# Patient Record
Sex: FEMALE | Race: BLACK OR AFRICAN AMERICAN | NOT HISPANIC OR LATINO | Employment: OTHER | URBAN - METROPOLITAN AREA
[De-identification: names, ages, dates, MRNs, and addresses within clinical notes are randomized per-mention and may not be internally consistent; named-entity substitution may affect disease eponyms.]

---

## 2023-08-23 ENCOUNTER — LAB (OUTPATIENT)
Dept: LAB | Facility: LAB | Age: 88
End: 2023-08-23
Payer: COMMERCIAL

## 2023-08-23 LAB
CHOLESTEROL (MG/DL) IN SER/PLAS: 109 MG/DL (ref 0–199)
CHOLESTEROL IN HDL (MG/DL) IN SER/PLAS: 44.1 MG/DL
CHOLESTEROL/HDL RATIO: 2.5
LDL: 53 MG/DL (ref 0–99)
TRIGLYCERIDE (MG/DL) IN SER/PLAS: 59 MG/DL (ref 0–149)
VLDL: 12 MG/DL (ref 0–40)

## 2023-10-12 DIAGNOSIS — R60.1 GENERALIZED EDEMA: Primary | ICD-10-CM

## 2023-10-12 PROBLEM — R32 INCONTINENCE OF URINE: Status: ACTIVE | Noted: 2023-10-12

## 2023-10-12 PROBLEM — I25.10 ATHEROSCLEROSIS OF CORONARY ARTERY: Status: ACTIVE | Noted: 2022-09-27

## 2023-10-12 PROBLEM — R42 VERTIGO: Status: ACTIVE | Noted: 2022-09-27

## 2023-10-12 PROBLEM — M47.816 LUMBAR SPONDYLOSIS: Status: ACTIVE | Noted: 2023-10-12

## 2023-10-12 PROBLEM — M17.9 OSTEOARTHRITIS OF KNEE: Status: ACTIVE | Noted: 2023-10-12

## 2023-10-12 PROBLEM — E66.9 OBESITY: Status: ACTIVE | Noted: 2023-10-12

## 2023-10-12 PROBLEM — H55.09: Status: ACTIVE | Noted: 2023-10-12

## 2023-10-12 PROBLEM — I48.0 PAROXYSMAL ATRIAL FIBRILLATION (MULTI): Status: ACTIVE | Noted: 2023-10-12

## 2023-10-12 PROBLEM — Z96.1 PSEUDOPHAKIA OF BOTH EYES: Status: ACTIVE | Noted: 2023-10-12

## 2023-10-12 PROBLEM — I63.9 CEREBELLAR STROKE (MULTI): Status: ACTIVE | Noted: 2023-10-12

## 2023-10-12 PROBLEM — D36.10 SCHWANNOMA: Status: ACTIVE | Noted: 2023-10-12

## 2023-10-12 PROBLEM — R33.9 URINARY RETENTION: Status: ACTIVE | Noted: 2023-10-12

## 2023-10-12 PROBLEM — R00.1 BRADYCARDIA: Status: ACTIVE | Noted: 2022-10-02

## 2023-10-12 PROBLEM — I50.30 HEART FAILURE WITH PRESERVED LEFT VENTRICULAR FUNCTION (HFPEF) (MULTI): Status: ACTIVE | Noted: 2023-10-12

## 2023-10-12 PROBLEM — I50.32 CHRONIC DIASTOLIC HEART FAILURE (MULTI): Status: ACTIVE | Noted: 2023-10-12

## 2023-10-12 PROBLEM — D64.9 CHRONIC ANEMIA: Status: ACTIVE | Noted: 2022-09-27

## 2023-10-12 PROBLEM — R22.1 MASS OF PARAPHARYNGEAL SPACE: Status: ACTIVE | Noted: 2023-10-12

## 2023-10-12 PROBLEM — G50.0 TRIGEMINAL NEURALGIA: Status: ACTIVE | Noted: 2023-10-12

## 2023-10-12 PROBLEM — N94.89 ADNEXAL MASS: Status: ACTIVE | Noted: 2023-10-12

## 2023-10-12 PROBLEM — M48.00 SPINAL STENOSIS: Status: ACTIVE | Noted: 2023-10-12

## 2023-10-12 PROBLEM — I51.7 LAE (LEFT ATRIAL ENLARGEMENT): Status: ACTIVE | Noted: 2023-10-12

## 2023-10-12 PROBLEM — E78.5 HYPERLIPIDEMIA: Status: ACTIVE | Noted: 2023-10-12

## 2023-10-12 PROBLEM — K11.8 PAROTID MASS: Status: ACTIVE | Noted: 2023-10-12

## 2023-10-12 PROBLEM — R73.03 PRE-DIABETES: Status: ACTIVE | Noted: 2023-10-12

## 2023-10-12 PROBLEM — K21.9 GERD (GASTROESOPHAGEAL REFLUX DISEASE): Status: ACTIVE | Noted: 2023-10-12

## 2023-10-12 PROBLEM — H90.3 BILATERAL SENSORINEURAL HEARING LOSS: Status: ACTIVE | Noted: 2023-10-12

## 2023-10-12 PROBLEM — G47.33 OBSTRUCTIVE SLEEP APNEA SYNDROME: Status: ACTIVE | Noted: 2019-04-02

## 2023-10-12 PROBLEM — Z86.73 HISTORY OF CVA (CEREBROVASCULAR ACCIDENT): Status: ACTIVE | Noted: 2023-10-12

## 2023-10-12 PROBLEM — R94.31 ABNORMAL ECG: Status: ACTIVE | Noted: 2023-10-12

## 2023-10-12 RX ORDER — CALCIUM CARBONATE 200(500)MG
1 TABLET,CHEWABLE ORAL EVERY 4 HOURS PRN
COMMUNITY
Start: 2022-11-28 | End: 2024-02-27 | Stop reason: WASHOUT

## 2023-10-12 RX ORDER — DOCUSATE SODIUM 100 MG/1
100 CAPSULE, LIQUID FILLED ORAL 2 TIMES DAILY
COMMUNITY
Start: 2022-05-23

## 2023-10-12 RX ORDER — MULTIVITAMIN
1 TABLET ORAL DAILY
COMMUNITY
Start: 2013-08-01

## 2023-10-12 RX ORDER — METOLAZONE 5 MG/1
5 TABLET ORAL
COMMUNITY
Start: 2023-09-06 | End: 2024-01-09 | Stop reason: HOSPADM

## 2023-10-12 RX ORDER — LOSARTAN POTASSIUM 50 MG/1
50 TABLET ORAL DAILY
COMMUNITY
Start: 2023-10-02 | End: 2024-01-09 | Stop reason: HOSPADM

## 2023-10-12 RX ORDER — FUROSEMIDE 40 MG/1
1 TABLET ORAL DAILY
COMMUNITY
Start: 2022-10-24 | End: 2023-12-21 | Stop reason: ENTERED-IN-ERROR

## 2023-10-12 RX ORDER — ATORVASTATIN CALCIUM 20 MG/1
1 TABLET, FILM COATED ORAL NIGHTLY
COMMUNITY
Start: 2019-09-24

## 2023-10-12 RX ORDER — ALBUTEROL SULFATE 90 UG/1
2 AEROSOL, METERED RESPIRATORY (INHALATION) EVERY 4 HOURS PRN
COMMUNITY
Start: 2023-01-27

## 2023-10-12 RX ORDER — GABAPENTIN 100 MG/1
CAPSULE ORAL
COMMUNITY
Start: 2023-05-06 | End: 2023-12-21 | Stop reason: ENTERED-IN-ERROR

## 2023-10-12 RX ORDER — FOLIC ACID 1 MG/1
1 TABLET ORAL DAILY
COMMUNITY

## 2023-10-12 RX ORDER — FAMOTIDINE 20 MG/1
1 TABLET, FILM COATED ORAL DAILY
COMMUNITY
Start: 2022-10-24

## 2023-10-12 RX ORDER — APIXABAN 5 MG/1
5 TABLET, FILM COATED ORAL 2 TIMES DAILY
COMMUNITY
Start: 2023-09-03

## 2023-10-12 RX ORDER — CEPHRADINE 500 MG
1 CAPSULE ORAL DAILY
COMMUNITY
Start: 2022-11-28 | End: 2023-12-21 | Stop reason: ENTERED-IN-ERROR

## 2023-12-21 ENCOUNTER — APPOINTMENT (OUTPATIENT)
Dept: CARDIOLOGY | Facility: HOSPITAL | Age: 88
DRG: 242 | End: 2023-12-21
Payer: COMMERCIAL

## 2023-12-21 ENCOUNTER — APPOINTMENT (OUTPATIENT)
Dept: RADIOLOGY | Facility: HOSPITAL | Age: 88
DRG: 242 | End: 2023-12-21
Payer: COMMERCIAL

## 2023-12-21 ENCOUNTER — HOSPITAL ENCOUNTER (INPATIENT)
Facility: HOSPITAL | Age: 88
LOS: 18 days | Discharge: NURSING, REHAB OR LONG-TERM CARE | DRG: 242 | End: 2024-01-09
Attending: EMERGENCY MEDICINE | Admitting: INTERNAL MEDICINE
Payer: COMMERCIAL

## 2023-12-21 DIAGNOSIS — M79.89 SWELLING OF EXTREMITY, RIGHT: ICD-10-CM

## 2023-12-21 DIAGNOSIS — E87.3 METABOLIC ALKALOSIS: ICD-10-CM

## 2023-12-21 DIAGNOSIS — N18.30 STAGE 3 CHRONIC KIDNEY DISEASE, UNSPECIFIED WHETHER STAGE 3A OR 3B CKD (MULTI): ICD-10-CM

## 2023-12-21 DIAGNOSIS — N14.11 CONTRAST-INDUCED NEPHROPATHY: ICD-10-CM

## 2023-12-21 DIAGNOSIS — R60.9 SWELLING: ICD-10-CM

## 2023-12-21 DIAGNOSIS — E66.01 MORBID OBESITY (MULTI): ICD-10-CM

## 2023-12-21 DIAGNOSIS — T50.8X5A CONTRAST-INDUCED NEPHROPATHY: ICD-10-CM

## 2023-12-21 DIAGNOSIS — I50.41 ACUTE COMBINED SYSTOLIC AND DIASTOLIC HEART FAILURE (MULTI): ICD-10-CM

## 2023-12-21 DIAGNOSIS — I50.30 HEART FAILURE WITH PRESERVED LEFT VENTRICULAR FUNCTION (HFPEF) (MULTI): ICD-10-CM

## 2023-12-21 DIAGNOSIS — I48.0 PAROXYSMAL ATRIAL FIBRILLATION (MULTI): ICD-10-CM

## 2023-12-21 DIAGNOSIS — D63.1 ANEMIA DUE TO STAGE 3A CHRONIC KIDNEY DISEASE (MULTI): ICD-10-CM

## 2023-12-21 DIAGNOSIS — J90 PLEURAL EFFUSION: ICD-10-CM

## 2023-12-21 DIAGNOSIS — R45.1 AGITATION: ICD-10-CM

## 2023-12-21 DIAGNOSIS — R06.02 SHORTNESS OF BREATH: ICD-10-CM

## 2023-12-21 DIAGNOSIS — R79.89 ELEVATED BRAIN NATRIURETIC PEPTIDE (BNP) LEVEL: ICD-10-CM

## 2023-12-21 DIAGNOSIS — N18.31 STAGE 3A CHRONIC KIDNEY DISEASE (MULTI): ICD-10-CM

## 2023-12-21 DIAGNOSIS — E11.22 TYPE 2 DIABETES MELLITUS WITH CHRONIC KIDNEY DISEASE, WITHOUT LONG-TERM CURRENT USE OF INSULIN, UNSPECIFIED CKD STAGE (MULTI): ICD-10-CM

## 2023-12-21 DIAGNOSIS — R06.81 APNEA: ICD-10-CM

## 2023-12-21 DIAGNOSIS — I50.31 ACUTE HEART FAILURE WITH PRESERVED EJECTION FRACTION (MULTI): ICD-10-CM

## 2023-12-21 DIAGNOSIS — I50.33 ACUTE ON CHRONIC HEART FAILURE WITH PRESERVED EJECTION FRACTION (MULTI): Primary | ICD-10-CM

## 2023-12-21 DIAGNOSIS — N17.9 AKI (ACUTE KIDNEY INJURY) (CMS-HCC): ICD-10-CM

## 2023-12-21 DIAGNOSIS — N18.31 ANEMIA DUE TO STAGE 3A CHRONIC KIDNEY DISEASE (MULTI): ICD-10-CM

## 2023-12-21 PROBLEM — R73.03 PRE-DIABETES: Status: RESOLVED | Noted: 2023-10-12 | Resolved: 2023-12-21

## 2023-12-21 PROBLEM — J96.01 ACUTE RESPIRATORY FAILURE WITH HYPOXIA (MULTI): Status: ACTIVE | Noted: 2023-12-21

## 2023-12-21 LAB
ALBUMIN SERPL BCP-MCNC: 4.1 G/DL (ref 3.4–5)
ALP SERPL-CCNC: 91 U/L (ref 33–136)
ALT SERPL W P-5'-P-CCNC: 9 U/L (ref 7–45)
ANION GAP SERPL CALC-SCNC: 11 MMOL/L (ref 10–20)
AST SERPL W P-5'-P-CCNC: 11 U/L (ref 9–39)
BASOPHILS # BLD AUTO: 0.01 X10*3/UL (ref 0–0.1)
BASOPHILS NFR BLD AUTO: 0.2 %
BILIRUB SERPL-MCNC: 0.8 MG/DL (ref 0–1.2)
BNP SERPL-MCNC: 982 PG/ML (ref 0–99)
BUN SERPL-MCNC: 26 MG/DL (ref 6–23)
CALCIUM SERPL-MCNC: 8.7 MG/DL (ref 8.6–10.3)
CARDIAC TROPONIN I PNL SERPL HS: 8 NG/L (ref 0–13)
CARDIAC TROPONIN I PNL SERPL HS: 9 NG/L (ref 0–13)
CHLORIDE SERPL-SCNC: 104 MMOL/L (ref 98–107)
CO2 SERPL-SCNC: 31 MMOL/L (ref 21–32)
CREAT SERPL-MCNC: 1.09 MG/DL (ref 0.5–1.05)
EOSINOPHIL # BLD AUTO: 0.15 X10*3/UL (ref 0–0.4)
EOSINOPHIL NFR BLD AUTO: 2.7 %
ERYTHROCYTE [DISTWIDTH] IN BLOOD BY AUTOMATED COUNT: 17.2 % (ref 11.5–14.5)
GFR SERPL CREATININE-BSD FRML MDRD: 50 ML/MIN/1.73M*2
GLUCOSE BLD MANUAL STRIP-MCNC: 80 MG/DL (ref 74–99)
GLUCOSE SERPL-MCNC: 109 MG/DL (ref 74–99)
HCT VFR BLD AUTO: 30.7 % (ref 36–46)
HGB BLD-MCNC: 8.6 G/DL (ref 12–16)
IMM GRANULOCYTES # BLD AUTO: 0.02 X10*3/UL (ref 0–0.5)
IMM GRANULOCYTES NFR BLD AUTO: 0.4 % (ref 0–0.9)
LYMPHOCYTES # BLD AUTO: 0.76 X10*3/UL (ref 0.8–3)
LYMPHOCYTES NFR BLD AUTO: 13.9 %
MAGNESIUM SERPL-MCNC: 2.12 MG/DL (ref 1.6–2.4)
MCH RBC QN AUTO: 28.5 PG (ref 26–34)
MCHC RBC AUTO-ENTMCNC: 28 G/DL (ref 32–36)
MCV RBC AUTO: 102 FL (ref 80–100)
MONOCYTES # BLD AUTO: 0.66 X10*3/UL (ref 0.05–0.8)
MONOCYTES NFR BLD AUTO: 12.1 %
NEUTROPHILS # BLD AUTO: 3.86 X10*3/UL (ref 1.6–5.5)
NEUTROPHILS NFR BLD AUTO: 70.7 %
NRBC BLD-RTO: 0 /100 WBCS (ref 0–0)
PLATELET # BLD AUTO: 155 X10*3/UL (ref 150–450)
POTASSIUM SERPL-SCNC: 4.8 MMOL/L (ref 3.5–5.3)
PROT SERPL-MCNC: 6.2 G/DL (ref 6.4–8.2)
RBC # BLD AUTO: 3.02 X10*6/UL (ref 4–5.2)
SODIUM SERPL-SCNC: 141 MMOL/L (ref 136–145)
WBC # BLD AUTO: 5.5 X10*3/UL (ref 4.4–11.3)

## 2023-12-21 PROCEDURE — 83880 ASSAY OF NATRIURETIC PEPTIDE: CPT | Performed by: EMERGENCY MEDICINE

## 2023-12-21 PROCEDURE — 83735 ASSAY OF MAGNESIUM: CPT | Performed by: STUDENT IN AN ORGANIZED HEALTH CARE EDUCATION/TRAINING PROGRAM

## 2023-12-21 PROCEDURE — 36415 COLL VENOUS BLD VENIPUNCTURE: CPT | Performed by: PHYSICIAN ASSISTANT

## 2023-12-21 PROCEDURE — 99222 1ST HOSP IP/OBS MODERATE 55: CPT | Performed by: NURSE PRACTITIONER

## 2023-12-21 PROCEDURE — 71045 X-RAY EXAM CHEST 1 VIEW: CPT | Mod: FOREIGN READ | Performed by: RADIOLOGY

## 2023-12-21 PROCEDURE — 71275 CT ANGIOGRAPHY CHEST: CPT | Mod: FR

## 2023-12-21 PROCEDURE — 80053 COMPREHEN METABOLIC PANEL: CPT | Performed by: STUDENT IN AN ORGANIZED HEALTH CARE EDUCATION/TRAINING PROGRAM

## 2023-12-21 PROCEDURE — 84075 ASSAY ALKALINE PHOSPHATASE: CPT | Performed by: STUDENT IN AN ORGANIZED HEALTH CARE EDUCATION/TRAINING PROGRAM

## 2023-12-21 PROCEDURE — 99285 EMERGENCY DEPT VISIT HI MDM: CPT | Mod: 25 | Performed by: EMERGENCY MEDICINE

## 2023-12-21 PROCEDURE — 2500000001 HC RX 250 WO HCPCS SELF ADMINISTERED DRUGS (ALT 637 FOR MEDICARE OP): Performed by: NURSE PRACTITIONER

## 2023-12-21 PROCEDURE — 84484 ASSAY OF TROPONIN QUANT: CPT | Performed by: STUDENT IN AN ORGANIZED HEALTH CARE EDUCATION/TRAINING PROGRAM

## 2023-12-21 PROCEDURE — 2500000004 HC RX 250 GENERAL PHARMACY W/ HCPCS (ALT 636 FOR OP/ED): Performed by: PHYSICIAN ASSISTANT

## 2023-12-21 PROCEDURE — 2550000001 HC RX 255 CONTRASTS: Performed by: EMERGENCY MEDICINE

## 2023-12-21 PROCEDURE — 83880 ASSAY OF NATRIURETIC PEPTIDE: CPT | Performed by: STUDENT IN AN ORGANIZED HEALTH CARE EDUCATION/TRAINING PROGRAM

## 2023-12-21 PROCEDURE — G0378 HOSPITAL OBSERVATION PER HR: HCPCS

## 2023-12-21 PROCEDURE — 82947 ASSAY GLUCOSE BLOOD QUANT: CPT

## 2023-12-21 PROCEDURE — 93005 ELECTROCARDIOGRAM TRACING: CPT

## 2023-12-21 PROCEDURE — 71045 X-RAY EXAM CHEST 1 VIEW: CPT | Mod: FR

## 2023-12-21 PROCEDURE — 85025 COMPLETE CBC W/AUTO DIFF WBC: CPT | Performed by: STUDENT IN AN ORGANIZED HEALTH CARE EDUCATION/TRAINING PROGRAM

## 2023-12-21 PROCEDURE — 71275 CT ANGIOGRAPHY CHEST: CPT | Mod: FOREIGN READ | Performed by: RADIOLOGY

## 2023-12-21 PROCEDURE — 36415 COLL VENOUS BLD VENIPUNCTURE: CPT | Performed by: STUDENT IN AN ORGANIZED HEALTH CARE EDUCATION/TRAINING PROGRAM

## 2023-12-21 PROCEDURE — 99285 EMERGENCY DEPT VISIT HI MDM: CPT | Performed by: PHYSICIAN ASSISTANT

## 2023-12-21 PROCEDURE — 94760 N-INVAS EAR/PLS OXIMETRY 1: CPT

## 2023-12-21 RX ORDER — FUROSEMIDE 10 MG/ML
40 INJECTION INTRAMUSCULAR; INTRAVENOUS ONCE
Status: COMPLETED | OUTPATIENT
Start: 2023-12-21 | End: 2023-12-21

## 2023-12-21 RX ORDER — DOCUSATE SODIUM 100 MG/1
100 CAPSULE, LIQUID FILLED ORAL 2 TIMES DAILY
Status: DISCONTINUED | OUTPATIENT
Start: 2023-12-21 | End: 2024-01-07

## 2023-12-21 RX ORDER — ALBUTEROL SULFATE 0.83 MG/ML
2.5 SOLUTION RESPIRATORY (INHALATION) EVERY 4 HOURS PRN
Status: DISCONTINUED | OUTPATIENT
Start: 2023-12-21 | End: 2024-01-09 | Stop reason: HOSPADM

## 2023-12-21 RX ORDER — INSULIN LISPRO 100 [IU]/ML
0-5 INJECTION, SOLUTION INTRAVENOUS; SUBCUTANEOUS
Status: DISCONTINUED | OUTPATIENT
Start: 2023-12-21 | End: 2024-01-07

## 2023-12-21 RX ORDER — NYSTATIN 100000 [USP'U]/G
1 POWDER TOPICAL 2 TIMES DAILY PRN
COMMUNITY

## 2023-12-21 RX ORDER — CYCLOBENZAPRINE HCL 10 MG
10 TABLET ORAL 2 TIMES DAILY PRN
Status: DISCONTINUED | OUTPATIENT
Start: 2023-12-21 | End: 2024-01-09 | Stop reason: HOSPADM

## 2023-12-21 RX ORDER — METOLAZONE 5 MG/1
5 TABLET ORAL
Status: DISCONTINUED | OUTPATIENT
Start: 2023-12-27 | End: 2023-12-25

## 2023-12-21 RX ORDER — ALBUTEROL SULFATE 90 UG/1
2 AEROSOL, METERED RESPIRATORY (INHALATION) EVERY 4 HOURS PRN
Status: DISCONTINUED | OUTPATIENT
Start: 2023-12-21 | End: 2023-12-21

## 2023-12-21 RX ORDER — SENNOSIDES 8.6 MG/1
1 TABLET ORAL 2 TIMES DAILY
Status: DISCONTINUED | OUTPATIENT
Start: 2023-12-21 | End: 2024-01-07

## 2023-12-21 RX ORDER — SENNOSIDES 8.6 MG/1
1 TABLET ORAL 2 TIMES DAILY
COMMUNITY

## 2023-12-21 RX ORDER — ACETAMINOPHEN 325 MG/1
650 TABLET ORAL EVERY 6 HOURS PRN
Status: DISCONTINUED | OUTPATIENT
Start: 2023-12-21 | End: 2024-01-07

## 2023-12-21 RX ORDER — CYANOCOBALAMIN (VITAMIN B-12) 250 MCG
250 TABLET ORAL DAILY
COMMUNITY

## 2023-12-21 RX ORDER — FUROSEMIDE 10 MG/ML
40 INJECTION INTRAMUSCULAR; INTRAVENOUS 2 TIMES DAILY
Status: DISCONTINUED | OUTPATIENT
Start: 2023-12-22 | End: 2023-12-25

## 2023-12-21 RX ORDER — DEXTROSE MONOHYDRATE 100 MG/ML
0.3 INJECTION, SOLUTION INTRAVENOUS ONCE AS NEEDED
Status: DISCONTINUED | OUTPATIENT
Start: 2023-12-21 | End: 2024-01-09 | Stop reason: HOSPADM

## 2023-12-21 RX ORDER — FAMOTIDINE 20 MG/1
20 TABLET, FILM COATED ORAL DAILY
Status: DISCONTINUED | OUTPATIENT
Start: 2023-12-22 | End: 2023-12-29

## 2023-12-21 RX ORDER — POLYETHYLENE GLYCOL 3350 17 G/17G
17 POWDER, FOR SOLUTION ORAL DAILY
Status: DISCONTINUED | OUTPATIENT
Start: 2023-12-22 | End: 2024-01-07

## 2023-12-21 RX ORDER — ACETAMINOPHEN 325 MG/1
500 TABLET ORAL 2 TIMES DAILY
Status: DISCONTINUED | OUTPATIENT
Start: 2023-12-21 | End: 2024-01-07

## 2023-12-21 RX ORDER — POLYETHYLENE GLYCOL 3350 17 G/17G
17 POWDER, FOR SOLUTION ORAL DAILY
COMMUNITY

## 2023-12-21 RX ORDER — LOSARTAN POTASSIUM 50 MG/1
50 TABLET ORAL DAILY
Status: DISCONTINUED | OUTPATIENT
Start: 2023-12-22 | End: 2023-12-27

## 2023-12-21 RX ORDER — CALCIUM CARBONATE 200(500)MG
1 TABLET,CHEWABLE ORAL EVERY 4 HOURS PRN
Status: DISCONTINUED | OUTPATIENT
Start: 2023-12-21 | End: 2024-01-09 | Stop reason: HOSPADM

## 2023-12-21 RX ORDER — ATORVASTATIN CALCIUM 20 MG/1
20 TABLET, FILM COATED ORAL NIGHTLY
Status: DISCONTINUED | OUTPATIENT
Start: 2023-12-21 | End: 2024-01-09 | Stop reason: HOSPADM

## 2023-12-21 RX ORDER — CYCLOBENZAPRINE HCL 10 MG
10 TABLET ORAL 2 TIMES DAILY PRN
COMMUNITY
End: 2024-02-27 | Stop reason: WASHOUT

## 2023-12-21 RX ORDER — DEXTROSE 50 % IN WATER (D50W) INTRAVENOUS SYRINGE
25
Status: DISCONTINUED | OUTPATIENT
Start: 2023-12-21 | End: 2024-01-09 | Stop reason: HOSPADM

## 2023-12-21 RX ORDER — ACETAMINOPHEN 500 MG
500 TABLET ORAL 2 TIMES DAILY
COMMUNITY
End: 2024-04-30 | Stop reason: WASHOUT

## 2023-12-21 RX ORDER — ACETAMINOPHEN 325 MG/1
650 TABLET ORAL EVERY 4 HOURS PRN
COMMUNITY

## 2023-12-21 RX ADMIN — ATORVASTATIN CALCIUM 20 MG: 20 TABLET, FILM COATED ORAL at 21:30

## 2023-12-21 RX ADMIN — ACETAMINOPHEN 487.5 MG: 325 TABLET ORAL at 21:31

## 2023-12-21 RX ADMIN — IOHEXOL 60 ML: 350 INJECTION, SOLUTION INTRAVENOUS at 14:56

## 2023-12-21 RX ADMIN — FUROSEMIDE 40 MG: 10 INJECTION, SOLUTION INTRAMUSCULAR; INTRAVENOUS at 13:33

## 2023-12-21 RX ADMIN — APIXABAN 5 MG: 5 TABLET, FILM COATED ORAL at 21:30

## 2023-12-21 SDOH — SOCIAL STABILITY: SOCIAL INSECURITY: WERE YOU ABLE TO COMPLETE ALL THE BEHAVIORAL HEALTH SCREENINGS?: YES

## 2023-12-21 SDOH — SOCIAL STABILITY: SOCIAL INSECURITY: DOES ANYONE TRY TO KEEP YOU FROM HAVING/CONTACTING OTHER FRIENDS OR DOING THINGS OUTSIDE YOUR HOME?: YES

## 2023-12-21 SDOH — SOCIAL STABILITY: SOCIAL INSECURITY: ABUSE: ADULT

## 2023-12-21 SDOH — HEALTH STABILITY: MENTAL HEALTH: EXPERIENCED ANY OF THE FOLLOWING LIFE EVENTS: DEATH OF FAMILY/FRIEND

## 2023-12-21 SDOH — SOCIAL STABILITY: SOCIAL INSECURITY: DO YOU FEEL UNSAFE GOING BACK TO THE PLACE WHERE YOU ARE LIVING?: YES

## 2023-12-21 SDOH — SOCIAL STABILITY: SOCIAL INSECURITY: DO YOU FEEL ANYONE HAS EXPLOITED OR TAKEN ADVANTAGE OF YOU FINANCIALLY OR OF YOUR PERSONAL PROPERTY?: YES

## 2023-12-21 SDOH — SOCIAL STABILITY: SOCIAL INSECURITY: ARE YOU OR HAVE YOU BEEN THREATENED OR ABUSED PHYSICALLY, EMOTIONALLY, OR SEXUALLY BY ANYONE?: YES

## 2023-12-21 SDOH — SOCIAL STABILITY: SOCIAL INSECURITY: ARE THERE ANY APPARENT SIGNS OF INJURIES/BEHAVIORS THAT COULD BE RELATED TO ABUSE/NEGLECT?: YES

## 2023-12-21 SDOH — SOCIAL STABILITY: SOCIAL INSECURITY: HAVE YOU HAD THOUGHTS OF HARMING ANYONE ELSE?: NO

## 2023-12-21 SDOH — SOCIAL STABILITY: SOCIAL INSECURITY: POSSIBLE ABUSE REPORTED TO:: OTHER (COMMENT)

## 2023-12-21 SDOH — SOCIAL STABILITY: SOCIAL INSECURITY: HAS ANYONE EVER THREATENED TO HURT YOUR FAMILY OR YOUR PETS?: YES

## 2023-12-21 ASSESSMENT — ENCOUNTER SYMPTOMS
COUGH: 0
MYALGIAS: 0
EYE REDNESS: 0
BLOOD IN STOOL: 0
SHORTNESS OF BREATH: 1
WOUND: 0
NAUSEA: 0
NUMBNESS: 0
HALLUCINATIONS: 0
DIFFICULTY URINATING: 0
HEADACHES: 1
PALPITATIONS: 0
DYSURIA: 0
ACTIVITY CHANGE: 0
EYE PAIN: 0
ABDOMINAL DISTENTION: 0
ROS GI COMMENTS: OBESE
FREQUENCY: 0
CHEST TIGHTNESS: 0
RHINORRHEA: 0
ARTHRALGIAS: 0
POLYPHAGIA: 0
POLYDIPSIA: 0
APPETITE CHANGE: 0
FLANK PAIN: 0
WHEEZING: 0
CHILLS: 0
EYE DISCHARGE: 0
CONFUSION: 0
DIARRHEA: 0
FEVER: 0
DIZZINESS: 1
FACIAL ASYMMETRY: 1
AGITATION: 0
VOMITING: 0
CONSTIPATION: 0

## 2023-12-21 ASSESSMENT — LIFESTYLE VARIABLES
HOW OFTEN DO YOU HAVE A DRINK CONTAINING ALCOHOL: NEVER
SKIP TO QUESTIONS 9-10: 1
HAVE YOU EVER FELT YOU SHOULD CUT DOWN ON YOUR DRINKING: NO
HOW MANY STANDARD DRINKS CONTAINING ALCOHOL DO YOU HAVE ON A TYPICAL DAY: PATIENT DOES NOT DRINK
REASON UNABLE TO ASSESS: NO
SUBSTANCE_ABUSE_PAST_12_MONTHS: NO
PRESCIPTION_ABUSE_PAST_12_MONTHS: NO
HOW OFTEN DO YOU HAVE 6 OR MORE DRINKS ON ONE OCCASION: NEVER
AUDIT-C TOTAL SCORE: 0
AUDIT-C TOTAL SCORE: 0
HAVE PEOPLE ANNOYED YOU BY CRITICIZING YOUR DRINKING: NO
EVER FELT BAD OR GUILTY ABOUT YOUR DRINKING: NO
EVER HAD A DRINK FIRST THING IN THE MORNING TO STEADY YOUR NERVES TO GET RID OF A HANGOVER: NO

## 2023-12-21 ASSESSMENT — ACTIVITIES OF DAILY LIVING (ADL)
ADEQUATE_TO_COMPLETE_ADL: YES
FEEDING YOURSELF: INDEPENDENT
HEARING - RIGHT EAR: FUNCTIONAL
DRESSING YOURSELF: NEEDS ASSISTANCE
JUDGMENT_ADEQUATE_SAFELY_COMPLETE_DAILY_ACTIVITIES: YES
WALKS IN HOME: NEEDS ASSISTANCE
LACK_OF_TRANSPORTATION: NO
LACK_OF_TRANSPORTATION: NO
TOILETING: NEEDS ASSISTANCE
ASSISTIVE_DEVICE: WHEELCHAIR
HEARING - LEFT EAR: FUNCTIONAL
GROOMING: NEEDS ASSISTANCE
PATIENT'S MEMORY ADEQUATE TO SAFELY COMPLETE DAILY ACTIVITIES?: YES
BATHING: NEEDS ASSISTANCE

## 2023-12-21 ASSESSMENT — PAIN - FUNCTIONAL ASSESSMENT
PAIN_FUNCTIONAL_ASSESSMENT: 0-10
PAIN_FUNCTIONAL_ASSESSMENT: 0-10

## 2023-12-21 ASSESSMENT — COGNITIVE AND FUNCTIONAL STATUS - GENERAL
MOVING TO AND FROM BED TO CHAIR: A LOT
WALKING IN HOSPITAL ROOM: TOTAL
DRESSING REGULAR UPPER BODY CLOTHING: A LITTLE
PATIENT BASELINE BEDBOUND: NO
HELP NEEDED FOR BATHING: A LITTLE
STANDING UP FROM CHAIR USING ARMS: A LOT
DRESSING REGULAR LOWER BODY CLOTHING: A LITTLE
TURNING FROM BACK TO SIDE WHILE IN FLAT BAD: A LOT
DAILY ACTIVITIY SCORE: 20
MOBILITY SCORE: 11
CLIMB 3 TO 5 STEPS WITH RAILING: TOTAL
MOVING FROM LYING ON BACK TO SITTING ON SIDE OF FLAT BED WITH BEDRAILS: A LITTLE
TOILETING: A LITTLE

## 2023-12-21 ASSESSMENT — COLUMBIA-SUICIDE SEVERITY RATING SCALE - C-SSRS
2. HAVE YOU ACTUALLY HAD ANY THOUGHTS OF KILLING YOURSELF?: NO
6. HAVE YOU EVER DONE ANYTHING, STARTED TO DO ANYTHING, OR PREPARED TO DO ANYTHING TO END YOUR LIFE?: NO
1. IN THE PAST MONTH, HAVE YOU WISHED YOU WERE DEAD OR WISHED YOU COULD GO TO SLEEP AND NOT WAKE UP?: NO

## 2023-12-21 ASSESSMENT — PAIN SCALES - PAIN ASSESSMENT IN ADVANCED DEMENTIA (PAINAD): BREATHING: NORMAL

## 2023-12-21 ASSESSMENT — PATIENT HEALTH QUESTIONNAIRE - PHQ9
2. FEELING DOWN, DEPRESSED OR HOPELESS: NOT AT ALL
SUM OF ALL RESPONSES TO PHQ9 QUESTIONS 1 & 2: 0
1. LITTLE INTEREST OR PLEASURE IN DOING THINGS: NOT AT ALL

## 2023-12-21 ASSESSMENT — PAIN DESCRIPTION - LOCATION
LOCATION: GENERALIZED
LOCATION: OTHER (COMMENT)

## 2023-12-21 ASSESSMENT — PAIN SCALES - WONG BAKER: WONGBAKER_NUMERICALRESPONSE: HURTS LITTLE MORE

## 2023-12-21 ASSESSMENT — PAIN SCALES - GENERAL: PAINLEVEL_OUTOF10: 4

## 2023-12-21 NOTE — PROGRESS NOTES
Emergency Medicine Transition of Care Note.    I received Fifi Jenkins in signout from Areli Santana PA-C.  Please see the previous ED provider note for all HPI, PE and MDM up to the time of signout at 12/21/23 1600. This is in addition to the primary record.    In brief Fifi Jenkins is an 87 y.o. female presenting for dyspnea with new oxygen requirement on 3 L via NC.  At the time of signout we were awaiting: CT PE study.    CT PE negative for evidence of pulmonary embolism, noted small bilateral pleural effusions and mild bibasilar areas of infiltrate or atelectasis.    Her oxygen was turned off and soon after, she desatted again down to 85% and was turned back on 3 L via NC with subsequent normalization of her oxygen saturation to 98%.  Low clinical evidence of pneumonia.  With concern for CHF exacerbation with new oxygen requirement, the patient was admitted for further diuresis.    ED Course as of 12/24/23 1413   Thu Dec 21, 2023   1216 EKG similar to appearance from 1 on July 25, 2023, patient does have T wave inversions in V2 and V3 however this was also present on previous EKG. [MK]      ED Course User Index  [MK] Areli Santana PA-C         Diagnoses as of 12/24/23 1413   Shortness of breath   Elevated brain natriuretic peptide (BNP) level   Pleural effusion   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 (CMS/HCC)       Medical Decision Making      Final diagnoses:   [R06.02] Shortness of breath   [R79.89] Elevated brain natriuretic peptide (BNP) level   [J90] Pleural effusion           Procedure  Procedures    Oli Foster PA-C

## 2023-12-21 NOTE — Clinical Note
The PACEMAKER, DUAL CHAMBER, PREMA MRI XT DR - [id number] device was inserted. The leads were placed into the connector and visually verified to be in correct position. Injury current not obtained.

## 2023-12-21 NOTE — ED PROVIDER NOTES
HPI   Chief Complaint   Patient presents with    Shortness of Breath       Patient is an 87-year-old female with history of heart failure with EF of 55%, paroxysmal A-fib on Eliquis, hyperlipidemia, hypertension who presents today for evaluation of 2 weeks of shortness of breath, patient states it started gradually, she states she is also had a cough but she does not feel like she is sick, no nasal congestion or fevers.  She states that when her shortness of breath gets worse specifically with exertion she does have some burning left-sided chest pain that is nonradiating.  Patient states been on and off for the past 2 weeks.  She never had a heart attack but does have hypertension and hyperlipidemia which is controlled with medication.  She denies specific weight gain but does endorse that her legs are always swollen, states she normally gets out of bed and into a wheelchair and when her legs are in the wheelchair all day the legs tend to swell more, both of them are equally swollen, denies recent surgeries hospitalizations or travel, denies history of malignancy or hormone use.  She denies any hemoptysis.  Patient is not sure whether that she feels fluid overloaded, states that she is incontinent at baseline so she does not notice any real change with the Lasix that she is supposed to be taking.  Patient tells me she did not take any of her medications today secondary to not eating, states she normally takes her medications daily unless she is going to the doctors or is not eating.  Denies any abdominal pain or back pain.  She does endorse a chronic headache secondary to trigeminal neuralgia, states is in the same spot as it always is, no changes.                            [place] Coma Scale Score: 15                  Patient History   Past Medical History:   Diagnosis Date    Elevated blood-pressure reading, without diagnosis of hypertension 05/23/2018    Elevated BP without diagnosis of hypertension    Other  chest pain 11/06/2015    Atypical chest pain    Other nonspecific abnormal finding of lung field 07/31/2013    Radiologic findings of lung field, abnormal    Other symptoms and signs involving the musculoskeletal system 10/22/2015    Leg weakness, bilateral    Personal history of other diseases of the circulatory system 11/28/2022    History of cardiomyopathy    Personal history of other endocrine, nutritional and metabolic disease 10/22/2015    History of diabetes mellitus    Personal history of transient ischemic attack (TIA), and cerebral infarction without residual deficits 10/28/2020    History of cerebrovascular accident    Polyp of colon 08/27/2018    Benign colonic polyp    Right upper quadrant pain 10/22/2015    Abdominal pain, RUQ (right upper quadrant)    Unspecified systolic (congestive) heart failure (CMS/HCC) 06/08/2021    HFrEF (heart failure with reduced ejection fraction)    Unspecified visual disturbance 10/17/2017    Change in vision     Past Surgical History:   Procedure Laterality Date    ANKLE SURGERY  08/01/2013    Ankle Surgery    APPENDECTOMY  02/21/2013    Appendectomy    BREAST BIOPSY  04/02/2013    Biopsy Breast Percutaneous Needle Core    CARPAL TUNNEL RELEASE  02/21/2013    Neuroplasty Decompression Median Nerve At Carpal Tunnel    CHOLECYSTECTOMY  02/21/2013    Cholecystectomy    COLONOSCOPY  12/22/2015    Complete Colonoscopy    CT ANGIO HEART CORONARY  2/26/2019    CT HEART CORONARY ANGIOGRAM 2/26/2019 [facility]    CT ANGIO NECK W AND WO IV CONTRAST  2/23/2019    CT NECK ANGIO W AND WO IV CONTRAST 2/23/2019 [facility]ICAL [facility]    CT HEAD ANGIO W AND WO IV CONTRAST  2/23/2019    CT HEAD ANGIO W AND WO IV CONTRAST 2/23/2019 [facility]    OTHER SURGICAL HISTORY  02/11/2020    Cataract surgery    OTHER SURGICAL HISTORY  12/22/2018    Upper Gastrointestinal Endoscopy (Therapeutic)    ROTATOR CUFF REPAIR  04/02/2013    Rotator Cuff Repair    TOTAL ABDOMINAL  HYSTERECTOMY  02/21/2013    Total Abdominal Hysterectomy    TOTAL KNEE ARTHROPLASTY  08/01/2013    Knee Replacement     No family history on file.  Social History     Tobacco Use    Smoking status: Not on file    Smokeless tobacco: Not on file   Substance Use Topics    Alcohol use: Not on file    Drug use: Not on file       Physical Exam   ED Triage Vitals [12/21/23 1111]   Temp Heart Rate Resp BP   36.1 °C (97 °F) 90 22 148/70      SpO2 Temp Source Heart Rate Source Patient Position   100 % Temporal Monitor --      BP Location FiO2 (%)     -- --       Physical Exam  Vitals and nursing note reviewed.   Constitutional:       General: She is not in acute distress.     Appearance: Normal appearance. She is obese. She is not toxic-appearing.   HENT:      Head: Normocephalic and atraumatic.      Nose: Nose normal.   Eyes:      Extraocular Movements: Extraocular movements intact.   Cardiovascular:      Rate and Rhythm: Normal rate and regular rhythm.   Pulmonary:      Effort: Pulmonary effort is normal.      Breath sounds: Examination of the right-lower field reveals rhonchi. Examination of the left-lower field reveals rhonchi. Rhonchi present.   Abdominal:      Palpations: Abdomen is soft.   Musculoskeletal:         General: Normal range of motion.      Cervical back: Normal range of motion and neck supple.      Right lower leg: No tenderness. Edema present.      Left lower leg: No tenderness. Edema present.   Skin:     General: Skin is warm and dry.   Neurological:      General: No focal deficit present.      Mental Status: She is alert.   Psychiatric:         Mood and Affect: Mood normal.         Thought Content: Thought content normal.       CT angio chest for pulmonary embolism   Final Result   No CT evidence of pulmonary embolism.   Scattered emphysematous changes with small bilateral pleural effusions   and mild bibasilar areas of infiltrate or atelectasis.   Signed by Steve Ramírez MD      XR chest 1 view   Final  Result   Mild cardiomegaly with small left pleural effusion.   Signed by Steve Ramírez MD        Labs Reviewed   CBC WITH AUTO DIFFERENTIAL - Abnormal       Result Value    WBC 5.5      nRBC 0.0      RBC 3.02 (*)     Hemoglobin 8.6 (*)     Hematocrit 30.7 (*)      (*)     MCH 28.5      MCHC 28.0 (*)     RDW 17.2 (*)     Platelets 155      Neutrophils % 70.7      Immature Granulocytes %, Automated 0.4      Lymphocytes % 13.9      Monocytes % 12.1      Eosinophils % 2.7      Basophils % 0.2      Neutrophils Absolute 3.86      Immature Granulocytes Absolute, Automated 0.02      Lymphocytes Absolute 0.76 (*)     Monocytes Absolute 0.66      Eosinophils Absolute 0.15      Basophils Absolute 0.01     COMPREHENSIVE METABOLIC PANEL - Abnormal    Glucose 109 (*)     Sodium 141      Potassium 4.8      Chloride 104      Bicarbonate 31      Anion Gap 11      Urea Nitrogen 26 (*)     Creatinine 1.09 (*)     eGFR 50 (*)     Calcium 8.7      Albumin 4.1      Alkaline Phosphatase 91      Total Protein 6.2 (*)     AST 11      Bilirubin, Total 0.8      ALT 9     B-TYPE NATRIURETIC PEPTIDE - Abnormal     (*)     Narrative:        <100 pg/mL - Heart failure unlikely  100-299 pg/mL - Intermediate probability of acute heart                  failure exacerbation. Correlate with clinical                  context and patient history.    >=300 pg/mL - Heart Failure likely. Correlate with clinical                  context and patient history.    BNP testing is performed using different testing methodology at [facility] than at other [facility] hospitals. Direct result comparisons should only be made within the same method.      MAGNESIUM - Normal    Magnesium 2.12     SERIAL TROPONIN-INITIAL - Normal    Troponin I, High Sensitivity 8      Narrative:     Less than 99th percentile of normal range cutoff-  Female and children under 18 years old <14 ng/L; Male <21 ng/L: Negative  Repeat testing should be performed if  clinically indicated.     Female and children under 18 years old 14-50 ng/L; Male 21-50 ng/L:  Consistent with possible cardiac damage and possible increased clinical   risk. Serial measurements may help to assess extent of myocardial damage.     >50 ng/L: Consistent with cardiac damage, increased clinical risk and  myocardial infarction. Serial measurements may help assess extent of   myocardial damage.      NOTE: Children less than 1 year old may have higher baseline troponin   levels and results should be interpreted in conjunction with the overall   clinical context.     NOTE: Troponin I testing is performed using a different   testing methodology at [facility] than at other   [facility]. Direct result comparisons should only   be made within the same method.   SERIAL TROPONIN, 1 HOUR - Normal    Troponin I, High Sensitivity 9      Narrative:     Less than 99th percentile of normal range cutoff-  Female and children under 18 years old <14 ng/L; Male <21 ng/L: Negative  Repeat testing should be performed if clinically indicated.     Female and children under 18 years old 14-50 ng/L; Male 21-50 ng/L:  Consistent with possible cardiac damage and possible increased clinical   risk. Serial measurements may help to assess extent of myocardial damage.     >50 ng/L: Consistent with cardiac damage, increased clinical risk and  myocardial infarction. Serial measurements may help assess extent of   myocardial damage.      NOTE: Children less than 1 year old may have higher baseline troponin   levels and results should be interpreted in conjunction with the overall   clinical context.     NOTE: Troponin I testing is performed using a different   testing methodology at [facility] than at other   [facility]. Direct result comparisons should only   be made within the same method.   TROPONIN SERIES- (INITIAL, 1 HR)    Narrative:     The following orders were created for panel order  Troponin I Series, High Sensitivity (0, 1 HR).  Procedure                               Abnormality         Status                     ---------                               -----------         ------                     Troponin I, High Sensiti...[[id number]]  Normal              Final result               Troponin, High Sensitivi...[[id number]]  Normal              Final result                 Please view results for these tests on the individual orders.         ED Course & MDM   ED Course as of 01/01/24 2117   Thu Dec 21, 2023   1216 EKG similar to appearance from 1 on July 25, 2023, patient does have T wave inversions in V2 and V3 however this was also present on previous EKG. [MK]      ED Course User Index  [MK] Areli Santana PA-C         Diagnoses as of 01/01/24 2117   Shortness of breath   Elevated brain natriuretic peptide (BNP) level   Pleural effusion   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 (CMS/HCC)       Medical Decision Making    MDM: Patient is a 87-year-old female who presents today for evaluation of left-sided burning chest pain and shortness of breath for 2 weeks, she does appear fluid overloaded on examination has bilateral rhonchi on examination, her proBNP is elevated at 982, troponin is 8, EKG showed T wave inversions in V2 and V3 however this was also present in July, no new ischemic changes on the EKG, her hemoglobin is 8.6, little bit lower than her baseline, she has no leukocytosis, magnesium is normal, glucose showed a slightly elevated creatinine of 1.09.  Patient was ordered 40 mg of IV Lasix given elevated proBNP rhonchi on examination pitting edema to the lower extremities, given that she is on oxygen here and normally is not supposed to be on oxygen, and she also does not necessarily always take her medications daily like she is supposed I did obtain a PE study to rule this out as a potential source. PE study pending medical regimen the department, patient  will be signed out to incoming provider for admission, Oli Foster.        Procedure  Procedures     Arlei Santana PA-C  12/21/23 5814      This patient was seen by the advanced practice provider.  I have personally performed a substantive portion of the encounter.  I have seen and examined the patient; agree with the workup, evaluation, MDM, management and diagnosis.  The care plan has been discussed.      I personally saw the patient and made/approved the management plan and take responsibility for the patient management.      The patient will be admitted to the hospital for worsening shortness of breath with new bilateral pleural effusions, infiltrates as well seen on the CT scan without evidence of pulmonary embolism.  The patient's oxygen was turned off to see how she would do, and she desatted to 85% and was turned up and titrated to 3 L again where she was at 98% and comfortable.  The patient will be admitted to the hospital for diuresis in the setting of heart failure and further management.  Clinically she does not appear to have pneumonia however if she develops worsening cough, fever, elevated white count or sputum production then treatment for infection may be considered.     Royer Alvarez DO  01/01/24 2126

## 2023-12-22 LAB
ANION GAP SERPL CALC-SCNC: 10 MMOL/L (ref 10–20)
ATRIAL RATE: 108 BPM
BUN SERPL-MCNC: 24 MG/DL (ref 6–23)
CALCIUM SERPL-MCNC: 9 MG/DL (ref 8.6–10.3)
CHLORIDE SERPL-SCNC: 102 MMOL/L (ref 98–107)
CO2 SERPL-SCNC: 34 MMOL/L (ref 21–32)
CREAT SERPL-MCNC: 0.99 MG/DL (ref 0.5–1.05)
ERYTHROCYTE [DISTWIDTH] IN BLOOD BY AUTOMATED COUNT: 17.2 % (ref 11.5–14.5)
GFR SERPL CREATININE-BSD FRML MDRD: 56 ML/MIN/1.73M*2
GLUCOSE BLD MANUAL STRIP-MCNC: 104 MG/DL (ref 74–99)
GLUCOSE BLD MANUAL STRIP-MCNC: 159 MG/DL (ref 74–99)
GLUCOSE BLD MANUAL STRIP-MCNC: 84 MG/DL (ref 74–99)
GLUCOSE BLD MANUAL STRIP-MCNC: 84 MG/DL (ref 74–99)
GLUCOSE SERPL-MCNC: 89 MG/DL (ref 74–99)
HCT VFR BLD AUTO: 31.6 % (ref 36–46)
HGB BLD-MCNC: 8.5 G/DL (ref 12–16)
MAGNESIUM SERPL-MCNC: 2.11 MG/DL (ref 1.6–2.4)
MCH RBC QN AUTO: 28 PG (ref 26–34)
MCHC RBC AUTO-ENTMCNC: 26.9 G/DL (ref 32–36)
MCV RBC AUTO: 104 FL (ref 80–100)
NRBC BLD-RTO: 0 /100 WBCS (ref 0–0)
P AXIS: -15 DEGREES
P OFFSET: 243 MS
P ONSET: 161 MS
PLATELET # BLD AUTO: 173 X10*3/UL (ref 150–450)
POTASSIUM SERPL-SCNC: 4.5 MMOL/L (ref 3.5–5.3)
PR INTERVAL: 168 MS
Q ONSET: 245 MS
QRS COUNT: 18 BEATS
QRS DURATION: 26 MS
QT INTERVAL: 278 MS
QTC CALCULATION(BAZETT): 372 MS
QTC FREDERICIA: 338 MS
R AXIS: 180 DEGREES
RBC # BLD AUTO: 3.04 X10*6/UL (ref 4–5.2)
SODIUM SERPL-SCNC: 141 MMOL/L (ref 136–145)
T AXIS: 81 DEGREES
T OFFSET: 384 MS
VENTRICULAR RATE: 108 BPM
WBC # BLD AUTO: 5.1 X10*3/UL (ref 4.4–11.3)

## 2023-12-22 PROCEDURE — 2500000001 HC RX 250 WO HCPCS SELF ADMINISTERED DRUGS (ALT 637 FOR MEDICARE OP): Performed by: NURSE PRACTITIONER

## 2023-12-22 PROCEDURE — 82947 ASSAY GLUCOSE BLOOD QUANT: CPT

## 2023-12-22 PROCEDURE — 80048 BASIC METABOLIC PNL TOTAL CA: CPT | Performed by: NURSE PRACTITIONER

## 2023-12-22 PROCEDURE — 2500000001 HC RX 250 WO HCPCS SELF ADMINISTERED DRUGS (ALT 637 FOR MEDICARE OP): Performed by: INTERNAL MEDICINE

## 2023-12-22 PROCEDURE — 2500000004 HC RX 250 GENERAL PHARMACY W/ HCPCS (ALT 636 FOR OP/ED): Performed by: NURSE PRACTITIONER

## 2023-12-22 PROCEDURE — 2500000005 HC RX 250 GENERAL PHARMACY W/O HCPCS: Performed by: NURSE PRACTITIONER

## 2023-12-22 PROCEDURE — 1200000002 HC GENERAL ROOM WITH TELEMETRY DAILY

## 2023-12-22 PROCEDURE — 36415 COLL VENOUS BLD VENIPUNCTURE: CPT | Performed by: NURSE PRACTITIONER

## 2023-12-22 PROCEDURE — 83735 ASSAY OF MAGNESIUM: CPT | Performed by: NURSE PRACTITIONER

## 2023-12-22 PROCEDURE — 2500000002 HC RX 250 W HCPCS SELF ADMINISTERED DRUGS (ALT 637 FOR MEDICARE OP, ALT 636 FOR OP/ED): Performed by: NURSE PRACTITIONER

## 2023-12-22 PROCEDURE — 85027 COMPLETE CBC AUTOMATED: CPT | Performed by: NURSE PRACTITIONER

## 2023-12-22 PROCEDURE — 94760 N-INVAS EAR/PLS OXIMETRY 1: CPT

## 2023-12-22 RX ADMIN — ACETAMINOPHEN 487.5 MG: 325 TABLET ORAL at 08:14

## 2023-12-22 RX ADMIN — EMPAGLIFLOZIN 10 MG: 10 TABLET, FILM COATED ORAL at 17:33

## 2023-12-22 RX ADMIN — INSULIN LISPRO 1 UNITS: 100 INJECTION, SOLUTION INTRAVENOUS; SUBCUTANEOUS at 21:31

## 2023-12-22 RX ADMIN — FUROSEMIDE 40 MG: 10 INJECTION, SOLUTION INTRAMUSCULAR; INTRAVENOUS at 08:15

## 2023-12-22 RX ADMIN — POLYETHYLENE GLYCOL 3350 17 G: 17 POWDER, FOR SOLUTION ORAL at 08:20

## 2023-12-22 RX ADMIN — ATORVASTATIN CALCIUM 20 MG: 20 TABLET, FILM COATED ORAL at 21:35

## 2023-12-22 RX ADMIN — FAMOTIDINE 20 MG: 20 TABLET ORAL at 17:32

## 2023-12-22 RX ADMIN — Medication 2 L/MIN: at 10:05

## 2023-12-22 RX ADMIN — APIXABAN 5 MG: 5 TABLET, FILM COATED ORAL at 08:14

## 2023-12-22 RX ADMIN — ACETAMINOPHEN 487.5 MG: 325 TABLET ORAL at 21:34

## 2023-12-22 RX ADMIN — FUROSEMIDE 40 MG: 10 INJECTION, SOLUTION INTRAMUSCULAR; INTRAVENOUS at 14:22

## 2023-12-22 RX ADMIN — LOSARTAN POTASSIUM 50 MG: 50 TABLET, FILM COATED ORAL at 08:15

## 2023-12-22 RX ADMIN — APIXABAN 5 MG: 5 TABLET, FILM COATED ORAL at 21:35

## 2023-12-22 ASSESSMENT — COGNITIVE AND FUNCTIONAL STATUS - GENERAL
MOVING TO AND FROM BED TO CHAIR: A LOT
MOVING FROM LYING ON BACK TO SITTING ON SIDE OF FLAT BED WITH BEDRAILS: A LITTLE
DRESSING REGULAR LOWER BODY CLOTHING: A LOT
MOBILITY SCORE: 11
CLIMB 3 TO 5 STEPS WITH RAILING: TOTAL
WALKING IN HOSPITAL ROOM: TOTAL
STANDING UP FROM CHAIR USING ARMS: A LOT
TURNING FROM BACK TO SIDE WHILE IN FLAT BAD: A LOT
DRESSING REGULAR UPPER BODY CLOTHING: A LOT
TOILETING: A LOT
HELP NEEDED FOR BATHING: A LOT
DAILY ACTIVITIY SCORE: 16

## 2023-12-22 ASSESSMENT — PAIN - FUNCTIONAL ASSESSMENT: PAIN_FUNCTIONAL_ASSESSMENT: 0-10

## 2023-12-22 ASSESSMENT — PAIN SCALES - GENERAL
PAINLEVEL_OUTOF10: 0 - NO PAIN
PAINLEVEL_OUTOF10: 0 - NO PAIN

## 2023-12-22 NOTE — PROGRESS NOTES
12/22/23 1130   Discharge Planning   Living Arrangements Alone  (Assisted living facility)   Support Systems Friends/neighbors;[employer] community;Children   Assistance Needed uses wheelchair   Type of Residence Assisted living   Do you have animals or pets at home? No   Care Facility Name [facility]   Who is requesting discharge planning? Provider   Home or Post Acute Services Other (Comment)  (Assisted living)   Patient expects to be discharged to: Assisted living   Does the patient need discharge transport arranged? Yes   RoundTrip coordination needed? Yes   Has discharge transport been arranged? No     Met with patient at bedside. Verified address and it is an Assisted Living Facility. Patient has been there for 3 years. Patient uses a wheelchair to get around and uses a para transport system through [place] to go wherever needed. Patient does have a son who lives in [place] but will help out when needed. Medications are gotten by facility through a pharmacy that facility utilizes. I patient needs to go to a SNF, would like to go somewhere other than [facility]. Will provide patient a list of other facilities for rehab. TCC team will follow patient for further discharge instructions.     1149- There is no PT/OT ordered at this time.

## 2023-12-22 NOTE — CONSULTS
Consults  History Of Present Illness:    Fifi Jenkins is a 87 y.o. female presenting with congestive heart failure by history appears to be HFpEF with an EF of 55 paroxysmal A-fib on Eliquis dyslipidemia hypertension presenting with gradual onset of dyspnea without chest pain over 2 weeks.  Also presented with cough but had no signs on evaluation of pneumonia.  She has hypertension hyperlipidemia but has never had a heart attack also presented with progressive leg swelling and had difficulty getting out of bed into a wheelchair when she presented..  He also has a history of trigeminal neuralgia and headache.  Her EKG showed T inversions V2 V3 also present in July her creatinine was 1.09 and she was given Lasix for an elevated proBNP.  She is on Eliquis 5 twice daily atorvastatin 20 daily Lasix 40 twice daily losartan 50 daily metolazone 5 daily she is not on a beta-blocker she presents with blood pressures of 130/80 pulse rate is 86-1 01.  I will try her on Jardiance and reduce the metolazone.  Spironolactone is also a consideration.  An echocardiogram is ordered but not completed.  The last completed echocardiogram in the system is from 2019 1 was ordered in 2022 but not completed    Per year she follows with Dr. 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 Nestor Wang's clinic    Echocardiogram    CONCLUSIONS:   1. Poorly visualized anatomical structures due to suboptimal image quality.   2. The left ventricular systolic function is low normal with a 55% estimated ejection fraction.   3. There is moderate concentric left ventricular hypertrophy.   4. There is plaque visualized in the ascending aorta.     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    Spinal stenosis 10/12/2023    Schwannoma 10/12/2023    Pseudophakia of both eyes 10/12/2023    Paroxysmal atrial fibrillation (CMS/HCC) 10/12/2023    Parotid mass 10/12/2023    Cerebellar stroke (CMS/[facility]) 10/12/2023    Osteoarthritis of knee 10/12/2023    Mass of parapharyngeal space 10/12/2023    Lumbar spondylosis 10/12/2023    LAE (left atrial enlargement) 10/12/2023    Incontinence of urine 10/12/2023    Hyperlipidemia 10/12/2023    History of CVA (cerebrovascular accident) 10/12/2023    Heart failure with preserved left ventricular function (HFpEF) (CMS/[facility]) 10/12/2023    GERD (gastroesophageal reflux disease) 10/12/2023    Bilateral sensorineural hearing loss 10/12/2023    Adnexal mass 10/12/2023    Abnormal ECG 10/12/2023    Bradycardia 10/02/2022    Vertigo 09/27/2022    Chronic anemia 09/27/2022    Atherosclerosis of coronary artery 09/27/2022    Obstructive sleep apnea syndrome 04/02/2019    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 (CMS/HCC) 12/21/2023    Acute respiratory failure with hypoxia (CMS/[facility]) 12/21/2023    Type 2 diabetes mellitus with chronic kidney disease, without long-term current use of insulin (CMS/[facility]) 12/21/2023    Acute heart failure with preserved ejection fraction (CMS/HCC) 12/21/2023    Morbid obesity (CMS/[facility]) 10/12/2023    Stage 3 chronic kidney disease (CMS/HCC) 10/02/2023       From Dr. Duarte note in  July    PMHx: as above  PSHx: carpal tunnel surgery R hand, BL knee replacement,  FHx: denies hx of DM or heart disease  Allergies: NKDA  Social Hx: Denies tobacco, drug, or alcohol use.     Hospitalization Hx:  9/22 hosp for CHF (edema/dyspnea/CP) -> diuresed and felt better.     Cardiac/Brain Imaging Hx:  ECG 11/03: SB (47)  ECG 11/08: SB (53)  ECG 8/12: SB (54), 1st deg AVB, TWIs  ECG 2/19: MAT (101); however,  AFIB  ECG 2/19: SB (57), 1st deg AVB, LVH, TWIs  ECG 2/19: SB (51), 1st deg AVB, LVH, TWIs  ECG 3/19: SB (54), 1st deg AVB, RBBB, LAFB, LVH, TWIs  ECG 9/19: SB (53), 1st deg AVB, RBBB, LAFB, TWIs  ECG 12/19: SR (64), RBBB, LAFB  ECG 9/22: AFIB (59), LBBB  ECG 10/22: Jxnal (45)  ECG 11/22: SB (53), PAC, 1st deg AVB, RBBB, LAFB  Zio 4/19: SR, HR  (avg 53), SVT x137 (long 1min)  HM 7/21: SR, HR  (avg 57), SVT x97 (long 4min)  Echo 12/15: EF 55-60%, sev LAE, mod ALEXIS, mild MS  Echo 2/19: EF 25-30%, DD, sev LAE  Echo 12/19: TDS, EF 55%, mod conc LVH, AsAo plaque  Nuc 12/15: no ischemia/scar, EF 68%  Nuc 2/19: no ischemia, breast artifact, EF 37% (rest) -> 44% (stress), mild LVE  CTA cors 2/19: TDS (motion artifact), LM ok, pLAD <50%, dLAD/LCx/RCA not well visualized, mod AS calc, nl Ao, PA 3.3cm  CXR 2/19: marked perihilar della  CXR 9/22: no acute abnl  CT brain 2/19: L cerebellar hypodensity/infarct  CT brain 3/19: no acute abnl, remote L cerebellar infarct  MRI brain 10/21: no acute abnl  CTA head/neck 2/19: L VA occ, no carotid/IC occ  LE US 9/22: no DVT         Active Problems  Problems    · Abnormal ECG (794.31) (R94.31)   · 2015 Echo mild MS, dilitation of LA - severe and RA mod, LVH. good ej fxn, diastolic      dysfnx. Nuclear ETT neg   · Adnexal mass (625.8) (N94.89)   · left adnexal region there is a solid nodular      Left mass present since 2003/ 2015 structure measuring 2.5 x 2.6 x 3.0 cm.   · A-fib (427.31) (I48.91)   · FELICIANO - 25 - 30 % ej fxn. CT angio Coronaries - no sig obstructive  dis. Nucl ETT neg.   · Arthritis (716.90) (M19.90)   · History of Benign colonic polyp (211.3) (K63.5)   · Bilateral sensorineural hearing loss (389.18) (H90.3)   · BMI 45.0-49.9, adult (V85.42) (Z68.42)   · CAD (coronary artery disease) (414.00) (I25.10)   · Cerebellar stroke (434.91) (I63.9)   · 02/19 Left - occlusion veterbral artery with a fib. anticoaguln. Rx   · Chronic anticoagulation (V58.61) (Z79.01)   · Chronic diastolic heart failure (428.32) (I50.32)   · 11/15 Echo ej fxn 55 -6 0 %, LA > RA diltn, LVH and mild MS. 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)   · Headache (784.0) (R51.9)   · Hearing loss (389.9) (H91.90)   · Heart failure with preserved left ventricular function (HFpEF) (428.9) (I50.30)   · History of HFrEF (heart failure with reduced ejection fraction) (428.20) (I50.20)   · History of cardiomyopathy (V12.59) (Z86.79)   · History of CVA (cerebrovascular accident) (V12.54) (Z86.73)   · Hyperlipidemia (272.4) (E78.5)   · Added by Problem List Migration; 2013-6-29   · Hyperopia of both eyes with astigmatism and presbyopia (367.0,367.20,367.4)  (H52.03,H52.203,H52.4)   · Hypertropia of right eye (378.31) (H50.21)   · Incontinence of urine (788.30) (R32)   · Influenza vaccine needed (V04.81) (Z23)   · Jaw pain (784.92) (R68.84)   · LAE (left atrial enlargement) (429.3) (I51.7)   · Lesion of parotid gland (527.8) (K11.9)   · L parotid lesion on MRI   · Lumbar spondylosis (721.3) (M47.816)   · Added by Problem List  Migration; 2013-6-29   · Mass of parapharyngeal space (784.2) (R22.1)   · Morbid obesity (278.01) (E66.01)   · Nausea alone (787.02) (R11.0)   · off & on   chronic   · Nausea with vomiting (787.01) (R11.2)   · Neck mass (784.2) (R22.1)   · 2 cm Right lesion post to parotid gland on MRI   · Nystagmus (379.50) (H55.00)   · Obesity (278.00) (E66.9)   · Obstructive sleep apnea syndrome (327.23) (G47.33)   · Ocular pain, left eye (379.91) (H57.12)   · Osteoarthritis of knee (715.36) (M17.9)   · Pain (780.96) (R52)   · Pain around left eye (379.91) (H57.12)   · Pain of left eye (379.91) (H57.12)   · Parotid mass (527.8) (K11.8)   · Paroxysmal atrial fibrillation (427.31) (I48.0)   · Persistent headaches (784.0) (R51.9)   · Pre-diabetes (790.29) (R73.03)   · Pseudophakia of both eyes (V43.1) (Z96.1)   · Schwannoma (215.9) (D36.10)   · Sleep apnea (780.57) (G47.30)   · 2015 and 11/16 studies   pt defers CPAP Rx   · Spinal stenosis (724.00) (M48.00)   · Spontaneous ocular nystagmus (379.56) (H55.09)   · Toenail deformity (703.9) (L60.8)   · Trigeminal neuralgia (350.1) (G50.0)   · Trigeminal neuralgia of left side of face (350.1) (G50.0)   · Urinary retention (788.20) (R33.9)   · Vertigo (780.4) (R42)     Surgical History  Problems    · History of Ankle Surgery   · lat malleolar with plate from fracture   · History of Appendectomy   · History of Biopsy Breast Percutaneous Needle Core   · LEFT   left - neg   · History of Cataract surgery   · Both eyes have had surgery by Dr. Cortez   · History of Cholecystectomy   · History of Complete Colonoscopy   · 05/13 colon polyps poor prep 12/15 - neg   · History of Knee Replacement   · bilat   · History of Neuroplasty Decompression Median Nerve At Carpal Tunnel   · R ULNAR & R MEDIAN   · History of Rotator Cuff Repair   · RIGHT   right   · History of Total Abdominal Hysterectomy   · History of Upper Gastrointestinal Endoscopy (Therapeutic)   · 12/15 gastric polyp and schatzi ring      Past Medical History  Problems    · History of Abdominal pain, RUQ (right upper quadrant) (789.01) (R10.11)   · Resolved Date: 10 May 2017   chronic   · History of Atypical chest pain (786.59) (R07.89)   · Resolved Date: 10 Aug 2017   · History of Benign colonic polyp (211.3) (K63.5)   · Resolved Date: 22 Oct 2015   · History of Change in vision (368.9) (H53.9)   · Resolved Date: 27 Aug 2018   · History of Elevated BP without diagnosis of hypertension (796.2) (R03.0)   · Resolved Date: 09 Nov 2017   · History of HFrEF (heart failure with reduced ejection fraction) (428.20) (I50.20)   · Resolved Date: 20 Dec 2019   · History of cardiomyopathy (V12.59) (Z86.79)   · Resolved Date: 20 Dec 2019   · History of cerebrovascular accident (V12.54) (Z86.73)   · Resolved Date: 08 Sep 2021   · History of diabetes mellitus (V12.29) (Z86.39)   · Resolved Date: 22 Oct 2015   · Added by Problem List Migration; 2013-2-19   · History of Leg weakness, bilateral (729.89) (R29.898)   · Resolved Date: 10 Aug 2017   · History of Radiologic findings of lung field, abnormal (793.19) (R91.8)   · folllowed for 2 years and stable - 2006 -2008     Current Meds     Medication Name Instruction   Artificial Tears 1 % Ophthalmic Solution INSTILL 1 DROP INTO AFFECTED EYE(S) 4 TIMES DAILY.   Atorvastatin Calcium 20 MG Oral Tablet TAKE 1 TABLET BY MOUTH DAILY   Cozaar 100 MG Oral Tablet TAKE 1 TABLET BY MOUTH EVERY DAY   Docusate Sodium 100 MG Oral Capsule TAKE 1 CAPSULE TWICE DAILY AS NEEDED.   Eliquis 5 MG Oral Tablet TAKE 1 TABLET BY MOUTH TWICE DAILY   Famotidine 20 MG Oral Tablet TAKE 1 TABLET AT BEDTIME.   Folic Acid 1 MG Oral Tablet TAKE 1 TABLET DAILY AS DIRECTED.   Furosemide 40 MG Oral Tablet TAKE 1 TABLET DAILY.   MiraLax 17 GM Oral Packet USE AS DIRECTED.   Multi-Vitamins TABS TAKE 1 TABLET DAILY.   Senna 8.6 MG Oral Tablet TAKE AS DIRECTED.   Tums 500 MG Oral Tablet Chewable TAKE AS DIRECTED.   Tylenol Extra Strength 500 MG Oral Tablet  TAKE 2 TABLETS UP TO THREE TIMES PER DAY AS NEEDED.   Vitamin D3 250 MCG (90098 UT) Oral Capsule Take as directed   Zofran 4 MG TABS TAKE 1 TABLET Every 6 hours PRN      Allergies  Medication    · No Known Drug Allergies   Recorded By: Carline Sullivan; 2020 1:33:35 PM  Denied    · Tramadol   Adverse Reaction; Chest Pain;; Updated By: Martina Logan; 2019 11:44:50 AM     Family History  Mother    · Family history of Breast Cancer (V16.3)   · Family history of Cancer   · Family history of Mother  At Age 49   · Family history of Uterine Cancer (V16.49)  Father    · Family history of Arteriosclerotic Cardiovascular Disease (ASCVD)   · Family history of Father  At Age 65  Family History    · Denied: Family history of Diabetes Mellitus   · Denied: Family history of Essential Hypertension   · Family history of Father  At Age ___   · Family history of Mother  At Age ___   · Denied: Family history of Sickle Cell Anemia  Other    · Family history of Medical history reviewed with no changes     Social History  Problems    · Denied: History of Alcohol Use (History)   · Caffeine use (V49.89) (Z78.9)   · Marital History -    · Never smoker   · No alcohol use   · Parentage   · 1 son in [place]   · Retired From Work   · Nursing Asst at [employer]   · Social history reviewed, unchanged   · Tobacco Non-user     Last Recorded Vitals:  Vitals:    23 1005 23 1021 23 1200 23 1601   BP:   137/81 91/54   BP Location:    Left arm   Patient Position:    Lying   Pulse:   101 86   Resp:   18 14   Temp:   37.3 °C (99.1 °F) 36.5 °C (97.7 °F)   TempSrc:    Tympanic   SpO2: (!) 86% 94% 95% (!) 82%   Weight:       Height:           Last Labs:  CBC - 2023:  8:49 AM  5.1 8.5 173    31.6      CMP - 2023:  8:49 AM  9.0 6.2 11 --- 0.8   _ 4.1 9 91      PTT - No results in last year.  _   _ _     Troponin I, High Sensitivity   Date/Time Value Ref Range Status    12/21/2023 01:32 PM 9 0 - 13 ng/L Final   12/21/2023 11:58 AM 8 0 - 13 ng/L Final     BNP   Date/Time Value Ref Range Status   12/21/2023 11:58  (H) 0 - 99 pg/mL Final   03/06/2019 04:50  (H) 0 - 99 pg/mL Final     Comment:     .  <100 pg/mL - Heart failure unlikely  100-299 pg/mL - Intermediate probability of acute heart  .               failure exacerbation. Correlate with clinical  .               context and patient history.    >=300 pg/mL - Heart Failure likely. Correlate with clinical  .               context and patient history.  BNP testing is performed using different testing   methodology at [facility] than at other   system hospitals. Direct result comparisons should   only be made within the same method.     03/05/2019 08:18  (H) 0 - 99 pg/mL Final     Comment:     .  <100 pg/mL - Heart failure unlikely  100-299 pg/mL - Intermediate probability of acute heart  .               failure exacerbation. Correlate with clinical  .               context and patient history.    >=300 pg/mL - Heart Failure likely. Correlate with clinical  .               context and patient history.  BNP testing is performed using different testing   methodology at [facility] than at other   system hospitals. Direct result comparisons should   only be made within the same method.       Hemoglobin A1C   Date/Time Value Ref Range Status   10/02/2023 03:06 PM 6.3 (H) 4.3 - 5.6 % Final     Comment:     American Diabetes Association guidelines indicate that patients with HgbA1c in the range 5.7-6.4% are at increased risk for development of diabetes, and intervention by lifestyle modification may be beneficial. HgbA1c greater or equal to 6.5% is considered diagnostic of diabetes.   03/16/2019 05:30 AM 5.6 % Final     Comment:          Diagnosis of Diabetes-Adults   Non-Diabetic: < or = 5.6%   Increased risk for developing diabetes: 5.7-6.4%   Diagnostic of diabetes: > or = 6.5%  .        "Monitoring of Diabetes                Age (y)     Therapeutic Goal (%)   Adults:          >18           <7.0   Pediatrics:    13-18           <7.5                   7-12           <8.0                   0- 6            7.5-8.5   American Diabetes Association. Diabetes Care 33(S1), Jan 2010.     02/23/2019 07:30 PM 6.0 % Final     Comment:          Diagnosis of Diabetes-Adults   Non-Diabetic: < or = 5.6%   Increased risk for developing diabetes: 5.7-6.4%   Diagnostic of diabetes: > or = 6.5%  .       Monitoring of Diabetes                Age (y)     Therapeutic Goal (%)   Adults:          >18           <7.0   Pediatrics:    13-18           <7.5                   7-12           <8.0                   0- 6            7.5-8.5   American Diabetes Association. Diabetes Care 33(S1), Jan 2010.       VLDL   Date/Time Value Ref Range Status   08/23/2023 01:25 PM 12 0 - 40 mg/dL Final   02/23/2019 07:30 PM 16 0 - 40 mg/dL Final      Last I/O:  I/O last 3 completed shifts:  In: 235 (1.6 mL/kg) [P.O.:235]  Out: 150 (1 mL/kg) [Urine:150 (0 mL/kg/hr)]  Weight: 148.9 kg     Past Cardiology Tests (Last 3 Years):  EKG:  ECG 12 lead 12/21/2023 (Preliminary)      ECG 12 lead 12/21/2023 (Preliminary)      ECG 12 lead 12/21/2023    Echo:  No results found for this or any previous visit from the past 1095 days.    Ejection Fractions:  No results found for: \"EF\"  Cath:  No results found for this or any previous visit from the past 1095 days.    Stress Test:  No results found for this or any previous visit from the past 1095 days.    Cardiac Imaging:  No results found for this or any previous visit from the past 1095 days.      Past Medical History:  She has a past medical history of Bilateral sensorineural hearing loss (10/12/2023), Cerebellar stroke (CMS/HCC) (10/12/2023), Chronic anemia (09/27/2022), Chronic diastolic heart failure (CMS/HCC) (10/12/2023), Elevated blood-pressure reading, without diagnosis of hypertension (05/23/2018), " GERD (gastroesophageal reflux disease) (10/12/2023), Heart failure with preserved left ventricular function (HFpEF) (CMS/[facility]) (10/12/2023), Hyperlipidemia (10/12/2023), LAE (left atrial enlargement) (10/12/2023), Obstructive sleep apnea syndrome (04/02/2019), Other chest pain (11/06/2015), Other nonspecific abnormal finding of lung field (07/31/2013), Other symptoms and signs involving the musculoskeletal system (10/22/2015), Paroxysmal atrial fibrillation (CMS/[facility]) (10/12/2023), Personal history of other diseases of the circulatory system (11/28/2022), Personal history of transient ischemic attack (TIA), and cerebral infarction without residual deficits (10/28/2020), Polyp of colon (08/27/2018), Spinal stenosis (10/12/2023), Stage 3 chronic kidney disease (CMS/[facility]) (10/02/2023), Trigeminal neuralgia (10/12/2023), Type 2 diabetes mellitus with chronic kidney disease, without long-term current use of insulin (CMS/[facility]) (12/21/2023), Unspecified systolic (congestive) heart failure (CMS/[facility]) (06/08/2021), Unspecified visual disturbance (10/17/2017), and Urinary retention (10/12/2023).    Past Surgical History:  She has a past surgical history that includes Total abdominal hysterectomy (02/21/2013); Carpal tunnel release (02/21/2013); Cholecystectomy (02/21/2013); Appendectomy (02/21/2013); Breast biopsy (04/02/2013); Rotator cuff repair (04/02/2013); Other surgical history (02/11/2020); Other surgical history (12/22/2018); Colonoscopy (12/22/2015); Ankle surgery (08/01/2013); Total knee arthroplasty (08/01/2013); CT angio neck (2/23/2019); CT angio head w and wo IV contrast (2/23/2019); and CT angio coronary art with heartflow if score >30% (2/26/2019).      Social History:  She reports that she has never smoked. She has never used smokeless tobacco. She reports that she does not drink alcohol and does not use drugs.    Family History:  Family History   Problem Relation Name Age of Onset    Other (breast cancer, uterine  cancer) Mother      Coronary artery disease Father          Allergies:  Ace inhibitors    Inpatient Medications:  Scheduled medications   Medication Dose Route Frequency    acetaminophen  487.5 mg oral BID    apixaban  5 mg oral BID    atorvastatin  20 mg oral Nightly    docusate sodium  100 mg oral BID    famotidine  20 mg oral Daily    furosemide  40 mg intravenous BID    insulin lispro  0-5 Units subcutaneous 4x daily    losartan  50 mg oral Daily    [START ON 12/27/2023] metOLazone  5 mg oral Weekly    perflutren lipid microspheres  0.5-10 mL of dilution intravenous Once in imaging    perflutren protein A microsphere  0.5 mL intravenous Once in imaging    polyethylene glycol  17 g oral Daily    sennosides  1 tablet oral BID    sulfur hexafluoride microsphr  2 mL intravenous Once in imaging     PRN medications   Medication    acetaminophen    albuterol    calcium carbonate    cyclobenzaprine    dextrose 10 % in water (D10W)    dextrose    glucagon    lubricating eye drops    oxygen     Continuous Medications   Medication Dose Last Rate     Outpatient Medications:  Current Outpatient Medications   Medication Instructions    acetaminophen (TYLENOL) 650 mg, oral, Every 4 hours PRN    acetaminophen (TYLENOL) 500 mg, oral, 2 times daily    albuterol 90 mcg/actuation inhaler Inhale 2 puffs every 4 hours if needed for wheezing or shortness of breath.    atorvastatin (Lipitor) 20 mg tablet 1 tablet, oral, Nightly    calcium carbonate (Tums) 200 mg calcium chewable tablet 1 tablet, oral, Every 4 hours PRN    compress.stocking,knee,reg,med misc MEDICAL COMPRESSION STOCKINGS 20-30 MM HG KNEE HIGH COMPRESSION STOCKINGS, PLEASE MEASURE FOR APPROPRIATE FIT, DISPENSE TWO PAIRS, REFILL 8    cyanocobalamin (VITAMIN B-12) 250 mcg, oral, Daily    cyclobenzaprine (FLEXERIL) 10 mg, oral, 2 times daily PRN    docusate sodium (COLACE) 100 mg, oral, 2 times daily    Eliquis 5 mg, oral, 2 times daily    famotidine (Pepcid) 20 mg tablet 1  tablet, oral, Daily    folic acid (FOLVITE) 1 mg, oral, Daily    losartan (COZAAR) 50 mg, oral, Daily    lubricating eye drops ophthalmic solution 1 drop, Both Eyes, Every 4 hours PRN    metOLazone (Zaroxolyn) 5 mg tablet Take 1 tablet (5 mg) by mouth 1 (one) time per week. Wednesday    multivitamin tablet 1 tablet, oral, Daily    nystatin (Mycostatin) 100,000 unit/gram powder 1 Application, Topical, 2 times daily PRN    polyethylene glycol (GLYCOLAX, MIRALAX) 17 g, oral, Daily    sennosides (Senokot) 8.6 mg tablet 1 tablet, oral, 2 times daily    torsemide 40 mg, oral, Daily     Results for orders placed or performed during the hospital encounter of 12/21/23 (from the past 96 hour(s))   ECG 12 lead   Result Value Ref Range    Ventricular Rate 108 BPM    Atrial Rate 108 BPM    SD Interval 168 ms    QRS Duration 26 ms    QT Interval 278 ms    QTC Calculation(Bazett) 372 ms    P Axis -15 degrees    R Axis 180 degrees    T Axis 81 degrees    QRS Count 18 beats    Q Onset 245 ms    P Onset 161 ms    P Offset 243 ms    T Offset 384 ms    QTC Fredericia 338 ms   ECG 12 lead   Result Value Ref Range    Ventricular Rate 90 BPM    Atrial Rate 110 BPM    QRS Duration 142 ms    QT Interval 400 ms    QTC Calculation(Bazett) 489 ms    R Axis -60 degrees    T Axis 70 degrees    QRS Count 15 beats    Q Onset 188 ms    T Offset 388 ms    QTC Fredericia 458 ms   CBC and Auto Differential   Result Value Ref Range    WBC 5.5 4.4 - 11.3 x10*3/uL    nRBC 0.0 0.0 - 0.0 /100 WBCs    RBC 3.02 (L) 4.00 - 5.20 x10*6/uL    Hemoglobin 8.6 (L) 12.0 - 16.0 g/dL    Hematocrit 30.7 (L) 36.0 - 46.0 %     (H) 80 - 100 fL    MCH 28.5 26.0 - 34.0 pg    MCHC 28.0 (L) 32.0 - 36.0 g/dL    RDW 17.2 (H) 11.5 - 14.5 %    Platelets 155 150 - 450 x10*3/uL    Neutrophils % 70.7 40.0 - 80.0 %    Immature Granulocytes %, Automated 0.4 0.0 - 0.9 %    Lymphocytes % 13.9 13.0 - 44.0 %    Monocytes % 12.1 2.0 - 10.0 %    Eosinophils % 2.7 0.0 - 6.0 %     Basophils % 0.2 0.0 - 2.0 %    Neutrophils Absolute 3.86 1.60 - 5.50 x10*3/uL    Immature Granulocytes Absolute, Automated 0.02 0.00 - 0.50 x10*3/uL    Lymphocytes Absolute 0.76 (L) 0.80 - 3.00 x10*3/uL    Monocytes Absolute 0.66 0.05 - 0.80 x10*3/uL    Eosinophils Absolute 0.15 0.00 - 0.40 x10*3/uL    Basophils Absolute 0.01 0.00 - 0.10 x10*3/uL   Comprehensive Metabolic Panel   Result Value Ref Range    Glucose 109 (H) 74 - 99 mg/dL    Sodium 141 136 - 145 mmol/L    Potassium 4.8 3.5 - 5.3 mmol/L    Chloride 104 98 - 107 mmol/L    Bicarbonate 31 21 - 32 mmol/L    Anion Gap 11 10 - 20 mmol/L    Urea Nitrogen 26 (H) 6 - 23 mg/dL    Creatinine 1.09 (H) 0.50 - 1.05 mg/dL    eGFR 50 (L) >60 mL/min/1.73m*2    Calcium 8.7 8.6 - 10.3 mg/dL    Albumin 4.1 3.4 - 5.0 g/dL    Alkaline Phosphatase 91 33 - 136 U/L    Total Protein 6.2 (L) 6.4 - 8.2 g/dL    AST 11 9 - 39 U/L    Bilirubin, Total 0.8 0.0 - 1.2 mg/dL    ALT 9 7 - 45 U/L   Magnesium   Result Value Ref Range    Magnesium 2.12 1.60 - 2.40 mg/dL   Brain Natriuretic Peptide   Result Value Ref Range     (H) 0 - 99 pg/mL   Troponin I, High Sensitivity, Initial   Result Value Ref Range    Troponin I, High Sensitivity 8 0 - 13 ng/L   Troponin, High Sensitivity, 1 Hour   Result Value Ref Range    Troponin I, High Sensitivity 9 0 - 13 ng/L   ECG 12 lead   Result Value Ref Range    Ventricular Rate 108 BPM    Atrial Rate 108 BPM    GA Interval 168 ms    QRS Duration 26 ms    QT Interval 278 ms    QTC Calculation(Bazett) 372 ms    P Axis -15 degrees    R Axis 180 degrees    T Axis 81 degrees    QRS Count 18 beats    Q Onset 245 ms    P Onset 161 ms    P Offset 243 ms    T Offset 384 ms    QTC Fredericia 338 ms   POCT GLUCOSE   Result Value Ref Range    POCT Glucose 80 74 - 99 mg/dL   POCT GLUCOSE   Result Value Ref Range    POCT Glucose 84 74 - 99 mg/dL   Magnesium   Result Value Ref Range    Magnesium 2.11 1.60 - 2.40 mg/dL   CBC   Result Value Ref Range    WBC 5.1 4.4  - 11.3 x10*3/uL    nRBC 0.0 0.0 - 0.0 /100 WBCs    RBC 3.04 (L) 4.00 - 5.20 x10*6/uL    Hemoglobin 8.5 (L) 12.0 - 16.0 g/dL    Hematocrit 31.6 (L) 36.0 - 46.0 %     (H) 80 - 100 fL    MCH 28.0 26.0 - 34.0 pg    MCHC 26.9 (L) 32.0 - 36.0 g/dL    RDW 17.2 (H) 11.5 - 14.5 %    Platelets 173 150 - 450 x10*3/uL   Basic Metabolic Panel   Result Value Ref Range    Glucose 89 74 - 99 mg/dL    Sodium 141 136 - 145 mmol/L    Potassium 4.5 3.5 - 5.3 mmol/L    Chloride 102 98 - 107 mmol/L    Bicarbonate 34 (H) 21 - 32 mmol/L    Anion Gap 10 10 - 20 mmol/L    Urea Nitrogen 24 (H) 6 - 23 mg/dL    Creatinine 0.99 0.50 - 1.05 mg/dL    eGFR 56 (L) >60 mL/min/1.73m*2    Calcium 9.0 8.6 - 10.3 mg/dL   POCT GLUCOSE   Result Value Ref Range    POCT Glucose 84 74 - 99 mg/dL   POCT GLUCOSE   Result Value Ref Range    POCT Glucose 104 (H) 74 - 99 mg/dL          Physical Exam:  Subjective:   Examination:   General Examination:   General Appearance: Well developed, well nourished, in no acute distress.  Morbidly obese  Head: normocephalic, atraumatic   Eyes: Anicteric sclera. Pupils are equally round and reactive to light.  Extraocular movements are intact.    Ears: normal   Oral: Cavity: mucosa moist.   Throat: clear.   Neck/Thyroid: neck supple, full range of motion, no cervical lymphadenopathy.   Skin: warm and dry, no suspicious lesions.    Heart: regular rate and rhythm, S1, S2 normal, no murmurs.   Lungs: clear to auscultation bilaterally.   Abdomen: soft, non-tender, non-distended, bowl sound present, normal.   Extremities: no clubbing, no cyanosis, 2+  Neuro: non-focal, motor strength normal upper lower extremities, sensory exam intact.  Past stroke       Assessment/Plan   I 53 I50.92 History of HFpEF but last echo was in 2019  E66 )2 stage II obesity BMI 55  G47.33 UMA but CPAP intolerant  I10.0 Hypertension  I63.9 I48.92 Paroxysmal A-fib with CVA history on Eliquis    Try Jardiance for HFpEF adjust metolazone started  consider spironolactone review echo when completed      Code Status:  DNR and No Intubation    I spent 45 minutes in the professional and overall care of this patient.        Steve Sneed MD

## 2023-12-22 NOTE — H&P
History Of Present Illness  Fifi Jenkins is a 87 y.o. 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     She tells me for the past 2 weeks her Chronic dyspnea exertion when getting up to the wheelchair has progressively worsened.  She is uncertain if she has gained weight and due to her morbid obesity is unable to tell if she has increased swelling in her legs.  She has chronic orthopnea that is somewhat worse.  She reports chest pain with coughing at times without hemoptysis and remains compliant with her Eliquis for A-fib.  She denies fever or chills, wheezing or productive cough.     In the emergency room she was hypoxic and required 3 L nasal cannula.  She was given 40 mg IV Lasix and has diuresed over 1100 mL via pure wick.  WBC count 5.5, hemoglobin 8.6 similar to baseline around 9.  BUN 26, creatinine 1.09, , troponin within normal limits 9-8.  EKG -atrial fibrillation at a rate of 90 with QTc 0.489 no acute findings.     She also mentions having dizziness when moving her head and headache which has been present since her stroke in 2019.  She is followed by her neurologist that she saw 1 month ago and was diagnosed with trigeminal neuropathy and chronic migraine and prescribed Pristiq.  She tells me her neurologist has tried multiple different medications and she continues to have the symptoms.  She tells me meclizine was not effective.     Past Medical History  She has a past medical history of Bilateral sensorineural hearing loss (10/12/2023), Cerebellar stroke (CMS/HCC) (10/12/2023), Chronic anemia (09/27/2022), Chronic  diastolic heart failure (CMS/[facility]) (10/12/2023), Elevated blood-pressure reading, without diagnosis of hypertension (05/23/2018), GERD (gastroesophageal reflux disease) (10/12/2023), Heart failure with preserved left ventricular function (HFpEF) (CMS/[facility]) (10/12/2023), Hyperlipidemia (10/12/2023), LAE (left atrial enlargement) (10/12/2023), Obstructive sleep apnea syndrome (04/02/2019), Other chest pain (11/06/2015), Other nonspecific abnormal finding of lung field (07/31/2013), Other symptoms and signs involving the musculoskeletal system (10/22/2015), Paroxysmal atrial fibrillation (CMS/[facility]) (10/12/2023), Personal history of other diseases of the circulatory system (11/28/2022), Personal history of transient ischemic attack (TIA), and cerebral infarction without residual deficits (10/28/2020), Polyp of colon (08/27/2018), Spinal stenosis (10/12/2023), Stage 3 chronic kidney disease (CMS/[facility]) (10/02/2023), Trigeminal neuralgia (10/12/2023), Type 2 diabetes mellitus with chronic kidney disease, without long-term current use of insulin (CMS/[facility]) (12/21/2023), Unspecified systolic (congestive) heart failure (CMS/[facility]) (06/08/2021), Unspecified visual disturbance (10/17/2017), and Urinary retention (10/12/2023).    Surgical History  She has a past surgical history that includes Total abdominal hysterectomy (02/21/2013); Carpal tunnel release (02/21/2013); Cholecystectomy (02/21/2013); Appendectomy (02/21/2013); Breast biopsy (04/02/2013); Rotator cuff repair (04/02/2013); Other surgical history (02/11/2020); Other surgical history (12/22/2018); Colonoscopy (12/22/2015); Ankle surgery (08/01/2013); Total knee arthroplasty (08/01/2013); CT angio neck (2/23/2019); CT angio head w and wo IV contrast (2/23/2019); and CT angio coronary art with heartflow if score >30% (2/26/2019).     Social History  She reports that she has never smoked. She has never used smokeless tobacco. She reports that she does not drink alcohol and  does not use drugs.    Family History  Family History   Problem Relation Name Age of Onset    Other (breast cancer, uterine cancer) Mother      Coronary artery disease Father          Allergies  Ace inhibitors    Review of Systems   Constitutional:  Negative for activity change, appetite change, chills and fever.   HENT:  Negative for congestion, rhinorrhea and sneezing.    Eyes:  Positive for visual disturbance. Negative for pain, discharge and redness.        Chronic HA and disjointed vision with asymmetrical eye movements and dizziness with eye movements ever since her stroke for which she has seen her neurologist many times including last month and was dx with chronic migrane and trigeminal neuropathy   Respiratory:  Positive for shortness of breath. Negative for cough, chest tightness and wheezing.    Cardiovascular:  Positive for chest pain. Negative for palpitations and leg swelling.        CP a few days ago with coughing that resolved   Gastrointestinal:  Negative for abdominal distention, blood in stool, constipation, diarrhea, nausea and vomiting.        Obese   Endocrine: Negative for polydipsia, polyphagia and polyuria.   Genitourinary:  Negative for difficulty urinating, dysuria, flank pain and frequency.   Musculoskeletal:  Positive for gait problem. Negative for arthralgias and myalgias.        Chronic difficulty walking   Skin:  Negative for pallor, rash and wound.   Allergic/Immunologic: Negative for environmental allergies.   Neurological:  Positive for dizziness, facial asymmetry and headaches. Negative for syncope and numbness.        Asymmetrical eye movements as specified above   Psychiatric/Behavioral:  Negative for agitation, confusion and hallucinations.         Physical Exam  Vitals reviewed.   Constitutional:       General: She is not in acute distress.     Appearance: She is obese. She is ill-appearing. She is not toxic-appearing or diaphoretic.   HENT:      Head: Normocephalic and  "atraumatic.      Right Ear: External ear normal.      Left Ear: External ear normal.      Nose: Nose normal. No congestion or rhinorrhea.      Mouth/Throat:      Mouth: Mucous membranes are moist.      Pharynx: Oropharynx is clear.   Eyes:      Conjunctiva/sclera: Conjunctivae normal.      Comments: Eye movements disjointed    Cardiovascular:      Rate and Rhythm: Normal rate and regular rhythm.      Pulses: Normal pulses.      Heart sounds: Normal heart sounds. No murmur heard.  Pulmonary:      Effort: Pulmonary effort is normal. No respiratory distress.      Breath sounds: Rhonchi present. No wheezing.      Comments: Bibasilar crackles posteriorly, upper lobes clear. Breathing even and unlabored on NC. Able to speak in complete sentences without SOB.   Abdominal:      General: Bowel sounds are normal. There is no distension.      Palpations: Abdomen is soft.      Tenderness: There is no abdominal tenderness. There is no right CVA tenderness, left CVA tenderness or rebound.      Hernia: No hernia is present.   Musculoskeletal:         General: No swelling or tenderness.      Cervical back: Normal range of motion. No rigidity or tenderness.   Skin:     General: Skin is warm and dry.      Capillary Refill: Capillary refill takes 2 to 3 seconds.      Coloration: Skin is not jaundiced or pale.      Findings: No bruising or rash.   Neurological:      Mental Status: She is alert and oriented to person, place, and time.      Comments: Alert, oriented x 3, eye movements disjointed, smile symmetrical. Speech clear. Reports feeling weaker on her left side but  grasps equal with hands and leg raise is weaker on the right from \"right thigh pain that has been present for a while\". Denies numbness or tingling of legs arms or face currently. Finger to nose is intact, no drift.    Psychiatric:         Mood and Affect: Mood normal.         Last Recorded Vitals  Blood pressure 127/89, pulse 94, temperature 36.1 °C (97 °F), " "temperature source Temporal, resp. rate 22, height 1.702 m (5' 7\"), weight (!) 151 kg (331 lb 14.4 oz), SpO2 100 %.  Visit Vitals  /89   Pulse 94   Temp 36.1 °C (97 °F) (Temporal)   Resp 22   Ht 1.702 m (5' 7\")   Wt (!) 151 kg (331 lb 14.4 oz)   SpO2 100%   BMI 51.98 kg/m²   Smoking Status Never   BSA 2.67 m²     Weight: (!) 151 kg (331 lb 14.4 oz)   Pain Assessment: 0-10  Pain Score: 4  Pain Location: Generalized        Relevant Results  Results for orders placed or performed during the hospital encounter of 12/21/23 (from the past 24 hour(s))   ECG 12 lead   Result Value Ref Range    Ventricular Rate 108 BPM    Atrial Rate 108 BPM    HI Interval 168 ms    QRS Duration 26 ms    QT Interval 278 ms    QTC Calculation(Bazett) 372 ms    P Axis -15 degrees    R Axis 180 degrees    T Axis 81 degrees    QRS Count 18 beats    Q Onset 245 ms    P Onset 161 ms    P Offset 243 ms    T Offset 384 ms    QTC Fredericia 338 ms   CBC and Auto Differential   Result Value Ref Range    WBC 5.5 4.4 - 11.3 x10*3/uL    nRBC 0.0 0.0 - 0.0 /100 WBCs    RBC 3.02 (L) 4.00 - 5.20 x10*6/uL    Hemoglobin 8.6 (L) 12.0 - 16.0 g/dL    Hematocrit 30.7 (L) 36.0 - 46.0 %     (H) 80 - 100 fL    MCH 28.5 26.0 - 34.0 pg    MCHC 28.0 (L) 32.0 - 36.0 g/dL    RDW 17.2 (H) 11.5 - 14.5 %    Platelets 155 150 - 450 x10*3/uL    Neutrophils % 70.7 40.0 - 80.0 %    Immature Granulocytes %, Automated 0.4 0.0 - 0.9 %    Lymphocytes % 13.9 13.0 - 44.0 %    Monocytes % 12.1 2.0 - 10.0 %    Eosinophils % 2.7 0.0 - 6.0 %    Basophils % 0.2 0.0 - 2.0 %    Neutrophils Absolute 3.86 1.60 - 5.50 x10*3/uL    Immature Granulocytes Absolute, Automated 0.02 0.00 - 0.50 x10*3/uL    Lymphocytes Absolute 0.76 (L) 0.80 - 3.00 x10*3/uL    Monocytes Absolute 0.66 0.05 - 0.80 x10*3/uL    Eosinophils Absolute 0.15 0.00 - 0.40 x10*3/uL    Basophils Absolute 0.01 0.00 - 0.10 x10*3/uL   Comprehensive Metabolic Panel   Result Value Ref Range    Glucose 109 (H) 74 - 99 mg/dL "    Sodium 141 136 - 145 mmol/L    Potassium 4.8 3.5 - 5.3 mmol/L    Chloride 104 98 - 107 mmol/L    Bicarbonate 31 21 - 32 mmol/L    Anion Gap 11 10 - 20 mmol/L    Urea Nitrogen 26 (H) 6 - 23 mg/dL    Creatinine 1.09 (H) 0.50 - 1.05 mg/dL    eGFR 50 (L) >60 mL/min/1.73m*2    Calcium 8.7 8.6 - 10.3 mg/dL    Albumin 4.1 3.4 - 5.0 g/dL    Alkaline Phosphatase 91 33 - 136 U/L    Total Protein 6.2 (L) 6.4 - 8.2 g/dL    AST 11 9 - 39 U/L    Bilirubin, Total 0.8 0.0 - 1.2 mg/dL    ALT 9 7 - 45 U/L   Magnesium   Result Value Ref Range    Magnesium 2.12 1.60 - 2.40 mg/dL   Brain Natriuretic Peptide   Result Value Ref Range     (H) 0 - 99 pg/mL   Troponin I, High Sensitivity, Initial   Result Value Ref Range    Troponin I, High Sensitivity 8 0 - 13 ng/L   Troponin, High Sensitivity, 1 Hour   Result Value Ref Range    Troponin I, High Sensitivity 9 0 - 13 ng/L      CT angio chest for pulmonary embolism    Result Date: 12/21/2023  STUDY: CT Angiogram of the Chest; 12/21/2023 2:57 PM. INDICATION: New hypoxemia.  On Eliquis.  Evaluate for pulmonary embolism. COMPARISON: Chest radiograph performed the same date. ACCESSION NUMBER(S): [id number] ORDERING CLINICIAN: BIANCA TIDWELL TECHNIQUE:  CTA of the chest was performed with intravenous contrast. Images are reviewed and processed at a workstation according to the CT angiogram protocol with 3-D and/or MIP post processing imaging generated.  Omnipaque 350, 60 mL was administered intravenously. Automated mA/kV exposure control was utilized and patient examination was performed in strict accordance with principles of ALARA. FINDINGS: Pulmonary arteries are adequately opacified without acute or chronic filling defects.  The thoracic aorta is normal in course and caliber without dissection or aneurysm. Heart is mildly enlarged. No pericardial effusion or thickening. Thoracic lymph nodes are not enlarged. There is no pleural effusion, pleural thickening, or pneumothorax. The  airways are patent. Lungs are clear without consolidation, interstitial disease, or suspicious nodules. Upper abdomen demonstrates no acute pathology. There are no acute fractures.  No suspicious bony lesions.    No CT evidence of pulmonary embolism. Scattered emphysematous changes with small bilateral pleural effusions and mild bibasilar areas of infiltrate or atelectasis. Signed by Steve Ramírez MD    ECG 12 lead    Result Date: 12/21/2023  Sinus tachycardia Right axis deviation Pulmonary disease pattern Right ventricular hypertrophy with repolarization abnormality ST elevation, consider inferior injury or acute infarct ** ** ACUTE MI ** ** Abnormal ECG When compared with ECG of 25-JUL-2023 14:50, CO interval has decreased Vent. rate has increased BY  51 BPM (RBBB and left anterior fascicular block) is no longer Present    XR chest 1 view    Result Date: 12/21/2023  STUDY: Chest Radiograph;  12/21/2023 at 12:21 PM. INDICATION: Shortness of breath. COMPARISON: XR chest 2/25/2019 ACCESSION NUMBER(S): [id number] ORDERING CLINICIAN: MATTHIEU PARISH TECHNIQUE:  Frontal chest was obtained at 12:21 hours. FINDINGS: CARDIOMEDIASTINAL SILHOUETTE: Heart is mildly enlarged.  LUNGS: Lungs are clear. Small left pleural effusion.  ABDOMEN: No remarkable upper abdominal findings.  BONES: No acute osseous changes.    Mild cardiomegaly with small left pleural effusion. Signed by Steve Ramríez MD     Encounter Date: 12/21/23   ECG 12 lead   Result Value    Ventricular Rate 108    Atrial Rate 108    CO Interval 168    QRS Duration 26    QT Interval 278    QTC Calculation(Bazett) 372    P Axis -15    R Axis 180    T Axis 81    QRS Count 18    Q Onset 245    P Onset 161    P Offset 243    T Offset 384    QTC Fredericia 338    Narrative    Sinus tachycardia  Right axis deviation  Pulmonary disease pattern  Right ventricular hypertrophy with repolarization abnormality  ST elevation, consider inferior injury or acute infarct  ** ** ACUTE  MI ** **  Abnormal ECG  When compared with ECG of 25-JUL-2023 14:50,  SD interval has decreased  Vent. rate has increased BY  51 BPM  (RBBB and left anterior fascicular block) is no longer Present       Home Medications  Prior to Admission medications    Medication Sig Start Date End Date Taking? Authorizing Provider   acetaminophen (Tylenol) 325 mg tablet Take 2 tablets (650 mg) by mouth every 4 hours if needed for mild pain (1 - 3).    Historical Provider, MD   acetaminophen (Tylenol) 500 mg tablet Take 1 tablet (500 mg) by mouth 2 times a day.    Historical Provider, MD   albuterol 90 mcg/actuation inhaler Inhale 2 puffs every 4 hours if needed for wheezing or shortness of breath. 1/27/23   Historical Provider, MD   atorvastatin (Lipitor) 20 mg tablet Take 1 tablet (20 mg) by mouth once daily at bedtime. 9/24/19   Historical Provider, MD   calcium carbonate (Tums) 200 mg calcium chewable tablet Chew 1 tablet (500 mg) every 4 hours if needed for heartburn. 11/28/22   Historical Provider, MD   compress.stocking,knee,reg,med misc MEDICAL COMPRESSION STOCKINGS 20-30 MM HG KNEE HIGH COMPRESSION STOCKINGS, PLEASE MEASURE FOR APPROPRIATE FIT, DISPENSE TWO PAIRS, REFILL 8 10/13/23   Marcie Terrazas MD   cyanocobalamin (Vitamin B-12) 250 mcg tablet Take 1 tablet (250 mcg) by mouth once daily.    Historical Provider, MD   cyclobenzaprine (Flexeril) 10 mg tablet Take 1 tablet (10 mg) by mouth 2 times a day as needed for muscle spasms.    Historical Provider, MD   docusate sodium (Colace) 100 mg capsule Take 1 capsule (100 mg) by mouth twice a day. 5/23/22   Historical Provider, MD   Eliquis 5 mg tablet Take 1 tablet (5 mg) by mouth 2 times a day. 9/3/23   Historical Provider, MD   famotidine (Pepcid) 20 mg tablet Take 1 tablet (20 mg) by mouth once daily. 10/24/22   Historical Provider, MD   folic acid (Folvite) 1 mg tablet Take 1 tablet (1 mg) by mouth once daily.    Historical Provider, MD   losartan (Cozaar) 50 mg  tablet Take 1 tablet (50 mg) by mouth once daily. 10/2/23   Historical Provider, MD   lubricating eye drops ophthalmic solution Administer 1 drop into both eyes every 4 hours if needed for dry eyes.    Historical Provider, MD   metOLazone (Zaroxolyn) 5 mg tablet Take 1 tablet (5 mg) by mouth 1 (one) time per week. Wednesday 9/6/23   Historical Provider, MD   multivitamin tablet Take 1 tablet by mouth once daily. 8/1/13   Historical Provider, MD   nystatin (Mycostatin) 100,000 unit/gram powder Apply 1 Application topically 2 times a day as needed for rash.    Historical Provider, MD   polyethylene glycol (Glycolax, Miralax) 17 gram packet Take 17 g by mouth once daily.    Historical Provider, MD   sennosides (Senokot) 8.6 mg tablet Take 1 tablet (8.6 mg) by mouth 2 times a day.    Historical Provider, MD   torsemide 40 mg tablet Take 40 mg by mouth once daily.    Historical Provider, MD   cholecalciferol, vitamin D3, 250 mcg (10,000 unit) capsule Take 1 capsule (250 mcg) by mouth once daily. 11/28/22 12/21/23  Historical Provider, MD   furosemide (Lasix) 40 mg tablet Take 1 tablet (40 mg) by mouth once daily. 10/24/22 12/21/23  Historical Provider, MD   gabapentin (Neurontin) 100 mg capsule  5/6/23 12/21/23  Historical Provider, MD       Medications  Scheduled medications  acetaminophen, 487.5 mg, oral, BID  apixaban, 5 mg, oral, BID  atorvastatin, 20 mg, oral, Nightly  docusate sodium, 100 mg, oral, BID  [START ON 12/22/2023] famotidine, 20 mg, oral, Daily  [START ON 12/22/2023] furosemide, 40 mg, intravenous, BID  insulin lispro, 0-5 Units, subcutaneous, 4x daily  [START ON 12/22/2023] losartan, 50 mg, oral, Daily  [START ON 12/22/2023] metOLazone, 5 mg, oral, Weekly  [START ON 12/22/2023] polyethylene glycol, 17 g, oral, Daily  sennosides, 1 tablet, oral, BID      Continuous medications     PRN medications  PRN medications: acetaminophen, albuterol, calcium carbonate, cyclobenzaprine, dextrose 10 % in water  (D10W), dextrose, glucagon, lubricating eye drops, oxygen      Assessment/Plan   Principal Problem:    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 (CMS/[facility])  Active Problems:    Acute respiratory failure with hypoxia (CMS/[facility])    Paroxysmal atrial fibrillation (CMS/[facility])    Obstructive sleep apnea syndrome    Stage 3 chronic kidney disease (CMS/[facility])    Morbid obesity (CMS/[facility])    Type 2 diabetes mellitus with chronic kidney disease, without long-term current use of insulin (CMS/[facility])    Acute heart failure with preserved ejection fraction (CMS/[facility])    Hyperlipidemia    History of CVA (cerebrovascular accident)    Chronic anemia    Plan: Daily weight, strict I and O, consult cardiology, IV Lasix 40 mg twice daily, monitor renal function, replace electrolytes as needed, home medications continued as appropriate, last echocardiogram on record from 2022 demonstrates ejection fraction of 64% with severely dilated LA and moderate/severe dilated RA, RVSP 39 and mild pulmonary hypertension.  Wean oxygen as tolerated.  Obtain echo.  Her neurologic symptoms of headache and dizziness seem best treated by her outpatient neurologist for continuity of care as her symptoms have been present for over 4 years and multiple treatments have been attempted.  Defer to PCP.    Per discussion [id number] order placed. Pt tells me she has paperwork on file at [facility].          Further evaluation and management per attending and consulting physicians.      This note has been transcribed using Dragon voice recognition system and there is a possibility of unintentional typing misprints.  Any information found to be copied from previous providers is done in the best interest of the patient to provide accurate, quality, and continuity of care.     I spent 40 minutes in the professional and overall care of this patient.      Jennifer Manuel, APRN-CNP  [facility] [place]

## 2023-12-22 NOTE — CARE PLAN
Problem: Fall/Injury  Goal: Pace activities to prevent fatigue by end of the shift  Outcome: Progressing     Problem: Heart Failure  Goal: Improved urinary output this shift  12/22/2023 1830 by Hali Foss RN  Outcome: Progressing  12/22/2023 1002 by Hali Foss RN  Outcome: Progressing  Goal: Reduction in peripheral edema within 24 hours  12/22/2023 1830 by Hali Foss RN  Outcome: Progressing  12/22/2023 1002 by Hali Foss RN  Outcome: Progressing  Goal: Report improvement of dyspnea/breathlessness this shift  Outcome: Progressing  Goal: Weight from fluid excess reduced over 2-3 days, then stabilize  Outcome: Progressing  Flowsheets (Taken 12/22/2023 1830)  Weight from fluid excess reduced over 2-3 days, then stabilize:   Med administration/monitor effect   Monitor intake/output and daily weight   Monitor serum electrolytes/replace per order     Problem: Fall/Injury  Goal: Not fall by end of shift  Outcome: Met

## 2023-12-22 NOTE — CARE PLAN
The patient's goals for the shift include      The clinical goals for the shift include LIVE LONGER      Problem: Fall/Injury  Goal: Not fall by end of shift  12/22/2023 0547 by Sejal Young RN  Outcome: Progressing  12/22/2023 0546 by Sejal Young RN  Outcome: Progressing  Goal: Be free from injury by end of the shift  12/22/2023 0547 by Sejal Young RN  Outcome: Progressing  12/22/2023 0546 by Sejal Young RN  Outcome: Progressing  Goal: Verbalize understanding of personal risk factors for fall in the hospital  12/22/2023 0547 by Sejal Young RN  Outcome: Progressing  12/22/2023 0546 by Sejal Young RN  Outcome: Progressing  Goal: Verbalize understanding of risk factor reduction measures to prevent injury from fall in the home  12/22/2023 0547 by Sejal Young RN  Outcome: Progressing  12/22/2023 0546 by Sejal Young RN  Outcome: Progressing  Goal: Use assistive devices by end of the shift  12/22/2023 0547 by Sejal Young RN  Outcome: Progressing  12/22/2023 0546 by Sejal Young RN  Outcome: Progressing  Goal: Pace activities to prevent fatigue by end of the shift  12/22/2023 0547 by Sejal Young RN  Outcome: Progressing  12/22/2023 0546 by Sejal Young RN  Outcome: Progressing     Problem: Pain  Goal: Takes deep breaths with improved pain control throughout the shift  12/22/2023 0547 by Sejal Young RN  Outcome: Progressing  12/22/2023 0546 by Sejal Young RN  Outcome: Progressing  Goal: Turns in bed with improved pain control throughout the shift  12/22/2023 0547 by Sejal Young RN  Outcome: Progressing  12/22/2023 0546 by Sejal Young RN  Outcome: Progressing  Goal: Walks with improved pain control throughout the shift  12/22/2023 0547 by Sejal Young RN  Outcome: Progressing  12/22/2023 0546 by Sejal Young RN  Outcome: Progressing  Goal: Performs ADL's with improved pain control throughout shift  12/22/2023 0547 by Sejal Danao, RN  Outcome: Progressing  12/22/2023 0546 by Sejal Young,  RN  Outcome: Progressing  Goal: Free from opioid side effects throughout the shift  12/22/2023 0547 by Sejal Young RN  Outcome: Progressing  12/22/2023 0546 by Sejal Young RN  Outcome: Progressing  Goal: Free from acute confusion related to pain meds throughout the shift  12/22/2023 0547 by Sejal Young RN  Outcome: Progressing  12/22/2023 0546 by Sejal Young RN  Outcome: Progressing     Problem: Skin  Goal: Participates in plan/prevention/treatment measures  12/22/2023 0547 by Sejal Young RN  Outcome: Progressing  12/22/2023 0546 by Sejal Young RN  Outcome: Progressing  Goal: Prevent/manage excess moisture  12/22/2023 0547 by Sejal Young RN  Outcome: Progressing  Flowsheets (Taken 12/22/2023 0547)  Prevent/manage excess moisture:   Cleanse incontinence/protect with barrier cream   Moisturize dry skin   Monitor for/manage infection if present  12/22/2023 0546 by Sejal Young RN  Outcome: Progressing  Goal: Prevent/minimize sheer/friction injuries  Outcome: Progressing  Goal: Promote/optimize nutrition  Outcome: Progressing

## 2023-12-23 LAB
ANION GAP SERPL CALC-SCNC: 11 MMOL/L (ref 10–20)
BUN SERPL-MCNC: 27 MG/DL (ref 6–23)
CALCIUM SERPL-MCNC: 8.7 MG/DL (ref 8.6–10.3)
CHLORIDE SERPL-SCNC: 101 MMOL/L (ref 98–107)
CO2 SERPL-SCNC: 31 MMOL/L (ref 21–32)
CREAT SERPL-MCNC: 1.23 MG/DL (ref 0.5–1.05)
ERYTHROCYTE [DISTWIDTH] IN BLOOD BY AUTOMATED COUNT: 17 % (ref 11.5–14.5)
GFR SERPL CREATININE-BSD FRML MDRD: 43 ML/MIN/1.73M*2
GLUCOSE BLD MANUAL STRIP-MCNC: 101 MG/DL (ref 74–99)
GLUCOSE BLD MANUAL STRIP-MCNC: 116 MG/DL (ref 74–99)
GLUCOSE BLD MANUAL STRIP-MCNC: 149 MG/DL (ref 74–99)
GLUCOSE BLD MANUAL STRIP-MCNC: 160 MG/DL (ref 74–99)
GLUCOSE SERPL-MCNC: 92 MG/DL (ref 74–99)
HCT VFR BLD AUTO: 30.4 % (ref 36–46)
HGB BLD-MCNC: 8.9 G/DL (ref 12–16)
MAGNESIUM SERPL-MCNC: 2.04 MG/DL (ref 1.6–2.4)
MCH RBC QN AUTO: 29.9 PG (ref 26–34)
MCHC RBC AUTO-ENTMCNC: 29.3 G/DL (ref 32–36)
MCV RBC AUTO: 102 FL (ref 80–100)
NRBC BLD-RTO: 0 /100 WBCS (ref 0–0)
PLATELET # BLD AUTO: 196 X10*3/UL (ref 150–450)
POTASSIUM SERPL-SCNC: 4.4 MMOL/L (ref 3.5–5.3)
RBC # BLD AUTO: 2.98 X10*6/UL (ref 4–5.2)
SODIUM SERPL-SCNC: 139 MMOL/L (ref 136–145)
WBC # BLD AUTO: 5.2 X10*3/UL (ref 4.4–11.3)

## 2023-12-23 PROCEDURE — 2500000001 HC RX 250 WO HCPCS SELF ADMINISTERED DRUGS (ALT 637 FOR MEDICARE OP): Performed by: NURSE PRACTITIONER

## 2023-12-23 PROCEDURE — 1200000002 HC GENERAL ROOM WITH TELEMETRY DAILY

## 2023-12-23 PROCEDURE — 83735 ASSAY OF MAGNESIUM: CPT | Performed by: NURSE PRACTITIONER

## 2023-12-23 PROCEDURE — 94760 N-INVAS EAR/PLS OXIMETRY 1: CPT

## 2023-12-23 PROCEDURE — 82947 ASSAY GLUCOSE BLOOD QUANT: CPT

## 2023-12-23 PROCEDURE — 80048 BASIC METABOLIC PNL TOTAL CA: CPT | Performed by: NURSE PRACTITIONER

## 2023-12-23 PROCEDURE — 2500000001 HC RX 250 WO HCPCS SELF ADMINISTERED DRUGS (ALT 637 FOR MEDICARE OP): Performed by: INTERNAL MEDICINE

## 2023-12-23 PROCEDURE — 2500000004 HC RX 250 GENERAL PHARMACY W/ HCPCS (ALT 636 FOR OP/ED): Performed by: NURSE PRACTITIONER

## 2023-12-23 PROCEDURE — 2500000002 HC RX 250 W HCPCS SELF ADMINISTERED DRUGS (ALT 637 FOR MEDICARE OP, ALT 636 FOR OP/ED): Performed by: NURSE PRACTITIONER

## 2023-12-23 PROCEDURE — 85027 COMPLETE CBC AUTOMATED: CPT | Performed by: NURSE PRACTITIONER

## 2023-12-23 PROCEDURE — 36415 COLL VENOUS BLD VENIPUNCTURE: CPT | Performed by: NURSE PRACTITIONER

## 2023-12-23 RX ADMIN — APIXABAN 5 MG: 5 TABLET, FILM COATED ORAL at 08:37

## 2023-12-23 RX ADMIN — DOCUSATE SODIUM 100 MG: 100 CAPSULE, LIQUID FILLED ORAL at 08:36

## 2023-12-23 RX ADMIN — INSULIN LISPRO 1 UNITS: 100 INJECTION, SOLUTION INTRAVENOUS; SUBCUTANEOUS at 21:41

## 2023-12-23 RX ADMIN — ATORVASTATIN CALCIUM 20 MG: 20 TABLET, FILM COATED ORAL at 21:41

## 2023-12-23 RX ADMIN — EMPAGLIFLOZIN 10 MG: 10 TABLET, FILM COATED ORAL at 08:37

## 2023-12-23 RX ADMIN — FUROSEMIDE 40 MG: 10 INJECTION, SOLUTION INTRAMUSCULAR; INTRAVENOUS at 14:31

## 2023-12-23 RX ADMIN — ACETAMINOPHEN 487.5 MG: 325 TABLET ORAL at 21:41

## 2023-12-23 RX ADMIN — FUROSEMIDE 40 MG: 10 INJECTION, SOLUTION INTRAMUSCULAR; INTRAVENOUS at 08:35

## 2023-12-23 RX ADMIN — APIXABAN 5 MG: 5 TABLET, FILM COATED ORAL at 21:41

## 2023-12-23 RX ADMIN — ACETAMINOPHEN 487.5 MG: 325 TABLET ORAL at 08:36

## 2023-12-23 RX ADMIN — FAMOTIDINE 20 MG: 20 TABLET ORAL at 16:20

## 2023-12-23 RX ADMIN — POLYETHYLENE GLYCOL 3350 17 G: 17 POWDER, FOR SOLUTION ORAL at 08:35

## 2023-12-23 RX ADMIN — LOSARTAN POTASSIUM 50 MG: 50 TABLET, FILM COATED ORAL at 08:37

## 2023-12-23 ASSESSMENT — COGNITIVE AND FUNCTIONAL STATUS - GENERAL
TOILETING: TOTAL
MOVING TO AND FROM BED TO CHAIR: TOTAL
PERSONAL GROOMING: TOTAL
MOBILITY SCORE: 6
DAILY ACTIVITIY SCORE: 7
EATING MEALS: A LOT
MOVING FROM LYING ON BACK TO SITTING ON SIDE OF FLAT BED WITH BEDRAILS: TOTAL
TURNING FROM BACK TO SIDE WHILE IN FLAT BAD: TOTAL
CLIMB 3 TO 5 STEPS WITH RAILING: TOTAL
HELP NEEDED FOR BATHING: TOTAL
WALKING IN HOSPITAL ROOM: TOTAL
DRESSING REGULAR UPPER BODY CLOTHING: TOTAL
STANDING UP FROM CHAIR USING ARMS: TOTAL
DRESSING REGULAR LOWER BODY CLOTHING: TOTAL

## 2023-12-23 ASSESSMENT — PAIN SCALES - WONG BAKER: WONGBAKER_NUMERICALRESPONSE: NO HURT

## 2023-12-23 ASSESSMENT — PAIN SCALES - GENERAL
PAINLEVEL_OUTOF10: 4
PAINLEVEL_OUTOF10: 0 - NO PAIN

## 2023-12-23 NOTE — PROGRESS NOTES
Physical Therapy                 Therapy Communication Note    Patient Name: Fifi Jenkins  MRN: [id number]  Today's Date: 12/23/2023     Discipline: Physical Therapy    Missed Visit Reason:  Attempted to see pt for PT reynoldal at 13:30 and pt was eating her lunch, re-attempted at 14:27 and RN was with patient putting in an IV.  Will attempt again later as appropriate.     Missed Time: Attempt

## 2023-12-23 NOTE — PROGRESS NOTES
Internal Medicine Progress Note    Patient Name: Fifi Jenkins          MRN: [id number]  Today's Date: December 23, 2023          Attending: Nick Montaño MD    Subjective:  Patient was seen and examined at bedside.    Review Of Systems:  GENERAL: No malaise or fevers.  CARDIOVASCULAR: Negative for chest pain, leg swelling  RESPIRATORY: Improving shortness of breath  GI: No nausea, vomiting, or diarrhea      Objective:  Vitals:    12/22/23 1700 12/22/23 2000 12/23/23 0000 12/23/23 0800   BP:  126/58 120/57 136/69   BP Location:  Left arm Left arm Right arm   Patient Position:  Lying Lying Lying   Pulse:  100 91 90   Resp:  16 18 18   Temp:  36.9 °C (98.4 °F) 37.4 °C (99.3 °F) 36.7 °C (98.1 °F)   TempSrc:    Temporal   SpO2: 92% 98% 94% 92%   Weight:       Height:               Physical Exam:   General appearance: Well-appearing alert, in no acute distress  Lungs: diminished breath sounds  Heart: RRR without murmur, gallop, or rubs.  No ectopy  Abdomen: Soft, non-tender.   Extremities: No deformity, no edema  Neuro: Alert oriented x3, no focal deficit.    Labs:  Results for orders placed or performed during the hospital encounter of 12/21/23 (from the past 24 hour(s))   POCT GLUCOSE   Result Value Ref Range    POCT Glucose 84 74 - 99 mg/dL   POCT GLUCOSE   Result Value Ref Range    POCT Glucose 104 (H) 74 - 99 mg/dL   POCT GLUCOSE   Result Value Ref Range    POCT Glucose 159 (H) 74 - 99 mg/dL   POCT GLUCOSE   Result Value Ref Range    POCT Glucose 101 (H) 74 - 99 mg/dL       Medications:  Scheduled medications  acetaminophen, 487.5 mg, oral, BID  apixaban, 5 mg, oral, BID  atorvastatin, 20 mg, oral, Nightly  docusate sodium, 100 mg, oral, BID  empagliflozin, 10 mg, oral, Daily  famotidine, 20 mg, oral, Daily  furosemide, 40 mg, intravenous, BID  insulin lispro, 0-5 Units, subcutaneous, 4x daily  losartan, 50 mg, oral, Daily  [START ON 12/27/2023] metOLazone, 5 mg, oral, Weekly  perflutren lipid microspheres, 0.5-10 mL  "of dilution, intravenous, Once in imaging  perflutren protein A microsphere, 0.5 mL, intravenous, Once in imaging  polyethylene glycol, 17 g, oral, Daily  sennosides, 1 tablet, oral, BID  sulfur hexafluoride microsphr, 2 mL, intravenous, Once in imaging      Continuous medications     PRN medications  PRN medications: acetaminophen, albuterol, calcium carbonate, cyclobenzaprine, dextrose 10 % in water (D10W), dextrose, glucagon, lubricating eye drops, oxygen      Assessment/Plan:  Medical Problems       Problem List       * (Principal) 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 (CMS/HCC)         Improving  Continue Lasix, losartan, Metolazone and Jardiance   Cardiology is following  Waiting for 2D echo report         Paroxysmal atrial fibrillation (CMS/HCC)         Continue current medications         Hyperlipidemia         Continue atorvastatin           Acute respiratory failure with hypoxia (CMS/HCC)         Improving  Continue treating heart failure and oxygen supplement         Stage 3 chronic kidney disease (CMS/HCC)         Monitor kidney function            Type 2 diabetes mellitus with chronic kidney disease, 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 In such instances, original meaning may be extrapolated by contextual derivation  "

## 2023-12-24 ENCOUNTER — APPOINTMENT (OUTPATIENT)
Dept: CARDIOLOGY | Facility: HOSPITAL | Age: 88
DRG: 242 | End: 2023-12-24
Payer: COMMERCIAL

## 2023-12-24 LAB
ANION GAP SERPL CALC-SCNC: 10 MMOL/L (ref 10–20)
BUN SERPL-MCNC: 28 MG/DL (ref 6–23)
CALCIUM SERPL-MCNC: 8.1 MG/DL (ref 8.6–10.3)
CHLORIDE SERPL-SCNC: 101 MMOL/L (ref 98–107)
CO2 SERPL-SCNC: 33 MMOL/L (ref 21–32)
CREAT SERPL-MCNC: 1.34 MG/DL (ref 0.5–1.05)
ERYTHROCYTE [DISTWIDTH] IN BLOOD BY AUTOMATED COUNT: 17.1 % (ref 11.5–14.5)
GFR SERPL CREATININE-BSD FRML MDRD: 39 ML/MIN/1.73M*2
GLUCOSE BLD MANUAL STRIP-MCNC: 126 MG/DL (ref 74–99)
GLUCOSE BLD MANUAL STRIP-MCNC: 151 MG/DL (ref 74–99)
GLUCOSE BLD MANUAL STRIP-MCNC: 88 MG/DL (ref 74–99)
GLUCOSE BLD MANUAL STRIP-MCNC: 91 MG/DL (ref 74–99)
GLUCOSE SERPL-MCNC: 88 MG/DL (ref 74–99)
HCT VFR BLD AUTO: 29.5 % (ref 36–46)
HGB BLD-MCNC: 8.2 G/DL (ref 12–16)
MAGNESIUM SERPL-MCNC: 2.07 MG/DL (ref 1.6–2.4)
MCH RBC QN AUTO: 28.3 PG (ref 26–34)
MCHC RBC AUTO-ENTMCNC: 27.8 G/DL (ref 32–36)
MCV RBC AUTO: 102 FL (ref 80–100)
NRBC BLD-RTO: 0 /100 WBCS (ref 0–0)
PLATELET # BLD AUTO: 178 X10*3/UL (ref 150–450)
POTASSIUM SERPL-SCNC: 4.2 MMOL/L (ref 3.5–5.3)
RBC # BLD AUTO: 2.9 X10*6/UL (ref 4–5.2)
SODIUM SERPL-SCNC: 140 MMOL/L (ref 136–145)
WBC # BLD AUTO: 5.2 X10*3/UL (ref 4.4–11.3)

## 2023-12-24 PROCEDURE — 2500000004 HC RX 250 GENERAL PHARMACY W/ HCPCS (ALT 636 FOR OP/ED): Performed by: INTERNAL MEDICINE

## 2023-12-24 PROCEDURE — 80048 BASIC METABOLIC PNL TOTAL CA: CPT | Performed by: NURSE PRACTITIONER

## 2023-12-24 PROCEDURE — 97165 OT EVAL LOW COMPLEX 30 MIN: CPT | Mod: GO | Performed by: OCCUPATIONAL THERAPIST

## 2023-12-24 PROCEDURE — 2500000004 HC RX 250 GENERAL PHARMACY W/ HCPCS (ALT 636 FOR OP/ED): Performed by: NURSE PRACTITIONER

## 2023-12-24 PROCEDURE — 85027 COMPLETE CBC AUTOMATED: CPT | Performed by: NURSE PRACTITIONER

## 2023-12-24 PROCEDURE — 97161 PT EVAL LOW COMPLEX 20 MIN: CPT | Mod: GP

## 2023-12-24 PROCEDURE — 97530 THERAPEUTIC ACTIVITIES: CPT | Mod: GP

## 2023-12-24 PROCEDURE — 2500000001 HC RX 250 WO HCPCS SELF ADMINISTERED DRUGS (ALT 637 FOR MEDICARE OP): Performed by: NURSE PRACTITIONER

## 2023-12-24 PROCEDURE — 36415 COLL VENOUS BLD VENIPUNCTURE: CPT | Performed by: NURSE PRACTITIONER

## 2023-12-24 PROCEDURE — 93306 TTE W/DOPPLER COMPLETE: CPT

## 2023-12-24 PROCEDURE — 1200000002 HC GENERAL ROOM WITH TELEMETRY DAILY

## 2023-12-24 PROCEDURE — 83735 ASSAY OF MAGNESIUM: CPT | Performed by: NURSE PRACTITIONER

## 2023-12-24 PROCEDURE — 2500000001 HC RX 250 WO HCPCS SELF ADMINISTERED DRUGS (ALT 637 FOR MEDICARE OP): Performed by: INTERNAL MEDICINE

## 2023-12-24 PROCEDURE — 82947 ASSAY GLUCOSE BLOOD QUANT: CPT

## 2023-12-24 RX ADMIN — APIXABAN 5 MG: 5 TABLET, FILM COATED ORAL at 21:08

## 2023-12-24 RX ADMIN — ATORVASTATIN CALCIUM 20 MG: 20 TABLET, FILM COATED ORAL at 21:07

## 2023-12-24 RX ADMIN — APIXABAN 5 MG: 5 TABLET, FILM COATED ORAL at 08:36

## 2023-12-24 RX ADMIN — FUROSEMIDE 40 MG: 10 INJECTION, SOLUTION INTRAMUSCULAR; INTRAVENOUS at 08:37

## 2023-12-24 RX ADMIN — FAMOTIDINE 20 MG: 20 TABLET ORAL at 17:25

## 2023-12-24 RX ADMIN — EMPAGLIFLOZIN 10 MG: 10 TABLET, FILM COATED ORAL at 08:37

## 2023-12-24 RX ADMIN — PERFLUTREN 2 ML OF DILUTION: 6.52 INJECTION, SUSPENSION INTRAVENOUS at 14:23

## 2023-12-24 RX ADMIN — ACETAMINOPHEN 487.5 MG: 325 TABLET ORAL at 21:04

## 2023-12-24 RX ADMIN — ACETAMINOPHEN 487.5 MG: 325 TABLET ORAL at 08:37

## 2023-12-24 ASSESSMENT — COGNITIVE AND FUNCTIONAL STATUS - GENERAL
DRESSING REGULAR UPPER BODY CLOTHING: A LOT
WALKING IN HOSPITAL ROOM: TOTAL
MOBILITY SCORE: 10
HELP NEEDED FOR BATHING: A LOT
MOVING FROM LYING ON BACK TO SITTING ON SIDE OF FLAT BED WITH BEDRAILS: A LOT
DRESSING REGULAR LOWER BODY CLOTHING: TOTAL
HELP NEEDED FOR BATHING: A LOT
TOILETING: A LOT
PERSONAL GROOMING: A LITTLE
TURNING FROM BACK TO SIDE WHILE IN FLAT BAD: A LOT
DRESSING REGULAR UPPER BODY CLOTHING: A LOT
STANDING UP FROM CHAIR USING ARMS: A LOT
MOBILITY SCORE: 10
TOILETING: A LOT
DAILY ACTIVITIY SCORE: 14
MOVING FROM LYING ON BACK TO SITTING ON SIDE OF FLAT BED WITH BEDRAILS: A LOT
PERSONAL GROOMING: A LITTLE
MOVING TO AND FROM BED TO CHAIR: A LOT
TURNING FROM BACK TO SIDE WHILE IN FLAT BAD: A LOT
TURNING FROM BACK TO SIDE WHILE IN FLAT BAD: A LOT
WALKING IN HOSPITAL ROOM: TOTAL
DRESSING REGULAR LOWER BODY CLOTHING: TOTAL
MOVING FROM LYING ON BACK TO SITTING ON SIDE OF FLAT BED WITH BEDRAILS: A LOT
DRESSING REGULAR UPPER BODY CLOTHING: A LITTLE
PERSONAL GROOMING: A LITTLE
CLIMB 3 TO 5 STEPS WITH RAILING: TOTAL
DRESSING REGULAR LOWER BODY CLOTHING: A LOT
WALKING IN HOSPITAL ROOM: TOTAL
DAILY ACTIVITIY SCORE: 16
CLIMB 3 TO 5 STEPS WITH RAILING: TOTAL
STANDING UP FROM CHAIR USING ARMS: A LOT
TOILETING: A LOT
STANDING UP FROM CHAIR USING ARMS: A LOT
MOVING TO AND FROM BED TO CHAIR: A LOT
MOVING TO AND FROM BED TO CHAIR: A LOT
MOBILITY SCORE: 10
CLIMB 3 TO 5 STEPS WITH RAILING: TOTAL
HELP NEEDED FOR BATHING: A LOT
DAILY ACTIVITIY SCORE: 14

## 2023-12-24 ASSESSMENT — PAIN - FUNCTIONAL ASSESSMENT: PAIN_FUNCTIONAL_ASSESSMENT: 0-10

## 2023-12-24 ASSESSMENT — PAIN SCALES - GENERAL
PAINLEVEL_OUTOF10: 0 - NO PAIN
PAINLEVEL_OUTOF10: 8

## 2023-12-24 ASSESSMENT — PAIN SCALES - PAIN ASSESSMENT IN ADVANCED DEMENTIA (PAINAD)
BREATHING: NORMAL
BODYLANGUAGE: RELAXED
FACIALEXPRESSION: SMILING OR INEXPRESSIVE

## 2023-12-24 ASSESSMENT — PAIN SCALES - WONG BAKER: WONGBAKER_NUMERICALRESPONSE: HURTS LITTLE MORE

## 2023-12-24 NOTE — CARE PLAN
Problem: Fall/Injury  Goal: Pace activities to prevent fatigue by end of the shift  Outcome: Progressing     Problem: Heart Failure  Goal: Improved urinary output this shift  12/24/2023 1719 by Hali Foss RN  Outcome: Progressing  12/24/2023 1046 by Hali Foss RN  Outcome: Progressing  Goal: Reduction in peripheral edema within 24 hours  Outcome: Progressing  Flowsheets (Taken 12/24/2023 1719)  Reduction in peripheral edema within 24 hours: Monitor edema/skin/mucous membrane integrity  Goal: Report improvement of dyspnea/breathlessness this shift  Outcome: Progressing  Flowsheets (Taken 12/24/2023 1719)  Report improvement of dyspnea/breathlessness this shift:   Consider PT/OT consult   Encourage activity/mobility/ROM

## 2023-12-24 NOTE — CONSULTS
INPATIENT NEPHROLOGY CONSULT NOTE        CONSULT: Nephrology SERVICE    PATIENT NAME: Fifi Jenkins    MRN: [id number]  DATE of SERVICE: December 24, 2023  TIME of SERVICE: 9:24 AM      REASON FOR CONSULT: CHERYL  REQUESTING PHYSICIAN: Dr. Montaño  PRIMARY CARE PHYSICIAN: Aung Jacobs MD    HPI:  Ms. Jenkins is a pleasant morbidly obese 87-year-old female with past medical history significant for chronic kidney disease stage IIIa with baseline serum creatinine 1 mg/dL EGFR 50 mill per minute per 1.73 m², long-term resident at [facility].  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 CVA in 2019, with residual dizziness.    Patient presented to [facility] December 21, 2023 for evaluation of progressively worsening shortness of breath.    Upon presentation patient was hypoxic requiring 3 L of oxygen.    Home medications: losartan 50 mg p.o. daily, metolazone 5 mg weekly, torsemide 40 mg p.o. daily  Current medications: Newly started Jardiance 10 mg, losartan has been on hold since admission.  Metolazone 5 mg weekly.  Received Lasix 40 mg IV twice daily.    Echocardiogram demonstrated ejection fraction 55%.     Labs:  Serum creatinine up to 1.34 mg deciliter BUN of 28 and GFR of 39.  From serum creatinine of 1 mg deciliter and a GFR of 58 mL/min upon presentation.  Anemia hemoglobin of 8.2 mg deciliter.    December 21, 2023 CT with IV contrast: No signs of pulmonary embolism demonstrated cardiomegaly without pericardial effusion.  Scattered emphysematous changes with small bilateral pleural effusions and mild bibasilar area of infiltrate or  atelectasis.    Patient seen and evaluated at bedside using PureWick.  Oxygen requirement stable at 2 L.    ASSESSMENT AND PLAN:  CHERYL on CKD IIIa:  likely hemodynamically mediated in the setting of diuretics adjustment and vasoactive medications (Jardiance), contrast nephropathy (contrast exposure December 21).  Serum creatinine up to 1.34 mg deciliter BUN of 28 and GFR of 39.  From serum creatinine of 1 mg deciliter and a GFR of 58 mL/min upon presentation.    Volume status: Hypervolemic on presentation improved after IV Lasix    CKD IIIa: Diabetic nephropathy, hypertensive nephrosclerosis  Electrolytes: Managed (with renal diet)  Hypertension: Relative hypotension blood pressure 100 oh 3/60 with a heart rate of 81  Without being on antihypertensive medications  Acid-base: Metabolic alkalosis likely secondary to diuresis  Anemia from renal failure, To check iron panel and start epogen. hemoglobin of 8.2 mg deciliter.  To rule out plasma cell disorder  T2DM on insulin sliding scale  COPD: Emphysematous changes on CT scan requiring 2 L of oxygen  CVA 2019 with residual dizziness  Patient wheelchair dependent  Urinary incontinent: Wearing depends at [facility] nursing facility  To monitor for UTI but Jardiance  CHF: Diastolic heart failure treated with IV Lasix likely for acute on chronic  UMA with CPAP intolerance  Paroxysmal A-fib on Eliquis     Plan:  Acute kidney injury likely hemodynamically mediated due to IV diuresis, the initiation of Jardiance, losartan use (last dose 12/23), hypotension, contrast exposure.    To discontinue losartan upon discharge (relative hypotension, patient already started on Jardiance for cardiorenal protective mechanism/diuresis).  Hypotension is a challenge for losartan use.    Regarding Jardiance safe to continue from renal standpoint.  An uptrend in serum creatinine by 30% from baseline still within acceptable range.  The only challenge the patient may face is frequent UTI due to the  fact that the patient has a urinary incontinent.  To monitor closely and consider an alternative if patient develops more than 3 urinary tract infection in 3 months.    Regarding diuresis:   The challenge with torsemide it is manufacturing dependent.  Diuretics can be switched from torsemide/metolazone to Bumex 2 mg p.o. daily with an extra dose for worsening dyspnea or 3 pounds weight gain or more.    Patient with severe emphysema/COPD requiring oxygen, UMA with intolerance to CPAP  May need home O2 upon discharge.    To continue diuretics holiday until serum creatinine improves then to restart Bumex.    To check urinalysis, urine lytes, protein to creatinine ratio    Strict input and output guide diuresis management.   Medication reviewed renally dosed  To continue renal diet, 2 g sodium.  No urgent indication for renal placement therapy.     Tentative medication adjustment upon discharge:  To discontinue losartan  To discontinue torsemide/metolazone  To continue Jardiance  To start Bumex 2 mg p.o. daily with extra dose as mentioned above.  Agree with cardiology recommendations; eventually at one point once patient blood pressure improved she will benefit from spironolactone for cardioprotective effect and volume management.     We will continue to monitor closely with you, thank you!      I sincerely, thank you Dr. Montaño for this opportunity to participate in the care of your patient, I will follow from Nephrology point view!    PAST MEDICAL HISTORY:    Past Medical History:   Diagnosis Date    Bilateral sensorineural hearing loss 10/12/2023    Cerebellar stroke (CMS/[facility]) 10/12/2023    Chronic anemia 09/27/2022    Chronic diastolic heart failure (CMS/[facility]) 10/12/2023    Elevated blood-pressure reading, without diagnosis of hypertension 05/23/2018    Elevated BP without diagnosis of hypertension    GERD (gastroesophageal reflux disease) 10/12/2023    Heart failure with preserved left ventricular function (HFpEF)  (CMS/[facility]) 10/12/2023    Hyperlipidemia 10/12/2023    LAE (left atrial enlargement) 10/12/2023    Obstructive sleep apnea syndrome 04/02/2019    Other chest pain 11/06/2015    Atypical chest pain    Other nonspecific abnormal finding of lung field 07/31/2013    Radiologic findings of lung field, abnormal    Other symptoms and signs involving the musculoskeletal system 10/22/2015    Leg weakness, bilateral    Paroxysmal atrial fibrillation (CMS/[facility]) 10/12/2023    Personal history of other diseases of the circulatory system 11/28/2022    History of cardiomyopathy    Personal history of transient ischemic attack (TIA), and cerebral infarction without residual deficits 10/28/2020    History of cerebrovascular accident    Polyp of colon 08/27/2018    Benign colonic polyp    Spinal stenosis 10/12/2023    Stage 3 chronic kidney disease (CMS/[facility]) 10/02/2023    Trigeminal neuralgia 10/12/2023    Type 2 diabetes mellitus with chronic kidney disease, without long-term current use of insulin (CMS/[facility]) 12/21/2023    Unspecified systolic (congestive) heart failure (CMS/[facility]) 06/08/2021    HFrEF (heart failure with reduced ejection fraction)    Unspecified visual disturbance 10/17/2017    Change in vision    Urinary retention 10/12/2023       PAST SURGICAL HISTORY:    Past Surgical History:   Procedure Laterality Date    ANKLE SURGERY  08/01/2013    Ankle Surgery    APPENDECTOMY  02/21/2013    Appendectomy    BREAST BIOPSY  04/02/2013    Biopsy Breast Percutaneous Needle Core    CARPAL TUNNEL RELEASE  02/21/2013    Neuroplasty Decompression Median Nerve At Carpal Tunnel    CHOLECYSTECTOMY  02/21/2013    Cholecystectomy    COLONOSCOPY  12/22/2015    Complete Colonoscopy    CT ANGIO CORONARY ART WITH HEARTFLOW IF SCORE >30%  2/26/2019    CT HEART CORONARY ANGIOGRAM 2/26/2019 [facility] CLINICAL LEGACY    CT ANGIO NECK  2/23/2019    CT NECK ANGIO W AND WO IV CONTRAST 2/23/2019 [facility] CLINICAL LEGACY    CT HEAD ANGIO W AND WO IV CONTRAST   2/23/2019    CT HEAD ANGIO W AND WO IV CONTRAST 2/23/2019 [facility] CLINICAL LEGACY    OTHER SURGICAL HISTORY  02/11/2020    Cataract surgery    OTHER SURGICAL HISTORY  12/22/2018    Upper Gastrointestinal Endoscopy (Therapeutic)    ROTATOR CUFF REPAIR  04/02/2013    Rotator Cuff Repair    TOTAL ABDOMINAL HYSTERECTOMY  02/21/2013    Total Abdominal Hysterectomy    TOTAL KNEE ARTHROPLASTY  08/01/2013    Knee Replacement       FAMILY HISTORY:    Family History   Problem Relation Name Age of Onset    Other (breast cancer, uterine cancer) Mother      Coronary artery disease Father         SOCIAL HISTORY:    Social History     Tobacco Use    Smoking status: Never    Smokeless tobacco: Never   Vaping Use    Vaping Use: Never used   Substance Use Topics    Alcohol use: Never    Drug use: Never       MEDICATIONS:  Prior to Admission Medications:  Medications Prior to Admission   Medication Sig Dispense Refill Last Dose    acetaminophen (Tylenol) 325 mg tablet Take 2 tablets (650 mg) by mouth every 4 hours if needed for mild pain (1 - 3).   Past Week    acetaminophen (Tylenol) 500 mg tablet Take 1 tablet (500 mg) by mouth 2 times a day.   12/20/2023    albuterol 90 mcg/actuation inhaler Inhale 2 puffs every 4 hours if needed for wheezing or shortness of breath.   Past Week    atorvastatin (Lipitor) 20 mg tablet Take 1 tablet (20 mg) by mouth once daily at bedtime.   12/20/2023    calcium carbonate (Tums) 200 mg calcium chewable tablet Chew 1 tablet (500 mg) every 4 hours if needed for heartburn.   Past Week    compress.stocking,knee,reg,med misc MEDICAL COMPRESSION STOCKINGS 20-30 MM HG KNEE HIGH COMPRESSION STOCKINGS, PLEASE MEASURE FOR APPROPRIATE FIT, DISPENSE TWO PAIRS, REFILL 8 2 each 8     cyanocobalamin (Vitamin B-12) 250 mcg tablet Take 1 tablet (250 mcg) by mouth once daily.   12/20/2023    cyclobenzaprine (Flexeril) 10 mg tablet Take 1 tablet (10 mg) by mouth 2 times a day as needed for muscle spasms.   Past Week     docusate sodium (Colace) 100 mg capsule Take 1 capsule (100 mg) by mouth twice a day.   12/20/2023    Eliquis 5 mg tablet Take 1 tablet (5 mg) by mouth 2 times a day.   12/20/2023    famotidine (Pepcid) 20 mg tablet Take 1 tablet (20 mg) by mouth once daily.   12/20/2023    folic acid (Folvite) 1 mg tablet Take 1 tablet (1 mg) by mouth once daily.   12/20/2023    losartan (Cozaar) 50 mg tablet Take 1 tablet (50 mg) by mouth once daily.   12/20/2023    lubricating eye drops ophthalmic solution Administer 1 drop into both eyes every 4 hours if needed for dry eyes.   Past Week    metOLazone (Zaroxolyn) 5 mg tablet Take 1 tablet (5 mg) by mouth 1 (one) time per week. Wednesday 12/20/2023    multivitamin tablet Take 1 tablet by mouth once daily.   12/20/2023    nystatin (Mycostatin) 100,000 unit/gram powder Apply 1 Application topically 2 times a day as needed for rash.   Past Month    polyethylene glycol (Glycolax, Miralax) 17 gram packet Take 17 g by mouth once daily.   12/20/2023    sennosides (Senokot) 8.6 mg tablet Take 1 tablet (8.6 mg) by mouth 2 times a day.   12/20/2023    torsemide 40 mg tablet Take 40 mg by mouth once daily.   12/20/2023       INPATIENT MEDICATIONS:    Current Facility-Administered Medications:     acetaminophen (Tylenol) tablet 487.5 mg, 487.5 mg, oral, BID, ESTEBAN Randhawa-CNP, 487.5 mg at 12/24/23 0837    acetaminophen (Tylenol) tablet 650 mg, 650 mg, oral, q6h PRN, ESTEBAN Randhawa-CNP    albuterol 2.5 mg /3 mL (0.083 %) nebulizer solution 2.5 mg, 2.5 mg, nebulization, q4h PRN, ESTEBAN Randhawa-CNP    apixaban (Eliquis) tablet 5 mg, 5 mg, oral, BID, BREE Randhawa, 5 mg at 12/24/23 0836    atorvastatin (Lipitor) tablet 20 mg, 20 mg, oral, Nightly, BREE Randhawa, 20 mg at 12/23/23 2141    calcium carbonate (Tums) chewable tablet 500 mg, 1 tablet, oral, q4h PRN, BREE Randhawa    cyclobenzaprine (Flexeril) tablet 10 mg, 10 mg, oral, BID  PRN, BREE Randhawa    dextrose 10 % in water (D10W) infusion, 0.3 g/kg/hr, intravenous, Once PRN, BREE Randhawa    dextrose 50 % injection 25 g, 25 g, intravenous, q15 min PRN, ESTEBAN Randhawa-CNP    docusate sodium (Colace) capsule 100 mg, 100 mg, oral, BID, ESTEBAN Randhawa-CNP, 100 mg at 12/23/23 0836    empagliflozin (Jardiance) tablet 10 mg, 10 mg, oral, Daily, Steve Sneed MD, 10 mg at 12/24/23 0837    famotidine (Pepcid) tablet 20 mg, 20 mg, oral, Daily, BREE Randhawa, 20 mg at 12/23/23 1620    [Held by provider] furosemide (Lasix) injection 40 mg, 40 mg, intravenous, BID, ESTEBAN Randhawa-CNP, 40 mg at 12/24/23 0837    glucagon (Glucagen) injection 1 mg, 1 mg, intramuscular, q15 min PRN, BREE Randhawa    insulin lispro (HumaLOG) injection 0-5 Units, 0-5 Units, subcutaneous, 4x daily, BREE Randhawa, 1 Units at 12/23/23 2141    [Held by provider] losartan (Cozaar) tablet 50 mg, 50 mg, oral, Daily, ESTEBAN Randhawa-CNP, 50 mg at 12/23/23 0837    lubricating eye drops ophthalmic solution 1 drop, 1 drop, Both Eyes, q4h PRN, ESTEBAN Randhawa-CNP    [START ON 12/27/2023] metOLazone (Zaroxolyn) tablet 5 mg, 5 mg, oral, Weekly, BREE Randhawa    oxygen (O2) therapy, , inhalation, Continuous PRN - O2/gases, ESTEBAN Randhawa-CNP, 2 L/min at 12/22/23 1005    perflutren lipid microspheres (Definity) injection 0.5-10 mL of dilution, 0.5-10 mL of dilution, intravenous, Once in imaging, BREE Randhawa    perflutren protein A microsphere (Optison) injection 0.5 mL, 0.5 mL, intravenous, Once in imaging, BREE Randhawa    polyethylene glycol (Glycolax, Miralax) packet 17 g, 17 g, oral, Daily, ESTEBAN Randhawa-CNP, 17 g at 12/23/23 0835    sennosides (Senokot) tablet 8.6 mg, 1 tablet, oral, BID, ESTEBAN Randhawa-CNP    sulfur hexafluoride microsphr (Lumason) injection 24.28 mg, 2 mL,  intravenous, Once in imaging, Jennifer Manuel, APRN-CNP    ALLERGIES:  Allergies   Allergen Reactions    Ace Inhibitors Cough       COMPLETE REVIEW OF SYSTEMS:    A comprehensive 14 point review of systems was obtained.  Constitutional: Morbidly obese requiring 2 L of oxygen  HENT: Negative for congestion and sore throat.    Eyes: Negative for pain and visual disturbance.  Respiratory: Positive for shortness of breath.    Cardiovascular: Negative for chest pain and palpitations.  Gastrointestinal: Negative for abdominal pain, diarrhea and vomiting.  Genitourinary: Positive for oliguria, PureWick catheter in place  Musculoskeletal: Negative for back pain and myalgias.  Skin: negative for wound. Negative for color change and rash.  Neurological: Negative for weakness and headaches.  Hematological: Negative for adenopathy. Does not bruise/bleed easily.  Psychiatric/Behavioral: Negative for agitation and confusion.  All other reviewed and negative other than HPI.    PHYSICAL EXAM: Physical exam performed.  Patient Vitals for the past 24 hrs:   BP Temp Temp src Pulse Resp SpO2   12/24/23 0800 103/71 36.1 °C (97 °F) -- 91 18 95 %   12/23/23 1600 166/63 36.7 °C (98.1 °F) Temporal (!) 116 (!) 87 92 %   12/23/23 1200 115/74 36.7 °C (98.1 °F) Temporal 92 18 90 %     Body mass index is 58.11 kg/m².    CONSTITUTIONAL:  Vital signs reviewed.  Relative hypotension, tachycardia  GENERAL: Morbidly obese, chronically ill, wearing 2 L of oxygen  SKIN: No petechia or ecchymosis  HEAD/SINUSES: Atraumatic  EYES: PERRLA, + pallor  OROPHARYNX: Dry mucous membranes  NECK: +  jugulovenous distention, No carotid bruits, Carotid pulse normal contour, Supple  LUNGS: Diminished air entry bilaterally, positive rhonchi  CARDIAC: distant S1 and S2; no rubs, murmurs, or gallops  ABDOMEN: Abdomen soft, non-tender, BS normal, distended  Pure wick catheter in place  EXTREMITIES: Good capillary refill, +1 edema.  NEUROLOGIC: Awake, alert and oriented  "×3, No focal deficit  HEMATOLOGIC/Lymphatic/Immunologic: No abnormal bleeding, echymosis, inflammation. No cervical or supraclavicular lymphadenopathy.    Intake/Output last 3 shifts:  I/O last 3 completed shifts:  In: 240 (1.6 mL/kg) [P.O.:240]  Out: - (0 mL/kg)   Weight: 148.8 kg     Diagnostic tests reviewed for today's visit:    CBC, Coags, RNP, Mg, Phos   Results from last 7 days   Lab Units 12/24/23  0637   WBC AUTO x10*3/uL 5.2   RBC AUTO x10*6/uL 2.90*   HEMOGLOBIN g/dL 8.2*   HEMATOCRIT % 29.5*     Results from last 7 days   Lab Units 12/24/23  0637 12/22/23  0849 12/21/23  1158   SODIUM mmol/L 140   < > 141   POTASSIUM mmol/L 4.2   < > 4.8   CHLORIDE mmol/L 101   < > 104   CO2 mmol/L 33*   < > 31   BUN mg/dL 28*   < > 26*   CREATININE mg/dL 1.34*   < > 1.09*   CALCIUM mg/dL 8.1*   < > 8.7   MAGNESIUM mg/dL 2.07   < > 2.12   BILIRUBIN TOTAL mg/dL  --   --  0.8   ALT U/L  --   --  9   AST U/L  --   --  11    < > = values in this interval not displayed.         IMAGING: CXR reviewed in  images.      SIGNATURE: Mary Solomon MD  Nephrology and Hypertension  [address] [facility]  Office phone: [phone]  FAX: [phone]      This note was partially created using voice recognition software and is inherently subject to errors including those of syntax and \"sound-alike\" substitutions which may escape proofreading.  In such instances, original meaning may be extrapolated by contextual derivation.    "

## 2023-12-24 NOTE — PROGRESS NOTES
Occupational Therapy    Evaluation    Patient Name: Fifi Jenkins  MRN: [id number]  Today's Date: 12/24/2023  Time Calculation  Start Time: 0949  Stop Time: 1015  Time Calculation (min): 26 min  Eval only     Assessment  IP OT Assessment  Prognosis: Fair  Medical Staff Made Aware: Yes  End of Session Communication: Bedside nurse  End of Session Patient Position: Bed, 3 rail up, Alarm on  Patient presents with decline in ADLs, functional transfers, and functional mobility tasks and would benefit from OT during acute stay to maximize functional independence and safety.  Patient requires 24 hours hands on assistance.  Recommend moderate intensity OT to maximize functional independence and safety.     Plan:  Treatment Interventions: ADL retraining, Functional transfer training, Patient/family training, Equipment evaluation/education, Compensatory technique education  OT Frequency: 3 times per week  OT Discharge Recommendations: Moderate intensity level of continued care  OT - OK to Discharge: Yes from acute care OT services to the next level of care when cleared by medical team      Subjective     Current Problem:  1. 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 (CMS/HCC)        2. Shortness of breath        3. Elevated brain natriuretic peptide (BNP) level        4. Pleural effusion        5. Acute heart failure with preserved ejection fraction (CMS/HCC)  Transthoracic Echo (TTE) Complete    Transthoracic Echo (TTE) Complete    CANCELED: Transthoracic Echo (TTE) Complete    CANCELED: Transthoracic Echo (TTE) Complete          General:  General  Reason for Referral: ADLs, safety assessment  Referred By: Nick Montaño MD  Past Medical History Relevant to Rehab: Admitted 12/21/2023 with shortness of breath for 2 weeks and bilateral LE swelling. PMH: atypical chest pain, CVA, TKR, hysterectomy, HTN, HLD, CHF, spinal stenosis, CKD III, chronic vertigo  Co-Treatment: PT  Co-Treatment Reason: for  "safety  Prior to Session Communication: Bedside nurse  Patient Position Received: Bed, 3 rail up, Alarm on  Preferred Learning Style: verbal  General Comment: Patient in long legged sitting in bed and agreeable to participate in OT evaluation.   Lines: NC, telemetry, purewick    Precautions:  Medical Precautions: Fall precautions    Vital Signs:   Spo2: 96% throughout on oxygen via NC    Pain:  Pain Assessment  Pain Assessment:  (Patient reported pain from \"my head to my toes\" but does not rate)    Objective     Cognition:  Overall Cognitive Status: Within Functional Limits  Orientation Level: Oriented X4             Home Living:  Home Living Comments: Lives at [facility].     Prior Function:  Prior Function Comments: Was independent with w/c mobility and transfers.  Independent with transfers and ADLs. Had assist for meals, laundry and cleaning    ADL:  LE Dressing Assistance: Maximal (anticipated)    Activity Tolerance:  Endurance: Decreased tolerance for upright activites    Bed Mobility/Transfers:   Bed Mobility  Bed Mobility:  (mod assist supine to sit at edge of bed with increased effort to complete. Max assist of 2 sit to supine in bed)  Transfers  Transfer:  (mod assist of 2 sit to stand with min verbal cues for technique. Patient only able to stand <30 seconds prior to needing to return to sitting secondary to dizziness and decreased endurance.  Patient appeared short of breath once returned to supine in bed.  Spo2 was assessed and was at 96% with heart rate in 90's. Notified RN.)    Extremities: RUE   RUE : Within Functional Limits and LUE   LUE: Within Functional Limits    Outcome Measures: [facility] Daily Activity  Putting on and taking off regular lower body clothing: A lot  Bathing (including washing, rinsing, drying): A lot  Putting on and taking off regular upper body clothing: A little  Toileting, which includes using toilet, bedpan or urinal: A lot  Taking care of personal " grooming such as brushing teeth: A little  Eating Meals: None  Daily Activity - Total Score: 16    EDUCATION:  Education  Individual(s) Educated: Patient  Education Provided: Fall precautons  Patient Response to Education: Patient/Caregiver Verbalized Understanding of Information      Goals:   Encounter Problems       Encounter Problems (Active)       Balance       STG-Patient will be CGA with assistive device dynamic stand task >5 minutes for ADL completion   (Progressing)       Start:  12/24/23    Expected End:  01/07/24               Dressing Upper Extremities       STG - Patient will dress upper body with CGA (Progressing)       Start:  12/24/23    Expected End:  01/07/24               Dressings Lower Extremities       STG - Patient will complete lower body dressing with mod assist with comp strategies PRN (Progressing)       Start:  12/24/23    Expected End:  01/07/24               Transfers       STG-Patient will be min assist with functional transfers demonstrating good safety  (progress only as appropriate) (Progressing)       Start:  12/24/23    Expected End:  01/07/24

## 2023-12-24 NOTE — PROGRESS NOTES
Internal Medicine Progress Note    Patient Name: Fifi Jenkins          MRN: [id number]  Today's Date: December 24, 2023          Attending: Nick Montaño MD    Subjective:  Patient was seen and examined at bedside.    Review Of Systems:  GENERAL: No malaise or fevers.  CARDIOVASCULAR: Negative for chest pain  RESPIRATORY: Improving shortness of breath  GI: No nausea, vomiting, or diarrhea      Objective:  Vitals:    12/23/23 0800 12/23/23 1200 12/23/23 1600 12/24/23 0800   BP: 136/69 115/74 166/63 103/71   BP Location: Right arm Left arm Left arm    Patient Position: Lying Lying Lying    Pulse: 90 92 (!) 116 91   Resp: 18 18 (!) 87 18   Temp: 36.7 °C (98.1 °F) 36.7 °C (98.1 °F) 36.7 °C (98.1 °F) 36.1 °C (97 °F)   TempSrc: Temporal Temporal Temporal    SpO2: 92% 90% 92% 95%   Weight:       Height:               Physical Exam:   General appearance: Well-appearing alert, in no acute distress  Lungs: diminished breath sounds  Heart: RRR without murmur, gallop, or rubs.  No ectopy  Abdomen: Soft, non-tender.   Extremities: No deformity, no edema  Neuro: Alert oriented x3, no focal deficit.    Labs:  Results for orders placed or performed during the hospital encounter of 12/21/23 (from the past 24 hour(s))   Magnesium   Result Value Ref Range    Magnesium 2.04 1.60 - 2.40 mg/dL   CBC   Result Value Ref Range    WBC 5.2 4.4 - 11.3 x10*3/uL    nRBC 0.0 0.0 - 0.0 /100 WBCs    RBC 2.98 (L) 4.00 - 5.20 x10*6/uL    Hemoglobin 8.9 (L) 12.0 - 16.0 g/dL    Hematocrit 30.4 (L) 36.0 - 46.0 %     (H) 80 - 100 fL    MCH 29.9 26.0 - 34.0 pg    MCHC 29.3 (L) 32.0 - 36.0 g/dL    RDW 17.0 (H) 11.5 - 14.5 %    Platelets 196 150 - 450 x10*3/uL   Basic Metabolic Panel   Result Value Ref Range    Glucose 92 74 - 99 mg/dL    Sodium 139 136 - 145 mmol/L    Potassium 4.4 3.5 - 5.3 mmol/L    Chloride 101 98 - 107 mmol/L    Bicarbonate 31 21 - 32 mmol/L    Anion Gap 11 10 - 20 mmol/L    Urea Nitrogen 27 (H) 6 - 23 mg/dL    Creatinine 1.23  (H) 0.50 - 1.05 mg/dL    eGFR 43 (L) >60 mL/min/1.73m*2    Calcium 8.7 8.6 - 10.3 mg/dL   POCT GLUCOSE   Result Value Ref Range    POCT Glucose 116 (H) 74 - 99 mg/dL   POCT GLUCOSE   Result Value Ref Range    POCT Glucose 149 (H) 74 - 99 mg/dL   POCT GLUCOSE   Result Value Ref Range    POCT Glucose 160 (H) 74 - 99 mg/dL   Magnesium   Result Value Ref Range    Magnesium 2.07 1.60 - 2.40 mg/dL   CBC   Result Value Ref Range    WBC 5.2 4.4 - 11.3 x10*3/uL    nRBC 0.0 0.0 - 0.0 /100 WBCs    RBC 2.90 (L) 4.00 - 5.20 x10*6/uL    Hemoglobin 8.2 (L) 12.0 - 16.0 g/dL    Hematocrit 29.5 (L) 36.0 - 46.0 %     (H) 80 - 100 fL    MCH 28.3 26.0 - 34.0 pg    MCHC 27.8 (L) 32.0 - 36.0 g/dL    RDW 17.1 (H) 11.5 - 14.5 %    Platelets 178 150 - 450 x10*3/uL   Basic Metabolic Panel   Result Value Ref Range    Glucose 88 74 - 99 mg/dL    Sodium 140 136 - 145 mmol/L    Potassium 4.2 3.5 - 5.3 mmol/L    Chloride 101 98 - 107 mmol/L    Bicarbonate 33 (H) 21 - 32 mmol/L    Anion Gap 10 10 - 20 mmol/L    Urea Nitrogen 28 (H) 6 - 23 mg/dL    Creatinine 1.34 (H) 0.50 - 1.05 mg/dL    eGFR 39 (L) >60 mL/min/1.73m*2    Calcium 8.1 (L) 8.6 - 10.3 mg/dL   POCT GLUCOSE   Result Value Ref Range    POCT Glucose 88 74 - 99 mg/dL       Medications:  Scheduled medications  acetaminophen, 487.5 mg, oral, BID  apixaban, 5 mg, oral, BID  atorvastatin, 20 mg, oral, Nightly  docusate sodium, 100 mg, oral, BID  empagliflozin, 10 mg, oral, Daily  famotidine, 20 mg, oral, Daily  [Held by provider] furosemide, 40 mg, intravenous, BID  insulin lispro, 0-5 Units, subcutaneous, 4x daily  [Held by provider] losartan, 50 mg, oral, Daily  [START ON 12/27/2023] metOLazone, 5 mg, oral, Weekly  perflutren lipid microspheres, 0.5-10 mL of dilution, intravenous, Once in imaging  perflutren protein A microsphere, 0.5 mL, intravenous, Once in imaging  polyethylene glycol, 17 g, oral, Daily  sennosides, 1 tablet, oral, BID  sulfur hexafluoride microsphr, 2 mL,  "intravenous, Once in imaging      Continuous medications     PRN medications  PRN medications: acetaminophen, albuterol, calcium carbonate, cyclobenzaprine, dextrose 10 % in water (D10W), dextrose, glucagon, lubricating eye drops, oxygen      Assessment/Plan:  Medical Problems       Problem List       * (Principal) 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 (CMS/HCC)         Continue Metolazone and Jardiance   Lasix and losartan on hold due to acute kidney injury  Cardiology is following  Waiting for 2D echo report         Paroxysmal atrial fibrillation (CMS/[facility])         Continue current medications         Hyperlipidemia         Continue atorvastatin         Acute respiratory failure with hypoxia (CMS/[facility])         Improving  Continue treating heart failure and oxygen supplement         Stage 3 chronic kidney disease (CMS/[facility])         Patient developed acute on chronic kidney disease  Consult nephrology         Type 2 diabetes mellitus with chronic kidney disease, without long-term current use of insulin (CMS/[facility])         Sliding scall insulin     Generalized weakness  PT/OT        Discussed with patient, RN    Nick Montaño MD   Date: 12/24/23  Time: 9:01 AM    This note was partially created using voice recognition software and is inherently subject to errors including those of syntax and \"sound-alike\" substitutions which may escape proofreading. In such instances, original meaning may be extrapolated by contextual derivation  "

## 2023-12-24 NOTE — PROGRESS NOTES
Physical Therapy    Physical Therapy Evaluation    Patient Name: Fifi Jenkins  MRN: [id number]  Today's Date: 12/24/2023   Time Calculation  Start Time: 0949  Stop Time: 1017  Time Calculation (min): 28 min  3114    Assessment/Plan   PT Assessment: Pt demonstrates decreased strength, decreased endurance, decreased activity tolerance, and impaired balance/mobility.  It is appears that pt is below her baseline level of function.  Based on current level of function, pt would benefit from continued skilled therapy while in the hospital to ensure safety, decrease risk of falls, and regain strength/mobility back to baseline.  Once stable enough for discharge, pt would benefit from moderate intensity therapy.    PT Assessment Results: Decreased strength, Decreased endurance, Impaired balance, Decreased mobility, Decreased safety awareness, Pain  Rehab Prognosis: Fair  Evaluation/Treatment Tolerance: Patient limited by fatigue  Medical Staff Made Aware: Yes  End of Session Communication: Bedside nurse  End of Session Patient Position: Bed, 3 rail up, Alarm on  IP OR SWING BED PT PLAN  Inpatient or Swing Bed: Inpatient  PT Plan  Treatment/Interventions: Bed mobility, Transfer training, Gait training, Balance training, Neuromuscular re-education, Strengthening, Therapeutic exercise, Therapeutic activity  PT Plan: Skilled PT  PT Frequency: 3 times per week  PT Discharge Recommendations: Moderate intensity level of continued care  Equipment Recommended upon Discharge: Wheeled walker, Wheelchair  PT Recommended Transfer Status: Assist x2  PT - OK to Discharge: Yes - To next level of care when cleared by medical team    Subjective     Current Problem:  Patient Active Problem List   Diagnosis    Urinary retention    Trigeminal neuralgia    Spontaneous ocular nystagmus    Spinal stenosis    Schwannoma    Pseudophakia of both eyes    Paroxysmal atrial fibrillation (CMS/HCC)    Parotid mass    Cerebellar stroke (CMS/HCC)     Osteoarthritis of knee    Obstructive sleep apnea syndrome    Mass of parapharyngeal space    Lumbar spondylosis    LAE (left atrial enlargement)    Incontinence of urine    Hyperlipidemia    History of CVA (cerebrovascular accident)    Heart failure with preserved left ventricular function (HFpEF) (CMS/[facility])    GERD (gastroesophageal reflux disease)    Vertigo    Chronic anemia    Bradycardia    Bilateral sensorineural hearing loss    Atherosclerosis of coronary artery    Adnexal mass    Abnormal ECG    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 (CMS/[facility])    Acute respiratory failure with hypoxia (CMS/[facility])    Stage 3 chronic kidney disease (CMS/[facility])    Morbid obesity (CMS/[facility])    Type 2 diabetes mellitus with chronic kidney disease, without long-term current use of insulin (CMS/[facility])    Acute heart failure with preserved ejection fraction (CMS/[facility])       General Visit Information:  Per EMR: pt presents today for evaluation of 2 weeks of shortness of breath, patient states it started gradually, she states she is also had a cough but she does not feel like she is sick, no nasal congestion or fevers.  She states that when her shortness of breath gets worse specifically with exertion she does have some burning left-sided chest pain that is nonradiating.  Patient states been on and off for the past 2 weeks.  She never had a heart attack but does have hypertension and hyperlipidemia which is controlled with medication.  She denies specific weight gain but does endorse that her legs are always swollen, states she normally gets out of bed and into a wheelchair and when her legs are in the wheelchair all day the legs tend to swell more, both of them are equally swollen, denies recent surgeries hospitalizations or travel, denies history of malignancy or hormone use.  She denies any hemoptysis.  Patient is not sure whether that she feels fluid overloaded, states that she is incontinent at baseline so she  "does not notice any real change with the Lasix that she is supposed to be taking.  Patient tells me she did not take any of her medications today secondary to not eating, states she normally takes her medications daily unless she is going to the doctors or is not eating.  Denies any abdominal pain or back pain.  She does endorse a chronic headache secondary to trigeminal neuralgia, states is in the same spot as it always is, no changes.   General: On arrival, pt supine in bed.  Pt in no apparent distress and agreeable to therapy.  Reason for Referral: impaired mobility, impaired cognition/safety awareness  Referred By: Nick Montaño  Co-Treatment: OT  Prior to Session Communication: Bedside nurse  Patient Position Received: Bed, 3 rail up, Alarm on    Home Living/PLOF:  Pt from [facility] where she has been for ~3 years.  She states she is indep with bed mobility occasionally needing assist to get BLE back into bed.  Pt reports she transfers self to/from  and self propels WC.  She ambulates minimally using rollator.  Pt gets meals to room.  No O2 needs at baseline.  Laundry and housekeeping is provided.  Denies any falls.      Precautions:  Precautions  Medical Precautions: Fall precautions, Oxygen therapy device and L/min    Vital Signs:  Vital Signs  SpO2: 96 % (2LNC)  Objective     Pain:  Pain Assessment  Pain Assessment:  (pt reports \"pain from my head to my toes\" when asked; but does not rate)    Cognition:  Cognition  Overall Cognitive Status: Within Functional Limits  Orientation Level: Oriented X4    General Assessments:      Activity Tolerance  Endurance: Tolerates less than 10 min exercise, no significant change in vital signs    Static Sitting Balance  Static Sitting-Comment/Number of Minutes: fair plus  Dynamic Sitting Balance  Dynamic Sitting-Comments: fair  Static Standing Balance  Static Standing-Comment/Number of Minutes: fair minus  Dynamic Standing Balance  Dynamic Standing-Comments: " poor    Functional Assessments:  Bed Mobility   Supine to sit: Mod A x2; able to move BLE but unable to complete transfer without assist  Sit to supine: Max A x2  Transfers  Sit to stand: Mod A x2; difficulty standing fully erect despite VCs  Stand to sit: Mod A x2; Vcs for hand placement     Unable to transfer to bedside chair secondary to safety and pt stating she would not be able to get out of it  Pt only able to tolerate ~10 seconds of standing     Ambulation/Gait Training  Unable to attempt ambulation at this time    Extremity/Trunk Assessments:  BLE strength: at least 3/5    Outcome Measures:  [facility] Basic Mobility  Turning from your back to your side while in a flat bed without using bedrails: A lot  Moving from lying on your back to sitting on the side of a flat bed without using bedrails: A lot  Moving to and from bed to chair (including a wheelchair): A lot  Standing up from a chair using your arms (e.g. wheelchair or bedside chair): A lot  To walk in hospital room: Total  Climbing 3-5 steps with railing: Total  Basic Mobility - Total Score: 10    Goals:  Encounter Problems       Encounter Problems (Active)       PT Problem       Pt will be able to perform all bed mobility tasks with CGA.  (Progressing)       Start:  12/24/23    Expected End:  01/07/24            Pt will perform all transfers with Min A with proper safety mechanics.   (Progressing)       Start:  12/24/23    Expected End:  01/07/24            Pt will be able to perform functional transfers while maintaining SpO2 > 90%.  (Progressing)       Start:  12/24/23    Expected End:  01/07/24            Pt will demonstrate good dynamic sit balance for completion of therapeutic exercises and functional tasks.  (Progressing)       Start:  12/24/23    Expected End:  01/07/24                 Education Documentation  Body Mechanics, taught by Sandy Singh, PT at 12/24/2023 10:35 AM.  Learner: Patient  Readiness: Acceptance  Method:  Explanation  Response: Verbalizes Understanding    Home Exercise Program, taught by Sandy Singh PT at 12/24/2023 10:35 AM.  Learner: Patient  Readiness: Acceptance  Method: Explanation  Response: Verbalizes Understanding    Mobility Training, taught by Sandy Singh PT at 12/24/2023 10:35 AM.  Learner: Patient  Readiness: Acceptance  Method: Explanation  Response: Verbalizes Understanding    Education Comments  No comments found.

## 2023-12-25 LAB
ANION GAP SERPL CALC-SCNC: 10 MMOL/L (ref 10–20)
BUN SERPL-MCNC: 24 MG/DL (ref 6–23)
CALCIUM SERPL-MCNC: 8.2 MG/DL (ref 8.6–10.3)
CHLORIDE SERPL-SCNC: 102 MMOL/L (ref 98–107)
CO2 SERPL-SCNC: 34 MMOL/L (ref 21–32)
CREAT SERPL-MCNC: 1.15 MG/DL (ref 0.5–1.05)
ERYTHROCYTE [DISTWIDTH] IN BLOOD BY AUTOMATED COUNT: 16.8 % (ref 11.5–14.5)
GFR SERPL CREATININE-BSD FRML MDRD: 46 ML/MIN/1.73M*2
GLUCOSE BLD MANUAL STRIP-MCNC: 110 MG/DL (ref 74–99)
GLUCOSE BLD MANUAL STRIP-MCNC: 113 MG/DL (ref 74–99)
GLUCOSE BLD MANUAL STRIP-MCNC: 122 MG/DL (ref 74–99)
GLUCOSE BLD MANUAL STRIP-MCNC: 149 MG/DL (ref 74–99)
GLUCOSE SERPL-MCNC: 110 MG/DL (ref 74–99)
HCT VFR BLD AUTO: 30 % (ref 36–46)
HGB BLD-MCNC: 8.3 G/DL (ref 12–16)
MAGNESIUM SERPL-MCNC: 1.96 MG/DL (ref 1.6–2.4)
MCH RBC QN AUTO: 28.3 PG (ref 26–34)
MCHC RBC AUTO-ENTMCNC: 27.7 G/DL (ref 32–36)
MCV RBC AUTO: 102 FL (ref 80–100)
NRBC BLD-RTO: 0 /100 WBCS (ref 0–0)
PLATELET # BLD AUTO: 179 X10*3/UL (ref 150–450)
POTASSIUM SERPL-SCNC: 4 MMOL/L (ref 3.5–5.3)
RBC # BLD AUTO: 2.93 X10*6/UL (ref 4–5.2)
SODIUM SERPL-SCNC: 142 MMOL/L (ref 136–145)
WBC # BLD AUTO: 6.1 X10*3/UL (ref 4.4–11.3)

## 2023-12-25 PROCEDURE — 2500000004 HC RX 250 GENERAL PHARMACY W/ HCPCS (ALT 636 FOR OP/ED): Performed by: NURSE PRACTITIONER

## 2023-12-25 PROCEDURE — 94760 N-INVAS EAR/PLS OXIMETRY 1: CPT

## 2023-12-25 PROCEDURE — 80048 BASIC METABOLIC PNL TOTAL CA: CPT | Performed by: NURSE PRACTITIONER

## 2023-12-25 PROCEDURE — 2500000001 HC RX 250 WO HCPCS SELF ADMINISTERED DRUGS (ALT 637 FOR MEDICARE OP): Performed by: INTERNAL MEDICINE

## 2023-12-25 PROCEDURE — 1200000002 HC GENERAL ROOM WITH TELEMETRY DAILY

## 2023-12-25 PROCEDURE — 83735 ASSAY OF MAGNESIUM: CPT | Performed by: NURSE PRACTITIONER

## 2023-12-25 PROCEDURE — 2500000001 HC RX 250 WO HCPCS SELF ADMINISTERED DRUGS (ALT 637 FOR MEDICARE OP): Performed by: NURSE PRACTITIONER

## 2023-12-25 PROCEDURE — 82947 ASSAY GLUCOSE BLOOD QUANT: CPT

## 2023-12-25 PROCEDURE — 82435 ASSAY OF BLOOD CHLORIDE: CPT | Performed by: NURSE PRACTITIONER

## 2023-12-25 PROCEDURE — 36415 COLL VENOUS BLD VENIPUNCTURE: CPT | Performed by: NURSE PRACTITIONER

## 2023-12-25 PROCEDURE — 85027 COMPLETE CBC AUTOMATED: CPT | Performed by: NURSE PRACTITIONER

## 2023-12-25 RX ORDER — BUMETANIDE 1 MG/1
2 TABLET ORAL DAILY
Status: DISCONTINUED | OUTPATIENT
Start: 2023-12-26 | End: 2024-01-02

## 2023-12-25 RX ADMIN — FAMOTIDINE 20 MG: 20 TABLET ORAL at 15:18

## 2023-12-25 RX ADMIN — ACETAMINOPHEN 650 MG: 325 TABLET ORAL at 09:34

## 2023-12-25 RX ADMIN — ACETAMINOPHEN 487.5 MG: 325 TABLET ORAL at 21:29

## 2023-12-25 RX ADMIN — SENNOSIDES 8.6 MG: 8.6 TABLET, FILM COATED ORAL at 09:34

## 2023-12-25 RX ADMIN — ACETAMINOPHEN 487.5 MG: 325 TABLET ORAL at 09:36

## 2023-12-25 RX ADMIN — DOCUSATE SODIUM 100 MG: 100 CAPSULE, LIQUID FILLED ORAL at 09:34

## 2023-12-25 RX ADMIN — EMPAGLIFLOZIN 10 MG: 10 TABLET, FILM COATED ORAL at 09:34

## 2023-12-25 RX ADMIN — ATORVASTATIN CALCIUM 20 MG: 20 TABLET, FILM COATED ORAL at 21:28

## 2023-12-25 RX ADMIN — APIXABAN 5 MG: 5 TABLET, FILM COATED ORAL at 21:27

## 2023-12-25 RX ADMIN — DOCUSATE SODIUM 100 MG: 100 CAPSULE, LIQUID FILLED ORAL at 21:29

## 2023-12-25 RX ADMIN — APIXABAN 5 MG: 5 TABLET, FILM COATED ORAL at 09:34

## 2023-12-25 RX ADMIN — POLYETHYLENE GLYCOL 3350 17 G: 17 POWDER, FOR SOLUTION ORAL at 09:36

## 2023-12-25 ASSESSMENT — PAIN SCALES - GENERAL
PAINLEVEL_OUTOF10: 5 - MODERATE PAIN
PAINLEVEL_OUTOF10: 4

## 2023-12-25 ASSESSMENT — PAIN SCALES - WONG BAKER: WONGBAKER_NUMERICALRESPONSE: HURTS LITTLE BIT

## 2023-12-25 ASSESSMENT — PAIN DESCRIPTION - LOCATION: LOCATION: BACK

## 2023-12-25 NOTE — PROGRESS NOTES
Internal Medicine Progress Note    Patient Name: Fifi Jenkins          MRN: [id number]  Today's Date: December 25, 2023          Attending: Nick Montaño MD    Subjective:  Patient was seen and examined at bedside.    Review Of Systems:  GENERAL: No malaise or fevers.  CARDIOVASCULAR: Negative for chest pain  RESPIRATORY: Improving shortness of breath  GI: No nausea, vomiting, or diarrhea    Objective:  Vitals:    12/24/23 2000 12/25/23 0000 12/25/23 0400 12/25/23 0800   BP: 103/60 112/74 108/68 140/61   BP Location:  Right arm Right arm Right arm   Patient Position:  Lying Lying Lying   Pulse: 81 87 77 57   Resp: 16 16 16 18   Temp: 36.1 °C (97 °F) 36.3 °C (97.3 °F) 35.5 °C (95.9 °F) 36.1 °C (97 °F)   TempSrc: Temporal Temporal Temporal Temporal   SpO2: 99% 98% 95% 98%   Weight:       Height:               Physical Exam:   General appearance: Alert, in no acute distress  Lungs: diminished breath sounds  Heart: RRR without murmur, gallop, or rubs.  No ectopy  Abdomen: Soft, non-tender.   Neuro: Alert oriented x3, no focal deficit.    Labs:  Results for orders placed or performed during the hospital encounter of 12/21/23 (from the past 24 hour(s))   POCT GLUCOSE   Result Value Ref Range    POCT Glucose 91 74 - 99 mg/dL   Transthoracic Echo (TTE) Complete   Result Value Ref Range    BSA 2.57 m2   POCT GLUCOSE   Result Value Ref Range    POCT Glucose 126 (H) 74 - 99 mg/dL   POCT GLUCOSE   Result Value Ref Range    POCT Glucose 151 (H) 74 - 99 mg/dL   Magnesium   Result Value Ref Range    Magnesium 1.96 1.60 - 2.40 mg/dL   Basic Metabolic Panel   Result Value Ref Range    Glucose 110 (H) 74 - 99 mg/dL    Sodium 142 136 - 145 mmol/L    Potassium 4.0 3.5 - 5.3 mmol/L    Chloride 102 98 - 107 mmol/L    Bicarbonate 34 (H) 21 - 32 mmol/L    Anion Gap 10 10 - 20 mmol/L    Urea Nitrogen 24 (H) 6 - 23 mg/dL    Creatinine 1.15 (H) 0.50 - 1.05 mg/dL    eGFR 46 (L) >60 mL/min/1.73m*2    Calcium 8.2 (L) 8.6 - 10.3 mg/dL   CBC    Result Value Ref Range    WBC 6.1 4.4 - 11.3 x10*3/uL    nRBC 0.0 0.0 - 0.0 /100 WBCs    RBC 2.93 (L) 4.00 - 5.20 x10*6/uL    Hemoglobin 8.3 (L) 12.0 - 16.0 g/dL    Hematocrit 30.0 (L) 36.0 - 46.0 %     (H) 80 - 100 fL    MCH 28.3 26.0 - 34.0 pg    MCHC 27.7 (L) 32.0 - 36.0 g/dL    RDW 16.8 (H) 11.5 - 14.5 %    Platelets 179 150 - 450 x10*3/uL   POCT GLUCOSE   Result Value Ref Range    POCT Glucose 113 (H) 74 - 99 mg/dL       Medications:  Scheduled medications  acetaminophen, 487.5 mg, oral, BID  apixaban, 5 mg, oral, BID  atorvastatin, 20 mg, oral, Nightly  docusate sodium, 100 mg, oral, BID  empagliflozin, 10 mg, oral, Daily  famotidine, 20 mg, oral, Daily  [Held by provider] furosemide, 40 mg, intravenous, BID  insulin lispro, 0-5 Units, subcutaneous, 4x daily  [Held by provider] losartan, 50 mg, oral, Daily  [START ON 12/27/2023] metOLazone, 5 mg, oral, Weekly  perflutren lipid microspheres, 0.5-10 mL of dilution, intravenous, Once in imaging  perflutren protein A microsphere, 0.5 mL, intravenous, Once in imaging  perflutren protein A microsphere, 0.5 mL, intravenous, Once in imaging  polyethylene glycol, 17 g, oral, Daily  sennosides, 1 tablet, oral, BID  sulfur hexafluoride microsphr, 2 mL, intravenous, Once in imaging  sulfur hexafluoride microsphr, 2 mL, intravenous, Once in imaging      Continuous medications     PRN medications  PRN medications: acetaminophen, albuterol, calcium carbonate, cyclobenzaprine, dextrose 10 % in water (D10W), dextrose, glucagon, lubricating eye drops, oxygen      Assessment/Plan:  Medical Problems       Problem List       * (Principal) 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 (CMS/HCC)         Continue Metolazone and Jardiance   Cardiology is following  Waiting for 2D echo report         Paroxysmal atrial fibrillation (CMS/HCC)         Continue current medications         Hyperlipidemia         Continue atorvastatin         Acute respiratory  "failure with hypoxia (CMS/HCC)         Improving  Continue treating heart failure and oxygen supplement         Stage 3 chronic kidney disease (CMS/[facility])         Kidney function is improving  Continue monitoring  Nephrology is following         Type 2 diabetes mellitus with chronic kidney disease, without long-term current use of insulin (CMS/[facility])         Sliding scall insulin     Generalized weakness  PT/OT        Discussed with patient, CONY Montaño MD   Date: 12/25/23  Time: 9:44 AM    This note was partially created using voice recognition software and is inherently subject to errors including those of syntax and \"sound-alike\" substitutions which may escape proofreading. In such instances, original meaning may be extrapolated by contextual derivation  "

## 2023-12-25 NOTE — CARE PLAN
The patient's goals for the shift include      The clinical goals for the shift include CONTINUE TO BREATH BETTER      Problem: Fall/Injury  Goal: Be free from injury by end of the shift  Outcome: Progressing  Goal: Verbalize understanding of personal risk factors for fall in the hospital  Outcome: Progressing  Goal: Verbalize understanding of risk factor reduction measures to prevent injury from fall in the home  Outcome: Progressing  Goal: Use assistive devices by end of the shift  Outcome: Progressing  Goal: Pace activities to prevent fatigue by end of the shift  Outcome: Progressing     Problem: Heart Failure  Goal: Improved gas exchange this shift  Outcome: Progressing  Goal: Improved urinary output this shift  Outcome: Progressing  Goal: Reduction in peripheral edema within 24 hours  Outcome: Progressing  Goal: Report improvement of dyspnea/breathlessness this shift  Outcome: Progressing  Goal: Weight from fluid excess reduced over 2-3 days, then stabilize  Outcome: Progressing

## 2023-12-25 NOTE — CARE PLAN
Problem: Fall/Injury  Goal: Be free from injury by end of the shift  Outcome: Progressing  Goal: Verbalize understanding of personal risk factors for fall in the hospital  Outcome: Progressing  Goal: Verbalize understanding of risk factor reduction measures to prevent injury from fall in the home  Outcome: Progressing  Goal: Use assistive devices by end of the shift  Outcome: Progressing  Goal: Pace activities to prevent fatigue by end of the shift  Outcome: Progressing     Problem: Skin  Goal: Participates in plan/prevention/treatment measures  Outcome: Progressing  Goal: Prevent/manage excess moisture  Outcome: Progressing  Goal: Prevent/minimize sheer/friction injuries  Outcome: Progressing  Flowsheets (Taken 12/25/2023 1051)  Prevent/minimize sheer/friction injuries: HOB 30 degrees or less  Goal: Promote/optimize nutrition  Outcome: Progressing     Problem: Dressings Lower Extremities  Goal: STG - Patient will complete lower body dressing with mod assist with comp strategies PRN  Outcome: Progressing     Problem: Heart Failure  Goal: Improved gas exchange this shift  Outcome: Progressing  Goal: Improved urinary output this shift  Outcome: Progressing  Goal: Reduction in peripheral edema within 24 hours  Outcome: Progressing  Goal: Report improvement of dyspnea/breathlessness this shift  Outcome: Progressing  Goal: Weight from fluid excess reduced over 2-3 days, then stabilize  Outcome: Progressing  Goal: Increase self care and/or family involvement in 24 hours  Outcome: Progressing   The patient's goals for the shift include      The clinical goals for the shift include CONTINUE TO BREATH BETTER

## 2023-12-25 NOTE — CARE PLAN
Problem: Fall/Injury  Goal: Be free from injury by end of the shift  12/25/2023 0549 by Sejal Young RN  Outcome: Progressing  12/25/2023 0543 by Sejal Young RN  Outcome: Progressing  Goal: Verbalize understanding of personal risk factors for fall in the hospital  12/25/2023 0549 by Sejal Young RN  Outcome: Progressing  12/25/2023 0543 by Sejal Young RN  Outcome: Progressing  Goal: Verbalize understanding of risk factor reduction measures to prevent injury from fall in the home  12/25/2023 0549 by Sejal Young RN  Outcome: Progressing  12/25/2023 0543 by Sejal Young RN  Outcome: Progressing  Goal: Use assistive devices by end of the shift  12/25/2023 0549 by Sejal Young RN  Outcome: Progressing  12/25/2023 0543 by Sejal Young RN  Outcome: Progressing  Goal: Pace activities to prevent fatigue by end of the shift  Outcome: Progressing     Problem: Heart Failure  Goal: Improved gas exchange this shift  12/25/2023 0549 by Sejal Young RN  Outcome: Progressing  12/25/2023 0543 by Sejal Young RN  Outcome: Progressing  Goal: Improved urinary output this shift  12/25/2023 0549 by Sejal Young RN  Outcome: Progressing  12/25/2023 0543 by Sejal Young RN  Outcome: Progressing  Goal: Reduction in peripheral edema within 24 hours  12/25/2023 0549 by Sejal Young RN  Outcome: Progressing  12/25/2023 0543 by Sejal Young RN  Outcome: Progressing  Goal: Report improvement of dyspnea/breathlessness this shift  12/25/2023 0549 by Sejal Young RN  Outcome: Progressing  12/25/2023 0543 by Sejal Young RN  Outcome: Progressing  Goal: Weight from fluid excess reduced over 2-3 days, then stabilize  12/25/2023 0549 by Sejal Young RN  Outcome: Progressing  12/25/2023 0543 by Sejal Young RN  Outcome: Progressing  Goal: Increase self care and/or family involvement in 24 hours  Outcome: Progressing     Problem: Dressings Lower Extremities  Goal: STG - Patient will complete lower body dressing with mod assist with  comp strategies PRN  Outcome: Progressing   The patient's goals for the shift include      The clinical goals for the shift include CONTINUE TO BREATH BETTER

## 2023-12-25 NOTE — PROGRESS NOTES
INPATIENT NEPHROLOGY CONSULT PROGRESS NOTE      Patient Name: Fifi Jenkins MRN: [id number]  DATE of SERVICE: December 25, 2023  TIME of SERVICE: 12:05 PM  CONSULTING SERVICE: Nephrology    REASON for CONSULT: Acute kidney injury    SUBJECTIVE:  Patient seen and evaluated at bedside.  Reports sudden onset left side of the face pain associated with dizziness.  Patient with prior history of trigeminal neuropathy and residual dizziness after her prior stroke  Patient reports that her symptoms started after the morning round.  Patient reports intolerance to multiple medications in the past including gabapentin.  To give a dose of Tylenol.  Eventually needs to follow-up with neurology for further evaluation for the need for carbamazepine/topiramate or amitriptyline.    SUMMARY OF STAY:  Ms. Jenkins is a pleasant morbidly obese 87-year-old female with past medical history significant for chronic kidney disease stage IIIa with baseline serum creatinine 1 mg/dL EGFR 50 mill per minute per 1.73 m², long-term resident at [facility].  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 CVA in 2019, with residual dizziness. Patient presented to [facility] December 21, 2023 for evaluation of progressively worsening shortness of breath.  Upon presentation patient was hypoxic requiring 3 L of oxygen.  Home medications: losartan 50 mg p.o. daily, metolazone 5 mg weekly, torsemide 40 mg p.o. daily  Current medications: Newly started Jardiance 10 mg, losartan has been on hold since admission.  Metolazone 5 mg weekly.  Received Lasix 40 mg IV twice daily. Echocardiogram demonstrated  ejection fraction 55%.  Labs Serum creatinine up to 1.34 mg deciliter BUN of 28 and GFR of 39.  From serum creatinine of 1 mg deciliter and a GFR of 58 mL/min upon presentation.  Anemia hemoglobin of 8.2 mg deciliter. December 21, 2023 CT with IV contrast: No signs of pulmonary embolism demonstrated cardiomegaly without pericardial effusion.  Scattered emphysematous changes with small bilateral pleural effusions and mild bibasilar area of infiltrate or atelectasis.    ASSESSMENT AND PLAN:  CHERYL on CKD IIIa:  likely hemodynamically mediated in the setting of diuretics adjustment and vasoactive medications (Jardiance), contrast nephropathy (contrast exposure December 21).  Serum creatinine up to 1.34 mg deciliter BUN of 28 and GFR of 39.  From serum creatinine of 1 mg deciliter and a GFR of 58 mL/min upon presentation.     Volume status: Hypervolemic on presentation improved after IV Lasix     CKD IIIa: Diabetic nephropathy, hypertensive nephrosclerosis  Electrolytes: Managed (with renal diet)  Hypertension: Relative hypotension blood pressure 100 oh 3/60 with a heart rate of 81  Without being on antihypertensive medications  Acid-base: Metabolic alkalosis likely secondary to diuresis  Anemia from renal failure, To check iron panel and start epogen. hemoglobin of 8.2 mg deciliter.  To rule out plasma cell disorder  T2DM on insulin sliding scale  COPD: Emphysematous changes on CT scan requiring 2 L of oxygen  CVA 2019 with residual dizziness  Patient wheelchair dependent  Urinary incontinent: Wearing depends at skilled nursing facility  To monitor for UTI but Jardiance  CHF: Diastolic heart failure treated with IV Lasix likely for acute on chronic  UMA with CPAP intolerance  Paroxysmal A-fib on Eliquis    Plan:  Acute kidney injury likely hemodynamically mediated due to IV diuresis, Jardiance, losartan use (last dose 12/23), hypotension, contrast exposure.     Renal parameters improving serum creatinine down to 1.22 mg  deciliter, BUN 24 GFR 46 from 39  Urine output 1.6 L.  To start Bumex 2 mg p.o. daily (first dose in a.m.).   Discontinue metolazone/torsemide upon discharge     Patient with severe emphysema/COPD requiring oxygen, UMA with intolerance to CPAP  May need home O2 upon discharge.  Pending evaluation     Strict input and output guide diuresis management.   Medication reviewed renally dosed  To continue renal diet, 2 g sodium.  No urgent indication for renal placement therapy.      Tentative medication adjustment upon discharge:  To discontinue losartan  To discontinue torsemide/metolazone  To continue Jardiance  To start Bumex 2 mg p.o. daily with extra dose as mentioned above.  Agree with cardiology recommendations; reevaluate in the near future the need to start spironolactone,     We will continue to monitor closely with you, thank you!    Medications:    Current Facility-Administered Medications:     acetaminophen (Tylenol) tablet 487.5 mg, 487.5 mg, oral, BID, Jennifer Granadosrici, APRN-CNP, 487.5 mg at 12/25/23 0936    acetaminophen (Tylenol) tablet 650 mg, 650 mg, oral, q6h PRN, Jennifer Granadosrici, APRN-CNP, 650 mg at 12/25/23 0934    albuterol 2.5 mg /3 mL (0.083 %) nebulizer solution 2.5 mg, 2.5 mg, nebulization, q4h PRN, Jennifer Granadosrici, APRN-CNP    apixaban (Eliquis) tablet 5 mg, 5 mg, oral, BID, Jennifer Granadosrici, APRN-CNP, 5 mg at 12/25/23 0934    atorvastatin (Lipitor) tablet 20 mg, 20 mg, oral, Nightly, Jennifer Granadosrici, APRN-CNP, 20 mg at 12/24/23 2107    calcium carbonate (Tums) chewable tablet 500 mg, 1 tablet, oral, q4h PRN, Jennifer Granadosrici, APRN-CNP    cyclobenzaprine (Flexeril) tablet 10 mg, 10 mg, oral, BID PRN, Jennifer Granadosrici, APRN-CNP    dextrose 10 % in water (D10W) infusion, 0.3 g/kg/hr, intravenous, Once PRN, BREE Randhawa    dextrose 50 % injection 25 g, 25 g, intravenous, q15 min PRN, BREE Randhawa    docusate sodium (Colace) capsule 100 mg, 100 mg, oral, BID, Jennifer Manuel,  APRN-CNP, 100 mg at 12/25/23 0934    empagliflozin (Jardiance) tablet 10 mg, 10 mg, oral, Daily, Steve Sneed MD, 10 mg at 12/25/23 0934    famotidine (Pepcid) tablet 20 mg, 20 mg, oral, Daily, ESTEBAN Randhawa-CNP, 20 mg at 12/25/23 1518    [Held by provider] furosemide (Lasix) injection 40 mg, 40 mg, intravenous, BID, ESTEBAN Randhawa-CNP, 40 mg at 12/24/23 0837    glucagon (Glucagen) injection 1 mg, 1 mg, intramuscular, q15 min PRN, BREE Randhawa    insulin lispro (HumaLOG) injection 0-5 Units, 0-5 Units, subcutaneous, 4x daily, ESTEBAN Randhawa-CNP, 1 Units at 12/23/23 2141    [Held by provider] losartan (Cozaar) tablet 50 mg, 50 mg, oral, Daily, ESTEBAN Randhawa-CNP, 50 mg at 12/23/23 0837    lubricating eye drops ophthalmic solution 1 drop, 1 drop, Both Eyes, q4h PRN, ESTEBAN Randhawa-CNP    [START ON 12/27/2023] metOLazone (Zaroxolyn) tablet 5 mg, 5 mg, oral, Weekly, ESTEBAN Randhawa-CNP    oxygen (O2) therapy, , inhalation, Continuous PRN - O2/gases, ESTEBAN Randhawa-CNP, 2 L/min at 12/22/23 1005    perflutren lipid microspheres (Definity) injection 0.5-10 mL of dilution, 0.5-10 mL of dilution, intravenous, Once in imaging, ESTEBAN Randhawa-CNP    perflutren protein A microsphere (Optison) injection 0.5 mL, 0.5 mL, intravenous, Once in imaging, Jennifer Manuel APRN-CNP    perflutren protein A microsphere (Optison) injection 0.5 mL, 0.5 mL, intravenous, Once in imaging, Steve Sneed MD    polyethylene glycol (Glycolax, Miralax) packet 17 g, 17 g, oral, Daily, Jennifer Manuel APRN-CNP, 17 g at 12/25/23 0936    sennosides (Senokot) tablet 8.6 mg, 1 tablet, oral, BID, Jennifer Manuel APRN-CNP, 8.6 mg at 12/25/23 0934    sulfur hexafluoride microsphr (Lumason) injection 24.28 mg, 2 mL, intravenous, Once in imaging, Jennifer Manuel APRN-CNP    sulfur hexafluoride microsphr (Lumason) injection 24.28 mg, 2 mL, intravenous, Once in imaging, Steve LOJA  MD Nedra    PERTINENT ROS:  GENERAL:  positive for fatigue, poor appetite.  No fever/chills  RESPIRATORY:  positive for shortness of breath.  Negative for cough, wheezing.  CARDIOVASCULAR:   Negative for chest pain or palpitation.  GI:  Negative for abdominal pain, diarrhea, heartburn, nausea, vomiting  : Dysuria    Physical Exam:  Vital signs in last 24 hours:  Temp:  [35.5 °C (95.9 °F)-36.3 °C (97.3 °F)] 36 °C (96.8 °F)  Heart Rate:  [] 102  Resp:  [16-18] 16  BP: (103-140)/(56-74) 132/56    General: Awake, cooperative, not in acute distress  HEENT:  NCAT,  mucous membranes moist and pink  NECK:  Elevated JVD, no carotid bruit, supple, no cervical mass or thyromegaly  LUNGS;  Diminished breath sounds, fine Rales  CV:  Distant, regular rate and rhythm, no murmurs  ABDOMEN:  abdomen soft, nontender, BS normal, no masses or organomegaly  EDEMA:  +2 lower extremity edema/dependent edema  SKIN:  dry and normal turgor, no clubbing, cyanosis or petechia.  No rashes noted      Intake/Output last 3 shifts:  I/O last 3 completed shifts:  In: 840 (5.6 mL/kg) [P.O.:840]  Out: 1600 (10.8 mL/kg) [Urine:1600 (0.3 mL/kg/hr)]  Weight: 148.8 kg     DATA:  Diagnotic tests reviewed for Todays visit:  Results from last 7 days   Lab Units 12/25/23  0616   WBC AUTO x10*3/uL 6.1   RBC AUTO x10*6/uL 2.93*   HEMOGLOBIN g/dL 8.3*   HEMATOCRIT % 30.0*     Results from last 7 days   Lab Units 12/25/23  0615 12/22/23  0849 12/21/23  1158   SODIUM mmol/L 142   < > 141   POTASSIUM mmol/L 4.0   < > 4.8   CHLORIDE mmol/L 102   < > 104   CO2 mmol/L 34*   < > 31   BUN mg/dL 24*   < > 26*   CREATININE mg/dL 1.15*   < > 1.09*   CALCIUM mg/dL 8.2*   < > 8.7   MAGNESIUM mg/dL 1.96   < > 2.12   BILIRUBIN TOTAL mg/dL  --   --  0.8   ALT U/L  --   --  9   AST U/L  --   --  11    < > = values in this interval not displayed.         IMAGING: CXR reviewed in  images      SIGNATURE: Mary Solomon MD  Nephrology and Hypertension  [address]  [place], [place]  Office phone: [phone]  FAX: [phone]    This note was partially generated using the Dragon voice recognition system, and there may be some incorrect words, spelling's and punctuation that were not noted in checking the note before saving.

## 2023-12-26 LAB
ANION GAP SERPL CALC-SCNC: 11 MMOL/L (ref 10–20)
BUN SERPL-MCNC: 21 MG/DL (ref 6–23)
CALCIUM SERPL-MCNC: 8.4 MG/DL (ref 8.6–10.3)
CHLORIDE SERPL-SCNC: 101 MMOL/L (ref 98–107)
CO2 SERPL-SCNC: 35 MMOL/L (ref 21–32)
CREAT SERPL-MCNC: 1.1 MG/DL (ref 0.5–1.05)
CRP SERPL-MCNC: 0.24 MG/DL
ERYTHROCYTE [DISTWIDTH] IN BLOOD BY AUTOMATED COUNT: 17 % (ref 11.5–14.5)
ERYTHROCYTE [SEDIMENTATION RATE] IN BLOOD BY WESTERGREN METHOD: 21 MM/H (ref 0–30)
GFR SERPL CREATININE-BSD FRML MDRD: 49 ML/MIN/1.73M*2
GLUCOSE BLD MANUAL STRIP-MCNC: 119 MG/DL (ref 74–99)
GLUCOSE BLD MANUAL STRIP-MCNC: 128 MG/DL (ref 74–99)
GLUCOSE BLD MANUAL STRIP-MCNC: 131 MG/DL (ref 74–99)
GLUCOSE SERPL-MCNC: 113 MG/DL (ref 74–99)
HCT VFR BLD AUTO: 31.4 % (ref 36–46)
HGB BLD-MCNC: 8.7 G/DL (ref 12–16)
MAGNESIUM SERPL-MCNC: 2.09 MG/DL (ref 1.6–2.4)
MCH RBC QN AUTO: 28.3 PG (ref 26–34)
MCHC RBC AUTO-ENTMCNC: 27.7 G/DL (ref 32–36)
MCV RBC AUTO: 102 FL (ref 80–100)
NRBC BLD-RTO: 0 /100 WBCS (ref 0–0)
PLATELET # BLD AUTO: 169 X10*3/UL (ref 150–450)
POTASSIUM SERPL-SCNC: 4.3 MMOL/L (ref 3.5–5.3)
RBC # BLD AUTO: 3.07 X10*6/UL (ref 4–5.2)
SODIUM SERPL-SCNC: 143 MMOL/L (ref 136–145)
WBC # BLD AUTO: 6.9 X10*3/UL (ref 4.4–11.3)

## 2023-12-26 PROCEDURE — 2500000004 HC RX 250 GENERAL PHARMACY W/ HCPCS (ALT 636 FOR OP/ED): Performed by: NURSE PRACTITIONER

## 2023-12-26 PROCEDURE — 2500000001 HC RX 250 WO HCPCS SELF ADMINISTERED DRUGS (ALT 637 FOR MEDICARE OP): Performed by: INTERNAL MEDICINE

## 2023-12-26 PROCEDURE — 85027 COMPLETE CBC AUTOMATED: CPT | Performed by: NURSE PRACTITIONER

## 2023-12-26 PROCEDURE — 82947 ASSAY GLUCOSE BLOOD QUANT: CPT

## 2023-12-26 PROCEDURE — 94760 N-INVAS EAR/PLS OXIMETRY 1: CPT

## 2023-12-26 PROCEDURE — 2500000001 HC RX 250 WO HCPCS SELF ADMINISTERED DRUGS (ALT 637 FOR MEDICARE OP): Performed by: NURSE PRACTITIONER

## 2023-12-26 PROCEDURE — 83735 ASSAY OF MAGNESIUM: CPT | Performed by: NURSE PRACTITIONER

## 2023-12-26 PROCEDURE — 80048 BASIC METABOLIC PNL TOTAL CA: CPT | Performed by: NURSE PRACTITIONER

## 2023-12-26 PROCEDURE — 1200000002 HC GENERAL ROOM WITH TELEMETRY DAILY

## 2023-12-26 PROCEDURE — 86140 C-REACTIVE PROTEIN: CPT | Performed by: PHYSICIAN ASSISTANT

## 2023-12-26 PROCEDURE — 85652 RBC SED RATE AUTOMATED: CPT | Performed by: PHYSICIAN ASSISTANT

## 2023-12-26 PROCEDURE — 2500000004 HC RX 250 GENERAL PHARMACY W/ HCPCS (ALT 636 FOR OP/ED): Performed by: PHYSICIAN ASSISTANT

## 2023-12-26 PROCEDURE — 36415 COLL VENOUS BLD VENIPUNCTURE: CPT | Performed by: NURSE PRACTITIONER

## 2023-12-26 RX ORDER — LORAZEPAM 0.5 MG/1
0.25 TABLET ORAL ONCE
Status: DISCONTINUED | OUTPATIENT
Start: 2023-12-26 | End: 2024-01-07

## 2023-12-26 RX ORDER — TRAMADOL HYDROCHLORIDE 50 MG/1
50 TABLET ORAL EVERY 8 HOURS PRN
Status: DISCONTINUED | OUTPATIENT
Start: 2023-12-26 | End: 2024-01-07

## 2023-12-26 RX ORDER — ONDANSETRON HYDROCHLORIDE 2 MG/ML
4 INJECTION, SOLUTION INTRAVENOUS ONCE
Status: COMPLETED | OUTPATIENT
Start: 2023-12-26 | End: 2023-12-26

## 2023-12-26 RX ADMIN — ACETAMINOPHEN 487.5 MG: 325 TABLET ORAL at 22:20

## 2023-12-26 RX ADMIN — SENNOSIDES 8.6 MG: 8.6 TABLET, FILM COATED ORAL at 08:41

## 2023-12-26 RX ADMIN — ONDANSETRON 4 MG: 2 INJECTION INTRAMUSCULAR; INTRAVENOUS at 09:57

## 2023-12-26 RX ADMIN — POLYETHYLENE GLYCOL 3350 17 G: 17 POWDER, FOR SOLUTION ORAL at 08:41

## 2023-12-26 RX ADMIN — SENNOSIDES 8.6 MG: 8.6 TABLET, FILM COATED ORAL at 22:21

## 2023-12-26 RX ADMIN — DOCUSATE SODIUM 100 MG: 100 CAPSULE, LIQUID FILLED ORAL at 08:41

## 2023-12-26 RX ADMIN — ACETAMINOPHEN 487.5 MG: 325 TABLET ORAL at 08:40

## 2023-12-26 RX ADMIN — FAMOTIDINE 20 MG: 20 TABLET ORAL at 16:00

## 2023-12-26 RX ADMIN — TRAMADOL HYDROCHLORIDE 50 MG: 50 TABLET, COATED ORAL at 22:22

## 2023-12-26 RX ADMIN — APIXABAN 5 MG: 5 TABLET, FILM COATED ORAL at 22:21

## 2023-12-26 RX ADMIN — BUMETANIDE 2 MG: 1 TABLET ORAL at 08:40

## 2023-12-26 RX ADMIN — APIXABAN 5 MG: 5 TABLET, FILM COATED ORAL at 08:40

## 2023-12-26 RX ADMIN — EMPAGLIFLOZIN 10 MG: 10 TABLET, FILM COATED ORAL at 08:40

## 2023-12-26 RX ADMIN — ATORVASTATIN CALCIUM 20 MG: 20 TABLET, FILM COATED ORAL at 22:21

## 2023-12-26 ASSESSMENT — PAIN - FUNCTIONAL ASSESSMENT
PAIN_FUNCTIONAL_ASSESSMENT: 0-10
PAIN_FUNCTIONAL_ASSESSMENT: 0-10

## 2023-12-26 ASSESSMENT — PAIN SCALES - GENERAL
PAINLEVEL_OUTOF10: 7
PAINLEVEL_OUTOF10: 10 - WORST POSSIBLE PAIN
PAINLEVEL_OUTOF10: 2

## 2023-12-26 ASSESSMENT — PAIN DESCRIPTION - DESCRIPTORS: DESCRIPTORS: ACHING

## 2023-12-26 ASSESSMENT — PAIN SCALES - WONG BAKER: WONGBAKER_NUMERICALRESPONSE: NO HURT

## 2023-12-26 NOTE — PROGRESS NOTES
Internal Medicine Progress Note    Patient Name: Fifi Jenkins          MRN: [id number]  Today's Date: December 26, 2023          Attending: Nick Montaño MD    Subjective:  Patient was seen and examined at bedside she complains of severe left-sided headache.    Review Of Systems:  GENERAL: No malaise or fevers.  CARDIOVASCULAR: Negative for chest pain  RESPIRATORY: Improving shortness of breath  GI: No nausea, vomiting, or diarrhea    Objective:  Vitals:    12/25/23 2000 12/26/23 0100 12/26/23 0400 12/26/23 0800   BP: 113/76   113/76   BP Location: Right arm   Right arm   Patient Position: Lying   Lying   Pulse: 95   106   Resp: 18   18   Temp: 36.8 °C (98.2 °F)   36.4 °C (97.5 °F)   TempSrc: Temporal   Temporal   SpO2: 94% 94% 95% 95%   Weight:       Height:               Physical Exam:   General appearance: Alert, in no acute distress  Lungs: diminished breath sounds  Heart: RRR without murmur, gallop, or rubs  Abdomen: Soft, non-tender.   Neuro: Alert oriented x3, no focal deficit.    Labs:  Results for orders placed or performed during the hospital encounter of 12/21/23 (from the past 24 hour(s))   POCT GLUCOSE   Result Value Ref Range    POCT Glucose 122 (H) 74 - 99 mg/dL   POCT GLUCOSE   Result Value Ref Range    POCT Glucose 149 (H) 74 - 99 mg/dL   Magnesium   Result Value Ref Range    Magnesium 2.09 1.60 - 2.40 mg/dL   CBC   Result Value Ref Range    WBC 6.9 4.4 - 11.3 x10*3/uL    nRBC 0.0 0.0 - 0.0 /100 WBCs    RBC 3.07 (L) 4.00 - 5.20 x10*6/uL    Hemoglobin 8.7 (L) 12.0 - 16.0 g/dL    Hematocrit 31.4 (L) 36.0 - 46.0 %     (H) 80 - 100 fL    MCH 28.3 26.0 - 34.0 pg    MCHC 27.7 (L) 32.0 - 36.0 g/dL    RDW 17.0 (H) 11.5 - 14.5 %    Platelets 169 150 - 450 x10*3/uL   Basic Metabolic Panel   Result Value Ref Range    Glucose 113 (H) 74 - 99 mg/dL    Sodium 143 136 - 145 mmol/L    Potassium 4.3 3.5 - 5.3 mmol/L    Chloride 101 98 - 107 mmol/L    Bicarbonate 35 (H) 21 - 32 mmol/L    Anion Gap 11 10 - 20  mmol/L    Urea Nitrogen 21 6 - 23 mg/dL    Creatinine 1.10 (H) 0.50 - 1.05 mg/dL    eGFR 49 (L) >60 mL/min/1.73m*2    Calcium 8.4 (L) 8.6 - 10.3 mg/dL   Sedimentation rate, automated   Result Value Ref Range    Sedimentation Rate 21 0 - 30 mm/h   C-reactive protein   Result Value Ref Range    C-Reactive Protein 0.24 <1.00 mg/dL   POCT GLUCOSE   Result Value Ref Range    POCT Glucose 128 (H) 74 - 99 mg/dL       Medications:  Scheduled medications  acetaminophen, 487.5 mg, oral, BID  apixaban, 5 mg, oral, BID  atorvastatin, 20 mg, oral, Nightly  bumetanide, 2 mg, oral, Daily  docusate sodium, 100 mg, oral, BID  empagliflozin, 10 mg, oral, Daily  famotidine, 20 mg, oral, Daily  insulin lispro, 0-5 Units, subcutaneous, 4x daily  LORazepam, 0.25 mg, oral, Once  [Held by provider] losartan, 50 mg, oral, Daily  perflutren lipid microspheres, 0.5-10 mL of dilution, intravenous, Once in imaging  perflutren protein A microsphere, 0.5 mL, intravenous, Once in imaging  perflutren protein A microsphere, 0.5 mL, intravenous, Once in imaging  polyethylene glycol, 17 g, oral, Daily  sennosides, 1 tablet, oral, BID  sulfur hexafluoride microsphr, 2 mL, intravenous, Once in imaging  sulfur hexafluoride microsphr, 2 mL, intravenous, Once in imaging      Continuous medications     PRN medications  PRN medications: acetaminophen, albuterol, calcium carbonate, cyclobenzaprine, dextrose 10 % in water (D10W), dextrose, glucagon, lubricating eye drops, oxygen, traMADol      Assessment/Plan:  Medical Problems       Problem List       * (Principal) 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 (CMS/HCC)         Continue Metolazone and Jardiance   Cardiology is following  Waiting for 2D echo report         Paroxysmal atrial fibrillation (CMS/HCC)         Continue current medications         Hyperlipidemia         Continue atorvastatin         Acute respiratory failure with hypoxia (CMS/HCC)         Improving  Continue  "treating heart failure and oxygen supplement         Stage 3 chronic kidney disease (CMS/[facility])         Kidney function is improving  Continue monitoring  Nephrology is following         Type 2 diabetes mellitus with chronic kidney disease, without long-term current use of insulin (CMS/[facility])         Sliding scall insulin     Generalized weakness  PT/OT    Headache  Patient complains of left-sided headache, will check CRP and ESR they were negative  Start patient on tramadol for pain        Discussed with patient, RN    Nick Montaño MD   Date: 12/26/23  Time: 11:50 AM    This note was partially created using voice recognition software and is inherently subject to errors including those of syntax and \"sound-alike\" substitutions which may escape proofreading. In such instances, original meaning may be extrapolated by contextual derivation  "

## 2023-12-26 NOTE — PROGRESS NOTES
"Occupational Therapy                 Therapy Communication Note    Patient Name: Fifi Jenkins  MRN: [id number]  Today's Date: 12/26/2023     Discipline: Occupational Therapy    Missed Visit Reason:      Missed Time: Attempt    Comment:Pt not feeling well. Currently \"puking\" according to nursing and is not appropriate for therpy at this time  "

## 2023-12-26 NOTE — PROGRESS NOTES
Physical Therapy                 Therapy Communication Note    Patient Name: Fifi Jenkins  MRN: [id number]  Today's Date: 12/26/2023     Discipline: Physical Therapy    Missed Visit Reason: Missed Visit Reason: Patient refused    Missed Time: Attempt    Comment: Patient declined PT stating she felt poorly Nursing reporting patient nauseated and vomitting. Will continue to follow and see as it is appropriate

## 2023-12-26 NOTE — CARE PLAN
Problem: Fall/Injury  Goal: Be free from injury by end of the shift  Outcome: Progressing  Goal: Verbalize understanding of personal risk factors for fall in the hospital  Outcome: Progressing  Goal: Verbalize understanding of risk factor reduction measures to prevent injury from fall in the home  Outcome: Progressing  Goal: Use assistive devices by end of the shift  Outcome: Progressing  Goal: Pace activities to prevent fatigue by end of the shift  Outcome: Progressing     Problem: Heart Failure  Goal: Improved gas exchange this shift  Outcome: Progressing  Goal: Improved urinary output this shift  Outcome: Progressing  Goal: Reduction in peripheral edema within 24 hours  Outcome: Progressing  Goal: Report improvement of dyspnea/breathlessness this shift  Outcome: Progressing  Goal: Weight from fluid excess reduced over 2-3 days, then stabilize  Outcome: Progressing  Goal: Increase self care and/or family involvement in 24 hours  Outcome: Progressing     Problem: Respiratory  Goal: Clear secretions with interventions this shift  Outcome: Progressing  Goal: Minimize anxiety/maximize coping throughout shift  Outcome: Progressing  Goal: Minimal/no exertional discomfort or dyspnea this shift  Outcome: Progressing  Goal: No signs of respiratory distress (eg. Use of accessory muscles. Peds grunting)  Outcome: Progressing   The patient's goals for the shift include      The clinical goals for the shift include will be without respiratory distress

## 2023-12-26 NOTE — CARE PLAN
The patient's goals for the shift include  get all my meds.    The clinical goals for the shift include will be without respiratory distress    Continue with oxygen at 2l/nc for pox >90%.

## 2023-12-27 LAB
ANION GAP SERPL CALC-SCNC: 11 MMOL/L (ref 10–20)
AORTIC VALVE MEAN GRADIENT: 5
AORTIC VALVE PEAK VELOCITY: 1.35
APPEARANCE UR: ABNORMAL
AV PEAK GRADIENT: 7.3
AVA (PEAK VEL): 1.32
AVA (VTI): 1.08
BACTERIA #/AREA URNS AUTO: ABNORMAL /HPF
BILIRUB UR STRIP.AUTO-MCNC: NEGATIVE MG/DL
BUN SERPL-MCNC: 23 MG/DL (ref 6–23)
CALCIUM SERPL-MCNC: 8.5 MG/DL (ref 8.6–10.3)
CHLORIDE SERPL-SCNC: 100 MMOL/L (ref 98–107)
CO2 SERPL-SCNC: 34 MMOL/L (ref 21–32)
COLOR UR: ABNORMAL
CREAT SERPL-MCNC: 1.12 MG/DL (ref 0.5–1.05)
EJECTION FRACTION APICAL 4 CHAMBER: 30.5
EJECTION FRACTION: 33
ERYTHROCYTE [DISTWIDTH] IN BLOOD BY AUTOMATED COUNT: 16.6 % (ref 11.5–14.5)
GFR SERPL CREATININE-BSD FRML MDRD: 48 ML/MIN/1.73M*2
GLUCOSE BLD MANUAL STRIP-MCNC: 105 MG/DL (ref 74–99)
GLUCOSE BLD MANUAL STRIP-MCNC: 123 MG/DL (ref 74–99)
GLUCOSE BLD MANUAL STRIP-MCNC: 125 MG/DL (ref 74–99)
GLUCOSE BLD MANUAL STRIP-MCNC: 140 MG/DL (ref 74–99)
GLUCOSE BLD MANUAL STRIP-MCNC: 99 MG/DL (ref 74–99)
GLUCOSE SERPL-MCNC: 103 MG/DL (ref 74–99)
GLUCOSE UR STRIP.AUTO-MCNC: ABNORMAL MG/DL
HCT VFR BLD AUTO: 32 % (ref 36–46)
HGB BLD-MCNC: 8.7 G/DL (ref 12–16)
HOLD SPECIMEN: NORMAL
KETONES UR STRIP.AUTO-MCNC: NEGATIVE MG/DL
LEFT ATRIUM VOLUME AREA LENGTH INDEX BSA: 38.1
LEFT VENTRICLE INTERNAL DIMENSION DIASTOLE: 5.38 (ref 3.5–6)
LEFT VENTRICULAR OUTFLOW TRACT DIAMETER: 1.8
LEUKOCYTE ESTERASE UR QL STRIP.AUTO: ABNORMAL
MAGNESIUM SERPL-MCNC: 2.16 MG/DL (ref 1.6–2.4)
MCH RBC QN AUTO: 28.2 PG (ref 26–34)
MCHC RBC AUTO-ENTMCNC: 27.2 G/DL (ref 32–36)
MCV RBC AUTO: 104 FL (ref 80–100)
NITRITE UR QL STRIP.AUTO: NEGATIVE
NRBC BLD-RTO: 0 /100 WBCS (ref 0–0)
PH UR STRIP.AUTO: 6 [PH]
PLATELET # BLD AUTO: 179 X10*3/UL (ref 150–450)
POTASSIUM SERPL-SCNC: 4.4 MMOL/L (ref 3.5–5.3)
PROT UR STRIP.AUTO-MCNC: ABNORMAL MG/DL
RBC # BLD AUTO: 3.08 X10*6/UL (ref 4–5.2)
RBC # UR STRIP.AUTO: ABNORMAL /UL
RBC #/AREA URNS AUTO: >20 /HPF
RIGHT VENTRICLE FREE WALL PEAK S': 7.6
RIGHT VENTRICLE PEAK SYSTOLIC PRESSURE: 15.7
SODIUM SERPL-SCNC: 141 MMOL/L (ref 136–145)
SP GR UR STRIP.AUTO: 1.02
SQUAMOUS #/AREA URNS AUTO: ABNORMAL /HPF
TRICUSPID ANNULAR PLANE SYSTOLIC EXCURSION: 1.3
UROBILINOGEN UR STRIP.AUTO-MCNC: 4 MG/DL
WBC # BLD AUTO: 5.9 X10*3/UL (ref 4.4–11.3)
WBC #/AREA URNS AUTO: ABNORMAL /HPF

## 2023-12-27 PROCEDURE — 87186 SC STD MICRODIL/AGAR DIL: CPT | Mod: STJLAB | Performed by: NURSE PRACTITIONER

## 2023-12-27 PROCEDURE — 81001 URINALYSIS AUTO W/SCOPE: CPT | Performed by: NURSE PRACTITIONER

## 2023-12-27 PROCEDURE — 2500000001 HC RX 250 WO HCPCS SELF ADMINISTERED DRUGS (ALT 637 FOR MEDICARE OP): Performed by: INTERNAL MEDICINE

## 2023-12-27 PROCEDURE — 83735 ASSAY OF MAGNESIUM: CPT | Performed by: NURSE PRACTITIONER

## 2023-12-27 PROCEDURE — 82947 ASSAY GLUCOSE BLOOD QUANT: CPT

## 2023-12-27 PROCEDURE — 97116 GAIT TRAINING THERAPY: CPT | Mod: GP,CQ

## 2023-12-27 PROCEDURE — 1200000002 HC GENERAL ROOM WITH TELEMETRY DAILY

## 2023-12-27 PROCEDURE — 80048 BASIC METABOLIC PNL TOTAL CA: CPT | Performed by: NURSE PRACTITIONER

## 2023-12-27 PROCEDURE — 97530 THERAPEUTIC ACTIVITIES: CPT | Mod: GP,CQ

## 2023-12-27 PROCEDURE — 94760 N-INVAS EAR/PLS OXIMETRY 1: CPT

## 2023-12-27 PROCEDURE — 36415 COLL VENOUS BLD VENIPUNCTURE: CPT | Performed by: NURSE PRACTITIONER

## 2023-12-27 PROCEDURE — 2500000004 HC RX 250 GENERAL PHARMACY W/ HCPCS (ALT 636 FOR OP/ED): Performed by: NURSE PRACTITIONER

## 2023-12-27 PROCEDURE — 87086 URINE CULTURE/COLONY COUNT: CPT | Mod: STJLAB | Performed by: NURSE PRACTITIONER

## 2023-12-27 PROCEDURE — 81003 URINALYSIS AUTO W/O SCOPE: CPT | Performed by: NURSE PRACTITIONER

## 2023-12-27 PROCEDURE — 2500000001 HC RX 250 WO HCPCS SELF ADMINISTERED DRUGS (ALT 637 FOR MEDICARE OP): Performed by: NURSE PRACTITIONER

## 2023-12-27 PROCEDURE — 85027 COMPLETE CBC AUTOMATED: CPT | Performed by: NURSE PRACTITIONER

## 2023-12-27 PROCEDURE — 97535 SELF CARE MNGMENT TRAINING: CPT | Mod: GO,CO

## 2023-12-27 RX ORDER — BUTALBITAL, ACETAMINOPHEN AND CAFFEINE 50; 325; 40 MG/1; MG/1; MG/1
1 TABLET ORAL EVERY 4 HOURS PRN
Status: DISCONTINUED | OUTPATIENT
Start: 2023-12-27 | End: 2024-01-07

## 2023-12-27 RX ORDER — CEFTRIAXONE 1 G/50ML
1 INJECTION, SOLUTION INTRAVENOUS EVERY 24 HOURS
Status: DISCONTINUED | OUTPATIENT
Start: 2023-12-27 | End: 2024-01-01

## 2023-12-27 RX ADMIN — CEFTRIAXONE SODIUM 1 G: 1 INJECTION, SOLUTION INTRAVENOUS at 13:13

## 2023-12-27 RX ADMIN — EMPAGLIFLOZIN 10 MG: 10 TABLET, FILM COATED ORAL at 09:09

## 2023-12-27 RX ADMIN — APIXABAN 5 MG: 5 TABLET, FILM COATED ORAL at 09:09

## 2023-12-27 RX ADMIN — APIXABAN 5 MG: 5 TABLET, FILM COATED ORAL at 20:56

## 2023-12-27 RX ADMIN — BUTALBITAL, ACETAMINOPHEN, AND CAFFEINE 1 TABLET: 50; 325; 40 TABLET ORAL at 20:56

## 2023-12-27 RX ADMIN — SENNOSIDES 8.6 MG: 8.6 TABLET, FILM COATED ORAL at 09:10

## 2023-12-27 RX ADMIN — FAMOTIDINE 20 MG: 20 TABLET ORAL at 18:25

## 2023-12-27 RX ADMIN — ATORVASTATIN CALCIUM 20 MG: 20 TABLET, FILM COATED ORAL at 20:56

## 2023-12-27 RX ADMIN — CARBOXYMETHYLCELLULOSE SODIUM 1 DROP: 5 SOLUTION/ DROPS OPHTHALMIC at 09:11

## 2023-12-27 RX ADMIN — BUMETANIDE 2 MG: 1 TABLET ORAL at 09:09

## 2023-12-27 RX ADMIN — ACETAMINOPHEN 487.5 MG: 325 TABLET ORAL at 09:10

## 2023-12-27 RX ADMIN — ACETAMINOPHEN 487.5 MG: 325 TABLET ORAL at 20:56

## 2023-12-27 RX ADMIN — DOCUSATE SODIUM 100 MG: 100 CAPSULE, LIQUID FILLED ORAL at 09:10

## 2023-12-27 ASSESSMENT — COGNITIVE AND FUNCTIONAL STATUS - GENERAL
EATING MEALS: A LITTLE
WALKING IN HOSPITAL ROOM: A LOT
MOBILITY SCORE: 12
PERSONAL GROOMING: A LITTLE
HELP NEEDED FOR BATHING: A LOT
EATING MEALS: A LITTLE
HELP NEEDED FOR BATHING: A LOT
CLIMB 3 TO 5 STEPS WITH RAILING: TOTAL
DAILY ACTIVITIY SCORE: 14
DAILY ACTIVITIY SCORE: 14
MOVING FROM LYING ON BACK TO SITTING ON SIDE OF FLAT BED WITH BEDRAILS: A LOT
WALKING IN HOSPITAL ROOM: A LOT
MOVING TO AND FROM BED TO CHAIR: A LOT
MOVING TO AND FROM BED TO CHAIR: A LOT
DRESSING REGULAR LOWER BODY CLOTHING: A LOT
DRESSING REGULAR UPPER BODY CLOTHING: A LOT
MOVING FROM LYING ON BACK TO SITTING ON SIDE OF FLAT BED WITH BEDRAILS: A LOT
MOBILITY SCORE: 11
TURNING FROM BACK TO SIDE WHILE IN FLAT BAD: A LOT
CLIMB 3 TO 5 STEPS WITH RAILING: TOTAL
STANDING UP FROM CHAIR USING ARMS: A LOT
MOBILITY SCORE: 11
HELP NEEDED FOR BATHING: A LOT
DAILY ACTIVITIY SCORE: 14
TOILETING: TOTAL
MOVING TO AND FROM BED TO CHAIR: A LOT
TURNING FROM BACK TO SIDE WHILE IN FLAT BAD: A LOT
DRESSING REGULAR UPPER BODY CLOTHING: A LITTLE
PERSONAL GROOMING: A LITTLE
CLIMB 3 TO 5 STEPS WITH RAILING: A LOT
TURNING FROM BACK TO SIDE WHILE IN FLAT BAD: A LOT
WALKING IN HOSPITAL ROOM: A LOT
DRESSING REGULAR LOWER BODY CLOTHING: A LOT
DRESSING REGULAR UPPER BODY CLOTHING: A LITTLE
PERSONAL GROOMING: A LOT
STANDING UP FROM CHAIR USING ARMS: A LOT
STANDING UP FROM CHAIR USING ARMS: A LOT
MOVING FROM LYING ON BACK TO SITTING ON SIDE OF FLAT BED WITH BEDRAILS: A LOT
TOILETING: TOTAL
TOILETING: A LOT
DRESSING REGULAR LOWER BODY CLOTHING: A LOT

## 2023-12-27 ASSESSMENT — PAIN DESCRIPTION - ORIENTATION: ORIENTATION: LEFT

## 2023-12-27 ASSESSMENT — PAIN - FUNCTIONAL ASSESSMENT
PAIN_FUNCTIONAL_ASSESSMENT: 0-10

## 2023-12-27 ASSESSMENT — ACTIVITIES OF DAILY LIVING (ADL)
BATHING_WHERE_ASSESSED: EDGE OF BED
BATHING_LEVEL_OF_ASSISTANCE: MAXIMUM ASSISTANCE
HOME_MANAGEMENT_TIME_ENTRY: 26

## 2023-12-27 ASSESSMENT — PAIN DESCRIPTION - LOCATION: LOCATION: HEAD

## 2023-12-27 ASSESSMENT — PAIN DESCRIPTION - DESCRIPTORS
DESCRIPTORS: ACHING
DESCRIPTORS: ACHING

## 2023-12-27 ASSESSMENT — PAIN SCALES - GENERAL
PAINLEVEL_OUTOF10: 4
PAINLEVEL_OUTOF10: 0 - NO PAIN
PAINLEVEL_OUTOF10: 7
PAINLEVEL_OUTOF10: 10 - WORST POSSIBLE PAIN
PAINLEVEL_OUTOF10: 0 - NO PAIN

## 2023-12-27 NOTE — PROGRESS NOTES
Physical Therapy    Physical Therapy Treatment    Patient Name: Fifi Jenkins  MRN: [id number]  Today's Date: 12/27/2023  Time Calculation  Start Time: 0936  Stop Time: 1022  Time Calculation (min): 46 min       Assessment/Plan   PT Assessment  PT Assessment Results: Decreased strength, Decreased endurance, Impaired balance, Decreased mobility  Rehab Prognosis: Fair  Evaluation/Treatment Tolerance: Patient limited by fatigue  Medical Staff Made Aware: Yes  End of Session Communication: Bedside nurse  End of Session Patient Position: Bed, 3 rail up, Alarm on  PT Plan  Inpatient/Swing Bed or Outpatient: Inpatient  PT Plan  Treatment/Interventions: Bed mobility, Transfer training, Gait training, Balance training, Neuromuscular re-education, Strengthening, Therapeutic exercise, Therapeutic activity  PT Plan: Skilled PT  PT Frequency: 3 times per week  PT Discharge Recommendations: Moderate intensity level of continued care  Equipment Recommended upon Discharge: Wheeled walker, Wheelchair  PT Recommended Transfer Status: Assist x2  PT - OK to Discharge: Yes     12/27/23 0936   PT  Visit   PT Received On 12/27/23   Response to Previous Treatment Not applicable  (Pt. refused previous tx.)   General   Reason for Referral impaired mobility, impaired cognition/safety awareness   Referred By Nick Montaño   Prior to Session Communication Bedside nurse   Patient Position Received Bed, 3 rail up;Alarm on   Preferred Learning Style verbal   General Comment Pt. cleared by nursing, pt. pleasant and agreeable to tx.   Pain Assessment   Pain Assessment 0-10   Pain Score 0 - No pain   Cognition   Overall Cognitive Status WFL   Therapeutic Exercise   Therapeutic Exercise Performed No   Bed Mobility   Bed Mobility Yes   Bed Mobility 1   Bed Mobility 1 Supine to sitting   Level of Assistance 1 Minimum assistance;Minimal verbal cues   Bed Mobility Comments 1 Pt. required Cem to get to EOB.  (Extra time needed to bring legs over EOB. Pt.  required multiple breaks throughout.)   Bed Mobility 2   Bed Mobility  2 Sitting to supine   Level of Assistance 2 Moderate assistance   Ambulation/Gait Training   Ambulation/Gait Training Performed No   Transfers   Transfer Yes   Transfer 1   Transfer From 1 Sit to;Stand to   Transfer to 1 Sit;Stand   Technique 1 Sit to stand;Stand to sit   Transfer Device 1 Walker;Gait belt   Transfer Level of Assistance 1 Moderate assistance;+2;Minimal verbal cues   Trials/Comments 1 Pt. performed 2 STS and was able to take 4 side steps to HOB with moderate assistance x2.  (VC required for hand placement.)   Activity Tolerance   Endurance Decreased tolerance for upright activites  (Pt. stated she was dizzy upon sitting at bedside and standing. Requiring a few minutes to reorient herself.)   PT Assessment   PT Assessment Results Decreased strength;Decreased endurance;Impaired balance;Decreased mobility   Rehab Prognosis Fair   Evaluation/Treatment Tolerance Patient limited by fatigue   Medical Staff Made Aware Yes   End of Session Communication Bedside nurse   End of Session Patient Position Bed, 3 rail up;Alarm on   PT Plan   Inpatient/Swing Bed or Outpatient Inpatient   PT Plan   Treatment/Interventions Bed mobility;Transfer training;Gait training;Balance training;Neuromuscular re-education;Strengthening;Therapeutic exercise;Therapeutic activity   PT Plan Skilled PT   PT Frequency 3 times per week   PT Discharge Recommendations Moderate intensity level of continued care   Equipment Recommended upon Discharge Wheeled walker;Wheelchair   PT Recommended Transfer Status Assist x2     Outcome Measures:  [facility] Basic Mobility  Turning from your back to your side while in a flat bed without using bedrails: A lot  Moving from lying on your back to sitting on the side of a flat bed without using bedrails: A lot  Moving to and from bed to chair (including a wheelchair): A lot  Standing up from a chair using your arms (e.g. wheelchair or  bedside chair): A lot  To walk in hospital room: A lot  Climbing 3-5 steps with railing: Total  Basic Mobility - Total Score: 11  Education Documentation  Body Mechanics, taught by Jacqueline Isbell PTA at 12/27/2023  1:07 PM.  Learner: Patient  Readiness: Acceptance  Method: Explanation  Response: Needs Reinforcement    Home Exercise Program, taught by Jacqueline Isbell PTA at 12/27/2023  1:07 PM.  Learner: Patient  Readiness: Acceptance  Method: Explanation  Response: Needs Reinforcement    Mobility Training, taught by Jacqueline Isbell PTA at 12/27/2023  1:07 PM.  Learner: Patient  Readiness: Acceptance  Method: Explanation  Response: Needs Reinforcement    Education Comments  No comments found.            EDUCATION:       GOALS:  Encounter Problems       Encounter Problems (Active)       PT Problem       Pt will be able to perform all bed mobility tasks with CGA.  (Progressing)       Start:  12/24/23    Expected End:  01/07/24            Pt will perform all transfers with Min A with proper safety mechanics.   (Progressing)       Start:  12/24/23    Expected End:  01/07/24            Pt will be able to perform functional transfers while maintaining SpO2 > 90%.  (Progressing)       Start:  12/24/23    Expected End:  01/07/24            Pt will demonstrate good dynamic sit balance for completion of therapeutic exercises and functional tasks.  (Progressing)       Start:  12/24/23    Expected End:  01/07/24

## 2023-12-27 NOTE — PROGRESS NOTES
Occupational Therapy    OT Treatment    Patient Name: Fifi Jenkins  MRN: [id number]  Today's Date: 12/27/2023  Time Calculation  Start Time: 0929  Stop Time: 1020  Time Calculation (min): 51 min         Assessment:  Prognosis: Fair  Evaluation/Treatment Tolerance: Patient tolerated treatment well, Patient limited by fatigue  End of Session Communication: Bedside nurse  End of Session Patient Position: Bed, 3 rail up, Alarm on  OT Assessment Results: Decreased ADL status, Decreased functional mobility  Prognosis: Fair  Evaluation/Treatment Tolerance: Patient tolerated treatment well, Patient limited by fatigue    Plan:  Treatment Interventions: ADL retraining, Functional transfer training, Endurance training  OT Frequency: 3 times per week  OT Discharge Recommendations: Moderate intensity level of continued care  Equipment Recommended upon Discharge: Wheeled walker, Wheelchair  Treatment Interventions: ADL retraining, Functional transfer training, Endurance training  Subjective     Current Problem:  Patient Active Problem List   Diagnosis    Urinary retention    Trigeminal neuralgia    Spontaneous ocular nystagmus    Spinal stenosis    Schwannoma    Pseudophakia of both eyes    Paroxysmal atrial fibrillation (CMS/HCC)    Parotid mass    Cerebellar stroke (CMS/HCC)    Osteoarthritis of knee    Obstructive sleep apnea syndrome    Mass of parapharyngeal space    Lumbar spondylosis    LAE (left atrial enlargement)    Incontinence of urine    Hyperlipidemia    History of CVA (cerebrovascular accident)    Heart failure with preserved left ventricular function (HFpEF) (CMS/HCC)    GERD (gastroesophageal reflux disease)    Vertigo    Chronic anemia    Bradycardia    Bilateral sensorineural hearing loss    Atherosclerosis of coronary artery    Adnexal mass    Abnormal ECG    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 (CMS/HCC)    Acute respiratory failure with hypoxia (CMS/HCC)    Stage 3 chronic kidney  disease (CMS/[facility])    Morbid obesity (CMS/[facility])    Type 2 diabetes mellitus with chronic kidney disease, without long-term current use of insulin (CMS/HCC)    Acute heart failure with preserved ejection fraction (CMS/[facility])       General:  OT Received On: 12/27/23  Reason for Referral: impaired mobility, impaired cognition/safety awareness  Co-Treatment: PT  Co-Treatment Reason: for safety  Prior to Session Communication: Bedside nurse  Patient Position Received: Bed, 3 rail up, Alarm on  Preferred Learning Style: verbal  General Comment: Pt agreeable to therapy, cleared by nursing    Vital Signs:       Pain:  Pain Assessment  Pain Assessment: 0-10  Pain Score: 0 - No pain  Objective      Activities of Daily Living:    Grooming  Grooming Level of Assistance: Setup  Grooming Where Assessed: Edge of bed  Grooming Comments: Pt washed face  UE Bathing  UE Bathing Level of Assistance: Minimum assistance  UE Bathing Where Assessed: Edge of bed  UE Bathing Comments: Pt washed under arms and folds. Assist with drying under folds  LE Bathing  LE Bathing Level of Assistance: Maximum assistance  LE Bathing Where Assessed: Edge of bed  UE Dressing  UE Dressing Level of Assistance: Minimum assistance  UE Dressing Where Assessed: Edge of bed  UE Dressing Comments: doffed/donned gown     Toileting  Toileting Level of Assistance: Dependent  Where Assessed: Bed level  Toileting Comments: pt using pure wick    Functional Standing Tolerance:  Functional Standing Tolerance Comments: Pt stood alongside EOB 2 x , min A of 2 people and took a few side steps alongside EOB.    Bed Mobility/Transfers: Bed Mobility  Bed Mobility:  (min A to EOB, Mod A to get back into bed)  Transfers  Transfer:  (STS at min A of 2 people)                     Outcome Measures:[facility] Daily Activity  Putting on and taking off regular lower body clothing: A lot  Bathing (including washing, rinsing, drying): A lot  Putting on and taking off regular upper body clothing:  A little  Toileting, which includes using toilet, bedpan or urinal: Total  Taking care of personal grooming such as brushing teeth: A little  Eating Meals: A little  Daily Activity - Total Score: 14        EDUCATION:  Education  Individual(s) Educated: Patient  Education Provided: Fall precautons  Patient Response to Education: Patient/Caregiver Verbalized Understanding of Information  Education Comment: Pt would benefit from continued reinforcement.    Goals:  Encounter Problems       Encounter Problems (Active)       Balance       STG-Patient will be CGA with assistive device dynamic stand task >5 minutes for ADL completion   (Progressing)       Start:  12/24/23    Expected End:  01/07/24               Dressing Upper Extremities       STG - Patient will dress upper body with CGA (Progressing)       Start:  12/24/23    Expected End:  12/30/23               Dressings Lower Extremities       STG - Patient will complete lower body dressing with mod assist with comp strategies PRN (Progressing)       Start:  12/24/23    Expected End:  12/30/23               Transfers       STG-Patient will be min assist with functional transfers demonstrating good safety  (progress only as appropriate) (Progressing)       Start:  12/24/23    Expected End:  01/07/24

## 2023-12-27 NOTE — DISCHARGE INSTR - AVS FIRST PAGE
Heart Failure Discharge Instructions - SNF/ECF/AL    1. Weigh patient daily and record.  2. If patient gains more than 2 or 3 pounds overnight or 5 pounds in 5 days, call the cardiologist Dr. Marcie Terrazas [phone].  3. Follow a low sodium diet. No more than 2000 mg in one day, or more than 700 mg per meal.  4. Limit total fluids to no more than 8 cups (or 2 liters) per day - this includes all fluids (water, coffee, juice, milk, tea, etc.)  5. Monitor blood pressure daily and record.  6. Be sure to have patient see cardiologist in one week after discharge. Call to schedule follow-up appointments.  7. Keep follow-up appointments. Send the weight and vital signs record with patient to the appointments so the doctors can see the weight trend and blood pressure readings for tighter heart failure management.  8. Activity as tolerated, advance based on tolerance.  9. If you notice subtle change of symptoms (slight increase in swelling, slight shortness of breath, a new intolerance to lying flat, a new cough), be sure to call the cardiologist.  10. If the cardiologist is unavailable or does not return your call, call the [facility] heart failure navigator (Mila Juarez) at [phone].  11. NOTE: Patient is newly started on Empagliflozin (Jardiance) for her heart failure. It is imperative to provide meticulous wanda care to avoid UTI and fungal infections with this medicine.    Pacemaker Instructions:  No lifting the affected arm above the level of the heart for one month from 1/5/24.   No lifting greater than 5 pounds with the affected arm for one month.   No reaching behind you or pushing off the chair/bed with the affected arm for one month. May use affected arm for limited activity.  Keep surgical dressing on insertion site until seen by the surgeon in the office on scheduled appointment 1/15/24, unless instructed otherwise BY DR. PAUL.  Per Eloy Paul, may shower patient but keep her back to the water and do not let  water run over the incision.

## 2023-12-27 NOTE — CARE PLAN
Problem: Fall/Injury  Goal: Be free from injury by end of the shift  Outcome: Progressing  Goal: Verbalize understanding of personal risk factors for fall in the hospital  Outcome: Progressing  Goal: Verbalize understanding of risk factor reduction measures to prevent injury from fall in the home  Outcome: Progressing  Goal: Use assistive devices by end of the shift  Outcome: Progressing  Goal: Pace activities to prevent fatigue by end of the shift  Outcome: Progressing

## 2023-12-27 NOTE — PROGRESS NOTES
Internal Medicine Progress Note    Patient Name: Fifi Jenkins          MRN: [id number]  Today's Date: December 27, 2023          Attending: Nick Montaño MD    Subjective:  Patient was seen and examined at bedside she complains of severe left-sided headache.    Review Of Systems:  GENERAL: No malaise or fevers.  CARDIOVASCULAR: Negative for chest pain  RESPIRATORY: Improving shortness of breath  GI: No nausea, vomiting, or diarrhea    Objective:  Vitals:    12/27/23 0800 12/27/23 1100 12/27/23 1139 12/27/23 1501   BP: 162/63      BP Location: Right arm      Patient Position: Lying      Pulse: 100      Resp: 18      Temp: 36.1 °C (97 °F)      TempSrc: Temporal      SpO2: 99% (!) 87% 97% (!) 88%   Weight:       Height:               Physical Exam:   General appearance: Alert, in no acute distress  Lungs: diminished breath sounds  Heart: RRR without murmur  Abdomen: Soft, non-tender.   Neuro: Alert oriented x3, no focal deficit.    Labs:  Results for orders placed or performed during the hospital encounter of 12/21/23 (from the past 24 hour(s))   POCT GLUCOSE   Result Value Ref Range    POCT Glucose 131 (H) 74 - 99 mg/dL   POCT GLUCOSE   Result Value Ref Range    POCT Glucose 119 (H) 74 - 99 mg/dL   POCT GLUCOSE   Result Value Ref Range    POCT Glucose 99 74 - 99 mg/dL   Magnesium   Result Value Ref Range    Magnesium 2.16 1.60 - 2.40 mg/dL   CBC   Result Value Ref Range    WBC 5.9 4.4 - 11.3 x10*3/uL    nRBC 0.0 0.0 - 0.0 /100 WBCs    RBC 3.08 (L) 4.00 - 5.20 x10*6/uL    Hemoglobin 8.7 (L) 12.0 - 16.0 g/dL    Hematocrit 32.0 (L) 36.0 - 46.0 %     (H) 80 - 100 fL    MCH 28.2 26.0 - 34.0 pg    MCHC 27.2 (L) 32.0 - 36.0 g/dL    RDW 16.6 (H) 11.5 - 14.5 %    Platelets 179 150 - 450 x10*3/uL   Basic Metabolic Panel   Result Value Ref Range    Glucose 103 (H) 74 - 99 mg/dL    Sodium 141 136 - 145 mmol/L    Potassium 4.4 3.5 - 5.3 mmol/L    Chloride 100 98 - 107 mmol/L    Bicarbonate 34 (H) 21 - 32 mmol/L    Anion Gap  11 10 - 20 mmol/L    Urea Nitrogen 23 6 - 23 mg/dL    Creatinine 1.12 (H) 0.50 - 1.05 mg/dL    eGFR 48 (L) >60 mL/min/1.73m*2    Calcium 8.5 (L) 8.6 - 10.3 mg/dL   Urinalysis with Reflex Culture and Microscopic   Result Value Ref Range    Color, Urine Airam (N) Straw, Yellow    Appearance, Urine Hazy (N) Clear    Specific Gravity, Urine 1.020 1.005 - 1.035    pH, Urine 6.0 5.0, 5.5, 6.0, 6.5, 7.0, 7.5, 8.0    Protein, Urine 100 (2+) (N) NEGATIVE mg/dL    Glucose, Urine >=500 (3+) (A) NEGATIVE mg/dL    Blood, Urine MODERATE (2+) (A) NEGATIVE    Ketones, Urine NEGATIVE NEGATIVE mg/dL    Bilirubin, Urine NEGATIVE NEGATIVE    Urobilinogen, Urine 4.0 (N) <2.0 mg/dL    Nitrite, Urine NEGATIVE NEGATIVE    Leukocyte Esterase, Urine LARGE (3+) (A) NEGATIVE   Microscopic Only, Urine   Result Value Ref Range    WBC, Urine 11-20 (A) 1-5, NONE /HPF    RBC, Urine >20 (A) NONE, 1-2, 3-5 /HPF    Squamous Epithelial Cells, Urine 1-9 (SPARSE) Reference range not established. /HPF    Bacteria, Urine 4+ (A) NONE SEEN /HPF   POCT GLUCOSE   Result Value Ref Range    POCT Glucose 125 (H) 74 - 99 mg/dL       Medications:  Scheduled medications  acetaminophen, 487.5 mg, oral, BID  apixaban, 5 mg, oral, BID  atorvastatin, 20 mg, oral, Nightly  bumetanide, 2 mg, oral, Daily  cefTRIAXone, 1 g, intravenous, q24h  docusate sodium, 100 mg, oral, BID  empagliflozin, 10 mg, oral, Daily  famotidine, 20 mg, oral, Daily  insulin lispro, 0-5 Units, subcutaneous, 4x daily  LORazepam, 0.25 mg, oral, Once  perflutren lipid microspheres, 0.5-10 mL of dilution, intravenous, Once in imaging  perflutren protein A microsphere, 0.5 mL, intravenous, Once in imaging  perflutren protein A microsphere, 0.5 mL, intravenous, Once in imaging  polyethylene glycol, 17 g, oral, Daily  sennosides, 1 tablet, oral, BID  sulfur hexafluoride microsphr, 2 mL, intravenous, Once in imaging  sulfur hexafluoride microsphr, 2 mL, intravenous, Once in imaging      Continuous  "medications     PRN medications  PRN medications: acetaminophen, albuterol, butalbital-acetaminophen-caff, calcium carbonate, cyclobenzaprine, dextrose 10 % in water (D10W), dextrose, glucagon, lubricating eye drops, oxygen, traMADol      Assessment/Plan:  Medical Problems       Problem List       * (Principal) 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 (CMS/HCC)         Continue Metolazone and Jardiance   Cardiology is following  Echo showed decreased systolic function         Paroxysmal atrial fibrillation (CMS/[facility])         Continue current medications         Hyperlipidemia         Continue atorvastatin         Acute respiratory failure with hypoxia (CMS/[facility])         Continue treating heart failure and oxygen supplement         Stage 3 chronic kidney disease (CMS/[facility])         Kidney function is improving  Continue monitoring  Nephrology is following         Type 2 diabetes mellitus with chronic kidney disease, without long-term current use of insulin (CMS/[facility])         Sliding scall insulin     Generalized weakness  PT/OT    Headache  Patient did not improve with pseudorheumatoid, will try Fioricet        Discussed with patient, RN    Nick Montaño MD   Date: 12/27/23  Time: 3:36 PM    This note was partially created using voice recognition software and is inherently subject to errors including those of syntax and \"sound-alike\" substitutions which may escape proofreading. In such instances, original meaning may be extrapolated by contextual derivation  "

## 2023-12-27 NOTE — PROGRESS NOTES
INPATIENT NEPHROLOGY CONSULT PROGRESS NOTE      Patient Name: Fifi Jenkins MRN: [id number]  DATE of SERVICE: December 26, 2023  TIME of SERVICE: 12:49 PM  CONSULTING SERVICE: Nephrology    REASON for CONSULT: Acute kidney injury    SUBJECTIVE:  Patient seen and evaluated at bedside.  Reports an improvement in her left sided headache.    ml  Tolerating bumex 2 mg po daily.   Tolerating jardiance 10 mg  BP stable off losartan.     SUMMARY OF STAY:  Ms. Jenkins is a pleasant morbidly obese 87-year-old female with past medical history significant for chronic kidney disease stage IIIa with baseline serum creatinine 1 mg/dL EGFR 50 mill per minute per 1.73 m², long-term resident at [facility].  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 CVA in 2019, with residual dizziness. Patient presented to [facility] December 21, 2023 for evaluation of progressively worsening shortness of breath.  Upon presentation patient was hypoxic requiring 3 L of oxygen.  Home medications: losartan 50 mg p.o. daily, metolazone 5 mg weekly, torsemide 40 mg p.o. daily  Current medications: Newly started Jardiance 10 mg, losartan has been on hold since admission.  Metolazone 5 mg weekly.  Received Lasix 40 mg IV twice daily. Echocardiogram demonstrated ejection fraction 55%.  Labs Serum creatinine up to 1.34 mg deciliter BUN of 28 and GFR of 39.  From serum creatinine of 1 mg deciliter and a GFR of 58 mL/min upon presentation.  Anemia hemoglobin of 8.2 mg deciliter. December 21, 2023 CT with IV contrast: No signs of pulmonary embolism demonstrated cardiomegaly without pericardial  effusion.  Scattered emphysematous changes with small bilateral pleural effusions and mild bibasilar area of infiltrate or atelectasis.    ASSESSMENT AND PLAN:  CHERYL on CKD IIIa:  likely hemodynamically mediated in the setting of diuretics adjustment and vasoactive medications (Jardiance), contrast nephropathy (contrast exposure December 21).  Serum creatinine up to 1.34 mg deciliter BUN of 28 and GFR of 39.  From serum creatinine of 1 mg deciliter and a GFR of 58 mL/min upon presentation.     Volume status: Hypervolemic on presentation improved after IV Lasix     CKD IIIa: Diabetic nephropathy, hypertensive nephrosclerosis  Electrolytes: Managed (with renal diet)  Hypertension: Relative hypotension blood pressure 100 oh 3/60 with a heart rate of 81  Without being on antihypertensive medications  Acid-base: Metabolic alkalosis likely secondary to diuresis  Anemia from renal failure, To check iron panel and start epogen. hemoglobin of 8.2 mg deciliter.  To rule out plasma cell disorder  T2DM on insulin sliding scale  COPD: Emphysematous changes on CT scan requiring 2 L of oxygen  CVA 2019 with residual dizziness  Patient wheelchair dependent  Urinary incontinent: Wearing depends at [facility] nursing facility  CHF: Diastolic heart failure treated with IV Lasix likely for acute on chronic  UMA with CPAP intolerance  Paroxysmal A-fib on Eliquis    Plan:  Acute kidney injury likely hemodynamically mediated due to IV diuresis, Jardiance, losartan use (last dose 12/23), hypotension, contrast exposure.     Renal parameters improving serum creatinine down to 1.1 mg deciliter, BUN 24 GFR 46 from 39  To cont Bumex 2 mg p.o. daily.   To Discontinue metolazone/torsemide upon discharge     Patient with severe emphysema/COPD requiring oxygen, UMA with intolerance to CPAP    Strict input and output guide diuresis management.   Medication reviewed renally dosed  To continue renal diet, 2 g sodium.  No urgent indication for renal  placement therapy.      Discharge medication:  To discontinue losartan.   To discontinue torsemide/metolazone.   To continue Jardiance  To cont Bumex 2 mg p.o. daily with extra dose as mentioned above.    We will continue to monitor closely with you, thank you!    Medications:    Current Facility-Administered Medications:     acetaminophen (Tylenol) tablet 487.5 mg, 487.5 mg, oral, BID, Jennifer Manuel APRN-CNP, 487.5 mg at 12/26/23 0840    acetaminophen (Tylenol) tablet 650 mg, 650 mg, oral, q6h PRN, Jennifer Manuel, APRN-CNP, 650 mg at 12/25/23 0934    albuterol 2.5 mg /3 mL (0.083 %) nebulizer solution 2.5 mg, 2.5 mg, nebulization, q4h PRN, Jennifer Manuel APRN-CNP    apixaban (Eliquis) tablet 5 mg, 5 mg, oral, BID, ESTEBAN Randhawa-CNP, 5 mg at 12/26/23 0840    atorvastatin (Lipitor) tablet 20 mg, 20 mg, oral, Nightly, Jennifer Manuel APRN-CNP, 20 mg at 12/25/23 2128    bumetanide (Bumex) tablet 2 mg, 2 mg, oral, Daily, Mary Solomon MD, 2 mg at 12/26/23 0840    calcium carbonate (Tums) chewable tablet 500 mg, 1 tablet, oral, q4h PRN, Jennifer Manuel APRN-CNP    cyclobenzaprine (Flexeril) tablet 10 mg, 10 mg, oral, BID PRN, ESTEBAN Randhawa-CNP    dextrose 10 % in water (D10W) infusion, 0.3 g/kg/hr, intravenous, Once PRN, ESTEBAN Randhawa-CNP    dextrose 50 % injection 25 g, 25 g, intravenous, q15 min PRN, Jennifer Manuel APRN-CNP    docusate sodium (Colace) capsule 100 mg, 100 mg, oral, BID, ESTEBAN Randhawa-CNP, 100 mg at 12/26/23 0841    empagliflozin (Jardiance) tablet 10 mg, 10 mg, oral, Daily, Steve Sneed MD, 10 mg at 12/26/23 0840    famotidine (Pepcid) tablet 20 mg, 20 mg, oral, Daily, BREE Randhawa, 20 mg at 12/26/23 1600    glucagon (Glucagen) injection 1 mg, 1 mg, intramuscular, q15 min PRN, BREE Randhawa    insulin lispro (HumaLOG) injection 0-5 Units, 0-5 Units, subcutaneous, 4x daily, BREE Randhawa, 1 Units at 12/23/23 4798     LORazepam (Ativan) tablet 0.25 mg, 0.25 mg, oral, Once, Ayanna Giles PA-C    [Held by provider] losartan (Cozaar) tablet 50 mg, 50 mg, oral, Daily, Jennifer Tomsravan APROZ-CNP, 50 mg at 12/23/23 0837    lubricating eye drops ophthalmic solution 1 drop, 1 drop, Both Eyes, q4h PRN, Jennifer Manuel APRN-CNP    oxygen (O2) therapy, , inhalation, Continuous PRN - O2/gases, Jennifer Tomi APRN-CNP, 2 L/min at 12/22/23 1005    perflutren lipid microspheres (Definity) injection 0.5-10 mL of dilution, 0.5-10 mL of dilution, intravenous, Once in imaging, Jennifer M Vaishalisravan APROZ-CNP    perflutren protein A microsphere (Optison) injection 0.5 mL, 0.5 mL, intravenous, Once in imaging, Jennifer OSCAR Tomsravan APRN-CNP    perflutren protein A microsphere (Optison) injection 0.5 mL, 0.5 mL, intravenous, Once in imaging, Steve Sneed MD    polyethylene glycol (Glycolax, Miralax) packet 17 g, 17 g, oral, Daily, Jennifer Manuel APROZ-CNP, 17 g at 12/26/23 0841    sennosides (Senokot) tablet 8.6 mg, 1 tablet, oral, BID, Jennifer Tomsravan APROZ-CNP, 8.6 mg at 12/26/23 0841    sulfur hexafluoride microsphr (Lumason) injection 24.28 mg, 2 mL, intravenous, Once in imaging, Jennifer Tomsravan APRN-CNP    sulfur hexafluoride microsphr (Lumason) injection 24.28 mg, 2 mL, intravenous, Once in imaging, Steve Sneed MD    traMADol (Ultram) tablet 50 mg, 50 mg, oral, q8h PRN, Nick Montaño MD    PERTINENT ROS:  GENERAL:  positive for fatigue, poor appetite.  No fever/chills  RESPIRATORY:  positive for shortness of breath.  Negative for cough, wheezing.  CARDIOVASCULAR:   Negative for chest pain or palpitation.  GI:  Negative for abdominal pain, diarrhea, heartburn, nausea, vomiting  : Dysuria    Physical Exam:  Vital signs in last 24 hours:  Temp:  [36.1 °C (97 °F)-36.4 °C (97.5 °F)] 36.1 °C (97 °F)  Heart Rate:  [] 98  Resp:  [18] 18  BP: (113-128)/(59-76) 128/59    General: Awake, cooperative, not in acute distress  HEENT:  NCAT,   mucous membranes moist and pink  NECK:  Elevated JVD, no carotid bruit, supple, no cervical mass or thyromegaly  LUNGS;  Diminished breath sounds, fine Rales  CV:  Distant, regular rate and rhythm, no murmurs  ABDOMEN:  abdomen soft, nontender, BS normal, no masses or organomegaly  EDEMA:  +1 lower extremity edema/dependent edema  SKIN:  dry and normal turgor, no clubbing, cyanosis or petechia.  No rashes noted      Intake/Output last 3 shifts:  I/O last 3 completed shifts:  In: 720 (4.8 mL/kg) [P.O.:720]  Out: 1700 (11.4 mL/kg) [Urine:1700 (0.3 mL/kg/hr)]  Weight: 148.8 kg     DATA:  Diagnotic tests reviewed for Todays visit:  Results from last 7 days   Lab Units 12/26/23  0629   WBC AUTO x10*3/uL 6.9   RBC AUTO x10*6/uL 3.07*   HEMOGLOBIN g/dL 8.7*   HEMATOCRIT % 31.4*       Results from last 7 days   Lab Units 12/26/23  0629 12/22/23  0849 12/21/23  1158   SODIUM mmol/L 143   < > 141   POTASSIUM mmol/L 4.3   < > 4.8   CHLORIDE mmol/L 101   < > 104   CO2 mmol/L 35*   < > 31   BUN mg/dL 21   < > 26*   CREATININE mg/dL 1.10*   < > 1.09*   CALCIUM mg/dL 8.4*   < > 8.7   MAGNESIUM mg/dL 2.09   < > 2.12   BILIRUBIN TOTAL mg/dL  --   --  0.8   ALT U/L  --   --  9   AST U/L  --   --  11    < > = values in this interval not displayed.           IMAGING: CXR reviewed in  images      SIGNATURE: Mary Solomon MD  Nephrology and Hypertension  [address] [place]  Office phone: [phone]  FAX: [phone]    This note was partially generated using the Dragon voice recognition system, and there may be some incorrect words, spelling's and punctuation that were not noted in checking the note before saving.

## 2023-12-27 NOTE — PROGRESS NOTES
Spiritual Care Visit    Clinical Encounter Type  Visited With: Patient  Routine Visit: Introduction  Continue Visiting: No                                            Taxonomy  Intended Effects: Preserve dignity and respect, Promote sense of peace  Methods: Offer spiritual/Voodoo support  Interventions: Share words of hope and inspiration, [place]    Patient shared she goes to a [employer] called [facility] on [address].   sat wit the patient and listened as she talked about her alfonso.   prayed at her request.  Patient said she prays often and it is what gets her through each day.

## 2023-12-27 NOTE — PROGRESS NOTES
INPATIENT NEPHROLOGY CONSULT PROGRESS NOTE      Patient Name: Fifi Jenkins MRN: [id number]  DATE of SERVICE: December 27, 2023  TIME of SERVICE: 12:49 PM  CONSULTING SERVICE: Nephrology    REASON for CONSULT: Acute kidney injury    SUBJECTIVE:  Seen and evaluated at bedside.   Still complaining of migraine headaches patient reports that the tramadol did not help.  Given a dose of Ativan    Urine microscopy today positive for leukocyte esterase RBCs and 4+ bacteria urine culture in process.  Renal parameters plateauing versus improving serum creatinine 1.1 mg deciliter EGFR 48 metabolic alkalosis bicarb 34.     Tolerating bumex 2 mg po daily.   Tolerating jardiance 10 mg  BP stable off losartan.     SUMMARY OF STAY:  Ms. Jenkins is a pleasant morbidly obese 87-year-old female with past medical history significant for chronic kidney disease stage IIIa with baseline serum creatinine 1 mg/dL EGFR 50 mill per minute per 1.73 m², long-term resident at [facility].  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 CVA in 2019, with residual dizziness. Patient presented to [facility] December 21, 2023 for evaluation of progressively worsening shortness of breath.  Upon presentation patient was hypoxic requiring 3 L of oxygen.  Home medications: losartan 50 mg p.o. daily, metolazone 5 mg weekly, torsemide 40 mg p.o. daily  Current medications: Newly started Jardiance 10 mg, losartan has been on hold since admission.  Metolazone 5 mg weekly.  Received Lasix 40 mg IV twice daily. Echocardiogram demonstrated ejection fraction 55%.  Labs Serum creatinine up to 1.34 mg deciliter  BUN of 28 and GFR of 39.  From serum creatinine of 1 mg deciliter and a GFR of 58 mL/min upon presentation.  Anemia hemoglobin of 8.2 mg deciliter. December 21, 2023 CT with IV contrast: No signs of pulmonary embolism demonstrated cardiomegaly without pericardial effusion.  Scattered emphysematous changes with small bilateral pleural effusions and mild bibasilar area of infiltrate or atelectasis.    ASSESSMENT AND PLAN:  CHERYL on CKD IIIa:  likely hemodynamically mediated in the setting of diuretics adjustment and vasoactive medications (Jardiance), contrast nephropathy (contrast exposure December 21).  Serum creatinine up to 1.34 mg deciliter BUN of 28 and GFR of 39.  From serum creatinine of 1 mg deciliter and a GFR of 58 mL/min upon presentation.     Volume status: Hypervolemic on presentation improved after IV Lasix     CKD IIIa: Diabetic nephropathy, hypertensive nephrosclerosis  Electrolytes: Managed (with renal diet)  Hypertension: Relative hypotension blood pressure 100 oh 3/60 with a heart rate of 81  Without being on antihypertensive medications  Acid-base: Metabolic alkalosis likely secondary to diuresis  Anemia from renal failure, To check iron panel and start epogen. hemoglobin of 8.2 mg deciliter.  To rule out plasma cell disorder  T2DM on insulin sliding scale  COPD: Emphysematous changes on CT scan requiring 2 L of oxygen  CVA 2019 with residual dizziness  Patient wheelchair dependent  Urinary incontinent: Wearing depends at skilled nursing facility  CHF: Diastolic heart failure treated with IV Lasix likely for acute on chronic  UMA with CPAP intolerance  Paroxysmal A-fib on Eliquis    Plan:  Acute kidney injury likely hemodynamically mediated due to IV diuresis, Jardiance, losartan use (last dose 12/23), hypotension, contrast exposure.  Improving     Volume status improving:   Renal parameters improving serum creatinine down to 1.1 mg deciliter, EGFR 48 mL/min per 1.73 m per  To cont Bumex 2 mg p.o.  daily.  Urine output 1100 ml, using a pure wick    Mild metabolic alkalosis bicarb up to 34, will add acetazolamide for bicarb level more than 40.    Dysuria urine culture pending.     Persistent headache, known history of left-sided trigeminy with intolerance to multiple medications may benefit from neurology evaluation.     Strict input and output guide diuresis management.   Medication reviewed renally dosed  To continue renal diet, 2 g sodium.  No urgent indication for renal placement therapy.      Discharge medication:  To discontinue losartan.   To discontinue torsemide/metolazone.   To continue Jardiance  To cont Bumex 2 mg p.o. daily with extra dose as mentioned above.  To continue CPAP    We will continue to monitor closely with you, thank you!    Medications:    Current Facility-Administered Medications:     acetaminophen (Tylenol) tablet 487.5 mg, 487.5 mg, oral, BID, BREE Randhawa, 487.5 mg at 12/27/23 0910    acetaminophen (Tylenol) tablet 650 mg, 650 mg, oral, q6h PRN, BREE Randhawa, 650 mg at 12/25/23 0934    albuterol 2.5 mg /3 mL (0.083 %) nebulizer solution 2.5 mg, 2.5 mg, nebulization, q4h PRN, BREE Randhawa    apixaban (Eliquis) tablet 5 mg, 5 mg, oral, BID, ESTEBAN Randhawa-CNP, 5 mg at 12/27/23 0909    atorvastatin (Lipitor) tablet 20 mg, 20 mg, oral, Nightly, ESTEBAN Randhawa-CNP, 20 mg at 12/26/23 2221    bumetanide (Bumex) tablet 2 mg, 2 mg, oral, Daily, Mary Solomon MD, 2 mg at 12/27/23 0909    butalbital-acetaminophen-caff -40 mg per tablet 1 tablet, 1 tablet, oral, q4h PRN, Nick Montaño MD    calcium carbonate (Tums) chewable tablet 500 mg, 1 tablet, oral, q4h PRN, BREE Randhawa    cefTRIAXone (Rocephin) IVPB 1 g, 1 g, intravenous, q24h, Aminata Gardner, ESTEBAN-CNP, Stopped at 12/27/23 1343    cyclobenzaprine (Flexeril) tablet 10 mg, 10 mg, oral, BID PRN, Jennifer Manuel, ESTEBAN-CNP    dextrose 10 % in water (D10W)  infusion, 0.3 g/kg/hr, intravenous, Once PRN, ESTEBAN Randhawa-CNP    dextrose 50 % injection 25 g, 25 g, intravenous, q15 min PRN, ESTEBAN Randhawa-CNP    docusate sodium (Colace) capsule 100 mg, 100 mg, oral, BID, ESTEBAN Randhawa-CNP, 100 mg at 12/27/23 0910    empagliflozin (Jardiance) tablet 10 mg, 10 mg, oral, Daily, Steve Sneed MD, 10 mg at 12/27/23 0909    famotidine (Pepcid) tablet 20 mg, 20 mg, oral, Daily, BREE Randhawa, 20 mg at 12/26/23 1600    glucagon (Glucagen) injection 1 mg, 1 mg, intramuscular, q15 min PRN, ESTEBAN Randhawa-CNP    insulin lispro (HumaLOG) injection 0-5 Units, 0-5 Units, subcutaneous, 4x daily, ESTEBAN Randhawa-CNP, 1 Units at 12/23/23 2141    LORazepam (Ativan) tablet 0.25 mg, 0.25 mg, oral, Once, Ayanna Giles PA-C    lubricating eye drops ophthalmic solution 1 drop, 1 drop, Both Eyes, q4h PRN, ESTEBAN Randhawa-CNP, 1 drop at 12/27/23 0911    oxygen (O2) therapy, , inhalation, Continuous PRN - O2/gases, ESTEBAN Randhawa-CNP, 2 L/min at 12/22/23 1005    perflutren lipid microspheres (Definity) injection 0.5-10 mL of dilution, 0.5-10 mL of dilution, intravenous, Once in imaging, ESTEBAN Randhawa-CNP    perflutren protein A microsphere (Optison) injection 0.5 mL, 0.5 mL, intravenous, Once in imaging, Jennifer Manuel APRN-CNP    perflutren protein A microsphere (Optison) injection 0.5 mL, 0.5 mL, intravenous, Once in imaging, Steve Sneed MD    polyethylene glycol (Glycolax, Miralax) packet 17 g, 17 g, oral, Daily, Jennifer Manuel APRN-CNP, 17 g at 12/26/23 0841    sennosides (Senokot) tablet 8.6 mg, 1 tablet, oral, BID, Jennifer Manuel APRN-CNP, 8.6 mg at 12/27/23 0910    sulfur hexafluoride microsphr (Lumason) injection 24.28 mg, 2 mL, intravenous, Once in imaging, ESTEBAN Randhawa-CNP    sulfur hexafluoride microsphr (Lumason) injection 24.28 mg, 2 mL, intravenous, Once in imaging, Steve Sneed MD     traMADol (Ultram) tablet 50 mg, 50 mg, oral, q8h PRN, Nick Montaño MD, 50 mg at 12/26/23 2222    PERTINENT ROS:  GENERAL:  positive for fatigue, poor appetite.  No fever/chills  RESPIRATORY:  positive for shortness of breath.  Negative for cough, wheezing.  CARDIOVASCULAR:   Negative for chest pain or palpitation.  GI:  Negative for abdominal pain, diarrhea, heartburn, nausea, vomiting  : Dysuria    Physical Exam:  Vital signs in last 24 hours:  Temp:  [36 °C (96.8 °F)-36.3 °C (97.3 °F)] 36 °C (96.8 °F)  Heart Rate:  [] 117  Resp:  [16-18] 16  BP: (100-162)/(56-67) 110/56    General: Awake, cooperative, not in acute distress  HEENT:  NCAT,  mucous membranes moist and pink  NECK:  Elevated JVD, no carotid bruit, supple, no cervical mass or thyromegaly  LUNGS;  Diminished breath sounds, fine Rales  CV:  Distant, regular rate and rhythm, no murmurs  ABDOMEN:  abdomen soft, nontender, BS normal, no masses or organomegaly  EDEMA:  +1 lower extremity edema/dependent edema  SKIN:  dry and normal turgor, no clubbing, cyanosis or petechia.  No rashes noted      Intake/Output last 3 shifts:  I/O last 3 completed shifts:  In: 600 (4 mL/kg) [P.O.:600]  Out: 1150 (7.7 mL/kg) [Urine:1150 (0.2 mL/kg/hr)]  Weight: 148.8 kg     DATA:  Diagnotic tests reviewed for Todays visit:  Results from last 7 days   Lab Units 12/27/23  0818   WBC AUTO x10*3/uL 5.9   RBC AUTO x10*6/uL 3.08*   HEMOGLOBIN g/dL 8.7*   HEMATOCRIT % 32.0*       Results from last 7 days   Lab Units 12/27/23  0818 12/22/23  0849 12/21/23  1158   SODIUM mmol/L 141   < > 141   POTASSIUM mmol/L 4.4   < > 4.8   CHLORIDE mmol/L 100   < > 104   CO2 mmol/L 34*   < > 31   BUN mg/dL 23   < > 26*   CREATININE mg/dL 1.12*   < > 1.09*   CALCIUM mg/dL 8.5*   < > 8.7   MAGNESIUM mg/dL 2.16   < > 2.12   BILIRUBIN TOTAL mg/dL  --   --  0.8   ALT U/L  --   --  9   AST U/L  --   --  11    < > = values in this interval not displayed.       Results from last 7 days   Lab Units  12/27/23  1114   COLOR U  Airam*   APPEARANCE U  Hazy*   PH U  6.0   SPEC GRAV UR  1.020   PROTEIN U mg/dL 100 (2+)*   BLOOD UR  MODERATE (2+)*   NITRITE U  NEGATIVE   WBC UR /HPF 11-20*   BACTERIA UR /HPF 4+*     IMAGING: CXR reviewed in  images      SIGNATURE: Mary Solomon MD  Nephrology and Hypertension  [address] [facility]  Office phone: [phone]  FAX: [phone]    This note was partially generated using the Dragon voice recognition system, and there may be some incorrect words, spelling's and punctuation that were not noted in checking the note before saving.

## 2023-12-28 ENCOUNTER — APPOINTMENT (OUTPATIENT)
Dept: CARDIOLOGY | Facility: HOSPITAL | Age: 88
DRG: 242 | End: 2023-12-28
Payer: COMMERCIAL

## 2023-12-28 LAB
ANION GAP SERPL CALC-SCNC: 9 MMOL/L (ref 10–20)
BUN SERPL-MCNC: 25 MG/DL (ref 6–23)
CALCIUM SERPL-MCNC: 8.3 MG/DL (ref 8.6–10.3)
CHLORIDE SERPL-SCNC: 99 MMOL/L (ref 98–107)
CO2 SERPL-SCNC: 37 MMOL/L (ref 21–32)
CREAT SERPL-MCNC: 1.23 MG/DL (ref 0.5–1.05)
ERYTHROCYTE [DISTWIDTH] IN BLOOD BY AUTOMATED COUNT: 16.7 % (ref 11.5–14.5)
GFR SERPL CREATININE-BSD FRML MDRD: 43 ML/MIN/1.73M*2
GLUCOSE BLD MANUAL STRIP-MCNC: 119 MG/DL (ref 74–99)
GLUCOSE BLD MANUAL STRIP-MCNC: 122 MG/DL (ref 74–99)
GLUCOSE BLD MANUAL STRIP-MCNC: 136 MG/DL (ref 74–99)
GLUCOSE BLD MANUAL STRIP-MCNC: 94 MG/DL (ref 74–99)
GLUCOSE SERPL-MCNC: 92 MG/DL (ref 74–99)
HCT VFR BLD AUTO: 31.2 % (ref 36–46)
HGB BLD-MCNC: 8.6 G/DL (ref 12–16)
MAGNESIUM SERPL-MCNC: 2.14 MG/DL (ref 1.6–2.4)
MCH RBC QN AUTO: 28.8 PG (ref 26–34)
MCHC RBC AUTO-ENTMCNC: 27.6 G/DL (ref 32–36)
MCV RBC AUTO: 104 FL (ref 80–100)
NRBC BLD-RTO: 0 /100 WBCS (ref 0–0)
PLATELET # BLD AUTO: 163 X10*3/UL (ref 150–450)
POTASSIUM SERPL-SCNC: 4.3 MMOL/L (ref 3.5–5.3)
RBC # BLD AUTO: 2.99 X10*6/UL (ref 4–5.2)
SODIUM SERPL-SCNC: 141 MMOL/L (ref 136–145)
WBC # BLD AUTO: 5.7 X10*3/UL (ref 4.4–11.3)

## 2023-12-28 PROCEDURE — 93971 EXTREMITY STUDY: CPT

## 2023-12-28 PROCEDURE — 93971 EXTREMITY STUDY: CPT | Performed by: INTERNAL MEDICINE

## 2023-12-28 PROCEDURE — 85027 COMPLETE CBC AUTOMATED: CPT | Performed by: NURSE PRACTITIONER

## 2023-12-28 PROCEDURE — 2500000001 HC RX 250 WO HCPCS SELF ADMINISTERED DRUGS (ALT 637 FOR MEDICARE OP): Performed by: INTERNAL MEDICINE

## 2023-12-28 PROCEDURE — 2500000004 HC RX 250 GENERAL PHARMACY W/ HCPCS (ALT 636 FOR OP/ED): Performed by: NURSE PRACTITIONER

## 2023-12-28 PROCEDURE — 1200000002 HC GENERAL ROOM WITH TELEMETRY DAILY

## 2023-12-28 PROCEDURE — 36415 COLL VENOUS BLD VENIPUNCTURE: CPT | Performed by: NURSE PRACTITIONER

## 2023-12-28 PROCEDURE — 2500000001 HC RX 250 WO HCPCS SELF ADMINISTERED DRUGS (ALT 637 FOR MEDICARE OP): Performed by: NURSE PRACTITIONER

## 2023-12-28 PROCEDURE — 2500000002 HC RX 250 W HCPCS SELF ADMINISTERED DRUGS (ALT 637 FOR MEDICARE OP, ALT 636 FOR OP/ED): Performed by: NURSE PRACTITIONER

## 2023-12-28 PROCEDURE — 80048 BASIC METABOLIC PNL TOTAL CA: CPT | Performed by: NURSE PRACTITIONER

## 2023-12-28 PROCEDURE — 82947 ASSAY GLUCOSE BLOOD QUANT: CPT

## 2023-12-28 PROCEDURE — 94640 AIRWAY INHALATION TREATMENT: CPT

## 2023-12-28 PROCEDURE — 83735 ASSAY OF MAGNESIUM: CPT | Performed by: NURSE PRACTITIONER

## 2023-12-28 RX ORDER — DIGOXIN 125 MCG
62.5 TABLET ORAL DAILY
Status: DISCONTINUED | OUTPATIENT
Start: 2023-12-29 | End: 2023-12-29

## 2023-12-28 RX ORDER — METOPROLOL SUCCINATE 25 MG/1
25 TABLET, EXTENDED RELEASE ORAL DAILY
Status: DISCONTINUED | OUTPATIENT
Start: 2023-12-28 | End: 2023-12-29

## 2023-12-28 RX ADMIN — CALCIUM CARBONATE (ANTACID) CHEW TAB 500 MG 500 MG: 500 CHEW TAB at 20:53

## 2023-12-28 RX ADMIN — BUMETANIDE 2 MG: 1 TABLET ORAL at 09:24

## 2023-12-28 RX ADMIN — FAMOTIDINE 20 MG: 20 TABLET ORAL at 16:49

## 2023-12-28 RX ADMIN — METOPROLOL SUCCINATE 25 MG: 25 TABLET, EXTENDED RELEASE ORAL at 16:49

## 2023-12-28 RX ADMIN — CEFTRIAXONE SODIUM 1 G: 1 INJECTION, SOLUTION INTRAVENOUS at 12:03

## 2023-12-28 RX ADMIN — APIXABAN 5 MG: 5 TABLET, FILM COATED ORAL at 09:24

## 2023-12-28 RX ADMIN — ACETAMINOPHEN 487.5 MG: 325 TABLET ORAL at 09:24

## 2023-12-28 RX ADMIN — ATORVASTATIN CALCIUM 20 MG: 20 TABLET, FILM COATED ORAL at 20:37

## 2023-12-28 RX ADMIN — ALBUTEROL SULFATE 2.5 MG: 2.5 SOLUTION RESPIRATORY (INHALATION) at 17:16

## 2023-12-28 RX ADMIN — APIXABAN 5 MG: 5 TABLET, FILM COATED ORAL at 20:36

## 2023-12-28 RX ADMIN — EMPAGLIFLOZIN 10 MG: 10 TABLET, FILM COATED ORAL at 09:24

## 2023-12-28 RX ADMIN — ACETAMINOPHEN 487.5 MG: 325 TABLET ORAL at 20:37

## 2023-12-28 ASSESSMENT — COGNITIVE AND FUNCTIONAL STATUS - GENERAL
WALKING IN HOSPITAL ROOM: A LOT
DAILY ACTIVITIY SCORE: 14
MOVING FROM LYING ON BACK TO SITTING ON SIDE OF FLAT BED WITH BEDRAILS: A LOT
MOBILITY SCORE: 11
MOVING TO AND FROM BED TO CHAIR: A LOT
DRESSING REGULAR UPPER BODY CLOTHING: A LITTLE
PERSONAL GROOMING: A LITTLE
TURNING FROM BACK TO SIDE WHILE IN FLAT BAD: A LOT
EATING MEALS: A LITTLE
HELP NEEDED FOR BATHING: A LOT
CLIMB 3 TO 5 STEPS WITH RAILING: TOTAL
STANDING UP FROM CHAIR USING ARMS: A LOT
DRESSING REGULAR LOWER BODY CLOTHING: A LOT
TOILETING: TOTAL

## 2023-12-28 ASSESSMENT — PAIN DESCRIPTION - LOCATION: LOCATION: OTHER (COMMENT)

## 2023-12-28 ASSESSMENT — PAIN SCALES - GENERAL
PAINLEVEL_OUTOF10: 9
PAINLEVEL_OUTOF10: 0 - NO PAIN

## 2023-12-28 ASSESSMENT — PAIN - FUNCTIONAL ASSESSMENT: PAIN_FUNCTIONAL_ASSESSMENT: 0-10

## 2023-12-28 NOTE — PROGRESS NOTES
Physical Therapy                 Therapy Communication Note    Patient Name: Fifi Jenkins  MRN: [id number]  Today's Date: 12/28/2023     Discipline: Physical Therapy    Missed Visit Reason: Missed Visit Reason: Patient sleeping    Missed Time: Attempt    Comment: Multiple attempts made to see pt this date. 814, 1147, 1220, 1350.  Pt sleeping at every attempt. Will continue to see per POC.

## 2023-12-28 NOTE — CARE PLAN
The patient's goals for the shift include    Problem: Fall/Injury  Goal: Be free from injury by end of the shift  Outcome: Progressing  Goal: Verbalize understanding of personal risk factors for fall in the hospital  Outcome: Progressing  Goal: Verbalize understanding of risk factor reduction measures to prevent injury from fall in the home  Outcome: Progressing  Goal: Use assistive devices by end of the shift  Outcome: Progressing  Goal: Pace activities to prevent fatigue by end of the shift  Outcome: Progressing     Problem: Heart Failure  Goal: Improved gas exchange this shift  Outcome: Progressing  Goal: Improved urinary output this shift  Outcome: Progressing  Goal: Reduction in peripheral edema within 24 hours  Outcome: Progressing  Goal: Report improvement of dyspnea/breathlessness this shift  Outcome: Progressing  Goal: Weight from fluid excess reduced over 2-3 days, then stabilize  Outcome: Progressing  Goal: Increase self care and/or family involvement in 24 hours  Outcome: Progressing     Problem: Dressings Lower Extremities  Goal: STG - Patient will complete lower body dressing with mod assist with comp strategies PRN  Outcome: Progressing     Problem: Dressing Upper Extremities  Goal: STG - Patient will dress upper body with CGA  Outcome: Progressing     Problem: Skin  Goal: Participates in plan/prevention/treatment measures  Outcome: Progressing  Flowsheets (Taken 12/28/2023 1021)  Participates in plan/prevention/treatment measures: Elevate heels  Goal: Prevent/manage excess moisture  Outcome: Progressing  Flowsheets (Taken 12/28/2023 1021)  Prevent/manage excess moisture:   Cleanse incontinence/protect with barrier cream   Moisturize dry skin   Monitor for/manage infection if present  Goal: Prevent/minimize sheer/friction injuries  Outcome: Progressing  Flowsheets (Taken 12/28/2023 1021)  Prevent/minimize sheer/friction injuries:   Turn/reposition every 2 hours/use positioning/transfer devices   Use  pull sheet   HOB 30 degrees or less  Goal: Promote/optimize nutrition  Outcome: Progressing  Flowsheets (Taken 12/28/2023 1021)  Promote/optimize nutrition:   Consume > 50% meals/supplements   Monitor/record intake including meals     Problem: Respiratory  Goal: Clear secretions with interventions this shift  Outcome: Progressing  Goal: Minimize anxiety/maximize coping throughout shift  Outcome: Progressing  Goal: Minimal/no exertional discomfort or dyspnea this shift  Outcome: Progressing  Goal: No signs of respiratory distress (eg. Use of accessory muscles. Peds grunting)  Outcome: Progressing       The clinical goals for the shift include remain HDS this shift    Pt stable at this time.

## 2023-12-28 NOTE — PROGRESS NOTES
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 dizziness  Presented to [facility] on December 21 for progressive dyspnea sees Marcie Terrazas  Her home meds were losartan metolazone Demadex she was started on Jardiance losartan has been on hold since admission she received twice daily IV Lasix her creatinine rusty to 1.34  When her renal situation stabilizes recommend resuming losartan or considering Entresto based on nephrology status  Will try adding a low-dose of the beta-1 selective agent  She has emphysematous lungs and previous CVA  She appears to have 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  Transient hypotension has limited medical therapy    In addition she has new A-fib RVR and not present in July we will ask EP opinion given her morbid obesity pulmonary status and weight of 147 kg regarding potential consideration of cardioversion attempt she has been compliant on Eliquis    Her CAD status is unknown she will warrant possible consideration of a functional study in the future      Overnight Events:    Improving renal status     Objective Data:  Last Recorded Vitals:  Vitals:    12/28/23 0400 12/28/23 0800 12/28/23 1200 12/28/23 1600   BP: 115/65 145/73 122/55 137/67   BP Location: Right arm Right arm Right arm Right arm   Patient Position: Lying Lying Lying Lying   Pulse: (!) 113 95 100 107   Resp: 18 18 18 18   Temp: 36.2 °C (97.2 °F) 36.3 °C (97.3  °F) 36.4 °C (97.5 °F) 36.1 °C (97 °F)   TempSrc: Temporal Temporal Temporal Temporal   SpO2: 98% 96% 94% 98%   Weight:       Height:           Last Labs:  CBC - 12/28/2023:  7:08 AM  5.7 8.6 163    31.2      CMP - 12/28/2023:  7:08 AM  8.3 6.2 11 --- 0.8   _ 4.1 9 91      PTT - No results in last year.  _   _ _     TROPHS   Date/Time Value Ref Range Status   12/21/2023 01:32 PM 9 0 - 13 ng/L Final   12/21/2023 11:58 AM 8 0 - 13 ng/L Final     BNP   Date/Time Value Ref Range Status   12/21/2023 11:58  0 - 99 pg/mL Final   03/06/2019 04:50  0 - 99 pg/mL Final     Comment:     .  <100 pg/mL - Heart failure unlikely  100-299 pg/mL - Intermediate probability of acute heart  .               failure exacerbation. Correlate with clinical  .               context and patient history.    >=300 pg/mL - Heart Failure likely. Correlate with clinical  .               context and patient history.  BNP testing is performed using different testing   methodology at [facility] than at other   system hospitals. Direct result comparisons should   only be made within the same method.     03/05/2019 08:18  0 - 99 pg/mL Final     Comment:     .  <100 pg/mL - Heart failure unlikely  100-299 pg/mL - Intermediate probability of acute heart  .               failure exacerbation. Correlate with clinical  .               context and patient history.    >=300 pg/mL - Heart Failure likely. Correlate with clinical  .               context and patient history.  BNP testing is performed using different testing   methodology at [facility] than at other   system hospitals. Direct result comparisons should   only be made within the same method.       HGBA1C   Date/Time Value Ref Range Status   10/02/2023 03:06 PM 6.3 4.3 - 5.6 % Final     Comment:     American Diabetes Association guidelines indicate that patients with HgbA1c in the range 5.7-6.4% are at increased risk for development of diabetes, and  intervention by lifestyle modification may be beneficial. HgbA1c greater or equal to 6.5% is considered diagnostic of diabetes.   03/16/2019 05:30 AM 5.6 % Final     Comment:          Diagnosis of Diabetes-Adults   Non-Diabetic: < or = 5.6%   Increased risk for developing diabetes: 5.7-6.4%   Diagnostic of diabetes: > or = 6.5%  .       Monitoring of Diabetes                Age (y)     Therapeutic Goal (%)   Adults:          >18           <7.0   Pediatrics:    13-18           <7.5                   7-12           <8.0                   0- 6            7.5-8.5   American Diabetes Association. Diabetes Care 33(S1), Jan 2010.     02/23/2019 07:30 PM 6.0 % Final     Comment:          Diagnosis of Diabetes-Adults   Non-Diabetic: < or = 5.6%   Increased risk for developing diabetes: 5.7-6.4%   Diagnostic of diabetes: > or = 6.5%  .       Monitoring of Diabetes                Age (y)     Therapeutic Goal (%)   Adults:          >18           <7.0   Pediatrics:    13-18           <7.5                   7-12           <8.0                   0- 6            7.5-8.5   American Diabetes Association. Diabetes Care 33(S1), Jan 2010.       VLDL   Date/Time Value Ref Range Status   08/23/2023 01:25 PM 12 0 - 40 mg/dL Final   02/23/2019 07:30 PM 16 0 - 40 mg/dL Final      Results for orders placed or performed during the hospital encounter of 12/21/23 (from the past 96 hour(s))   POCT GLUCOSE   Result Value Ref Range    POCT Glucose 126 (H) 74 - 99 mg/dL   POCT GLUCOSE   Result Value Ref Range    POCT Glucose 151 (H) 74 - 99 mg/dL   Magnesium   Result Value Ref Range    Magnesium 1.96 1.60 - 2.40 mg/dL   Basic Metabolic Panel   Result Value Ref Range    Glucose 110 (H) 74 - 99 mg/dL    Sodium 142 136 - 145 mmol/L    Potassium 4.0 3.5 - 5.3 mmol/L    Chloride 102 98 - 107 mmol/L    Bicarbonate 34 (H) 21 - 32 mmol/L    Anion Gap 10 10 - 20 mmol/L    Urea Nitrogen 24 (H) 6 - 23 mg/dL    Creatinine 1.15 (H) 0.50 - 1.05 mg/dL    eGFR 46  (L) >60 mL/min/1.73m*2    Calcium 8.2 (L) 8.6 - 10.3 mg/dL   CBC   Result Value Ref Range    WBC 6.1 4.4 - 11.3 x10*3/uL    nRBC 0.0 0.0 - 0.0 /100 WBCs    RBC 2.93 (L) 4.00 - 5.20 x10*6/uL    Hemoglobin 8.3 (L) 12.0 - 16.0 g/dL    Hematocrit 30.0 (L) 36.0 - 46.0 %     (H) 80 - 100 fL    MCH 28.3 26.0 - 34.0 pg    MCHC 27.7 (L) 32.0 - 36.0 g/dL    RDW 16.8 (H) 11.5 - 14.5 %    Platelets 179 150 - 450 x10*3/uL   POCT GLUCOSE   Result Value Ref Range    POCT Glucose 113 (H) 74 - 99 mg/dL   POCT GLUCOSE   Result Value Ref Range    POCT Glucose 110 (H) 74 - 99 mg/dL   POCT GLUCOSE   Result Value Ref Range    POCT Glucose 122 (H) 74 - 99 mg/dL   POCT GLUCOSE   Result Value Ref Range    POCT Glucose 149 (H) 74 - 99 mg/dL   Magnesium   Result Value Ref Range    Magnesium 2.09 1.60 - 2.40 mg/dL   CBC   Result Value Ref Range    WBC 6.9 4.4 - 11.3 x10*3/uL    nRBC 0.0 0.0 - 0.0 /100 WBCs    RBC 3.07 (L) 4.00 - 5.20 x10*6/uL    Hemoglobin 8.7 (L) 12.0 - 16.0 g/dL    Hematocrit 31.4 (L) 36.0 - 46.0 %     (H) 80 - 100 fL    MCH 28.3 26.0 - 34.0 pg    MCHC 27.7 (L) 32.0 - 36.0 g/dL    RDW 17.0 (H) 11.5 - 14.5 %    Platelets 169 150 - 450 x10*3/uL   Basic Metabolic Panel   Result Value Ref Range    Glucose 113 (H) 74 - 99 mg/dL    Sodium 143 136 - 145 mmol/L    Potassium 4.3 3.5 - 5.3 mmol/L    Chloride 101 98 - 107 mmol/L    Bicarbonate 35 (H) 21 - 32 mmol/L    Anion Gap 11 10 - 20 mmol/L    Urea Nitrogen 21 6 - 23 mg/dL    Creatinine 1.10 (H) 0.50 - 1.05 mg/dL    eGFR 49 (L) >60 mL/min/1.73m*2    Calcium 8.4 (L) 8.6 - 10.3 mg/dL   Sedimentation rate, automated   Result Value Ref Range    Sedimentation Rate 21 0 - 30 mm/h   C-reactive protein   Result Value Ref Range    C-Reactive Protein 0.24 <1.00 mg/dL   POCT GLUCOSE   Result Value Ref Range    POCT Glucose 128 (H) 74 - 99 mg/dL   POCT GLUCOSE   Result Value Ref Range    POCT Glucose 140 (H) 74 - 99 mg/dL   POCT GLUCOSE   Result Value Ref Range    POCT Glucose  131 (H) 74 - 99 mg/dL   POCT GLUCOSE   Result Value Ref Range    POCT Glucose 119 (H) 74 - 99 mg/dL   POCT GLUCOSE   Result Value Ref Range    POCT Glucose 99 74 - 99 mg/dL   Magnesium   Result Value Ref Range    Magnesium 2.16 1.60 - 2.40 mg/dL   CBC   Result Value Ref Range    WBC 5.9 4.4 - 11.3 x10*3/uL    nRBC 0.0 0.0 - 0.0 /100 WBCs    RBC 3.08 (L) 4.00 - 5.20 x10*6/uL    Hemoglobin 8.7 (L) 12.0 - 16.0 g/dL    Hematocrit 32.0 (L) 36.0 - 46.0 %     (H) 80 - 100 fL    MCH 28.2 26.0 - 34.0 pg    MCHC 27.2 (L) 32.0 - 36.0 g/dL    RDW 16.6 (H) 11.5 - 14.5 %    Platelets 179 150 - 450 x10*3/uL   Basic Metabolic Panel   Result Value Ref Range    Glucose 103 (H) 74 - 99 mg/dL    Sodium 141 136 - 145 mmol/L    Potassium 4.4 3.5 - 5.3 mmol/L    Chloride 100 98 - 107 mmol/L    Bicarbonate 34 (H) 21 - 32 mmol/L    Anion Gap 11 10 - 20 mmol/L    Urea Nitrogen 23 6 - 23 mg/dL    Creatinine 1.12 (H) 0.50 - 1.05 mg/dL    eGFR 48 (L) >60 mL/min/1.73m*2    Calcium 8.5 (L) 8.6 - 10.3 mg/dL   Urinalysis with Reflex Culture and Microscopic   Result Value Ref Range    Color, Urine [place] (N) Straw, Yellow    Appearance, Urine Hazy (N) Clear    Specific Gravity, Urine 1.020 1.005 - 1.035    pH, Urine 6.0 5.0, 5.5, 6.0, 6.5, 7.0, 7.5, 8.0    Protein, Urine 100 (2+) (N) NEGATIVE mg/dL    Glucose, Urine >=500 (3+) (A) NEGATIVE mg/dL    Blood, Urine MODERATE (2+) (A) NEGATIVE    Ketones, Urine NEGATIVE NEGATIVE mg/dL    Bilirubin, Urine NEGATIVE NEGATIVE    Urobilinogen, Urine 4.0 (N) <2.0 mg/dL    Nitrite, Urine NEGATIVE NEGATIVE    Leukocyte Esterase, Urine LARGE (3+) (A) NEGATIVE   Extra Urine Gray Tube   Result Value Ref Range    Extra Tube Hold for add-ons.    Microscopic Only, Urine   Result Value Ref Range    WBC, Urine 11-20 (A) 1-5, NONE /HPF    RBC, Urine >20 (A) NONE, 1-2, 3-5 /HPF    Squamous Epithelial Cells, Urine 1-9 (SPARSE) Reference range not established. /HPF    Bacteria, Urine 4+ (A) NONE SEEN /HPF   Urine  Culture    Specimen: Clean Catch/Voided; Urine   Result Value Ref Range    Urine Culture >100,000 Gram Negative Bacilli (A)    POCT GLUCOSE   Result Value Ref Range    POCT Glucose 125 (H) 74 - 99 mg/dL   POCT GLUCOSE   Result Value Ref Range    POCT Glucose 105 (H) 74 - 99 mg/dL   POCT GLUCOSE   Result Value Ref Range    POCT Glucose 123 (H) 74 - 99 mg/dL   Magnesium   Result Value Ref Range    Magnesium 2.14 1.60 - 2.40 mg/dL   CBC   Result Value Ref Range    WBC 5.7 4.4 - 11.3 x10*3/uL    nRBC 0.0 0.0 - 0.0 /100 WBCs    RBC 2.99 (L) 4.00 - 5.20 x10*6/uL    Hemoglobin 8.6 (L) 12.0 - 16.0 g/dL    Hematocrit 31.2 (L) 36.0 - 46.0 %     (H) 80 - 100 fL    MCH 28.8 26.0 - 34.0 pg    MCHC 27.6 (L) 32.0 - 36.0 g/dL    RDW 16.7 (H) 11.5 - 14.5 %    Platelets 163 150 - 450 x10*3/uL   Basic Metabolic Panel   Result Value Ref Range    Glucose 92 74 - 99 mg/dL    Sodium 141 136 - 145 mmol/L    Potassium 4.3 3.5 - 5.3 mmol/L    Chloride 99 98 - 107 mmol/L    Bicarbonate 37 (H) 21 - 32 mmol/L    Anion Gap 9 (L) 10 - 20 mmol/L    Urea Nitrogen 25 (H) 6 - 23 mg/dL    Creatinine 1.23 (H) 0.50 - 1.05 mg/dL    eGFR 43 (L) >60 mL/min/1.73m*2    Calcium 8.3 (L) 8.6 - 10.3 mg/dL   POCT GLUCOSE   Result Value Ref Range    POCT Glucose 94 74 - 99 mg/dL   POCT GLUCOSE   Result Value Ref Range    POCT Glucose 119 (H) 74 - 99 mg/dL        Last I/O:  I/O last 3 completed shifts:  In: 170 (1.1 mL/kg) [P.O.:120; IV Piggyback:50]  Out: 750 (5 mL/kg) [Urine:750 (0.1 mL/kg/hr)]  Weight: 148.8 kg     Past Cardiology Tests (Last 3 Years):  EKG:  ECG 12 lead 12/21/2023 (Preliminary)      ECG 12 lead 12/21/2023 (Preliminary)      ECG 12 lead 12/21/2023    Echo:  Transthoracic Echo (TTE) Complete 12/24/2023    Ejection Fractions:  EF   Date/Time Value Ref Range Status   12/24/2023 02:23 PM 33       Cath:  No results found for this or any previous visit from the past 1095 days.    Stress Test:  No results found for this or any previous visit  from the past 1095 days.    Cardiac Imaging:  No results found for this or any previous visit from the past 1095 days.      Inpatient Medications:  Scheduled medications   Medication Dose Route Frequency    acetaminophen  487.5 mg oral BID    apixaban  5 mg oral BID    atorvastatin  20 mg oral Nightly    bumetanide  2 mg oral Daily    cefTRIAXone  1 g intravenous q24h    docusate sodium  100 mg oral BID    empagliflozin  10 mg oral Daily    famotidine  20 mg oral Daily    insulin lispro  0-5 Units subcutaneous 4x daily    LORazepam  0.25 mg oral Once    perflutren lipid microspheres  0.5-10 mL of dilution intravenous Once in imaging    perflutren protein A microsphere  0.5 mL intravenous Once in imaging    perflutren protein A microsphere  0.5 mL intravenous Once in imaging    polyethylene glycol  17 g oral Daily    sennosides  1 tablet oral BID    sulfur hexafluoride microsphr  2 mL intravenous Once in imaging    sulfur hexafluoride microsphr  2 mL intravenous Once in imaging     PRN medications   Medication    acetaminophen    albuterol    butalbital-acetaminophen-caff    calcium carbonate    cyclobenzaprine    dextrose 10 % in water (D10W)    dextrose    glucagon    lubricating eye drops    oxygen    traMADol     Continuous Medications   Medication Dose Last Rate       Physical Exam:  Subjective:   Examination:   General Examination:   General Appearance: Well developed, well nourished, in no acute distress.  Morbidly obese  Head: normocephalic, atraumatic   Eyes: Anicteric sclera. Pupils are equally round and reactive to light.  Extraocular movements are intact.    Ears: normal   Oral: Cavity: mucosa moist.   Throat: clear.   Neck/Thyroid: neck supple, full range of motion, no cervical lymphadenopathy.   Skin: warm and dry, no suspicious lesions.    Heart: regular rate and rhythm, S1, S2 normal, no murmurs.   Lungs: clear to auscultation bilaterally.   Abdomen: soft, non-tender, non-distended, bowl sound  present, normal.   Extremities: no clubbing, no cyanosis, 2+  Neuro: non-focal, motor strength normal upper lower extremities, sensory exam intact.  Past stroke     Assessment/Plan   I48.91 Atrial fibrillation not present prior to this admission consideration of EP opinion regarding cardioversion, paroxysmal A-fib in the past on Eliquis with CVA in 2019  I42.9 HFrEF previously HFpEF followed by Dr. Terrazas echo done in the setting of irregular A-fib with tachycardia suggest drop in ejection fraction to 33 reestablish function if sinus rhythm restored  N18.3 Losartan was held Jardiance added with diuresis and followed by nephrology restart either losartan or consideration of Entresto if blood pressure and renal function stabilized for now add very low-dose beta-1 selective agent to try to better control her heart rate  E66.2 obesity bmi of 55  J44.9 emphysematous lungs    Code Status:  DNR and No Intubation    I spent 40 minutes in the professional and overall care of this patient.        Steve Sneed MD

## 2023-12-28 NOTE — PROGRESS NOTES
Internal Medicine Progress Note    Patient Name: Fifi Jenkins          MRN: [id number]  Today's Date: December 28, 2023          Attending: Nick Montaño MD    Subjective:  Patient was seen and examined at bedside she complains of severe left-sided headache.    Review Of Systems:  GENERAL: Generalized weakness  CARDIOVASCULAR: Negative for chest pain  RESPIRATORY: Improving shortness of breath  GI: No nausea, vomiting, or diarrhea    Objective:  Vitals:    12/28/23 0000 12/28/23 0400 12/28/23 0800 12/28/23 1200   BP: 114/68 115/65 145/73 122/55   BP Location: Right arm Right arm Right arm Right arm   Patient Position: Lying Lying Lying Lying   Pulse: 84 (!) 113 95 100   Resp: 18 18 18 18   Temp: 36.1 °C (97 °F) 36.2 °C (97.2 °F) 36.3 °C (97.3 °F) 36.4 °C (97.5 °F)   TempSrc: Temporal Temporal Temporal Temporal   SpO2: 98% 98% 96% 94%   Weight:       Height:               Physical Exam:   General appearance: Alert, in no acute distress  Lungs: diminished breath sounds  Heart: RRR without murmur  Abdomen: Soft, non-tender.   Neuro: Alert oriented x3, no focal deficit.    Labs:  Results for orders placed or performed during the hospital encounter of 12/21/23 (from the past 24 hour(s))   POCT GLUCOSE   Result Value Ref Range    POCT Glucose 105 (H) 74 - 99 mg/dL   POCT GLUCOSE   Result Value Ref Range    POCT Glucose 123 (H) 74 - 99 mg/dL   Magnesium   Result Value Ref Range    Magnesium 2.14 1.60 - 2.40 mg/dL   CBC   Result Value Ref Range    WBC 5.7 4.4 - 11.3 x10*3/uL    nRBC 0.0 0.0 - 0.0 /100 WBCs    RBC 2.99 (L) 4.00 - 5.20 x10*6/uL    Hemoglobin 8.6 (L) 12.0 - 16.0 g/dL    Hematocrit 31.2 (L) 36.0 - 46.0 %     (H) 80 - 100 fL    MCH 28.8 26.0 - 34.0 pg    MCHC 27.6 (L) 32.0 - 36.0 g/dL    RDW 16.7 (H) 11.5 - 14.5 %    Platelets 163 150 - 450 x10*3/uL   Basic Metabolic Panel   Result Value Ref Range    Glucose 92 74 - 99 mg/dL    Sodium 141 136 - 145 mmol/L    Potassium 4.3 3.5 - 5.3 mmol/L    Chloride 99 98  - 107 mmol/L    Bicarbonate 37 (H) 21 - 32 mmol/L    Anion Gap 9 (L) 10 - 20 mmol/L    Urea Nitrogen 25 (H) 6 - 23 mg/dL    Creatinine 1.23 (H) 0.50 - 1.05 mg/dL    eGFR 43 (L) >60 mL/min/1.73m*2    Calcium 8.3 (L) 8.6 - 10.3 mg/dL   POCT GLUCOSE   Result Value Ref Range    POCT Glucose 94 74 - 99 mg/dL   POCT GLUCOSE   Result Value Ref Range    POCT Glucose 119 (H) 74 - 99 mg/dL       Medications:  Scheduled medications  acetaminophen, 487.5 mg, oral, BID  apixaban, 5 mg, oral, BID  atorvastatin, 20 mg, oral, Nightly  bumetanide, 2 mg, oral, Daily  cefTRIAXone, 1 g, intravenous, q24h  docusate sodium, 100 mg, oral, BID  empagliflozin, 10 mg, oral, Daily  famotidine, 20 mg, oral, Daily  insulin lispro, 0-5 Units, subcutaneous, 4x daily  LORazepam, 0.25 mg, oral, Once  perflutren lipid microspheres, 0.5-10 mL of dilution, intravenous, Once in imaging  perflutren protein A microsphere, 0.5 mL, intravenous, Once in imaging  perflutren protein A microsphere, 0.5 mL, intravenous, Once in imaging  polyethylene glycol, 17 g, oral, Daily  sennosides, 1 tablet, oral, BID  sulfur hexafluoride microsphr, 2 mL, intravenous, Once in imaging  sulfur hexafluoride microsphr, 2 mL, intravenous, Once in imaging      Continuous medications     PRN medications  PRN medications: acetaminophen, albuterol, butalbital-acetaminophen-caff, calcium carbonate, cyclobenzaprine, dextrose 10 % in water (D10W), dextrose, glucagon, lubricating eye drops, oxygen, traMADol      Assessment/Plan:  Medical Problems       Problem List       * (Principal) Acute on chronic heart failure with reducedejection fraction (CMS/HCC)         Patient has systolic dysfunction  Continue Metolazone and Jardiance   Cardiology is following         Paroxysmal atrial fibrillation (CMS/HCC)         Continue current medications         Hyperlipidemia         Continue atorvastatin         Acute respiratory failure with hypoxia (CMS/HCC)         Continue treating heart  "failure and oxygen supplement         Stage 3 chronic kidney disease (CMS/[facility])         Continue monitoring  Nephrology is following         Type 2 diabetes mellitus with chronic kidney disease, without long-term current use of insulin (CMS/[facility])         Sliding scall insulin     Generalized weakness  PT/OT          Discussed with patient, RN    Nick Montaño MD   Date: 12/28/23  Time: 12:36 PM    This note was partially created using voice recognition software and is inherently subject to errors including those of syntax and \"sound-alike\" substitutions which may escape proofreading. In such instances, original meaning may be extrapolated by contextual derivation  "

## 2023-12-28 NOTE — CARE PLAN
The patient's goals for the shift include      The clinical goals for the shift include Pt will be free from falls and injury throughout this shift      Problem: Fall/Injury  Goal: Be free from injury by end of the shift  Outcome: Progressing  Goal: Verbalize understanding of personal risk factors for fall in the hospital  Outcome: Progressing  Goal: Verbalize understanding of risk factor reduction measures to prevent injury from fall in the home  Outcome: Progressing  Goal: Use assistive devices by end of the shift  Outcome: Progressing  Goal: Pace activities to prevent fatigue by end of the shift  Outcome: Progressing     Problem: Heart Failure  Goal: Improved gas exchange this shift  Outcome: Progressing  Goal: Improved urinary output this shift  Outcome: Progressing  Goal: Reduction in peripheral edema within 24 hours  Outcome: Progressing  Goal: Report improvement of dyspnea/breathlessness this shift  Outcome: Progressing  Goal: Weight from fluid excess reduced over 2-3 days, then stabilize  Outcome: Progressing  Goal: Increase self care and/or family involvement in 24 hours  Outcome: Progressing     Problem: Skin  Goal: Participates in plan/prevention/treatment measures  Outcome: Progressing  Goal: Prevent/manage excess moisture  Outcome: Progressing  Flowsheets (Taken 12/28/2023 0620)  Prevent/manage excess moisture:   Cleanse incontinence/protect with barrier cream   Moisturize dry skin   Monitor for/manage infection if present  Goal: Prevent/minimize sheer/friction injuries  Outcome: Progressing  Goal: Promote/optimize nutrition  Outcome: Progressing     Problem: Respiratory  Goal: Clear secretions with interventions this shift  Outcome: Progressing  Goal: Minimize anxiety/maximize coping throughout shift  Outcome: Progressing  Goal: Minimal/no exertional discomfort or dyspnea this shift  Outcome: Progressing  Goal: No signs of respiratory distress (eg. Use of accessory muscles. Peds grunting)  Outcome:  Progressing

## 2023-12-29 ENCOUNTER — APPOINTMENT (OUTPATIENT)
Dept: CARDIOLOGY | Facility: HOSPITAL | Age: 88
DRG: 242 | End: 2023-12-29
Payer: COMMERCIAL

## 2023-12-29 PROBLEM — R06.81 APNEA: Status: ACTIVE | Noted: 2023-12-29

## 2023-12-29 LAB
ANION GAP SERPL CALC-SCNC: 12 MMOL/L (ref 10–20)
BACTERIA UR CULT: ABNORMAL
BUN SERPL-MCNC: 32 MG/DL (ref 6–23)
CALCIUM SERPL-MCNC: 8.3 MG/DL (ref 8.6–10.3)
CHLORIDE SERPL-SCNC: 97 MMOL/L (ref 98–107)
CO2 SERPL-SCNC: 33 MMOL/L (ref 21–32)
CREAT SERPL-MCNC: 1.29 MG/DL (ref 0.5–1.05)
EJECTION FRACTION APICAL 4 CHAMBER: 34.3
EJECTION FRACTION: 37
ERYTHROCYTE [DISTWIDTH] IN BLOOD BY AUTOMATED COUNT: 16.7 % (ref 11.5–14.5)
GFR SERPL CREATININE-BSD FRML MDRD: 40 ML/MIN/1.73M*2
GLUCOSE BLD MANUAL STRIP-MCNC: 128 MG/DL (ref 74–99)
GLUCOSE BLD MANUAL STRIP-MCNC: 129 MG/DL (ref 74–99)
GLUCOSE BLD MANUAL STRIP-MCNC: 144 MG/DL (ref 74–99)
GLUCOSE BLD MANUAL STRIP-MCNC: 96 MG/DL (ref 74–99)
GLUCOSE SERPL-MCNC: 132 MG/DL (ref 74–99)
HCT VFR BLD AUTO: 32.7 % (ref 36–46)
HGB BLD-MCNC: 9 G/DL (ref 12–16)
LEFT VENTRICLE INTERNAL DIMENSION DIASTOLE: 5.4 (ref 3.5–6)
MAGNESIUM SERPL-MCNC: 1.98 MG/DL (ref 1.6–2.4)
MCH RBC QN AUTO: 28.1 PG (ref 26–34)
MCHC RBC AUTO-ENTMCNC: 27.5 G/DL (ref 32–36)
MCV RBC AUTO: 102 FL (ref 80–100)
NRBC BLD-RTO: 0 /100 WBCS (ref 0–0)
PLATELET # BLD AUTO: 163 X10*3/UL (ref 150–450)
POTASSIUM SERPL-SCNC: 4.2 MMOL/L (ref 3.5–5.3)
RBC # BLD AUTO: 3.2 X10*6/UL (ref 4–5.2)
SODIUM SERPL-SCNC: 138 MMOL/L (ref 136–145)
WBC # BLD AUTO: 7 X10*3/UL (ref 4.4–11.3)

## 2023-12-29 PROCEDURE — 2500000004 HC RX 250 GENERAL PHARMACY W/ HCPCS (ALT 636 FOR OP/ED): Performed by: NURSE PRACTITIONER

## 2023-12-29 PROCEDURE — 2500000004 HC RX 250 GENERAL PHARMACY W/ HCPCS (ALT 636 FOR OP/ED): Performed by: INTERNAL MEDICINE

## 2023-12-29 PROCEDURE — 7100000002 HC RECOVERY ROOM TIME - EACH INCREMENTAL 1 MINUTE

## 2023-12-29 PROCEDURE — 2500000001 HC RX 250 WO HCPCS SELF ADMINISTERED DRUGS (ALT 637 FOR MEDICARE OP): Performed by: INTERNAL MEDICINE

## 2023-12-29 PROCEDURE — 92960 CARDIOVERSION ELECTRIC EXT: CPT | Performed by: INTERNAL MEDICINE

## 2023-12-29 PROCEDURE — 99222 1ST HOSP IP/OBS MODERATE 55: CPT | Performed by: INTERNAL MEDICINE

## 2023-12-29 PROCEDURE — 7100000010 HC PHASE TWO TIME - EACH INCREMENTAL 1 MINUTE

## 2023-12-29 PROCEDURE — 83735 ASSAY OF MAGNESIUM: CPT | Performed by: NURSE PRACTITIONER

## 2023-12-29 PROCEDURE — 5A2204Z RESTORATION OF CARDIAC RHYTHM, SINGLE: ICD-10-PCS | Performed by: INTERNAL MEDICINE

## 2023-12-29 PROCEDURE — 2060000001 HC INTERMEDIATE ICU ROOM DAILY

## 2023-12-29 PROCEDURE — 2500000001 HC RX 250 WO HCPCS SELF ADMINISTERED DRUGS (ALT 637 FOR MEDICARE OP): Performed by: NURSE PRACTITIONER

## 2023-12-29 PROCEDURE — 82947 ASSAY GLUCOSE BLOOD QUANT: CPT | Mod: 91

## 2023-12-29 PROCEDURE — 36415 COLL VENOUS BLD VENIPUNCTURE: CPT | Performed by: NURSE PRACTITIONER

## 2023-12-29 PROCEDURE — 92960 CARDIOVERSION ELECTRIC EXT: CPT

## 2023-12-29 PROCEDURE — 3700000012 HC SEDATION LEVEL 5+ TIME - INITIAL 15 MINUTES 5/> YEARS

## 2023-12-29 PROCEDURE — 7100000001 HC RECOVERY ROOM TIME - INITIAL BASE CHARGE

## 2023-12-29 PROCEDURE — 93308 TTE F-UP OR LMTD: CPT

## 2023-12-29 PROCEDURE — 85027 COMPLETE CBC AUTOMATED: CPT | Performed by: NURSE PRACTITIONER

## 2023-12-29 PROCEDURE — 80048 BASIC METABOLIC PNL TOTAL CA: CPT | Performed by: NURSE PRACTITIONER

## 2023-12-29 PROCEDURE — 96374 THER/PROPH/DIAG INJ IV PUSH: CPT

## 2023-12-29 PROCEDURE — 7100000009 HC PHASE TWO TIME - INITIAL BASE CHARGE

## 2023-12-29 RX ORDER — METOPROLOL SUCCINATE 25 MG/1
25 TABLET, EXTENDED RELEASE ORAL 2 TIMES DAILY
Status: DISCONTINUED | OUTPATIENT
Start: 2023-12-29 | End: 2023-12-29

## 2023-12-29 RX ORDER — PROPOFOL 10 MG/ML
INJECTION, EMULSION INTRAVENOUS AS NEEDED
Status: DISCONTINUED | OUTPATIENT
Start: 2023-12-29 | End: 2024-01-09 | Stop reason: HOSPADM

## 2023-12-29 RX ADMIN — METOPROLOL SUCCINATE 25 MG: 25 TABLET, EXTENDED RELEASE ORAL at 08:38

## 2023-12-29 RX ADMIN — CEFTRIAXONE SODIUM 1 G: 1 INJECTION, SOLUTION INTRAVENOUS at 11:43

## 2023-12-29 RX ADMIN — SENNOSIDES 8.6 MG: 8.6 TABLET, FILM COATED ORAL at 08:42

## 2023-12-29 RX ADMIN — PERFLUTREN 10 ML OF DILUTION: 6.52 INJECTION, SUSPENSION INTRAVENOUS at 14:45

## 2023-12-29 RX ADMIN — SENNOSIDES 8.6 MG: 8.6 TABLET, FILM COATED ORAL at 22:35

## 2023-12-29 RX ADMIN — ACETAMINOPHEN 487.5 MG: 325 TABLET ORAL at 08:38

## 2023-12-29 RX ADMIN — POLYETHYLENE GLYCOL 3350 17 G: 17 POWDER, FOR SOLUTION ORAL at 08:37

## 2023-12-29 RX ADMIN — APIXABAN 5 MG: 5 TABLET, FILM COATED ORAL at 22:36

## 2023-12-29 RX ADMIN — EMPAGLIFLOZIN 10 MG: 10 TABLET, FILM COATED ORAL at 08:38

## 2023-12-29 RX ADMIN — DOCUSATE SODIUM 100 MG: 100 CAPSULE, LIQUID FILLED ORAL at 22:35

## 2023-12-29 RX ADMIN — ATORVASTATIN CALCIUM 20 MG: 20 TABLET, FILM COATED ORAL at 22:36

## 2023-12-29 RX ADMIN — DOCUSATE SODIUM 100 MG: 100 CAPSULE, LIQUID FILLED ORAL at 08:38

## 2023-12-29 RX ADMIN — DIGOXIN 62.5 MCG: 125 TABLET ORAL at 11:42

## 2023-12-29 RX ADMIN — PROPOFOL 60 MG: 10 INJECTION, EMULSION INTRAVENOUS at 13:43

## 2023-12-29 RX ADMIN — ACETAMINOPHEN 487.5 MG: 325 TABLET ORAL at 22:35

## 2023-12-29 RX ADMIN — BUMETANIDE 2 MG: 1 TABLET ORAL at 08:37

## 2023-12-29 RX ADMIN — APIXABAN 5 MG: 5 TABLET, FILM COATED ORAL at 08:38

## 2023-12-29 ASSESSMENT — PAIN SCALES - GENERAL
PAINLEVEL_OUTOF10: 0 - NO PAIN
PAINLEVEL_OUTOF10: 5 - MODERATE PAIN
PAINLEVEL_OUTOF10: 0 - NO PAIN

## 2023-12-29 ASSESSMENT — PAIN - FUNCTIONAL ASSESSMENT
PAIN_FUNCTIONAL_ASSESSMENT: 0-10

## 2023-12-29 NOTE — PROGRESS NOTES
Internal Medicine Progress Note    Patient Name: Fifi Jenkins          MRN: [id number]  Today's Date: December 29, 2023          Attending: Nick Montaño MD    Subjective:  Patient was seen and examined at bedside she underwent cardioversion and she had cardiac problems afterward, patient was transferred to stepdown unit.  S  Review Of Systems:  GENERAL: Generalized weakness  CARDIOVASCULAR: Negative for chest pain  GI: No nausea, vomiting, or diarrhea    Objective:  Vitals:    12/29/23 1445 12/29/23 1515 12/29/23 1547 12/29/23 1802   BP: 98/58 113/60 93/56 134/68   BP Location: Right arm Right arm Right arm Right arm   Patient Position:    Lying   Pulse: (!) 47 50 (!) 47 54   Resp: 12 13 14 16   Temp:       TempSrc:       SpO2: 99% 100% 99% 96%   Weight:       Height:           Physical Exam:   General appearance: Alert, in no acute distress  Lungs: diminished breath sounds  Heart: RRR without murmur  Abdomen: Soft, non-tender  Neuro: Alert oriented x3, no focal deficit.    Labs:  Results for orders placed or performed during the hospital encounter of 12/21/23 (from the past 24 hour(s))   POCT GLUCOSE   Result Value Ref Range    POCT Glucose 136 (H) 74 - 99 mg/dL   Magnesium   Result Value Ref Range    Magnesium 1.98 1.60 - 2.40 mg/dL   CBC   Result Value Ref Range    WBC 7.0 4.4 - 11.3 x10*3/uL    nRBC 0.0 0.0 - 0.0 /100 WBCs    RBC 3.20 (L) 4.00 - 5.20 x10*6/uL    Hemoglobin 9.0 (L) 12.0 - 16.0 g/dL    Hematocrit 32.7 (L) 36.0 - 46.0 %     (H) 80 - 100 fL    MCH 28.1 26.0 - 34.0 pg    MCHC 27.5 (L) 32.0 - 36.0 g/dL    RDW 16.7 (H) 11.5 - 14.5 %    Platelets 163 150 - 450 x10*3/uL   Basic Metabolic Panel   Result Value Ref Range    Glucose 132 (H) 74 - 99 mg/dL    Sodium 138 136 - 145 mmol/L    Potassium 4.2 3.5 - 5.3 mmol/L    Chloride 97 (L) 98 - 107 mmol/L    Bicarbonate 33 (H) 21 - 32 mmol/L    Anion Gap 12 10 - 20 mmol/L    Urea Nitrogen 32 (H) 6 - 23 mg/dL    Creatinine 1.29 (H) 0.50 - 1.05 mg/dL     eGFR 40 (L) >60 mL/min/1.73m*2    Calcium 8.3 (L) 8.6 - 10.3 mg/dL   POCT GLUCOSE   Result Value Ref Range    POCT Glucose 129 (H) 74 - 99 mg/dL   POCT GLUCOSE   Result Value Ref Range    POCT Glucose 128 (H) 74 - 99 mg/dL   Transthoracic Echo (TTE) Limited   Result Value Ref Range    LV biplane EF 37     LVIDd 5.40     LV A4C EF 34.3    POCT GLUCOSE   Result Value Ref Range    POCT Glucose 96 74 - 99 mg/dL       Medications:  Scheduled medications  acetaminophen, 487.5 mg, oral, BID  apixaban, 5 mg, oral, BID  atorvastatin, 20 mg, oral, Nightly  bumetanide, 2 mg, oral, Daily  cefTRIAXone, 1 g, intravenous, q24h  digoxin, 62.5 mcg, oral, Daily  docusate sodium, 100 mg, oral, BID  empagliflozin, 10 mg, oral, Daily  famotidine, 20 mg, oral, Daily  insulin lispro, 0-5 Units, subcutaneous, 4x daily  LORazepam, 0.25 mg, oral, Once  metoprolol succinate XL, 25 mg, oral, BID  polyethylene glycol, 17 g, oral, Daily  sennosides, 1 tablet, oral, BID      Continuous medications     PRN medications  PRN medications: acetaminophen, albuterol, butalbital-acetaminophen-caff, calcium carbonate, cyclobenzaprine, dextrose 10 % in water (D10W), dextrose, glucagon, lubricating eye drops, oxygen, propofol, traMADol      Assessment/Plan:  Medical Problems       Problem List       * (Principal) Acute on chronic heart failure with reducedejection fraction (CMS/HCC)    Patient was started on Bumex  Continue Jardiance      Paroxysmal atrial fibrillation (CMS/[facility])     S/P cardioversion  Continue Eliquis  Cardiology is following         Hyperlipidemia     Continue atorvastatin         Acute respiratory failure with hypoxia (CMS/[facility])     Continue treating heart failure and oxygen supplement         Stage 3 chronic kidney disease (CMS/[facility])     Continue monitoring  Nephrology is following         Type 2 diabetes mellitus with chronic kidney disease, without long-term current use of insulin (CMS/HCC)     Sliding scall insulin     Generalized  "weakness  PT/OT          Discussed with patient, RN    Nick Montaño MD   Date: 12/29/23  Time: 6:49 PM    This note was partially created using voice recognition software and is inherently subject to errors including those of syntax and \"sound-alike\" substitutions which may escape proofreading. In such instances, original meaning may be extrapolated by contextual derivation  "

## 2023-12-29 NOTE — PROGRESS NOTES
INPATIENT NEPHROLOGY CONSULT PROGRESS NOTE      Patient Name: Fifi Jenkins MRN: [id number]  DATE of SERVICE: December 28, 2023  TIME of SERVICE: 02:47 PM  CONSULTING SERVICE: Nephrology    REASON for CONSULT: Acute kidney injury    SUBJECTIVE:  Seen and evaluated at bedside.   Continues to endorse left frontal headache however improved compared to yesterday.    Blood pressure better today 130/60.  Safe to start Entresto.    Tolerating bumex 2 mg po daily.   Tolerating jardiance 10 mg      SUMMARY OF STAY:  Ms. Jenkins is a pleasant morbidly obese 87-year-old female with past medical history significant for chronic kidney disease stage IIIa with baseline serum creatinine 1 mg/dL EGFR 50 mill per minute per 1.73 m², long-term resident at [facility].  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 CVA in 2019, with residual dizziness. Patient presented to [facility] December 21, 2023 for evaluation of progressively worsening shortness of breath.  Upon presentation patient was hypoxic requiring 3 L of oxygen.  Home medications: losartan 50 mg p.o. daily, metolazone 5 mg weekly, torsemide 40 mg p.o. daily  Current medications: Newly started Jardiance 10 mg, losartan has been on hold since admission.  Metolazone 5 mg weekly.  Received Lasix 40 mg IV twice daily. Echocardiogram demonstrated ejection fraction 55%.  Labs Serum creatinine up to 1.34 mg deciliter BUN of 28 and GFR of 39.  From serum creatinine of 1 mg deciliter and a GFR of 58 mL/min upon presentation.  Anemia hemoglobin of 8.2 mg deciliter. December 21, 2023 CT with IV contrast: No signs of pulmonary  embolism demonstrated cardiomegaly without pericardial effusion.  Scattered emphysematous changes with small bilateral pleural effusions and mild bibasilar area of infiltrate or atelectasis.    ASSESSMENT AND PLAN:  CHERYL on CKD IIIa:  likely hemodynamically mediated in the setting of diuretics adjustment and vasoactive medications (Jardiance), contrast nephropathy (contrast exposure December 21).  Serum creatinine up to 1.34 mg deciliter BUN of 28 and GFR of 39.  From serum creatinine of 1 mg deciliter and a GFR of 58 mL/min upon presentation.     Volume status: Hypervolemic on presentation improved after IV Lasix     CKD IIIa: Diabetic nephropathy, hypertensive nephrosclerosis  Electrolytes: Managed (with renal diet)  Hypertension: Relative hypotension blood pressure 100 oh 3/60 with a heart rate of 81  Without being on antihypertensive medications  Acid-base: Metabolic alkalosis likely secondary to diuresis  Anemia from renal failure, To check iron panel and start epogen. hemoglobin of 8.2 mg deciliter.  To rule out plasma cell disorder  T2DM on insulin sliding scale  COPD: Emphysematous changes on CT scan requiring 2 L of oxygen  CVA 2019 with residual dizziness  Patient wheelchair dependent  Urinary incontinent: Wearing depends at skilled nursing facility  CHF: Diastolic heart failure treated with IV Lasix likely for acute on chronic  UMA with CPAP intolerance  Paroxysmal A-fib on Eliquis    Plan:  Acute kidney injury likely hemodynamically mediated due to IV diuresis, Jardiance, losartan use (last dose 12/23), hypotension, contrast exposure.  Improving     Renal parameters improving.  To continue Bumex  To continue Jardiance  Cleared from renal standpoint to start Entresto.    Strict input and output guide diuresis management.   Medication reviewed renally dosed  To continue renal diet, 2 g sodium.  No urgent indication for renal placement therapy.      Discharge medication:  Truly appreciate cardiology  recommendations, ejection fraction dropped significantly.  Safe to start Entresto from renal standpoint  To discontinue losartan upon discharge  To discontinue torsemide/metolazone.   To continue Jardiance  To cont Bumex 2 mg p.o. daily with extra dose as mentioned above.  To continue CPAP    We will continue to monitor closely with you, thank you!    Medications:    Current Facility-Administered Medications:     acetaminophen (Tylenol) tablet 487.5 mg, 487.5 mg, oral, BID, ESTEBAN Randhawa-CNP, 487.5 mg at 12/28/23 0924    acetaminophen (Tylenol) tablet 650 mg, 650 mg, oral, q6h PRN, ESTEBAN Randhawa-CNP, 650 mg at 12/25/23 0934    albuterol 2.5 mg /3 mL (0.083 %) nebulizer solution 2.5 mg, 2.5 mg, nebulization, q4h PRN, BREE Randhawa, 2.5 mg at 12/28/23 1716    apixaban (Eliquis) tablet 5 mg, 5 mg, oral, BID, ESTEBAN Randhawa-CNP, 5 mg at 12/28/23 0924    atorvastatin (Lipitor) tablet 20 mg, 20 mg, oral, Nightly, ESTEBAN Randhawa-CNP, 20 mg at 12/27/23 2056    bumetanide (Bumex) tablet 2 mg, 2 mg, oral, Daily, Mary Solomon MD, 2 mg at 12/28/23 0924    butalbital-acetaminophen-caff -40 mg per tablet 1 tablet, 1 tablet, oral, q4h PRN, Nick Montaño MD, 1 tablet at 12/27/23 2056    calcium carbonate (Tums) chewable tablet 500 mg, 1 tablet, oral, q4h PRN, BREE Randhawa    cefTRIAXone (Rocephin) IVPB 1 g, 1 g, intravenous, q24h, BREE Orourke, Stopped at 12/28/23 1233    cyclobenzaprine (Flexeril) tablet 10 mg, 10 mg, oral, BID PRN, BREE Randhawa    dextrose 10 % in water (D10W) infusion, 0.3 g/kg/hr, intravenous, Once PRN, BREE Randhawa    dextrose 50 % injection 25 g, 25 g, intravenous, q15 min PRN, BREE Randhawa    [START ON 12/29/2023] digoxin (Lanoxin) tablet 62.5 mcg, 62.5 mcg, oral, Daily, Steve Sneed MD    docusate sodium (Colace) capsule 100 mg, 100 mg, oral, BID, BREE Randhawa, 100 mg at  12/27/23 0910    empagliflozin (Jardiance) tablet 10 mg, 10 mg, oral, Daily, Steve Sneed MD, 10 mg at 12/28/23 0924    famotidine (Pepcid) tablet 20 mg, 20 mg, oral, Daily, BREE Randhawa, 20 mg at 12/28/23 1649    glucagon (Glucagen) injection 1 mg, 1 mg, intramuscular, q15 min PRN, BREE Randhawa    insulin lispro (HumaLOG) injection 0-5 Units, 0-5 Units, subcutaneous, 4x daily, BREE Randhawa, 1 Units at 12/23/23 2141    LORazepam (Ativan) tablet 0.25 mg, 0.25 mg, oral, Once, Ayanna Giles PA-C    lubricating eye drops ophthalmic solution 1 drop, 1 drop, Both Eyes, q4h PRN, BREE Randhawa, 1 drop at 12/27/23 0911    metoprolol succinate XL (Toprol-XL) 24 hr tablet 25 mg, 25 mg, oral, Daily, Steve Sneed MD, 25 mg at 12/28/23 1649    oxygen (O2) therapy, , inhalation, Continuous PRN - O2/gases, BREE Randhawa, 2 L/min at 12/22/23 1005    perflutren lipid microspheres (Definity) injection 0.5-10 mL of dilution, 0.5-10 mL of dilution, intravenous, Once in imaging, BREE Randhawa    perflutren protein A microsphere (Optison) injection 0.5 mL, 0.5 mL, intravenous, Once in imaging, BREE Randhawa    perflutren protein A microsphere (Optison) injection 0.5 mL, 0.5 mL, intravenous, Once in imaging, Steve Sneed MD    polyethylene glycol (Glycolax, Miralax) packet 17 g, 17 g, oral, Daily, BREE Randhawa, 17 g at 12/26/23 0841    sennosides (Senokot) tablet 8.6 mg, 1 tablet, oral, BID, ESTEBAN Randhawa-CNP, 8.6 mg at 12/27/23 0910    sulfur hexafluoride microsphr (Lumason) injection 24.28 mg, 2 mL, intravenous, Once in imaging, ESTEBAN Randhawa-CNP    sulfur hexafluoride microsphr (Lumason) injection 24.28 mg, 2 mL, intravenous, Once in imaging, Steve Sneed MD    traMADol (Ultram) tablet 50 mg, 50 mg, oral, q8h PRN, Nick Montaño MD, 50 mg at 12/26/23 2222    PERTINENT ROS:  GENERAL:  positive for  fatigue, poor appetite.  No fever/chills  RESPIRATORY:  positive for shortness of breath.  Negative for cough, wheezing.  CARDIOVASCULAR:   Negative for chest pain or palpitation.  GI:  Negative for abdominal pain, diarrhea, heartburn, nausea, vomiting  : Dysuria    Physical Exam:  Vital signs in last 24 hours:  Temp:  [36.1 °C (97 °F)-36.4 °C (97.5 °F)] 36.1 °C (97 °F)  Heart Rate:  [] 107  Resp:  [18] 18  BP: (114-145)/(55-73) 137/67    General: Awake, cooperative, not in acute distress  HEENT:  NCAT,  mucous membranes moist and pink  NECK:  Elevated JVD, no carotid bruit, supple, no cervical mass or thyromegaly  LUNGS;  Diminished breath sounds, fine Rales  CV:  Distant, regular rate and rhythm, no murmurs  ABDOMEN:  abdomen soft, nontender, BS normal, no masses or organomegaly  EDEMA:  +1 lower extremity edema/dependent edema  SKIN:  dry and normal turgor, no clubbing, cyanosis or petechia.  No rashes noted      Intake/Output last 3 shifts:  I/O last 3 completed shifts:  In: 700 (4.7 mL/kg) [P.O.:600; IV Piggyback:100]  Out: 501 (3.4 mL/kg) [Urine:500 (0.1 mL/kg/hr); Stool:1]  Weight: 148.8 kg     DATA:  Diagnotic tests reviewed for Todays visit:  Results from last 7 days   Lab Units 12/28/23  0708   WBC AUTO x10*3/uL 5.7   RBC AUTO x10*6/uL 2.99*   HEMOGLOBIN g/dL 8.6*   HEMATOCRIT % 31.2*       Results from last 7 days   Lab Units 12/28/23  0708   SODIUM mmol/L 141   POTASSIUM mmol/L 4.3   CHLORIDE mmol/L 99   CO2 mmol/L 37*   BUN mg/dL 25*   CREATININE mg/dL 1.23*   CALCIUM mg/dL 8.3*   MAGNESIUM mg/dL 2.14       Results from last 7 days   Lab Units 12/27/23  1114   COLOR U  Airam*   APPEARANCE U  Hazy*   PH U  6.0   SPEC GRAV UR  1.020   PROTEIN U mg/dL 100 (2+)*   BLOOD UR  MODERATE (2+)*   NITRITE U  NEGATIVE   WBC UR /HPF 11-20*   BACTERIA UR /HPF 4+*       IMAGING: CXR reviewed in  images      SIGNATURE: Mary Solomon MD  Nephrology and Hypertension  [id number], [place]  [address]  Office phone: [phone]  FAX: [phone]    This note was partially generated using the Dragon voice recognition system, and there may be some incorrect words, spelling's and punctuation that were not noted in checking the note before saving.

## 2023-12-29 NOTE — PROGRESS NOTES
Subjective Data:  Addendum note to cardioversion note  Post cardioversion unstable, asystolic pauses, junctional bradycardia, diminished respiratory drive  Eventually stabilized heart rate 44 to 50 sinus bradycardia  Neurologic and congnitivelly at baseline now  Limited repeat echo with definity    LV dilated global at 34%  RV moderately dilated and RV impaired  Marked biatrial enlargment  Aortic valve sclerosis  Mitral annular calcification  No pericardial thickening or effusion    Overnight Events:    Cardioversion at 1:15 PM     Objective Data:  Last Recorded Vitals:  Vitals:    12/29/23 1404 12/29/23 1409 12/29/23 1414 12/29/23 1419   BP: (!) 73/44 (!) 78/48 95/55 (!) 89/49   BP Location: Right arm Right arm Right arm Right arm   Patient Position:       Pulse: 60 (!) 44 68 61   Resp: (!) 9 23 10 20   Temp:       TempSrc:       SpO2: 97% 100% 94% 97%   Weight:       Height:           Last Labs:  CBC - 12/29/2023:  7:46 AM  7.0 9.0 163    32.7      CMP - 12/29/2023:  7:46 AM  8.3 6.2 11 --- 0.8   _ 4.1 9 91      PTT - No results in last year.  _   _ _     TROPHS   Date/Time Value Ref Range Status   12/21/2023 01:32 PM 9 0 - 13 ng/L Final   12/21/2023 11:58 AM 8 0 - 13 ng/L Final     BNP   Date/Time Value Ref Range Status   12/21/2023 11:58  0 - 99 pg/mL Final   03/06/2019 04:50  0 - 99 pg/mL Final     Comment:     .  <100 pg/mL - Heart failure unlikely  100-299 pg/mL - Intermediate probability of acute heart  .               failure exacerbation. Correlate with clinical  .               context and patient history.    >=300 pg/mL - Heart Failure likely. Correlate with clinical  .               context and patient history.  BNP testing is performed using different testing   methodology at [facility] than at other   [facility] hospitals. Direct result comparisons should   only be made within the same method.     03/05/2019 08:18  0 - 99 pg/mL Final     Comment:     .  <100 pg/mL - Heart  failure unlikely  100-299 pg/mL - Intermediate probability of acute heart  .               failure exacerbation. Correlate with clinical  .               context and patient history.    >=300 pg/mL - Heart Failure likely. Correlate with clinical  .               context and patient history.  BNP testing is performed using different testing   methodology at [facility] than at other   [facility]. Direct result comparisons should   only be made within the same method.       HGBA1C   Date/Time Value Ref Range Status   10/02/2023 03:06 PM 6.3 4.3 - 5.6 % Final     Comment:     American Diabetes Association guidelines indicate that patients with HgbA1c in the range 5.7-6.4% are at increased risk for development of diabetes, and intervention by lifestyle modification may be beneficial. HgbA1c greater or equal to 6.5% is considered diagnostic of diabetes.   03/16/2019 05:30 AM 5.6 % Final     Comment:          Diagnosis of Diabetes-Adults   Non-Diabetic: < or = 5.6%   Increased risk for developing diabetes: 5.7-6.4%   Diagnostic of diabetes: > or = 6.5%  .       Monitoring of Diabetes                Age (y)     Therapeutic Goal (%)   Adults:          >18           <7.0   Pediatrics:    13-18           <7.5                   7-12           <8.0                   0- 6            7.5-8.5   American Diabetes Association. Diabetes Care 33(S1), Jan 2010.     02/23/2019 07:30 PM 6.0 % Final     Comment:          Diagnosis of Diabetes-Adults   Non-Diabetic: < or = 5.6%   Increased risk for developing diabetes: 5.7-6.4%   Diagnostic of diabetes: > or = 6.5%  .       Monitoring of Diabetes                Age (y)     Therapeutic Goal (%)   Adults:          >18           <7.0   Pediatrics:    13-18           <7.5                   7-12           <8.0                   0- 6            7.5-8.5   American Diabetes Association. Diabetes Care 33(S1), Jan 2010.       VLDL   Date/Time Value Ref Range Status   08/23/2023  01:25 PM 12 0 - 40 mg/dL Final   02/23/2019 07:30 PM 16 0 - 40 mg/dL Final      Results for orders placed or performed during the hospital encounter of 12/21/23 (from the past 96 hour(s))   POCT GLUCOSE   Result Value Ref Range    POCT Glucose 122 (H) 74 - 99 mg/dL   POCT GLUCOSE   Result Value Ref Range    POCT Glucose 149 (H) 74 - 99 mg/dL   Magnesium   Result Value Ref Range    Magnesium 2.09 1.60 - 2.40 mg/dL   CBC   Result Value Ref Range    WBC 6.9 4.4 - 11.3 x10*3/uL    nRBC 0.0 0.0 - 0.0 /100 WBCs    RBC 3.07 (L) 4.00 - 5.20 x10*6/uL    Hemoglobin 8.7 (L) 12.0 - 16.0 g/dL    Hematocrit 31.4 (L) 36.0 - 46.0 %     (H) 80 - 100 fL    MCH 28.3 26.0 - 34.0 pg    MCHC 27.7 (L) 32.0 - 36.0 g/dL    RDW 17.0 (H) 11.5 - 14.5 %    Platelets 169 150 - 450 x10*3/uL   Basic Metabolic Panel   Result Value Ref Range    Glucose 113 (H) 74 - 99 mg/dL    Sodium 143 136 - 145 mmol/L    Potassium 4.3 3.5 - 5.3 mmol/L    Chloride 101 98 - 107 mmol/L    Bicarbonate 35 (H) 21 - 32 mmol/L    Anion Gap 11 10 - 20 mmol/L    Urea Nitrogen 21 6 - 23 mg/dL    Creatinine 1.10 (H) 0.50 - 1.05 mg/dL    eGFR 49 (L) >60 mL/min/1.73m*2    Calcium 8.4 (L) 8.6 - 10.3 mg/dL   Sedimentation rate, automated   Result Value Ref Range    Sedimentation Rate 21 0 - 30 mm/h   C-reactive protein   Result Value Ref Range    C-Reactive Protein 0.24 <1.00 mg/dL   POCT GLUCOSE   Result Value Ref Range    POCT Glucose 128 (H) 74 - 99 mg/dL   POCT GLUCOSE   Result Value Ref Range    POCT Glucose 140 (H) 74 - 99 mg/dL   POCT GLUCOSE   Result Value Ref Range    POCT Glucose 131 (H) 74 - 99 mg/dL   POCT GLUCOSE   Result Value Ref Range    POCT Glucose 119 (H) 74 - 99 mg/dL   POCT GLUCOSE   Result Value Ref Range    POCT Glucose 99 74 - 99 mg/dL   Magnesium   Result Value Ref Range    Magnesium 2.16 1.60 - 2.40 mg/dL   CBC   Result Value Ref Range    WBC 5.9 4.4 - 11.3 x10*3/uL    nRBC 0.0 0.0 - 0.0 /100 WBCs    RBC 3.08 (L) 4.00 - 5.20 x10*6/uL     Hemoglobin 8.7 (L) 12.0 - 16.0 g/dL    Hematocrit 32.0 (L) 36.0 - 46.0 %     (H) 80 - 100 fL    MCH 28.2 26.0 - 34.0 pg    MCHC 27.2 (L) 32.0 - 36.0 g/dL    RDW 16.6 (H) 11.5 - 14.5 %    Platelets 179 150 - 450 x10*3/uL   Basic Metabolic Panel   Result Value Ref Range    Glucose 103 (H) 74 - 99 mg/dL    Sodium 141 136 - 145 mmol/L    Potassium 4.4 3.5 - 5.3 mmol/L    Chloride 100 98 - 107 mmol/L    Bicarbonate 34 (H) 21 - 32 mmol/L    Anion Gap 11 10 - 20 mmol/L    Urea Nitrogen 23 6 - 23 mg/dL    Creatinine 1.12 (H) 0.50 - 1.05 mg/dL    eGFR 48 (L) >60 mL/min/1.73m*2    Calcium 8.5 (L) 8.6 - 10.3 mg/dL   Urinalysis with Reflex Culture and Microscopic   Result Value Ref Range    Color, Urine Airam (N) Straw, Yellow    Appearance, Urine Hazy (N) Clear    Specific Gravity, Urine 1.020 1.005 - 1.035    pH, Urine 6.0 5.0, 5.5, 6.0, 6.5, 7.0, 7.5, 8.0    Protein, Urine 100 (2+) (N) NEGATIVE mg/dL    Glucose, Urine >=500 (3+) (A) NEGATIVE mg/dL    Blood, Urine MODERATE (2+) (A) NEGATIVE    Ketones, Urine NEGATIVE NEGATIVE mg/dL    Bilirubin, Urine NEGATIVE NEGATIVE    Urobilinogen, Urine 4.0 (N) <2.0 mg/dL    Nitrite, Urine NEGATIVE NEGATIVE    Leukocyte Esterase, Urine LARGE (3+) (A) NEGATIVE   Extra Urine Gray Tube   Result Value Ref Range    Extra Tube Hold for add-ons.    Microscopic Only, Urine   Result Value Ref Range    WBC, Urine 11-20 (A) 1-5, NONE /HPF    RBC, Urine >20 (A) NONE, 1-2, 3-5 /HPF    Squamous Epithelial Cells, Urine 1-9 (SPARSE) Reference range not established. /HPF    Bacteria, Urine 4+ (A) NONE SEEN /HPF   Urine Culture    Specimen: Clean Catch/Voided; Urine   Result Value Ref Range    Urine Culture >100,000 Escherichia coli (A)        Susceptibility    Escherichia coli - MICROSCAN     Ampicillin  Susceptible      Cefazolin  Susceptible      Cefazolin (uncomplicated UTIs only)  Susceptible      Ciprofloxacin  Susceptible      Gentamicin  Susceptible      Nitrofurantoin  Susceptible       Piperacillin/Tazobactam  Susceptible      Trimethoprim/Sulfamethoxazole  Susceptible    POCT GLUCOSE   Result Value Ref Range    POCT Glucose 125 (H) 74 - 99 mg/dL   POCT GLUCOSE   Result Value Ref Range    POCT Glucose 105 (H) 74 - 99 mg/dL   POCT GLUCOSE   Result Value Ref Range    POCT Glucose 123 (H) 74 - 99 mg/dL   Magnesium   Result Value Ref Range    Magnesium 2.14 1.60 - 2.40 mg/dL   CBC   Result Value Ref Range    WBC 5.7 4.4 - 11.3 x10*3/uL    nRBC 0.0 0.0 - 0.0 /100 WBCs    RBC 2.99 (L) 4.00 - 5.20 x10*6/uL    Hemoglobin 8.6 (L) 12.0 - 16.0 g/dL    Hematocrit 31.2 (L) 36.0 - 46.0 %     (H) 80 - 100 fL    MCH 28.8 26.0 - 34.0 pg    MCHC 27.6 (L) 32.0 - 36.0 g/dL    RDW 16.7 (H) 11.5 - 14.5 %    Platelets 163 150 - 450 x10*3/uL   Basic Metabolic Panel   Result Value Ref Range    Glucose 92 74 - 99 mg/dL    Sodium 141 136 - 145 mmol/L    Potassium 4.3 3.5 - 5.3 mmol/L    Chloride 99 98 - 107 mmol/L    Bicarbonate 37 (H) 21 - 32 mmol/L    Anion Gap 9 (L) 10 - 20 mmol/L    Urea Nitrogen 25 (H) 6 - 23 mg/dL    Creatinine 1.23 (H) 0.50 - 1.05 mg/dL    eGFR 43 (L) >60 mL/min/1.73m*2    Calcium 8.3 (L) 8.6 - 10.3 mg/dL   POCT GLUCOSE   Result Value Ref Range    POCT Glucose 94 74 - 99 mg/dL   POCT GLUCOSE   Result Value Ref Range    POCT Glucose 119 (H) 74 - 99 mg/dL   POCT GLUCOSE   Result Value Ref Range    POCT Glucose 122 (H) 74 - 99 mg/dL   POCT GLUCOSE   Result Value Ref Range    POCT Glucose 136 (H) 74 - 99 mg/dL   Magnesium   Result Value Ref Range    Magnesium 1.98 1.60 - 2.40 mg/dL   CBC   Result Value Ref Range    WBC 7.0 4.4 - 11.3 x10*3/uL    nRBC 0.0 0.0 - 0.0 /100 WBCs    RBC 3.20 (L) 4.00 - 5.20 x10*6/uL    Hemoglobin 9.0 (L) 12.0 - 16.0 g/dL    Hematocrit 32.7 (L) 36.0 - 46.0 %     (H) 80 - 100 fL    MCH 28.1 26.0 - 34.0 pg    MCHC 27.5 (L) 32.0 - 36.0 g/dL    RDW 16.7 (H) 11.5 - 14.5 %    Platelets 163 150 - 450 x10*3/uL   Basic Metabolic Panel   Result Value Ref Range    Glucose  132 (H) 74 - 99 mg/dL    Sodium 138 136 - 145 mmol/L    Potassium 4.2 3.5 - 5.3 mmol/L    Chloride 97 (L) 98 - 107 mmol/L    Bicarbonate 33 (H) 21 - 32 mmol/L    Anion Gap 12 10 - 20 mmol/L    Urea Nitrogen 32 (H) 6 - 23 mg/dL    Creatinine 1.29 (H) 0.50 - 1.05 mg/dL    eGFR 40 (L) >60 mL/min/1.73m*2    Calcium 8.3 (L) 8.6 - 10.3 mg/dL   POCT GLUCOSE   Result Value Ref Range    POCT Glucose 129 (H) 74 - 99 mg/dL   POCT GLUCOSE   Result Value Ref Range    POCT Glucose 128 (H) 74 - 99 mg/dL       Last I/O:  I/O last 3 completed shifts:  In: 1000 (6.7 mL/kg) [P.O.:950; IV Piggyback:50]  Out: 501 (3.4 mL/kg) [Urine:500 (0.1 mL/kg/hr); Stool:1]  Weight: 148.8 kg     Past Cardiology Tests (Last 3 Years):  EKG:  ECG 12 lead 12/21/2023 (Preliminary)      ECG 12 lead 12/21/2023 (Preliminary)      ECG 12 lead 12/21/2023    Echo:  Transthoracic Echo (TTE) Complete 12/24/2023    Ejection Fractions:  EF   Date/Time Value Ref Range Status   12/24/2023 02:23 PM 33       Cath:  No results found for this or any previous visit from the past 1095 days.    Stress Test:  No results found for this or any previous visit from the past 1095 days.    Cardiac Imaging:  No results found for this or any previous visit from the past 1095 days.      Inpatient Medications:  Scheduled medications   Medication Dose Route Frequency    acetaminophen  487.5 mg oral BID    apixaban  5 mg oral BID    atorvastatin  20 mg oral Nightly    bumetanide  2 mg oral Daily    cefTRIAXone  1 g intravenous q24h    digoxin  62.5 mcg oral Daily    docusate sodium  100 mg oral BID    empagliflozin  10 mg oral Daily    famotidine  20 mg oral Daily    insulin lispro  0-5 Units subcutaneous 4x daily    LORazepam  0.25 mg oral Once    metoprolol succinate XL  25 mg oral BID    polyethylene glycol  17 g oral Daily    sennosides  1 tablet oral BID     PRN medications   Medication    acetaminophen    albuterol    butalbital-acetaminophen-caff    calcium carbonate     cyclobenzaprine    dextrose 10 % in water (D10W)    dextrose    glucagon    lubricating eye drops    oxygen    propofol    traMADol     Continuous Medications   Medication Dose Last Rate       Physical Exam:  Subjective:   Examination:   General Examination:   General Appearance: Well developed, well nourished, in no acute distress.  Morbidly obese  Head: normocephalic, atraumatic   Eyes: Anicteric sclera. Pupils are equally round and reactive to light.  Extraocular movements are intact.    Ears: normal   Oral: Cavity: mucosa moist.   Throat: clear.   Neck/Thyroid: neck supple, full range of motion, no cervical lymphadenopathy.   Skin: warm and dry, no suspicious lesions.    Heart: regular rate and rhythm, S1, S2 normal, no murmurs.   Lungs: clear to auscultation bilaterally.   Abdomen: soft, non-tender, non-distended, bowl sound present, normal.   Extremities: no clubbing, no cyanosis, 2+  Neuro: non-focal, motor strength normal upper lower extremities, sensory exam intact.  Past stroke     Assessment/Plan     I48.91 Atrial fibrillation not present prior to this admission consideration of EP opinion regarding cardioversion, paroxysmal A-fib in the past on Eliquis with CVA in 2019  I42.9 HFrEF previously HFpEF followed by Dr. 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        Steve Sneed MD

## 2023-12-29 NOTE — NURSING NOTE
Post cardioversion the patient had some asystolic pauses and bradycardia. HR are currently in the 50's. VSS. Surface echo complete at the bedside. Dr. Sneed would like to the patient transferred to stepdown for closer monitoring. Awaiting a bed assignment.

## 2023-12-29 NOTE — NURSING NOTE
Patient having frequent periods of afib rvr with heart rate in the 120's.  Converts back to sinus karyn.  Dr. Sneed notified.  Awaiting a response.

## 2023-12-29 NOTE — PROGRESS NOTES
Subjective Data:  Discussed with Dr. Savage from EP  Patient followed by Marcie fontana  with morbid obesity BMI 55 emphysematous lungs on CT chronic kidney disease paroxysmal A-fib with right bundle branch block on apixaban and she ensures me 100% compliance had a stroke in 2019  This admission new sustained atrial fibrillation with RVR  Decompensation of heart failure repeat echo on the 24th decline in EF to 36% had been 55 but this echo was done in the setting of rapid A-fib RVR  Developed hypotension renal insufficiency her losartan was held by nephrology she is on Jardiance diuretics  It is felt that she decompensated from sustained A-fib RVR and warrants an attempted cardioversion  From her stroke she has very minimal residual weakness  At home has been on losartan metolazone Demadex apixaban  We are planning attempted cardioversion at 1 PM informed consent from patient was obtained verbally and she expressed understanding  Long-term skilled nursing home facility resident at large with  Diabetes dyslipidemia hypertension neuropathy nephropathy has additional comorbidities  At home on losartan 50 metolazone 5 weekly Demadex 40 daily newly started on Jardiance 10 presented with creatinine of 1 GFR 58 anemia 8.2    Overnight Events:    No new changes started on metoprolol for rate control and renal adjusted digoxin     Objective Data:  Last Recorded Vitals:  Vitals:    12/28/23 1600 12/28/23 1718 12/29/23 0047 12/29/23 0800   BP: 137/67  128/58 133/71   BP Location: Right arm  Right arm Right arm   Patient Position: Lying  Lying Lying   Pulse: 107  (!) 115 82   Resp: 18  18 20   Temp: 36.1 °C (97 °F)  36.5 °C (97.7 °F) 35.5 °C (95.9 °F)   TempSrc: Temporal  Temporal Temporal   SpO2: 98% 100% 98% 96%   Weight:       Height:           Last Labs:  CBC - 12/29/2023:  7:46 AM  7.0 9.0 163    32.7      CMP - 12/29/2023:  7:46 AM  8.3 6.2 11 --- 0.8   _ 4.1 9 91      PTT - No results in last year.  _   _ _      TROPHS   Date/Time Value Ref Range Status   12/21/2023 01:32 PM 9 0 - 13 ng/L Final   12/21/2023 11:58 AM 8 0 - 13 ng/L Final     BNP   Date/Time Value Ref Range Status   12/21/2023 11:58  0 - 99 pg/mL Final   03/06/2019 04:50  0 - 99 pg/mL Final     Comment:     .  <100 pg/mL - Heart failure unlikely  100-299 pg/mL - Intermediate probability of acute heart  .               failure exacerbation. Correlate with clinical  .               context and patient history.    >=300 pg/mL - Heart Failure likely. Correlate with clinical  .               context and patient history.  BNP testing is performed using different testing   methodology at [facility] (formerly [facility]) than at other   system hospitals. Direct result comparisons should   only be made within the same method.     03/05/2019 08:18  0 - 99 pg/mL Final     Comment:     .  <100 pg/mL - Heart failure unlikely  100-299 pg/mL - Intermediate probability of acute heart  .               failure exacerbation. Correlate with clinical  .               context and patient history.    >=300 pg/mL - Heart Failure likely. Correlate with clinical  .               context and patient history.  BNP testing is performed using different testing   methodology at [facility] ([facility] than at other   system hospitals. Direct result comparisons should   only be made within the same method.       HGBA1C   Date/Time Value Ref Range Status   10/02/2023 03:06 PM 6.3 4.3 - 5.6 % Final     Comment:     American Diabetes Association guidelines indicate that patients with HgbA1c in the range 5.7-6.4% are at increased risk for development of diabetes, and intervention by lifestyle modification may be beneficial. HgbA1c greater or equal to 6.5% is considered diagnostic of diabetes.   03/16/2019 05:30 AM 5.6 % Final     Comment:          Diagnosis of Diabetes-Adults   Non-Diabetic: < or = 5.6%   Increased risk for developing diabetes: 5.7-6.4%   Diagnostic of diabetes: >  or = 6.5%  .       Monitoring of Diabetes                Age (y)     Therapeutic Goal (%)   Adults:          >18           <7.0   Pediatrics:    13-18           <7.5                   7-12           <8.0                   0- 6            7.5-8.5   American Diabetes Association. Diabetes Care 33(S1), Jan 2010.     02/23/2019 07:30 PM 6.0 % Final     Comment:          Diagnosis of Diabetes-Adults   Non-Diabetic: < or = 5.6%   Increased risk for developing diabetes: 5.7-6.4%   Diagnostic of diabetes: > or = 6.5%  .       Monitoring of Diabetes                Age (y)     Therapeutic Goal (%)   Adults:          >18           <7.0   Pediatrics:    13-18           <7.5                   7-12           <8.0                   0- 6            7.5-8.5   American Diabetes Association. Diabetes Care 33(S1), Jan 2010.       VLDL   Date/Time Value Ref Range Status   08/23/2023 01:25 PM 12 0 - 40 mg/dL Final   02/23/2019 07:30 PM 16 0 - 40 mg/dL Final      Last I/O:  I/O last 3 completed shifts:  In: 1000 (6.7 mL/kg) [P.O.:950; IV Piggyback:50]  Out: 501 (3.4 mL/kg) [Urine:500 (0.1 mL/kg/hr); Stool:1]  Weight: 148.8 kg     Past Cardiology Tests (Last 3 Years):  EKG:  ECG 12 lead 12/21/2023 (Preliminary)      ECG 12 lead 12/21/2023 (Preliminary)      ECG 12 lead 12/21/2023    Echo:  Transthoracic Echo (TTE) Complete 12/24/2023    Ejection Fractions:  EF   Date/Time Value Ref Range Status   12/24/2023 02:23 PM 33       Cath:  No results found for this or any previous visit from the past 1095 days.    Stress Test:  No results found for this or any previous visit from the past 1095 days.    Cardiac Imaging:  No results found for this or any previous visit from the past 1095 days.      Inpatient Medications:  Scheduled medications   Medication Dose Route Frequency    acetaminophen  487.5 mg oral BID    apixaban  5 mg oral BID    atorvastatin  20 mg oral Nightly    bumetanide  2 mg oral Daily    cefTRIAXone  1 g intravenous q24h     digoxin  62.5 mcg oral Daily    docusate sodium  100 mg oral BID    empagliflozin  10 mg oral Daily    famotidine  20 mg oral Daily    insulin lispro  0-5 Units subcutaneous 4x daily    LORazepam  0.25 mg oral Once    metoprolol succinate XL  25 mg oral Daily    perflutren lipid microspheres  0.5-10 mL of dilution intravenous Once in imaging    perflutren protein A microsphere  0.5 mL intravenous Once in imaging    perflutren protein A microsphere  0.5 mL intravenous Once in imaging    polyethylene glycol  17 g oral Daily    sennosides  1 tablet oral BID    sulfur hexafluoride microsphr  2 mL intravenous Once in imaging    sulfur hexafluoride microsphr  2 mL intravenous Once in imaging     PRN medications   Medication    acetaminophen    albuterol    butalbital-acetaminophen-caff    calcium carbonate    cyclobenzaprine    dextrose 10 % in water (D10W)    dextrose    glucagon    lubricating eye drops    oxygen    traMADol     Continuous Medications   Medication Dose Last Rate     Results for orders placed or performed during the hospital encounter of 12/21/23 (from the past 96 hour(s))   POCT GLUCOSE   Result Value Ref Range    POCT Glucose 110 (H) 74 - 99 mg/dL   POCT GLUCOSE   Result Value Ref Range    POCT Glucose 122 (H) 74 - 99 mg/dL   POCT GLUCOSE   Result Value Ref Range    POCT Glucose 149 (H) 74 - 99 mg/dL   Magnesium   Result Value Ref Range    Magnesium 2.09 1.60 - 2.40 mg/dL   CBC   Result Value Ref Range    WBC 6.9 4.4 - 11.3 x10*3/uL    nRBC 0.0 0.0 - 0.0 /100 WBCs    RBC 3.07 (L) 4.00 - 5.20 x10*6/uL    Hemoglobin 8.7 (L) 12.0 - 16.0 g/dL    Hematocrit 31.4 (L) 36.0 - 46.0 %     (H) 80 - 100 fL    MCH 28.3 26.0 - 34.0 pg    MCHC 27.7 (L) 32.0 - 36.0 g/dL    RDW 17.0 (H) 11.5 - 14.5 %    Platelets 169 150 - 450 x10*3/uL   Basic Metabolic Panel   Result Value Ref Range    Glucose 113 (H) 74 - 99 mg/dL    Sodium 143 136 - 145 mmol/L    Potassium 4.3 3.5 - 5.3 mmol/L    Chloride 101 98 - 107 mmol/L     Bicarbonate 35 (H) 21 - 32 mmol/L    Anion Gap 11 10 - 20 mmol/L    Urea Nitrogen 21 6 - 23 mg/dL    Creatinine 1.10 (H) 0.50 - 1.05 mg/dL    eGFR 49 (L) >60 mL/min/1.73m*2    Calcium 8.4 (L) 8.6 - 10.3 mg/dL   Sedimentation rate, automated   Result Value Ref Range    Sedimentation Rate 21 0 - 30 mm/h   C-reactive protein   Result Value Ref Range    C-Reactive Protein 0.24 <1.00 mg/dL   POCT GLUCOSE   Result Value Ref Range    POCT Glucose 128 (H) 74 - 99 mg/dL   POCT GLUCOSE   Result Value Ref Range    POCT Glucose 140 (H) 74 - 99 mg/dL   POCT GLUCOSE   Result Value Ref Range    POCT Glucose 131 (H) 74 - 99 mg/dL   POCT GLUCOSE   Result Value Ref Range    POCT Glucose 119 (H) 74 - 99 mg/dL   POCT GLUCOSE   Result Value Ref Range    POCT Glucose 99 74 - 99 mg/dL   Magnesium   Result Value Ref Range    Magnesium 2.16 1.60 - 2.40 mg/dL   CBC   Result Value Ref Range    WBC 5.9 4.4 - 11.3 x10*3/uL    nRBC 0.0 0.0 - 0.0 /100 WBCs    RBC 3.08 (L) 4.00 - 5.20 x10*6/uL    Hemoglobin 8.7 (L) 12.0 - 16.0 g/dL    Hematocrit 32.0 (L) 36.0 - 46.0 %     (H) 80 - 100 fL    MCH 28.2 26.0 - 34.0 pg    MCHC 27.2 (L) 32.0 - 36.0 g/dL    RDW 16.6 (H) 11.5 - 14.5 %    Platelets 179 150 - 450 x10*3/uL   Basic Metabolic Panel   Result Value Ref Range    Glucose 103 (H) 74 - 99 mg/dL    Sodium 141 136 - 145 mmol/L    Potassium 4.4 3.5 - 5.3 mmol/L    Chloride 100 98 - 107 mmol/L    Bicarbonate 34 (H) 21 - 32 mmol/L    Anion Gap 11 10 - 20 mmol/L    Urea Nitrogen 23 6 - 23 mg/dL    Creatinine 1.12 (H) 0.50 - 1.05 mg/dL    eGFR 48 (L) >60 mL/min/1.73m*2    Calcium 8.5 (L) 8.6 - 10.3 mg/dL   Urinalysis with Reflex Culture and Microscopic   Result Value Ref Range    Color, Urine Airam (N) Straw, Yellow    Appearance, Urine Hazy (N) Clear    Specific Gravity, Urine 1.020 1.005 - 1.035    pH, Urine 6.0 5.0, 5.5, 6.0, 6.5, 7.0, 7.5, 8.0    Protein, Urine 100 (2+) (N) NEGATIVE mg/dL    Glucose, Urine >=500 (3+) (A) NEGATIVE mg/dL     Blood, Urine MODERATE (2+) (A) NEGATIVE    Ketones, Urine NEGATIVE NEGATIVE mg/dL    Bilirubin, Urine NEGATIVE NEGATIVE    Urobilinogen, Urine 4.0 (N) <2.0 mg/dL    Nitrite, Urine NEGATIVE NEGATIVE    Leukocyte Esterase, Urine LARGE (3+) (A) NEGATIVE   Extra Urine Gray Tube   Result Value Ref Range    Extra Tube Hold for add-ons.    Microscopic Only, Urine   Result Value Ref Range    WBC, Urine 11-20 (A) 1-5, NONE /HPF    RBC, Urine >20 (A) NONE, 1-2, 3-5 /HPF    Squamous Epithelial Cells, Urine 1-9 (SPARSE) Reference range not established. /HPF    Bacteria, Urine 4+ (A) NONE SEEN /HPF   Urine Culture    Specimen: Clean Catch/Voided; Urine   Result Value Ref Range    Urine Culture >100,000 Escherichia coli (A)        Susceptibility    Escherichia coli - MICROSCAN     Ampicillin  Susceptible      Cefazolin  Susceptible      Cefazolin (uncomplicated UTIs only)  Susceptible      Ciprofloxacin  Susceptible      Gentamicin  Susceptible      Nitrofurantoin  Susceptible      Piperacillin/Tazobactam  Susceptible      Trimethoprim/Sulfamethoxazole  Susceptible    POCT GLUCOSE   Result Value Ref Range    POCT Glucose 125 (H) 74 - 99 mg/dL   POCT GLUCOSE   Result Value Ref Range    POCT Glucose 105 (H) 74 - 99 mg/dL   POCT GLUCOSE   Result Value Ref Range    POCT Glucose 123 (H) 74 - 99 mg/dL   Magnesium   Result Value Ref Range    Magnesium 2.14 1.60 - 2.40 mg/dL   CBC   Result Value Ref Range    WBC 5.7 4.4 - 11.3 x10*3/uL    nRBC 0.0 0.0 - 0.0 /100 WBCs    RBC 2.99 (L) 4.00 - 5.20 x10*6/uL    Hemoglobin 8.6 (L) 12.0 - 16.0 g/dL    Hematocrit 31.2 (L) 36.0 - 46.0 %     (H) 80 - 100 fL    MCH 28.8 26.0 - 34.0 pg    MCHC 27.6 (L) 32.0 - 36.0 g/dL    RDW 16.7 (H) 11.5 - 14.5 %    Platelets 163 150 - 450 x10*3/uL   Basic Metabolic Panel   Result Value Ref Range    Glucose 92 74 - 99 mg/dL    Sodium 141 136 - 145 mmol/L    Potassium 4.3 3.5 - 5.3 mmol/L    Chloride 99 98 - 107 mmol/L    Bicarbonate 37 (H) 21 - 32 mmol/L     Anion Gap 9 (L) 10 - 20 mmol/L    Urea Nitrogen 25 (H) 6 - 23 mg/dL    Creatinine 1.23 (H) 0.50 - 1.05 mg/dL    eGFR 43 (L) >60 mL/min/1.73m*2    Calcium 8.3 (L) 8.6 - 10.3 mg/dL   POCT GLUCOSE   Result Value Ref Range    POCT Glucose 94 74 - 99 mg/dL   POCT GLUCOSE   Result Value Ref Range    POCT Glucose 119 (H) 74 - 99 mg/dL   POCT GLUCOSE   Result Value Ref Range    POCT Glucose 122 (H) 74 - 99 mg/dL   POCT GLUCOSE   Result Value Ref Range    POCT Glucose 136 (H) 74 - 99 mg/dL   Magnesium   Result Value Ref Range    Magnesium 1.98 1.60 - 2.40 mg/dL   CBC   Result Value Ref Range    WBC 7.0 4.4 - 11.3 x10*3/uL    nRBC 0.0 0.0 - 0.0 /100 WBCs    RBC 3.20 (L) 4.00 - 5.20 x10*6/uL    Hemoglobin 9.0 (L) 12.0 - 16.0 g/dL    Hematocrit 32.7 (L) 36.0 - 46.0 %     (H) 80 - 100 fL    MCH 28.1 26.0 - 34.0 pg    MCHC 27.5 (L) 32.0 - 36.0 g/dL    RDW 16.7 (H) 11.5 - 14.5 %    Platelets 163 150 - 450 x10*3/uL   Basic Metabolic Panel   Result Value Ref Range    Glucose 132 (H) 74 - 99 mg/dL    Sodium 138 136 - 145 mmol/L    Potassium 4.2 3.5 - 5.3 mmol/L    Chloride 97 (L) 98 - 107 mmol/L    Bicarbonate 33 (H) 21 - 32 mmol/L    Anion Gap 12 10 - 20 mmol/L    Urea Nitrogen 32 (H) 6 - 23 mg/dL    Creatinine 1.29 (H) 0.50 - 1.05 mg/dL    eGFR 40 (L) >60 mL/min/1.73m*2    Calcium 8.3 (L) 8.6 - 10.3 mg/dL   POCT GLUCOSE   Result Value Ref Range    POCT Glucose 129 (H) 74 - 99 mg/dL         Physical Exam:  Subjective:   Examination:   General Examination:   General Appearance: Well developed, well nourished, in no acute distress.  Morbidly obese  Head: normocephalic, atraumatic   Eyes: Anicteric sclera. Pupils are equally round and reactive to light.  Extraocular movements are intact.    Ears: normal   Oral: Cavity: mucosa moist.   Throat: clear.   Neck/Thyroid: neck supple, full range of motion, no cervical lymphadenopathy.   Skin: warm and dry, no suspicious lesions.    Heart: regular rate and rhythm, S1, S2 normal, no  murmurs.   Lungs: clear to auscultation bilaterally.   Abdomen: soft, non-tender, non-distended, bowl sound present, normal.   Extremities: no clubbing, no cyanosis, 2+  Neuro: non-focal, motor strength normal upper lower extremities, sensory exam intact.  Past stroke     Assessment/Plan     I48.91 Atrial fibrillation not present prior to this admission consideration of EP opinion regarding cardioversion, paroxysmal A-fib in the past on Eliquis with CVA in 2019  I42.9 HFrEF previously HFpEF followed by Dr. 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        Steve Sneed MD

## 2023-12-29 NOTE — H&P (VIEW-ONLY)
"CARDIOLOGY/ELECTROPHYSIOLOGY CONSULTATION    PATIENT NAME: Fifi Jenkins  PATIENT MRN: [id number]  SERVICE DATE:  12/29/2023  SERVICE TIME: 1:12 PM    CONSULTANT: Lamine Savage MD  PRIMARY CARE PHYSICIAN: Aung Jacobs MD  ATTENDING PHYSICIAN: Lamine Savage MD      IMPRESSIONS:  1.  Hypertension, controlled.  2.  Left heart failure on admission, as cause of exertional dyspnea.  She previously was felt to have heart failure with a preserved ejection fraction, but now has a diminished LVEF, perhaps representing a tachycardia-induced cardiomyopathy from rapid ventricular responses to atrial fibrillation over the past month or longer.  3.  Atrial fibrillation, paroxysmal in February 2019, previously treated with AV yair blocking drugs, though these were discontinued prior to admission.  She now appears to be in persistent atrial fibrillation, perhaps for several weeks or months.  The patient has a [id number] score of at least 7 (hypertension, diabetes, CHF, 2 points for prior stroke, and 2 points for age over 75), and she is appropriately anticoagulated with Eliquis.  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  4.  Multiple other medical problems, including obesity, degenerative joint disease, chronic anemia, stage III chronic kidney disease, gastroesophageal reflux disease, hyperlipidemia, and venous insufficiency.    RECOMMENDATIONS:  1.  I agree with Dr. Sneed restoration of sinus rhythm with a cardioversion is indicated.  2.  If the patient has rapid recurrence of atrial fibrillation post-cardioversion, I would then treat her with amiodarone as a \"rhythm control\" agent and perform a repeat cardioversion a few weeks thereafter.  3.  If sinus rhythm absolutely cannot be maintained even on antiarrhythmic therapy, the patient may be considered for simple AV junction ablation and permanent pacemaker " placement, perhaps using a Medtronic Micra VR leadless VVIR pacemaker.    Thank you for this consultation.      SIGNATURE: Lamine Savage MD   OFFICE: [phone]      ADDENDUM:  I assisted Dr. Steve Sneed with a direct-current cardioversion on the patient.  I administered 60 mg of intravenous propofol for deep sedation.  A 200 J shock failed to restore sinus rhythm.  A 300 J shock organized the patient to what appeared to be an atrial tachycardia at 105 bpm for roughly 1 minute, after which sinus bradycardia in the 40s and 50s was noted, with a first-degree AV block and bifascicular block.  This represents a successful direct-current cardioversion.  The patient will be monitored in hospital as she continues treatment for her heart failure.  If she has rapid recurrence of atrial fibrillation in the next several weeks, she will likely be started on amiodarone and undergo a subsequent cardioversion, as noted above.    Lamine Savage MD    ================================================================    REASON FOR CONSULTATION: Persistent atrial fibrillation    HISTORY: Fifi Jenkins is an 88 y.o. -American female, now followed from a cardiology standpoint by Dr. Marcie Terrazas who presented on 12/21/2023 because of progressive dyspnea with exertion and even at rest.  She was found to have left heart failure on admission with a BNP level of 982.  She has been managed by Dr. Steve Sneed with diuretic therapy, but continues to be symptomatic with dyspnea even at rest.  She has been in atrial fibrillation since admission, with a ventricular response originally over 100 bpm, but now with better rate control.  She has been chronically anticoagulated with Eliquis.  Dr. Sneed requested an opinion from Electrophysiology regarding a possible cardioversion and antiarrhythmic therapy.    PAST MEDICAL HISTORY: The patient has a history of chronic hypertension, hyperlipidemia, diabetes, degenerative  joint disease, stage III chronic kidney disease, gastroesophageal reflux disease, chronic obesity, and lower extremity venous insufficiency.  She is not known to have coronary disease with negative nuclear stress tests in December 2015 and February 2019.  Her LVEF has been normal or near-normal in the past, at 40% up to 55%.  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  She has been on Eliquis anticoagulation since then.    PAST SURGICAL HISTORY: Prior surgeries have included bilateral knee replacements, right carpal tunnel surgery, bilateral cataract surgeries, appendectomy, cholecystectomy, ankle surgery, and a right rotator cuff surgery.    OUTPATIENT MEDICATIONS:  Current Outpatient Medications on File Prior to Encounter   Medication Sig    acetaminophen (Tylenol) 325 mg tablet Take 2 tablets (650 mg) by mouth every 4 hours if needed for mild pain (1 - 3).    acetaminophen (Tylenol) 500 mg tablet Take 1 tablet (500 mg) by mouth 2 times a day.    albuterol 90 mcg/actuation inhaler Inhale 2 puffs every 4 hours if needed for wheezing or shortness of breath.    atorvastatin (Lipitor) 20 mg tablet Take 1 tablet (20 mg) by mouth once daily at bedtime.    calcium carbonate (Tums) 200 mg calcium chewable tablet Chew 1 tablet (500 mg) every 4 hours if needed for heartburn.    cyanocobalamin (Vitamin B-12) 250 mcg tablet Take 1 tablet (250 mcg) by mouth once daily.    cyclobenzaprine (Flexeril) 10 mg tablet Take 1 tablet (10 mg) by mouth 2 times a day as needed for muscle spasms.    docusate sodium (Colace) 100 mg capsule Take 1 capsule (100 mg) by mouth twice a day.    Eliquis 5 mg tablet Take 1 tablet (5 mg) by mouth 2 times a day.    famotidine (Pepcid) 20 mg tablet Take 1 tablet (20 mg) by mouth once daily.    folic acid (Folvite) 1 mg tablet Take 1 tablet (1 mg) by mouth once daily.    losartan (Cozaar) 50 mg tablet Take  "1 tablet (50 mg) by mouth once daily.    lubricating eye drops ophthalmic solution Administer 1 drop into both eyes every 4 hours if needed for dry eyes.    metOLazone (Zaroxolyn) 5 mg tablet Take 1 tablet (5 mg) by mouth 1 (one) time per week. Wednesday    multivitamin tablet Take 1 tablet by mouth once daily.    nystatin (Mycostatin) 100,000 unit/gram powder Apply 1 Application topically 2 times a day as needed for rash.    polyethylene glycol (Glycolax, Miralax) 17 gram packet Take 17 g by mouth once daily.    sennosides (Senokot) 8.6 mg tablet Take 1 tablet (8.6 mg) by mouth 2 times a day.    torsemide 40 mg tablet Take 40 mg by mouth once daily.     CURRENT CARDIAC MEDICATIONS:    apixaban (Eliquis) tablet 5 mg, 5 mg, oral, BID    atorvastatin (Lipitor) tablet 20 mg, 20 mg, oral, Nightly    bumetanide (Bumex) tablet 2 mg, 2 mg, oral, Daily    digoxin (Lanoxin) tablet 62.5 mcg, 62.5 mcg, oral, Daily    empagliflozin (Jardiance) tablet 10 mg, 10 mg, oral, Daily    metoprolol succinate XL (Toprol-XL) 24 hr tablet 25 mg, 25 mg, oral, BID    ALLERGIES AND DRUG INTOLERANCES: ACE inhibitors (cough)    FAMILY HISTORY: Not contributory    SOCIAL HISTORY: The patient is apparently  and lives independently.  She has 1 adult child who lives out of state.  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  She has never been a smoker or user of much alcohol.    COMPLETE REVIEW OF SYSTEMS:  See Dr. Sneed's consultation    PHYSICAL EXAM:  /67 (BP Location: Right arm, Patient Position: Lying)   Pulse 79   Temp 36.3 °C (97.3 °F) (Temporal)   Resp 20   Ht 1.6 m (5' 3\")   Wt 149 kg (328 lb 0.7 oz)   SpO2 100%   BMI 58.11 kg/m²   Gen: Pleasant obese black female in no distress; somnulent but oriented x 3  HEENT: Unremarkable  Neck: Stout; no jugular venous distention noted  Cardiac: Irregular rhythm with variable S1, grade I/VI EULOGIO with intact S2  Resp: Clear to auscultation " anteriorly, without wheezes or rales  Abd: Obese but benign, with no tenderness, no masses, and no organomegaly noted  Ext: Peripheral pulses intact; trace bilateral ankle edema  Neuro: Normal gross motor and sensory function; no focal deficits noted  Skin: No lesions noted    EKG's: A tracing from July 2023 showed sinus bradycardia at 57 bpm with a first-degree AV block (GA 0.24 seconds), left anterior fascicular block, and a right bundle branch block.  Tracings from 12/21/2023, however, showed atrial fibrillation with a ventricular response of   bpm with the same bifascicular block pattern.    ECHOCARDIOGRAM (12/24/2023): LVEF only 30-35% with global hypokinesis, borderline left atrial enlargement, and aortic sclerosis without stenosis    LABS:  Lab Results   Component Value Date    GLUCOSE 132 (H) 12/29/2023    CALCIUM 8.3 (L) 12/29/2023     12/29/2023    K 4.2 12/29/2023    CO2 33 (H) 12/29/2023    CL 97 (L) 12/29/2023    BUN 32 (H) 12/29/2023    CREATININE 1.29 (H) 12/29/2023      Lab Results   Component Value Date    WBC 7.0 12/29/2023    HGB 9.0 (L) 12/29/2023    HCT 32.7 (L) 12/29/2023     (H) 12/29/2023     12/29/2023

## 2023-12-29 NOTE — NURSING NOTE
Report was given to Rochelle DONNELLY and the patient was transported to #[id number] via bed on the zoll monitor in stable condition.

## 2023-12-29 NOTE — CARE PLAN
Problem: Skin  Goal: Prevent/minimize sheer/friction injuries  Flowsheets (Taken 12/29/2023 0113)  Prevent/minimize sheer/friction injuries: Turn/reposition every 2 hours/use positioning/transfer devices     Problem: Fall/Injury  Goal: Be free from injury by end of the shift  Outcome: Progressing  Goal: Verbalize understanding of personal risk factors for fall in the hospital  Outcome: Progressing  Goal: Verbalize understanding of risk factor reduction measures to prevent injury from fall in the home  Outcome: Progressing  Goal: Use assistive devices by end of the shift  Outcome: Progressing  Goal: Pace activities to prevent fatigue by end of the shift  Outcome: Progressing     Problem: Heart Failure  Goal: Improved gas exchange this shift  Outcome: Progressing  Goal: Improved urinary output this shift  Outcome: Progressing  Goal: Reduction in peripheral edema within 24 hours  Outcome: Progressing  Goal: Report improvement of dyspnea/breathlessness this shift  Outcome: Progressing  Goal: Weight from fluid excess reduced over 2-3 days, then stabilize  Outcome: Progressing  Goal: Increase self care and/or family involvement in 24 hours  Outcome: Progressing     Problem: Dressings Lower Extremities  Goal: STG - Patient will complete lower body dressing with mod assist with comp strategies PRN  Outcome: Progressing     Problem: Dressing Upper Extremities  Goal: STG - Patient will dress upper body with CGA  Outcome: Progressing     Problem: Skin  Goal: Participates in plan/prevention/treatment measures  Outcome: Progressing  Goal: Prevent/manage excess moisture  Outcome: Progressing  Goal: Prevent/minimize sheer/friction injuries  12/29/2023 0114 by Cecily Fernandez RN  Outcome: Progressing  12/29/2023 0113 by Cecily Fernandez RN  Flowsheets (Taken 12/29/2023 0113)  Prevent/minimize sheer/friction injuries: Turn/reposition every 2 hours/use positioning/transfer devices  Goal: Promote/optimize nutrition  Outcome:  Progressing     Problem: Respiratory  Goal: Clear secretions with interventions this shift  Outcome: Progressing  Goal: Minimize anxiety/maximize coping throughout shift  Outcome: Progressing  Goal: Minimal/no exertional discomfort or dyspnea this shift  Outcome: Progressing  Goal: No signs of respiratory distress (eg. Use of accessory muscles. Peds grunting)  Outcome: Progressing   The patient's goals for the shift include      The clinical goals for the shift include Pt will remain free from fall/injury

## 2023-12-29 NOTE — CONSULTS
"CARDIOLOGY/ELECTROPHYSIOLOGY CONSULTATION    PATIENT NAME: Fifi Jenkins  PATIENT MRN: [id number]  SERVICE DATE:  12/29/2023  SERVICE TIME: 1:12 PM    CONSULTANT: Lamine Savage MD  PRIMARY CARE PHYSICIAN: Aung Jacobs MD  ATTENDING PHYSICIAN: Lamine Savage MD      IMPRESSIONS:  1.  Hypertension, controlled.  2.  Left heart failure on admission, as cause of exertional dyspnea.  She previously was felt to have heart failure with a preserved ejection fraction, but now has a diminished LVEF, perhaps representing a tachycardia-induced cardiomyopathy from rapid ventricular responses to atrial fibrillation over the past month or longer.  3.  Atrial fibrillation, paroxysmal in February 2019, previously treated with AV yair blocking drugs, though these were discontinued prior to admission.  She now appears to be in persistent atrial fibrillation, perhaps for several weeks or months.  The patient has a [id number] score of at least 7 (hypertension, diabetes, CHF, 2 points for prior stroke, and 2 points for age over 75), and she is appropriately anticoagulated with Eliquis.  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  4.  Multiple other medical problems, including obesity, degenerative joint disease, chronic anemia, stage III chronic kidney disease, gastroesophageal reflux disease, hyperlipidemia, and venous insufficiency.    RECOMMENDATIONS:  1.  I agree with Dr. Sneed restoration of sinus rhythm with a cardioversion is indicated.  2.  If the patient has rapid recurrence of atrial fibrillation post-cardioversion, I would then treat her with amiodarone as a \"rhythm control\" agent and perform a repeat cardioversion a few weeks thereafter.  3.  If sinus rhythm absolutely cannot be maintained even on antiarrhythmic therapy, the patient may be considered for simple AV junction ablation and permanent pacemaker " placement, perhaps using a Medtronic Micra VR leadless VVIR pacemaker.    Thank you for this consultation.      SIGNATURE: Lamine Savage MD   OFFICE: [phone]      ADDENDUM:  I assisted Dr. Steve Sneed with a direct-current cardioversion on the patient.  I administered 60 mg of intravenous propofol for deep sedation.  A 200 J shock failed to restore sinus rhythm.  A 300 J shock organized the patient to what appeared to be an atrial tachycardia at 105 bpm for roughly 1 minute, after which sinus bradycardia in the 40s and 50s was noted, with a first-degree AV block and bifascicular block.  This represents a successful direct-current cardioversion.  The patient will be monitored in hospital as she continues treatment for her heart failure.  If she has rapid recurrence of atrial fibrillation in the next several weeks, she will likely be started on amiodarone and undergo a subsequent cardioversion, as noted above.    Lamine Savage MD    ================================================================    REASON FOR CONSULTATION: Persistent atrial fibrillation    HISTORY: Fifi Jenkins is an 88 y.o. -American female, now followed from a cardiology standpoint by Dr. Marcie Terrazas who presented on 12/21/2023 because of progressive dyspnea with exertion and even at rest.  She was found to have left heart failure on admission with a BNP level of 982.  She has been managed by Dr. Steve Sneed with diuretic therapy, but continues to be symptomatic with dyspnea even at rest.  She has been in atrial fibrillation since admission, with a ventricular response originally over 100 bpm, but now with better rate control.  She has been chronically anticoagulated with Eliquis.  Dr. Sneed requested an opinion from Electrophysiology regarding a possible cardioversion and antiarrhythmic therapy.    PAST MEDICAL HISTORY: The patient has a history of chronic hypertension, hyperlipidemia, diabetes, degenerative  joint disease, stage III chronic kidney disease, gastroesophageal reflux disease, chronic obesity, and lower extremity venous insufficiency.  She is not known to have coronary disease with negative nuclear stress tests in December 2015 and February 2019.  Her LVEF has been normal or near-normal in the past, at 40% up to 55%.  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  She has been on Eliquis anticoagulation since then.    PAST SURGICAL HISTORY: Prior surgeries have included bilateral knee replacements, right carpal tunnel surgery, bilateral cataract surgeries, appendectomy, cholecystectomy, ankle surgery, and a right rotator cuff surgery.    OUTPATIENT MEDICATIONS:  Current Outpatient Medications on File Prior to Encounter   Medication Sig    acetaminophen (Tylenol) 325 mg tablet Take 2 tablets (650 mg) by mouth every 4 hours if needed for mild pain (1 - 3).    acetaminophen (Tylenol) 500 mg tablet Take 1 tablet (500 mg) by mouth 2 times a day.    albuterol 90 mcg/actuation inhaler Inhale 2 puffs every 4 hours if needed for wheezing or shortness of breath.    atorvastatin (Lipitor) 20 mg tablet Take 1 tablet (20 mg) by mouth once daily at bedtime.    calcium carbonate (Tums) 200 mg calcium chewable tablet Chew 1 tablet (500 mg) every 4 hours if needed for heartburn.    cyanocobalamin (Vitamin B-12) 250 mcg tablet Take 1 tablet (250 mcg) by mouth once daily.    cyclobenzaprine (Flexeril) 10 mg tablet Take 1 tablet (10 mg) by mouth 2 times a day as needed for muscle spasms.    docusate sodium (Colace) 100 mg capsule Take 1 capsule (100 mg) by mouth twice a day.    Eliquis 5 mg tablet Take 1 tablet (5 mg) by mouth 2 times a day.    famotidine (Pepcid) 20 mg tablet Take 1 tablet (20 mg) by mouth once daily.    folic acid (Folvite) 1 mg tablet Take 1 tablet (1 mg) by mouth once daily.    losartan (Cozaar) 50 mg tablet Take  "1 tablet (50 mg) by mouth once daily.    lubricating eye drops ophthalmic solution Administer 1 drop into both eyes every 4 hours if needed for dry eyes.    metOLazone (Zaroxolyn) 5 mg tablet Take 1 tablet (5 mg) by mouth 1 (one) time per week. Wednesday    multivitamin tablet Take 1 tablet by mouth once daily.    nystatin (Mycostatin) 100,000 unit/gram powder Apply 1 Application topically 2 times a day as needed for rash.    polyethylene glycol (Glycolax, Miralax) 17 gram packet Take 17 g by mouth once daily.    sennosides (Senokot) 8.6 mg tablet Take 1 tablet (8.6 mg) by mouth 2 times a day.    torsemide 40 mg tablet Take 40 mg by mouth once daily.     CURRENT CARDIAC MEDICATIONS:    apixaban (Eliquis) tablet 5 mg, 5 mg, oral, BID    atorvastatin (Lipitor) tablet 20 mg, 20 mg, oral, Nightly    bumetanide (Bumex) tablet 2 mg, 2 mg, oral, Daily    digoxin (Lanoxin) tablet 62.5 mcg, 62.5 mcg, oral, Daily    empagliflozin (Jardiance) tablet 10 mg, 10 mg, oral, Daily    metoprolol succinate XL (Toprol-XL) 24 hr tablet 25 mg, 25 mg, oral, BID    ALLERGIES AND DRUG INTOLERANCES: ACE inhibitors (cough)    FAMILY HISTORY: Not contributory    SOCIAL HISTORY: The patient is apparently  and lives independently.  She has 1 adult child who lives out of state.  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  She has never been a smoker or user of much alcohol.    COMPLETE REVIEW OF SYSTEMS:  See Dr. Sneed's consultation    PHYSICAL EXAM:  /67 (BP Location: Right arm, Patient Position: Lying)   Pulse 79   Temp 36.3 °C (97.3 °F) (Temporal)   Resp 20   Ht 1.6 m (5' 3\")   Wt 149 kg (328 lb 0.7 oz)   SpO2 100%   BMI 58.11 kg/m²   Gen: Pleasant obese black female in no distress; somnulent but oriented x 3  HEENT: Unremarkable  Neck: Stout; no jugular venous distention noted  Cardiac: Irregular rhythm with variable S1, grade I/VI EULOGIO with intact S2  Resp: Clear to auscultation " anteriorly, without wheezes or rales  Abd: Obese but benign, with no tenderness, no masses, and no organomegaly noted  Ext: Peripheral pulses intact; trace bilateral ankle edema  Neuro: Normal gross motor and sensory function; no focal deficits noted  Skin: No lesions noted    EKG's: A tracing from July 2023 showed sinus bradycardia at 57 bpm with a first-degree AV block (OH 0.24 seconds), left anterior fascicular block, and a right bundle branch block.  Tracings from 12/21/2023, however, showed atrial fibrillation with a ventricular response of   bpm with the same bifascicular block pattern.    ECHOCARDIOGRAM (12/24/2023): LVEF only 30-35% with global hypokinesis, borderline left atrial enlargement, and aortic sclerosis without stenosis    LABS:  Lab Results   Component Value Date    GLUCOSE 132 (H) 12/29/2023    CALCIUM 8.3 (L) 12/29/2023     12/29/2023    K 4.2 12/29/2023    CO2 33 (H) 12/29/2023    CL 97 (L) 12/29/2023    BUN 32 (H) 12/29/2023    CREATININE 1.29 (H) 12/29/2023      Lab Results   Component Value Date    WBC 7.0 12/29/2023    HGB 9.0 (L) 12/29/2023    HCT 32.7 (L) 12/29/2023     (H) 12/29/2023     12/29/2023

## 2023-12-29 NOTE — PROGRESS NOTES
Physical Therapy                 Therapy Communication Note    Patient Name: Fifi Jenkins  MRN: [id number]  Today's Date: 12/29/2023     Discipline: Physical Therapy    Missed Visit Reason: Missed Visit Reason: Patient in a medical procedure    Missed Time: Attempt    Comment: Pt in cardio version at this time. Will continue to see per POC.

## 2023-12-29 NOTE — PROCEDURES
Cardioversion      Case reviewed discussed with Dr Hu, see his EP consult note  Recommendation by ep to proceed with cardioversion  If that fails consider amiodarone loading and 2nd attempt at cardioversion later  On or preferably consider av node ablation and micro pacemaker insertion  FELICIANO not done as she says fully compliant on eliquis since CVA 2019    Seen by Dr Hu and myself today, case fully reviewed discussed  Patient gave consent twice witnessed by RN staff and DR Hu  Also consent last night and earlier this am witnessed by RN on 3 s    Brevital 35 given  One shock 200 J failed to convert  2nd shock 300 J converted to SR     In recovery periods of apnea, intermitent junctional escape and intermittent 4 to 5 sec apnea  After 0.5 mg atopine and period of jaw lift her respiratory status improved and sinus recovery in the 42 to 50 range    Patient will be monitored off digoxin  metoprol in stepdown  If AF returns will consider av node ablation and micro pacemaker  Neuro checks negative so far will monitor    Steve Sneed MD [facility]

## 2023-12-29 NOTE — PROGRESS NOTES
Spoke with patient at the bedside and she thinks she will need to go to skilled rehab prior to returning to her apartment. She asked me to call her POACeleste.  Voice mail left for her to call me regarding discharge planning.  Patient is scheduled for a cardioversion today    1100: Spoke with NELSON Alonso by phone and she confirmed patient was at [facility] prior to coming to hospital. She would like a new SNF list emailed to :  [email or web address]. She does not want her to go to [facility] list emailed.     1330: Referral sent to [facility], waiting for response

## 2023-12-30 ENCOUNTER — APPOINTMENT (OUTPATIENT)
Dept: CARDIOLOGY | Facility: HOSPITAL | Age: 88
DRG: 242 | End: 2023-12-30
Payer: COMMERCIAL

## 2023-12-30 LAB
ANION GAP SERPL CALC-SCNC: 12 MMOL/L (ref 10–20)
ANION GAP SERPL CALC-SCNC: 14 MMOL/L (ref 10–20)
BUN SERPL-MCNC: 39 MG/DL (ref 6–23)
BUN SERPL-MCNC: 43 MG/DL (ref 6–23)
CALCIUM SERPL-MCNC: 8.2 MG/DL (ref 8.6–10.3)
CALCIUM SERPL-MCNC: 8.5 MG/DL (ref 8.6–10.3)
CHLORIDE SERPL-SCNC: 93 MMOL/L (ref 98–107)
CHLORIDE SERPL-SCNC: 94 MMOL/L (ref 98–107)
CO2 SERPL-SCNC: 35 MMOL/L (ref 21–32)
CO2 SERPL-SCNC: 36 MMOL/L (ref 21–32)
CREAT SERPL-MCNC: 1.76 MG/DL (ref 0.5–1.05)
CREAT SERPL-MCNC: 1.92 MG/DL (ref 0.5–1.05)
ERYTHROCYTE [DISTWIDTH] IN BLOOD BY AUTOMATED COUNT: 17.1 % (ref 11.5–14.5)
GFR SERPL CREATININE-BSD FRML MDRD: 25 ML/MIN/1.73M*2
GFR SERPL CREATININE-BSD FRML MDRD: 28 ML/MIN/1.73M*2
GLUCOSE BLD MANUAL STRIP-MCNC: 116 MG/DL (ref 74–99)
GLUCOSE BLD MANUAL STRIP-MCNC: 141 MG/DL (ref 74–99)
GLUCOSE SERPL-MCNC: 112 MG/DL (ref 74–99)
GLUCOSE SERPL-MCNC: 118 MG/DL (ref 74–99)
HCT VFR BLD AUTO: 31.4 % (ref 36–46)
HGB BLD-MCNC: 8.8 G/DL (ref 12–16)
MAGNESIUM SERPL-MCNC: 2.11 MG/DL (ref 1.6–2.4)
MCH RBC QN AUTO: 28.5 PG (ref 26–34)
MCHC RBC AUTO-ENTMCNC: 28 G/DL (ref 32–36)
MCV RBC AUTO: 102 FL (ref 80–100)
NRBC BLD-RTO: 0 /100 WBCS (ref 0–0)
PLATELET # BLD AUTO: 171 X10*3/UL (ref 150–450)
POTASSIUM SERPL-SCNC: 4.3 MMOL/L (ref 3.5–5.3)
POTASSIUM SERPL-SCNC: 4.4 MMOL/L (ref 3.5–5.3)
RBC # BLD AUTO: 3.09 X10*6/UL (ref 4–5.2)
SODIUM SERPL-SCNC: 138 MMOL/L (ref 136–145)
SODIUM SERPL-SCNC: 138 MMOL/L (ref 136–145)
WBC # BLD AUTO: 6.6 X10*3/UL (ref 4.4–11.3)

## 2023-12-30 PROCEDURE — 2500000001 HC RX 250 WO HCPCS SELF ADMINISTERED DRUGS (ALT 637 FOR MEDICARE OP): Performed by: INTERNAL MEDICINE

## 2023-12-30 PROCEDURE — 2500000005 HC RX 250 GENERAL PHARMACY W/O HCPCS: Performed by: INTERNAL MEDICINE

## 2023-12-30 PROCEDURE — 93005 ELECTROCARDIOGRAM TRACING: CPT

## 2023-12-30 PROCEDURE — 36415 COLL VENOUS BLD VENIPUNCTURE: CPT | Performed by: INTERNAL MEDICINE

## 2023-12-30 PROCEDURE — 2500000004 HC RX 250 GENERAL PHARMACY W/ HCPCS (ALT 636 FOR OP/ED): Performed by: NURSE PRACTITIONER

## 2023-12-30 PROCEDURE — 82947 ASSAY GLUCOSE BLOOD QUANT: CPT

## 2023-12-30 PROCEDURE — 2060000001 HC INTERMEDIATE ICU ROOM DAILY

## 2023-12-30 PROCEDURE — 80048 BASIC METABOLIC PNL TOTAL CA: CPT | Performed by: INTERNAL MEDICINE

## 2023-12-30 PROCEDURE — 80048 BASIC METABOLIC PNL TOTAL CA: CPT | Performed by: NURSE PRACTITIONER

## 2023-12-30 PROCEDURE — 2500000002 HC RX 250 W HCPCS SELF ADMINISTERED DRUGS (ALT 637 FOR MEDICARE OP, ALT 636 FOR OP/ED): Performed by: INTERNAL MEDICINE

## 2023-12-30 PROCEDURE — 85027 COMPLETE CBC AUTOMATED: CPT | Performed by: INTERNAL MEDICINE

## 2023-12-30 PROCEDURE — 99232 SBSQ HOSP IP/OBS MODERATE 35: CPT | Performed by: INTERNAL MEDICINE

## 2023-12-30 PROCEDURE — 36415 COLL VENOUS BLD VENIPUNCTURE: CPT | Performed by: NURSE PRACTITIONER

## 2023-12-30 PROCEDURE — 94640 AIRWAY INHALATION TREATMENT: CPT

## 2023-12-30 PROCEDURE — 83735 ASSAY OF MAGNESIUM: CPT | Performed by: INTERNAL MEDICINE

## 2023-12-30 PROCEDURE — 2500000004 HC RX 250 GENERAL PHARMACY W/ HCPCS (ALT 636 FOR OP/ED): Performed by: INTERNAL MEDICINE

## 2023-12-30 RX ORDER — SODIUM CHLORIDE, SODIUM LACTATE, POTASSIUM CHLORIDE, CALCIUM CHLORIDE 600; 310; 30; 20 MG/100ML; MG/100ML; MG/100ML; MG/100ML
50 INJECTION, SOLUTION INTRAVENOUS CONTINUOUS
Status: ACTIVE | OUTPATIENT
Start: 2023-12-30 | End: 2023-12-30

## 2023-12-30 RX ADMIN — EMPAGLIFLOZIN 10 MG: 10 TABLET, FILM COATED ORAL at 11:44

## 2023-12-30 RX ADMIN — ACETAMINOPHEN 487.5 MG: 325 TABLET ORAL at 22:31

## 2023-12-30 RX ADMIN — APIXABAN 5 MG: 5 TABLET, FILM COATED ORAL at 11:57

## 2023-12-30 RX ADMIN — SENNOSIDES 8.6 MG: 8.6 TABLET, FILM COATED ORAL at 11:47

## 2023-12-30 RX ADMIN — BUMETANIDE 2 MG: 1 TABLET ORAL at 11:44

## 2023-12-30 RX ADMIN — SODIUM CHLORIDE, POTASSIUM CHLORIDE, SODIUM LACTATE AND CALCIUM CHLORIDE 50 ML/HR: 600; 310; 30; 20 INJECTION, SOLUTION INTRAVENOUS at 12:15

## 2023-12-30 RX ADMIN — ALBUTEROL SULFATE 2.5 MG: 2.5 SOLUTION RESPIRATORY (INHALATION) at 11:44

## 2023-12-30 RX ADMIN — ATORVASTATIN CALCIUM 20 MG: 20 TABLET, FILM COATED ORAL at 22:31

## 2023-12-30 RX ADMIN — CEFTRIAXONE SODIUM 1 G: 1 INJECTION, SOLUTION INTRAVENOUS at 12:15

## 2023-12-30 RX ADMIN — Medication 3 L/MIN: at 00:25

## 2023-12-30 RX ADMIN — DOCUSATE SODIUM 100 MG: 100 CAPSULE, LIQUID FILLED ORAL at 11:47

## 2023-12-30 RX ADMIN — ACETAMINOPHEN 487.5 MG: 325 TABLET ORAL at 11:44

## 2023-12-30 RX ADMIN — CALCIUM CARBONATE (ANTACID) CHEW TAB 500 MG 500 MG: 500 CHEW TAB at 00:23

## 2023-12-30 ASSESSMENT — PAIN SCALES - GENERAL
PAINLEVEL_OUTOF10: 0 - NO PAIN

## 2023-12-30 ASSESSMENT — PAIN - FUNCTIONAL ASSESSMENT
PAIN_FUNCTIONAL_ASSESSMENT: 0-10

## 2023-12-30 NOTE — CARE PLAN
Brief nephrology follow-up note:  Attempted to see the patient for follow-up however patient was off the floor for a procedure.

## 2023-12-30 NOTE — PROGRESS NOTES
Subjective Data:  Post cardioversion protracted long recovery.  Where she appeared unstable for 10 minutes then recovered slow clearance of Brevital despite half dose because of her renal status most likely last night she had recurrent episodes of A-fib RVR in the 130s with postconversion consecutive 3.6-second asystolic pauses then went back into sinus bradycardia  Today she show sinus bradycardia first-degree block right bundle branch block off dig off beta-blockers with reported dizziness  Since she is not on beta-blockers needed to treat her cardiomyopathy adequately the best treatment may indeed be as EP suggested AV node modification ablation and Micra pacemaker  The issue includes how long to hold Eliquis before Micra pacemaker given her morbid obesity increased risk of access site bleeding  While conduction system pacing could be considered she has right bundle 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 bundle branch pattern and given her risk of complications with her emphysema and her quality of life Micra may be preferable  While CAD has not been excluded as to the drop in ejection fraction stabilization of rhythm for potential tachycardia induced cardiomyopathy and proper medical management GDMT which would include either Toprol or carvedilol after rhythm is stabilized should be tried first if her EF remains low other options considered in addition she is increased risk for contrast-induced nephropathy  My recommendation is to discuss with the EP regarding modification ablation and Micra pacer if she has more lengthy asystolic pauses then a decision regarding temporary pacing can be made but this might enhance the risk of asystole showed that temporary lead move will hold beta-blockers for now    Overnight Events:    A-fib and postconversion sequential 3.6 asystolic pauses     Objective Data:  Last Recorded Vitals:  Vitals:    12/30/23 0022 12/30/23 0400 12/30/23 0423 12/30/23 0600   BP: 104/50  102/72    BP Location: Right arm   Right arm    Patient Position: Lying  Lying    Pulse: 58  54 (!) 48   Resp: 18  18 17   Temp: 36.5 °C (97.7 °F)  36.5 °C (97.7 °F)    TempSrc: Oral  Temporal    SpO2: 95% 95% 100%    Weight:    142 kg (312 lb 13.3 oz)   Height:           Last Labs:  CBC - 12/30/2023:  5:27 AM  6.6 8.8 171    31.4      CMP - 12/30/2023:  5:27 AM  8.5 6.2 11 --- 0.8   _ 4.1 9 91      PTT - No results in last year.  _   _ _     TROPHS   Date/Time Value Ref Range Status   12/21/2023 01:32 PM 9 0 - 13 ng/L Final   12/21/2023 11:58 AM 8 0 - 13 ng/L Final     BNP   Date/Time Value Ref Range Status   12/21/2023 11:58  0 - 99 pg/mL Final   03/06/2019 04:50  0 - 99 pg/mL Final     Comment:     .  <100 pg/mL - Heart failure unlikely  100-299 pg/mL - Intermediate probability of acute heart  .               failure exacerbation. Correlate with clinical  .               context and patient history.    >=300 pg/mL - Heart Failure likely. Correlate with clinical  .               context and patient history.  BNP testing is performed using different testing   methodology at [facility] than at other   [facility] hospitals. Direct result comparisons should   only be made within the same method.     03/05/2019 08:18  0 - 99 pg/mL Final     Comment:     .  <100 pg/mL - Heart failure unlikely  100-299 pg/mL - Intermediate probability of acute heart  .               failure exacerbation. Correlate with clinical  .               context and patient history.    >=300 pg/mL - Heart Failure likely. Correlate with clinical  .               context and patient history.  BNP testing is performed using different testing   methodology at [facility] than at other   system hospitals. Direct result comparisons should   only be made within the same method.       HGBA1C   Date/Time Value Ref Range Status   10/02/2023 03:06 PM 6.3 4.3 - 5.6 % Final     Comment:     American Diabetes Association guidelines indicate that patients  with HgbA1c in the range 5.7-6.4% are at increased risk for development of diabetes, and intervention by lifestyle modification may be beneficial. HgbA1c greater or equal to 6.5% is considered diagnostic of diabetes.   03/16/2019 05:30 AM 5.6 % Final     Comment:          Diagnosis of Diabetes-Adults   Non-Diabetic: < or = 5.6%   Increased risk for developing diabetes: 5.7-6.4%   Diagnostic of diabetes: > or = 6.5%  .       Monitoring of Diabetes                Age (y)     Therapeutic Goal (%)   Adults:          >18           <7.0   Pediatrics:    13-18           <7.5                   7-12           <8.0                   0- 6            7.5-8.5   American Diabetes Association. Diabetes Care 33(S1), Jan 2010.     02/23/2019 07:30 PM 6.0 % Final     Comment:          Diagnosis of Diabetes-Adults   Non-Diabetic: < or = 5.6%   Increased risk for developing diabetes: 5.7-6.4%   Diagnostic of diabetes: > or = 6.5%  .       Monitoring of Diabetes                Age (y)     Therapeutic Goal (%)   Adults:          >18           <7.0   Pediatrics:    13-18           <7.5                   7-12           <8.0                   0- 6            7.5-8.5   American Diabetes Association. Diabetes Care 33(S1), Jan 2010.       VLDL   Date/Time Value Ref Range Status   08/23/2023 01:25 PM 12 0 - 40 mg/dL Final   02/23/2019 07:30 PM 16 0 - 40 mg/dL Final      Last I/O:  I/O last 3 completed shifts:  In: 350 (2.5 mL/kg) [P.O.:350]  Out: 1450 (10.2 mL/kg) [Urine:1450 (0.3 mL/kg/hr)]  Weight: 141.9 kg     Past Cardiology Tests (Last 3 Years):  EKG:  ECG 12 lead 12/21/2023 (Preliminary)      ECG 12 lead 12/21/2023 (Preliminary)      ECG 12 lead 12/21/2023    Echo:  Transthoracic Echo (TTE) Limited 12/29/2023      Transthoracic Echo (TTE) Complete 12/24/2023    Ejection Fractions:  EF   Date/Time Value Ref Range Status   12/29/2023 02:53 PM 37     12/24/2023 02:23 PM 33       Cath:  No results found for this or any previous visit from the  past 1095 days.    Stress Test:  No results found for this or any previous visit from the past 1095 days.    Cardiac Imaging:  No results found for this or any previous visit from the past 1095 days.      Inpatient Medications:  Scheduled medications   Medication Dose Route Frequency    acetaminophen  487.5 mg oral BID    apixaban  5 mg oral BID    atorvastatin  20 mg oral Nightly    bumetanide  2 mg oral Daily    cefTRIAXone  1 g intravenous q24h    docusate sodium  100 mg oral BID    empagliflozin  10 mg oral Daily    insulin lispro  0-5 Units subcutaneous 4x daily    LORazepam  0.25 mg oral Once    perflutren lipid microspheres  0.5-10 mL of dilution intravenous Once in imaging    perflutren protein A microsphere  0.5 mL intravenous Once in imaging    polyethylene glycol  17 g oral Daily    sennosides  1 tablet oral BID    sulfur hexafluoride microsphr  2 mL intravenous Once in imaging     PRN medications   Medication    acetaminophen    albuterol    butalbital-acetaminophen-caff    calcium carbonate    cyclobenzaprine    dextrose 10 % in water (D10W)    dextrose    glucagon    lubricating eye drops    oxygen    propofol    traMADol     Continuous Medications   Medication Dose Last Rate     Results for orders placed or performed during the hospital encounter of 12/21/23 (from the past 96 hour(s))   POCT GLUCOSE   Result Value Ref Range    POCT Glucose 140 (H) 74 - 99 mg/dL   POCT GLUCOSE   Result Value Ref Range    POCT Glucose 131 (H) 74 - 99 mg/dL   POCT GLUCOSE   Result Value Ref Range    POCT Glucose 119 (H) 74 - 99 mg/dL   POCT GLUCOSE   Result Value Ref Range    POCT Glucose 99 74 - 99 mg/dL   Magnesium   Result Value Ref Range    Magnesium 2.16 1.60 - 2.40 mg/dL   CBC   Result Value Ref Range    WBC 5.9 4.4 - 11.3 x10*3/uL    nRBC 0.0 0.0 - 0.0 /100 WBCs    RBC 3.08 (L) 4.00 - 5.20 x10*6/uL    Hemoglobin 8.7 (L) 12.0 - 16.0 g/dL    Hematocrit 32.0 (L) 36.0 - 46.0 %     (H) 80 - 100 fL    MCH 28.2  26.0 - 34.0 pg    MCHC 27.2 (L) 32.0 - 36.0 g/dL    RDW 16.6 (H) 11.5 - 14.5 %    Platelets 179 150 - 450 x10*3/uL   Basic Metabolic Panel   Result Value Ref Range    Glucose 103 (H) 74 - 99 mg/dL    Sodium 141 136 - 145 mmol/L    Potassium 4.4 3.5 - 5.3 mmol/L    Chloride 100 98 - 107 mmol/L    Bicarbonate 34 (H) 21 - 32 mmol/L    Anion Gap 11 10 - 20 mmol/L    Urea Nitrogen 23 6 - 23 mg/dL    Creatinine 1.12 (H) 0.50 - 1.05 mg/dL    eGFR 48 (L) >60 mL/min/1.73m*2    Calcium 8.5 (L) 8.6 - 10.3 mg/dL   Urinalysis with Reflex Culture and Microscopic   Result Value Ref Range    Color, Urine Airam (N) Straw, Yellow    Appearance, Urine Hazy (N) Clear    Specific Gravity, Urine 1.020 1.005 - 1.035    pH, Urine 6.0 5.0, 5.5, 6.0, 6.5, 7.0, 7.5, 8.0    Protein, Urine 100 (2+) (N) NEGATIVE mg/dL    Glucose, Urine >=500 (3+) (A) NEGATIVE mg/dL    Blood, Urine MODERATE (2+) (A) NEGATIVE    Ketones, Urine NEGATIVE NEGATIVE mg/dL    Bilirubin, Urine NEGATIVE NEGATIVE    Urobilinogen, Urine 4.0 (N) <2.0 mg/dL    Nitrite, Urine NEGATIVE NEGATIVE    Leukocyte Esterase, Urine LARGE (3+) (A) NEGATIVE   Extra Urine Gray Tube   Result Value Ref Range    Extra Tube Hold for add-ons.    Microscopic Only, Urine   Result Value Ref Range    WBC, Urine 11-20 (A) 1-5, NONE /HPF    RBC, Urine >20 (A) NONE, 1-2, 3-5 /HPF    Squamous Epithelial Cells, Urine 1-9 (SPARSE) Reference range not established. /HPF    Bacteria, Urine 4+ (A) NONE SEEN /HPF   Urine Culture    Specimen: Clean Catch/Voided; Urine   Result Value Ref Range    Urine Culture >100,000 Escherichia coli (A)        Susceptibility    Escherichia coli - MICROSCAN     Ampicillin  Susceptible      Cefazolin  Susceptible      Cefazolin (uncomplicated UTIs only)  Susceptible      Ciprofloxacin  Susceptible      Gentamicin  Susceptible      Nitrofurantoin  Susceptible      Piperacillin/Tazobactam  Susceptible      Trimethoprim/Sulfamethoxazole  Susceptible    POCT GLUCOSE   Result Value  Ref Range    POCT Glucose 125 (H) 74 - 99 mg/dL   POCT GLUCOSE   Result Value Ref Range    POCT Glucose 105 (H) 74 - 99 mg/dL   POCT GLUCOSE   Result Value Ref Range    POCT Glucose 123 (H) 74 - 99 mg/dL   Magnesium   Result Value Ref Range    Magnesium 2.14 1.60 - 2.40 mg/dL   CBC   Result Value Ref Range    WBC 5.7 4.4 - 11.3 x10*3/uL    nRBC 0.0 0.0 - 0.0 /100 WBCs    RBC 2.99 (L) 4.00 - 5.20 x10*6/uL    Hemoglobin 8.6 (L) 12.0 - 16.0 g/dL    Hematocrit 31.2 (L) 36.0 - 46.0 %     (H) 80 - 100 fL    MCH 28.8 26.0 - 34.0 pg    MCHC 27.6 (L) 32.0 - 36.0 g/dL    RDW 16.7 (H) 11.5 - 14.5 %    Platelets 163 150 - 450 x10*3/uL   Basic Metabolic Panel   Result Value Ref Range    Glucose 92 74 - 99 mg/dL    Sodium 141 136 - 145 mmol/L    Potassium 4.3 3.5 - 5.3 mmol/L    Chloride 99 98 - 107 mmol/L    Bicarbonate 37 (H) 21 - 32 mmol/L    Anion Gap 9 (L) 10 - 20 mmol/L    Urea Nitrogen 25 (H) 6 - 23 mg/dL    Creatinine 1.23 (H) 0.50 - 1.05 mg/dL    eGFR 43 (L) >60 mL/min/1.73m*2    Calcium 8.3 (L) 8.6 - 10.3 mg/dL   POCT GLUCOSE   Result Value Ref Range    POCT Glucose 94 74 - 99 mg/dL   POCT GLUCOSE   Result Value Ref Range    POCT Glucose 119 (H) 74 - 99 mg/dL   POCT GLUCOSE   Result Value Ref Range    POCT Glucose 122 (H) 74 - 99 mg/dL   POCT GLUCOSE   Result Value Ref Range    POCT Glucose 136 (H) 74 - 99 mg/dL   Magnesium   Result Value Ref Range    Magnesium 1.98 1.60 - 2.40 mg/dL   CBC   Result Value Ref Range    WBC 7.0 4.4 - 11.3 x10*3/uL    nRBC 0.0 0.0 - 0.0 /100 WBCs    RBC 3.20 (L) 4.00 - 5.20 x10*6/uL    Hemoglobin 9.0 (L) 12.0 - 16.0 g/dL    Hematocrit 32.7 (L) 36.0 - 46.0 %     (H) 80 - 100 fL    MCH 28.1 26.0 - 34.0 pg    MCHC 27.5 (L) 32.0 - 36.0 g/dL    RDW 16.7 (H) 11.5 - 14.5 %    Platelets 163 150 - 450 x10*3/uL   Basic Metabolic Panel   Result Value Ref Range    Glucose 132 (H) 74 - 99 mg/dL    Sodium 138 136 - 145 mmol/L    Potassium 4.2 3.5 - 5.3 mmol/L    Chloride 97 (L) 98 - 107  mmol/L    Bicarbonate 33 (H) 21 - 32 mmol/L    Anion Gap 12 10 - 20 mmol/L    Urea Nitrogen 32 (H) 6 - 23 mg/dL    Creatinine 1.29 (H) 0.50 - 1.05 mg/dL    eGFR 40 (L) >60 mL/min/1.73m*2    Calcium 8.3 (L) 8.6 - 10.3 mg/dL   POCT GLUCOSE   Result Value Ref Range    POCT Glucose 129 (H) 74 - 99 mg/dL   POCT GLUCOSE   Result Value Ref Range    POCT Glucose 128 (H) 74 - 99 mg/dL   Transthoracic Echo (TTE) Limited   Result Value Ref Range    LV biplane EF 37     LVIDd 5.40     LV A4C EF 34.3    POCT GLUCOSE   Result Value Ref Range    POCT Glucose 96 74 - 99 mg/dL   POCT GLUCOSE   Result Value Ref Range    POCT Glucose 144 (H) 74 - 99 mg/dL   Magnesium   Result Value Ref Range    Magnesium 2.11 1.60 - 2.40 mg/dL   CBC   Result Value Ref Range    WBC 6.6 4.4 - 11.3 x10*3/uL    nRBC 0.0 0.0 - 0.0 /100 WBCs    RBC 3.09 (L) 4.00 - 5.20 x10*6/uL    Hemoglobin 8.8 (L) 12.0 - 16.0 g/dL    Hematocrit 31.4 (L) 36.0 - 46.0 %     (H) 80 - 100 fL    MCH 28.5 26.0 - 34.0 pg    MCHC 28.0 (L) 32.0 - 36.0 g/dL    RDW 17.1 (H) 11.5 - 14.5 %    Platelets 171 150 - 450 x10*3/uL   Basic Metabolic Panel   Result Value Ref Range    Glucose 112 (H) 74 - 99 mg/dL    Sodium 138 136 - 145 mmol/L    Potassium 4.4 3.5 - 5.3 mmol/L    Chloride 93 (L) 98 - 107 mmol/L    Bicarbonate 35 (H) 21 - 32 mmol/L    Anion Gap 14 10 - 20 mmol/L    Urea Nitrogen 39 (H) 6 - 23 mg/dL    Creatinine 1.76 (H) 0.50 - 1.05 mg/dL    eGFR 28 (L) >60 mL/min/1.73m*2    Calcium 8.5 (L) 8.6 - 10.3 mg/dL        Physical Exam:  ubjective:   Examination:   General Examination:   General Appearance: Well developed, well nourished, in no acute distress.  Morbidly obese  Head: normocephalic, atraumatic   Eyes: Anicteric sclera. Pupils are equally round and reactive to light.  Extraocular movements are intact.    Ears: normal   Oral: Cavity: mucosa moist.   Throat: clear.   Neck/Thyroid: neck supple, full range of motion, no cervical lymphadenopathy.   Skin: warm and dry,  no suspicious lesions.    Heart: regular rate and rhythm, S1, S2 normal, no murmurs.   Lungs: clear to auscultation bilaterally.   Abdomen: soft, non-tender, non-distended, bowl sound present, normal.   Extremities: no clubbing, no cyanosis, 2+  Neuro: non-focal, motor strength normal upper lower extremities, sensory exam intact.  Past stroke     Assessment/Plan   I48.91 Atrial fibrillation not present prior to this admission consideration of EP opinion regarding cardioversion, paroxysmal A-fib in the past on Eliquis with CVA in 2019  I42.9 HFrEF previously HFpEF followed by Dr. 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        Steve Sneed MD

## 2023-12-30 NOTE — CARE PLAN
Pt remains in sinus bradycardia to atrial fibrillation, heart rates variable 46-130s. Atrial fibrillation bouts are short and pt returns to sinus rhythm independent without interventions, pt remains asymptomatic with rapid afib. Call to Dr Savage this shift without return call.

## 2023-12-30 NOTE — PROGRESS NOTES
Internal Medicine Progress Note    Patient Name: Fifi Jenkins          MRN: [id number]  Today's Date: December 30, 2023          Attending: Nick Montaño MD    Subjective:  Patient was seen and examined at bedside     Review Of Systems:  GENERAL: Generalized weakness  CARDIOVASCULAR: Negative for chest pain  GI: No nausea, vomiting, or diarrhea    Objective:  Vitals:    12/30/23 0022 12/30/23 0400 12/30/23 0423 12/30/23 0600   BP: 104/50  102/72    BP Location: Right arm  Right arm    Patient Position: Lying  Lying    Pulse: 58  54 (!) 48   Resp: 18  18 17   Temp: 36.5 °C (97.7 °F)  36.5 °C (97.7 °F)    TempSrc: Oral  Temporal    SpO2: 95% 95% 100%    Weight:    142 kg (312 lb 13.3 oz)   Height:           Physical Exam:   General appearance: Alert, in no acute distress  Lungs: diminished breath sounds  Heart: Tachycardic without murmur  Abdomen: Soft, non-tender  Neuro: Alert oriented x3, no focal deficit.    Labs:  Results for orders placed or performed during the hospital encounter of 12/21/23 (from the past 24 hour(s))   POCT GLUCOSE   Result Value Ref Range    POCT Glucose 128 (H) 74 - 99 mg/dL   Transthoracic Echo (TTE) Limited   Result Value Ref Range    LV biplane EF 37     LVIDd 5.40     LV A4C EF 34.3    POCT GLUCOSE   Result Value Ref Range    POCT Glucose 96 74 - 99 mg/dL   POCT GLUCOSE   Result Value Ref Range    POCT Glucose 144 (H) 74 - 99 mg/dL   Magnesium   Result Value Ref Range    Magnesium 2.11 1.60 - 2.40 mg/dL   CBC   Result Value Ref Range    WBC 6.6 4.4 - 11.3 x10*3/uL    nRBC 0.0 0.0 - 0.0 /100 WBCs    RBC 3.09 (L) 4.00 - 5.20 x10*6/uL    Hemoglobin 8.8 (L) 12.0 - 16.0 g/dL    Hematocrit 31.4 (L) 36.0 - 46.0 %     (H) 80 - 100 fL    MCH 28.5 26.0 - 34.0 pg    MCHC 28.0 (L) 32.0 - 36.0 g/dL    RDW 17.1 (H) 11.5 - 14.5 %    Platelets 171 150 - 450 x10*3/uL   Basic Metabolic Panel   Result Value Ref Range    Glucose 112 (H) 74 - 99 mg/dL    Sodium 138 136 - 145 mmol/L    Potassium 4.4 3.5  - 5.3 mmol/L    Chloride 93 (L) 98 - 107 mmol/L    Bicarbonate 35 (H) 21 - 32 mmol/L    Anion Gap 14 10 - 20 mmol/L    Urea Nitrogen 39 (H) 6 - 23 mg/dL    Creatinine 1.76 (H) 0.50 - 1.05 mg/dL    eGFR 28 (L) >60 mL/min/1.73m*2    Calcium 8.5 (L) 8.6 - 10.3 mg/dL       Medications:  Scheduled medications  acetaminophen, 487.5 mg, oral, BID  apixaban, 5 mg, oral, BID  atorvastatin, 20 mg, oral, Nightly  bumetanide, 2 mg, oral, Daily  cefTRIAXone, 1 g, intravenous, q24h  docusate sodium, 100 mg, oral, BID  empagliflozin, 10 mg, oral, Daily  insulin lispro, 0-5 Units, subcutaneous, 4x daily  LORazepam, 0.25 mg, oral, Once  perflutren lipid microspheres, 0.5-10 mL of dilution, intravenous, Once in imaging  perflutren protein A microsphere, 0.5 mL, intravenous, Once in imaging  polyethylene glycol, 17 g, oral, Daily  sennosides, 1 tablet, oral, BID  sulfur hexafluoride microsphr, 2 mL, intravenous, Once in imaging      Continuous medications     PRN medications  PRN medications: acetaminophen, albuterol, butalbital-acetaminophen-caff, calcium carbonate, cyclobenzaprine, dextrose 10 % in water (D10W), dextrose, glucagon, lubricating eye drops, oxygen, propofol, traMADol      Assessment/Plan:  Medical Problems       Problem List       * (Principal) Acute on chronic heart failure with reducedejection fraction (CMS/HCC)    Patient was started on Bumex  Continue Jardiance      Paroxysmal atrial fibrillation (CMS/[facility])     S/P cardioversion  Continue Eliquis  Cardiology is following         Hyperlipidemia     Continue atorvastatin         Acute respiratory failure with hypoxia (CMS/[facility])     Continue treating heart failure and oxygen supplement         Stage 3 chronic kidney disease (CMS/[facility])  Acute kidney injury     Patient developed acute kidney injury  Yesterday patient had episode of hypotension  Will give gentle hydration I will monitor kidney function       Type 2 diabetes mellitus with chronic kidney disease, without  "long-term current use of insulin (CMS/[facility])     Sliding scall insulin     Generalized weakness  PT/OT          Discussed with patient, RN    Nick Montaño MD   Date: 12/30/23  Time: 10:50 AM    This note was partially created using voice recognition software and is inherently subject to errors including those of syntax and \"sound-alike\" substitutions which may escape proofreading. In such instances, original meaning may be extrapolated by contextual derivation  "

## 2023-12-31 LAB
ANION GAP SERPL CALC-SCNC: 12 MMOL/L (ref 10–20)
BUN SERPL-MCNC: 45 MG/DL (ref 6–23)
CALCIUM SERPL-MCNC: 8.4 MG/DL (ref 8.6–10.3)
CHLORIDE SERPL-SCNC: 95 MMOL/L (ref 98–107)
CO2 SERPL-SCNC: 35 MMOL/L (ref 21–32)
CREAT SERPL-MCNC: 1.86 MG/DL (ref 0.5–1.05)
ERYTHROCYTE [DISTWIDTH] IN BLOOD BY AUTOMATED COUNT: 17.2 % (ref 11.5–14.5)
GFR SERPL CREATININE-BSD FRML MDRD: 26 ML/MIN/1.73M*2
GLUCOSE BLD MANUAL STRIP-MCNC: 104 MG/DL (ref 74–99)
GLUCOSE BLD MANUAL STRIP-MCNC: 129 MG/DL (ref 74–99)
GLUCOSE BLD MANUAL STRIP-MCNC: 146 MG/DL (ref 74–99)
GLUCOSE SERPL-MCNC: 113 MG/DL (ref 74–99)
HCT VFR BLD AUTO: 31.3 % (ref 36–46)
HGB BLD-MCNC: 8.7 G/DL (ref 12–16)
MAGNESIUM SERPL-MCNC: 2.03 MG/DL (ref 1.6–2.4)
MCH RBC QN AUTO: 28.4 PG (ref 26–34)
MCHC RBC AUTO-ENTMCNC: 27.8 G/DL (ref 32–36)
MCV RBC AUTO: 102 FL (ref 80–100)
NRBC BLD-RTO: 0 /100 WBCS (ref 0–0)
PLATELET # BLD AUTO: 152 X10*3/UL (ref 150–450)
POTASSIUM SERPL-SCNC: 4.6 MMOL/L (ref 3.5–5.3)
RBC # BLD AUTO: 3.06 X10*6/UL (ref 4–5.2)
SODIUM SERPL-SCNC: 137 MMOL/L (ref 136–145)
WBC # BLD AUTO: 7.1 X10*3/UL (ref 4.4–11.3)

## 2023-12-31 PROCEDURE — 85027 COMPLETE CBC AUTOMATED: CPT | Performed by: INTERNAL MEDICINE

## 2023-12-31 PROCEDURE — 36415 COLL VENOUS BLD VENIPUNCTURE: CPT | Performed by: INTERNAL MEDICINE

## 2023-12-31 PROCEDURE — 83735 ASSAY OF MAGNESIUM: CPT | Performed by: INTERNAL MEDICINE

## 2023-12-31 PROCEDURE — 2060000001 HC INTERMEDIATE ICU ROOM DAILY

## 2023-12-31 PROCEDURE — 2500000004 HC RX 250 GENERAL PHARMACY W/ HCPCS (ALT 636 FOR OP/ED): Performed by: INTERNAL MEDICINE

## 2023-12-31 PROCEDURE — 80048 BASIC METABOLIC PNL TOTAL CA: CPT | Performed by: INTERNAL MEDICINE

## 2023-12-31 PROCEDURE — 82947 ASSAY GLUCOSE BLOOD QUANT: CPT

## 2023-12-31 PROCEDURE — 2500000001 HC RX 250 WO HCPCS SELF ADMINISTERED DRUGS (ALT 637 FOR MEDICARE OP): Performed by: INTERNAL MEDICINE

## 2023-12-31 PROCEDURE — 99232 SBSQ HOSP IP/OBS MODERATE 35: CPT | Performed by: INTERNAL MEDICINE

## 2023-12-31 PROCEDURE — 2500000001 HC RX 250 WO HCPCS SELF ADMINISTERED DRUGS (ALT 637 FOR MEDICARE OP): Performed by: NURSE PRACTITIONER

## 2023-12-31 RX ADMIN — ACETAMINOPHEN 487.5 MG: 325 TABLET ORAL at 21:56

## 2023-12-31 RX ADMIN — APIXABAN 2.5 MG: 2.5 TABLET, FILM COATED ORAL at 09:01

## 2023-12-31 RX ADMIN — CEFTRIAXONE SODIUM 1 G: 1 INJECTION, SOLUTION INTRAVENOUS at 12:15

## 2023-12-31 RX ADMIN — DOCUSATE SODIUM 100 MG: 100 CAPSULE, LIQUID FILLED ORAL at 09:00

## 2023-12-31 RX ADMIN — ATORVASTATIN CALCIUM 20 MG: 20 TABLET, FILM COATED ORAL at 21:56

## 2023-12-31 RX ADMIN — CALCIUM CARBONATE (ANTACID) CHEW TAB 500 MG 500 MG: 500 CHEW TAB at 00:30

## 2023-12-31 RX ADMIN — APIXABAN 2.5 MG: 2.5 TABLET, FILM COATED ORAL at 21:57

## 2023-12-31 RX ADMIN — SENNOSIDES 8.6 MG: 8.6 TABLET, FILM COATED ORAL at 09:01

## 2023-12-31 RX ADMIN — ACETAMINOPHEN 487.5 MG: 325 TABLET ORAL at 09:01

## 2023-12-31 ASSESSMENT — COGNITIVE AND FUNCTIONAL STATUS - GENERAL
TURNING FROM BACK TO SIDE WHILE IN FLAT BAD: A LOT
PERSONAL GROOMING: TOTAL
WALKING IN HOSPITAL ROOM: TOTAL
STANDING UP FROM CHAIR USING ARMS: TOTAL
DRESSING REGULAR UPPER BODY CLOTHING: TOTAL
CLIMB 3 TO 5 STEPS WITH RAILING: TOTAL
MOVING TO AND FROM BED TO CHAIR: TOTAL
MOBILITY SCORE: 8
DAILY ACTIVITIY SCORE: 6
HELP NEEDED FOR BATHING: TOTAL
DRESSING REGULAR LOWER BODY CLOTHING: TOTAL
MOVING FROM LYING ON BACK TO SITTING ON SIDE OF FLAT BED WITH BEDRAILS: A LOT
TOILETING: TOTAL
EATING MEALS: TOTAL

## 2023-12-31 ASSESSMENT — PAIN - FUNCTIONAL ASSESSMENT
PAIN_FUNCTIONAL_ASSESSMENT: 0-10

## 2023-12-31 ASSESSMENT — PAIN SCALES - GENERAL
PAINLEVEL_OUTOF10: 0 - NO PAIN
PAINLEVEL_OUTOF10: 5 - MODERATE PAIN
PAINLEVEL_OUTOF10: 10 - WORST POSSIBLE PAIN
PAINLEVEL_OUTOF10: 0 - NO PAIN

## 2023-12-31 ASSESSMENT — PAIN DESCRIPTION - LOCATION: LOCATION: HEAD

## 2023-12-31 NOTE — PROGRESS NOTES
Subjective Data:  Patient says that she does not necessarily feel any better or worse than yesterday.    Overnight Events:    Paroxysmal SVT on telemetry converted to sinus bradycardia.     Objective Data:  Last Recorded Vitals:  Vitals:    12/31/23 0000 12/31/23 0400 12/31/23 0628 12/31/23 0800   BP: 107/68  146/59 136/65   BP Location: Left arm   Left arm   Patient Position: Lying   Lying   Pulse: (!) 49  64    Resp: 22  18    Temp: 36.6 °C (97.9 °F)  36.2 °C (97.2 °F)    TempSrc: Temporal  Temporal    SpO2: 97% 98% 97%    Weight:       Height:           Last Labs:  CBC - 12/31/2023:  5:39 AM  7.1 8.7 152    31.3      CMP - 12/31/2023:  5:39 AM  8.4 6.2 11 --- 0.8   _ 4.1 9 91      PTT - No results in last year.  _   _ _     TROPHS   Date/Time Value Ref Range Status   12/21/2023 01:32 PM 9 0 - 13 ng/L Final   12/21/2023 11:58 AM 8 0 - 13 ng/L Final     BNP   Date/Time Value Ref Range Status   12/21/2023 11:58  0 - 99 pg/mL Final   03/06/2019 04:50  0 - 99 pg/mL Final     Comment:     .  <100 pg/mL - Heart failure unlikely  100-299 pg/mL - Intermediate probability of acute heart  .               failure exacerbation. Correlate with clinical  .               context and patient history.    >=300 pg/mL - Heart Failure likely. Correlate with clinical  .               context and patient history.  BNP testing is performed using different testing   methodology at [facility] than at other   [facility] hospitals. Direct result comparisons should   only be made within the same method.     03/05/2019 08:18  0 - 99 pg/mL Final     Comment:     .  <100 pg/mL - Heart failure unlikely  100-299 pg/mL - Intermediate probability of acute heart  .               failure exacerbation. Correlate with clinical  .               context and patient history.    >=300 pg/mL - Heart Failure likely. Correlate with clinical  .               context and patient history.  BNP testing is performed using different  testing   methodology at [facility] than at other   [facility]. Direct result comparisons should   only be made within the same method.       HGBA1C   Date/Time Value Ref Range Status   10/02/2023 03:06 PM 6.3 4.3 - 5.6 % Final     Comment:     American Diabetes Association guidelines indicate that patients with HgbA1c in the range 5.7-6.4% are at increased risk for development of diabetes, and intervention by lifestyle modification may be beneficial. HgbA1c greater or equal to 6.5% is considered diagnostic of diabetes.   03/16/2019 05:30 AM 5.6 % Final     Comment:          Diagnosis of Diabetes-Adults   Non-Diabetic: < or = 5.6%   Increased risk for developing diabetes: 5.7-6.4%   Diagnostic of diabetes: > or = 6.5%  .       Monitoring of Diabetes                Age (y)     Therapeutic Goal (%)   Adults:          >18           <7.0   Pediatrics:    13-18           <7.5                   7-12           <8.0                   0- 6            7.5-8.5   American Diabetes Association. Diabetes Care 33(S1), Jan 2010.     02/23/2019 07:30 PM 6.0 % Final     Comment:          Diagnosis of Diabetes-Adults   Non-Diabetic: < or = 5.6%   Increased risk for developing diabetes: 5.7-6.4%   Diagnostic of diabetes: > or = 6.5%  .       Monitoring of Diabetes                Age (y)     Therapeutic Goal (%)   Adults:          >18           <7.0   Pediatrics:    13-18           <7.5                   7-12           <8.0                   0- 6            7.5-8.5   American Diabetes Association. Diabetes Care 33(S1), Jan 2010.       VLDL   Date/Time Value Ref Range Status   08/23/2023 01:25 PM 12 0 - 40 mg/dL Final   02/23/2019 07:30 PM 16 0 - 40 mg/dL Final      Last I/O:  I/O last 3 completed shifts:  In: 656.7 (4.6 mL/kg) [I.V.:656.7 (4.6 mL/kg)]  Out: 450 (3.2 mL/kg) [Urine:450 (0.1 mL/kg/hr)]  Weight: 141.9 kg     Past Cardiology Tests (Last 3 Years):  EKG:  ECG 12 lead 12/21/2023 (Preliminary)      ECG 12  lead 12/21/2023 (Preliminary)      ECG 12 lead 12/21/2023    Echo:  Transthoracic Echo (TTE) Limited 12/29/2023      Transthoracic Echo (TTE) Complete 12/24/2023    Ejection Fractions:  EF   Date/Time Value Ref Range Status   12/29/2023 02:53 PM 37     12/24/2023 02:23 PM 33       Cath:  No results found for this or any previous visit from the past 1095 days.    Stress Test:  No results found for this or any previous visit from the past 1095 days.    Cardiac Imaging:  No results found for this or any previous visit from the past 1095 days.      Inpatient Medications:  Scheduled medications   Medication Dose Route Frequency    acetaminophen  487.5 mg oral BID    apixaban  2.5 mg oral BID    atorvastatin  20 mg oral Nightly    [Held by provider] bumetanide  2 mg oral Daily    cefTRIAXone  1 g intravenous q24h    docusate sodium  100 mg oral BID    [Held by provider] empagliflozin  10 mg oral Daily    insulin lispro  0-5 Units subcutaneous 4x daily    LORazepam  0.25 mg oral Once    perflutren lipid microspheres  0.5-10 mL of dilution intravenous Once in imaging    perflutren protein A microsphere  0.5 mL intravenous Once in imaging    polyethylene glycol  17 g oral Daily    sennosides  1 tablet oral BID    sulfur hexafluoride microsphr  2 mL intravenous Once in imaging     PRN medications   Medication    acetaminophen    albuterol    butalbital-acetaminophen-caff    calcium carbonate    cyclobenzaprine    dextrose 10 % in water (D10W)    dextrose    glucagon    lubricating eye drops    oxygen    propofol    traMADol     Continuous Medications   Medication Dose Last Rate       Physical Exam:  In general: alert and in no acute distress.   HEENT: Carotid upstrokes normal with no bruits. JVP is normal.   Pulmonary: Clear to auscultation bilaterally.  Cardiovascular: S1,S2, regular. No appreciable murmurs, rubs or gallops.   Lower extremities: Warm. 2+ distal pulses. No edema.     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    Unable to use beta-blockers due to her bradycardia.  Although her creatinine is improving it is still elevated and I would like to hold off on starting ACE/ARB/Arni at this time.    Ultimately I believe that implantation of pacemaker would allow us to titrate her heart failure regimen.    2) sick sinus syndrome: Had a lengthy discussion with the patient about her concerns for pacemaker implantation.  She was concerned that the pacemaker might interfere with her heart's ability to beat normally.    I had a lengthy discussion about the fact that the pacemaker would only work when needed as programmed.  After this discussion she is open to consider having the pacemaker implanted    I will speak to Dr. Savage about this.          Eliazar Gonzalez MD

## 2023-12-31 NOTE — PROGRESS NOTES
INPATIENT NEPHROLOGY CONSULT PROGRESS NOTE      Patient Name: Fifi Jenkins MRN: [id number]  DATE of SERVICE: December 30, 2023  TIME of SERVICE: 02:04 PM  CONSULTING SERVICE: Nephrology    REASON for CONSULT: Acute kidney injury    SUBJECTIVE:  Patient seen and evaluated at bedside transferred to cardiac ICU after cardioversion yesterday due to prolonged recovery and complicated course.   Serum creatinine today up trended to 1.9 mg/dL from 1.2.  BUN 43 and GFR dropped to 25 from 40.  Likely hemodynamically mediated as patient had positive for systolic.  Vitals demonstrate hypertension blood pressure 103/55 with a pulse rate of 42.  Patient received a liter of LR at 50 cc/h to maintain hemodynamic stability.    If okay with cardiology to hold Bumex, Jardiance pending improvement in hemodynamics and renal parameters.    SUMMARY OF STAY:  Ms. Jenkins is a pleasant morbidly obese 88-year-old female with past medical history significant for chronic kidney disease stage IIIa with baseline serum creatinine 1 mg/dL EGFR 50 mill per minute per 1.73 m², long-term resident at [facility].  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 CVA in 2019, with residual dizziness. Patient presented to [facility] December 21, 2023 for evaluation of progressively worsening shortness of breath.  Upon presentation patient was hypoxic requiring 3 L of oxygen.  Home medications: losartan 50 mg p.o. daily, metolazone 5 mg weekly, torsemide 40 mg p.o. daily  Current medications: Newly started Jardiance 10 mg, losartan has been on hold since admission.  Metolazone 5 mg weekly.  Received  Lasix 40 mg IV twice daily. Echocardiogram demonstrated ejection fraction 55%.  Labs Serum creatinine up to 1.34 mg deciliter BUN of 28 and GFR of 39.  From serum creatinine of 1 mg deciliter and a GFR of 58 mL/min upon presentation.  Anemia hemoglobin of 8.2 mg deciliter. December 21, 2023 CT with IV contrast: No signs of pulmonary embolism demonstrated cardiomegaly without pericardial effusion.  Scattered emphysematous changes with small bilateral pleural effusions and mild bibasilar area of infiltrate or atelectasis.    ASSESSMENT AND PLAN:  CHERYL on CKD IIIa:  likely hemodynamically mediated in the setting of diuretics adjustment and vasoactive medications (Jardiance), contrast nephropathy (contrast exposure December 21).  Serum creatinine up to 1.34 mg deciliter BUN of 28 and GFR of 39.  From serum creatinine of 1 mg deciliter and a GFR of 58 mL/min upon presentation.     Volume status: Hypervolemic on presentation improved after IV Lasix     CKD IIIa: Diabetic nephropathy, hypertensive nephrosclerosis  Electrolytes: Managed (with renal diet)  Hypertension: Relative hypotension blood pressure 100 oh 3/60 with a heart rate of 81  Without being on antihypertensive medications  Acid-base: Metabolic alkalosis likely secondary to diuresis  Anemia from renal failure, To check iron panel and start epogen. hemoglobin of 8.2 mg deciliter.  To rule out plasma cell disorder  T2DM on insulin sliding scale  COPD: Emphysematous changes on CT scan requiring 2 L of oxygen  CVA 2019 with residual dizziness  Patient wheelchair dependent  Urinary incontinent: Wearing depends at skilled nursing facility  CHF: Diastolic heart failure treated with IV Lasix likely for acute on chronic  UMA with CPAP intolerance  Paroxysmal A-fib on Eliquis    Plan:  Acute kidney injury likely hemodynamically mediated due to IV diuresis, Jardiance, losartan use (last dose 12/23), hypotension, contrast exposure.  Improving     Worsening renal parameters  and has an episode of hemodynamic instability post cardioversion.  Agree with primary care team given 1 L of LR to maintain hemodynamic stability.    To hold Jardiance.  To hold Bumex.    Strict input and output guide diuresis management.   Medication reviewed renally dosed  To continue renal diet, 2 g sodium.  No urgent indication for renal placement therapy.      Discharge medication:  Truly appreciate cardiology recommendations, ejection fraction dropped significantly.  Safe to start Entresto from renal standpoint  To discontinue losartan upon discharge  To discontinue torsemide/metolazone.   To continue Jardiance  To cont Bumex 2 mg p.o. daily with extra dose as mentioned above.  To continue CPAP    We will continue to monitor closely with you, thank you!    Medications:    Current Facility-Administered Medications:     acetaminophen (Tylenol) tablet 487.5 mg, 487.5 mg, oral, BID, Steve Sneed MD, 487.5 mg at 12/30/23 1144    acetaminophen (Tylenol) tablet 650 mg, 650 mg, oral, q6h PRN, Steve Sneed MD, 650 mg at 12/25/23 0934    albuterol 2.5 mg /3 mL (0.083 %) nebulizer solution 2.5 mg, 2.5 mg, nebulization, q4h PRN, Steve Sneed MD, 2.5 mg at 12/30/23 1144    [START ON 12/31/2023] apixaban (Eliquis) tablet 2.5 mg, 2.5 mg, oral, BID, Jennifer Manuel, APRN-CNP    atorvastatin (Lipitor) tablet 20 mg, 20 mg, oral, Nightly, Steve Sneed MD, 20 mg at 12/29/23 2236    bumetanide (Bumex) tablet 2 mg, 2 mg, oral, Daily, Steve Sneed MD, 2 mg at 12/30/23 1144    butalbital-acetaminophen-caff -40 mg per tablet 1 tablet, 1 tablet, oral, q4h PRN, Steve Sneed MD, 1 tablet at 12/27/23 2056    calcium carbonate (Tums) chewable tablet 500 mg, 1 tablet, oral, q4h PRN, Steve Sneed MD, 500 mg at 12/30/23 0023    cefTRIAXone (Rocephin) IVPB 1 g, 1 g, intravenous, q24h, Steve Sneed MD, Stopped at 12/30/23 1245    cyclobenzaprine (Flexeril) tablet 10 mg, 10 mg, oral, BID PRN, Steve LOJA  MD Nedra    dextrose 10 % in water (D10W) infusion, 0.3 g/kg/hr, intravenous, Once PRN, Steve Sneed MD    dextrose 50 % injection 25 g, 25 g, intravenous, q15 min PRN, Steve Sneed MD    docusate sodium (Colace) capsule 100 mg, 100 mg, oral, BID, Steve Sneed MD, 100 mg at 12/30/23 1147    empagliflozin (Jardiance) tablet 10 mg, 10 mg, oral, Daily, Steve Sneed MD, 10 mg at 12/30/23 1144    glucagon (Glucagen) injection 1 mg, 1 mg, intramuscular, q15 min PRN, Steve Sneed MD    insulin lispro (HumaLOG) injection 0-5 Units, 0-5 Units, subcutaneous, 4x daily, Steve Sneed MD, 1 Units at 12/23/23 2141    lactated Ringer's infusion, 50 mL/hr, intravenous, Continuous, Jennifer Manuel, APRN-CNP, Last Rate: 50 mL/hr at 12/30/23 1215, 50 mL/hr at 12/30/23 1215    LORazepam (Ativan) tablet 0.25 mg, 0.25 mg, oral, Once, Steve Sneed MD    lubricating eye drops ophthalmic solution 1 drop, 1 drop, Both Eyes, q4h PRN, Steve Sneed MD, 1 drop at 12/27/23 0911    oxygen (O2) therapy, , inhalation, Continuous PRN - O2/gases, Steve Sneed MD, 3 L/min at 12/30/23 0025    perflutren lipid microspheres (Definity) injection 0.5-10 mL of dilution, 0.5-10 mL of dilution, intravenous, Once in imaging, Steve Sneed MD    perflutren protein A microsphere (Optison) injection 0.5 mL, 0.5 mL, intravenous, Once in imaging, Steve Sneed MD    polyethylene glycol (Glycolax, Miralax) packet 17 g, 17 g, oral, Daily, Steve Sneed MD, 17 g at 12/29/23 0837    propofol (Diprivan) injection, , , PRN, Lamine Savage MD, 60 mg at 12/29/23 1343    sennosides (Senokot) tablet 8.6 mg, 1 tablet, oral, BID, Steve Sneed MD, 8.6 mg at 12/30/23 1147    sulfur hexafluoride microsphr (Lumason) injection 24.28 mg, 2 mL, intravenous, Once in imaging, Steve Sneed MD    traMADol (Ultram) tablet 50 mg, 50 mg, oral, q8h PRN, Steve Sneed MD, 50 mg at 12/26/23 2222    PERTINENT  ROS:  GENERAL:  positive for fatigue, poor appetite.  No fever/chills  RESPIRATORY:  positive for shortness of breath.  Negative for cough, wheezing.  CARDIOVASCULAR:   Negative for chest pain or palpitation.  GI:  Negative for abdominal pain, diarrhea, heartburn, nausea, vomiting  : Dysuria    Physical Exam:  Vital signs in last 24 hours:  Temp:  [35.9 °C (96.6 °F)-36.5 °C (97.7 °F)] 36.5 °C (97.7 °F)  Heart Rate:  [] 42  Resp:  [12-21] 12  BP: (102-127)/(50-83) 103/55    General: Awake, cooperative, not in acute distress  HEENT:  NCAT,  mucous membranes moist and pink  NECK:  Elevated JVD, no carotid bruit, supple, no cervical mass or thyromegaly  LUNGS;  Diminished breath sounds, fine Rales  CV:  Distant, regular rate and rhythm, no murmurs  ABDOMEN:  abdomen soft, nontender, BS normal, no masses or organomegaly  EDEMA:  +1 lower extremity edema/dependent edema  SKIN:  dry and normal turgor, no clubbing, cyanosis or petechia.  No rashes noted      Intake/Output last 3 shifts:  I/O last 3 completed shifts:  In: - (0 mL/kg)   Out: 1450 (10.2 mL/kg) [Urine:1450 (0.3 mL/kg/hr)]  Weight: 141.9 kg     DATA:  Diagnotic tests reviewed for Todays visit:  Results from last 7 days   Lab Units 12/30/23  0527   WBC AUTO x10*3/uL 6.6   RBC AUTO x10*6/uL 3.09*   HEMOGLOBIN g/dL 8.8*   HEMATOCRIT % 31.4*       Results from last 7 days   Lab Units 12/30/23  1614 12/30/23  0527   SODIUM mmol/L 138 138   POTASSIUM mmol/L 4.3 4.4   CHLORIDE mmol/L 94* 93*   CO2 mmol/L 36* 35*   BUN mg/dL 43* 39*   CREATININE mg/dL 1.92* 1.76*   CALCIUM mg/dL 8.2* 8.5*   MAGNESIUM mg/dL  --  2.11       Results from last 7 days   Lab Units 12/27/23  1114   COLOR U  Airam*   APPEARANCE U  Hazy*   PH U  6.0   SPEC GRAV UR  1.020   PROTEIN U mg/dL 100 (2+)*   BLOOD UR  MODERATE (2+)*   NITRITE U  NEGATIVE   WBC UR /HPF 11-20*   BACTERIA UR /HPF 4+*       IMAGING: CXR reviewed in [facility] images      SIGNATURE: Mary Solomon MD  Nephrology and  Hypertension  [address]  Office phone: [phone]  FAX: [phone]    This note was partially generated using the Dragon voice recognition system, and there may be some incorrect words, spelling's and punctuation that were not noted in checking the note before saving.

## 2023-12-31 NOTE — PROGRESS NOTES
Internal Medicine Progress Note    Patient Name: Fifi Jenkins          MRN: [id number]  Today's Date: December 31, 2023          Attending: Nick Montaño MD    Subjective:  Patient was seen and examined at bedside     Review Of Systems:  GENERAL: Generalized weakness  CARDIOVASCULAR: Negative for chest pain  GI: No nausea, vomiting, or diarrhea    Objective:  Vitals:    12/31/23 0000 12/31/23 0400 12/31/23 0628 12/31/23 0800   BP: 107/68  146/59 136/65   BP Location: Left arm   Left arm   Patient Position: Lying   Lying   Pulse: (!) 49  64    Resp: 22  18    Temp: 36.6 °C (97.9 °F)  36.2 °C (97.2 °F)    TempSrc: Temporal  Temporal    SpO2: 97% 98% 97%    Weight:       Height:           Physical Exam:   General appearance: Alert, in no acute distress  Lungs: diminished breath sounds  Heart:  RRR, without murmur  Abdomen: Soft, non-tender  Neuro: Alert oriented x3, no focal deficit.    Labs:  Results for orders placed or performed during the hospital encounter of 12/21/23 (from the past 24 hour(s))   POCT GLUCOSE   Result Value Ref Range    POCT Glucose 141 (H) 74 - 99 mg/dL   Magnesium   Result Value Ref Range    Magnesium 2.03 1.60 - 2.40 mg/dL   CBC   Result Value Ref Range    WBC 7.1 4.4 - 11.3 x10*3/uL    nRBC 0.0 0.0 - 0.0 /100 WBCs    RBC 3.06 (L) 4.00 - 5.20 x10*6/uL    Hemoglobin 8.7 (L) 12.0 - 16.0 g/dL    Hematocrit 31.3 (L) 36.0 - 46.0 %     (H) 80 - 100 fL    MCH 28.4 26.0 - 34.0 pg    MCHC 27.8 (L) 32.0 - 36.0 g/dL    RDW 17.2 (H) 11.5 - 14.5 %    Platelets 152 150 - 450 x10*3/uL   Basic Metabolic Panel   Result Value Ref Range    Glucose 113 (H) 74 - 99 mg/dL    Sodium 137 136 - 145 mmol/L    Potassium 4.6 3.5 - 5.3 mmol/L    Chloride 95 (L) 98 - 107 mmol/L    Bicarbonate 35 (H) 21 - 32 mmol/L    Anion Gap 12 10 - 20 mmol/L    Urea Nitrogen 45 (H) 6 - 23 mg/dL    Creatinine 1.86 (H) 0.50 - 1.05 mg/dL    eGFR 26 (L) >60 mL/min/1.73m*2    Calcium 8.4 (L) 8.6 - 10.3 mg/dL   POCT GLUCOSE   Result  Value Ref Range    POCT Glucose 104 (H) 74 - 99 mg/dL   POCT GLUCOSE   Result Value Ref Range    POCT Glucose 129 (H) 74 - 99 mg/dL       Medications:  Scheduled medications  acetaminophen, 487.5 mg, oral, BID  apixaban, 2.5 mg, oral, BID  atorvastatin, 20 mg, oral, Nightly  [Held by provider] bumetanide, 2 mg, oral, Daily  cefTRIAXone, 1 g, intravenous, q24h  docusate sodium, 100 mg, oral, BID  [Held by provider] empagliflozin, 10 mg, oral, Daily  insulin lispro, 0-5 Units, subcutaneous, 4x daily  LORazepam, 0.25 mg, oral, Once  perflutren lipid microspheres, 0.5-10 mL of dilution, intravenous, Once in imaging  perflutren protein A microsphere, 0.5 mL, intravenous, Once in imaging  polyethylene glycol, 17 g, oral, Daily  sennosides, 1 tablet, oral, BID  sulfur hexafluoride microsphr, 2 mL, intravenous, Once in imaging      Continuous medications     PRN medications  PRN medications: acetaminophen, albuterol, butalbital-acetaminophen-caff, calcium carbonate, cyclobenzaprine, dextrose 10 % in water (D10W), dextrose, glucagon, lubricating eye drops, oxygen, propofol, traMADol      Assessment/Plan:  Medical Problems       Problem List       * (Principal) Acute on chronic heart failure with reducedejection fraction (CMS/HCC)    Bumex is on hold  Hold Jardiance per nephrology recommendations      Paroxysmal atrial fibrillation (CMS/HCC)     Patient gets tachycardic and bradycardic  Continue Eliquis  Cardiology and EP are following, recommendations for pacemaker, patient to think about it         Hyperlipidemia     Continue atorvastatin         Acute respiratory failure with hypoxia (CMS/[facility])     On oxygen supplement         Stage 3 chronic kidney disease (CMS/HCC)  Acute kidney injury     Kidney function is stable  Continue monitoring         Type 2 diabetes mellitus with chronic kidney disease, without long-term current use of insulin (CMS/HCC)     Sliding scall insulin     Generalized weakness  PT/OT          Discussed  "with patient, CONY Montaño MD   Date: 12/31/23  Time: 4:15 PM    This note was partially created using voice recognition software and is inherently subject to errors including those of syntax and \"sound-alike\" substitutions which may escape proofreading. In such instances, original meaning may be extrapolated by contextual derivation  "

## 2023-12-31 NOTE — PROGRESS NOTES
" ELECTROPHYSIOLOGY PROGRESS NOTE    PATIENT NAME: Fifi Jenkins  PATIENT MRN: [id number]  SERVICE DATE:  12/30/2023  SERVICE TIME: 9:07 PM    CONSULTANT: Lamine Savage MD  PRIMARY CARE PHYSICIAN: Aung Jacobs MD  ATTENDING PHYSICIAN: Nick Montaño MD      IMPRESSIONS:  1.  HTN, controlled.  2.  Recent persistent AF with RVR, S/P DCC on 12/29/2023 but with recurrences since then.  The best agent for a \"rhythm control strategy\" for her would be amiodarone, but this would further impair her sinus node dysfunction.  3.  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  4.  Classic tachy-karyn syndrome, with offset pauses upon spontaneous termination of AF and also post-DC cardioversion.  This is a standard indication for permanent pacing.  5.  Other medical problems, including obesity, DJD, anemia, stage III CKD, GERD, hyperlipidemia, and venous insufficiency.     RECOMMENDATIONS:  1.  I agree with Dr. Sneed that permanent pacing is indicated, though the patient currently refuses consideration of this.  2.  The two major treatment options, in my opinion, are: a. Standard DDDR pacemaker placement, with concomitant amiodarone Rx to seek to maintain SR; or b. Medtronic Micra VR leadless VVIR pacemaker placement with concomitant AV junction ablation, expecting the patient to settle in permanent AF (with complete AV block).  Either approach is reasonable, and would have good potential to reverse the patient's presumed tachycardia-induced cardiomyopathy.  3.  I will speak with the patient again on 12/31/2023 to discuss these options.      SIGNATURE: Lamine Savage MD   OFFICE: [phone]      ADDENDUM:  I went to see the patient on 12/31/2023, but allowed her to remain sleeping.  Per the nursing staff, the patient still steadfastly refuses consideration of permanent pacing.  She remains in sinus rhythm around 40 bpm with short runs of either atrial tachycardia or " "transient flutter lasting less than 1 minute at a time, consistent with tachycardia-bradycardia syndrome, as noted above.  If she continues to refuse pacing, she is free for discharge back to her assisted living facility, from my standpoint.    Lamine Savage MD      ================================================================    SUBJECTIVE: The patient was seen by me yesterday in consultation - see consult for details.  She has recently been in persistent atrial fibrillation, likely producing a tachycardia-induced cardiomyopathy.  I assisted Dr. Sneed with a cardioversion yesterday on the patient, which was successful.  However, following the shock, the patient had some prominent sinus bradycardia with some pauses and required atropine.  She was then placed back on CCU stepdown, where she has gone in and out of atrial fibrillation with more offset pauses of 3 to 4 seconds, and sinus bradycardia in the 40s.  Dr. Sneed spoke with her today about possible permanent pacemaker placement, with or without AV junction ablation, but the patient is currently resistant to this idea.    CURRENT CARDIOVASCULAR MEDICATIONS:    [START ON 12/31/2023] apixaban (Eliquis) tablet 2.5 mg, 2.5 mg, oral, BID    atorvastatin (Lipitor) tablet 20 mg, 20 mg, oral, Nightly    [Held by provider] bumetanide (Bumex) tablet 2 mg, 2 mg, oral, Daily    VITALS:  /55 (BP Location: Left arm, Patient Position: Lying)   Pulse 62   Temp 36.5 °C (97.7 °F) (Temporal)   Resp 18   Ht 1.6 m (5' 3\")   Wt 142 kg (312 lb 13.3 oz)   SpO2 94%   BMI 55.42 kg/m²     MONITOR: Alternating between AF with RVR and sinus bradycardia in 40's-50's    LABS:  Lab Results   Component Value Date    WBC 6.6 12/30/2023    HGB 8.8 (L) 12/30/2023    HCT 31.4 (L) 12/30/2023     (H) 12/30/2023     12/30/2023      Lab Results   Component Value Date    GLUCOSE 118 (H) 12/30/2023    CALCIUM 8.2 (L) 12/30/2023     12/30/2023    K 4.3 " 12/30/2023    CO2 36 (H) 12/30/2023    CL 94 (L) 12/30/2023    BUN 43 (H) 12/30/2023    CREATININE 1.92 (H) 12/30/2023

## 2023-12-31 NOTE — PROGRESS NOTES
INPATIENT NEPHROLOGY CONSULT PROGRESS NOTE      Patient Name: Fifi Jenkins MRN: [id number]  DATE of SERVICE: December 31, 2023  TIME of SERVICE: 10:20 AM  CONSULTING SERVICE: Nephrology    REASON for CONSULT: Acute kidney injury    SUBJECTIVE:  Patient seen and evaluated at bedside.  Feeling better today  Serum creatinine plateauing down to 1.8 from 1.9 mg deciliter  Patient received a liter of LR at 50 cc/h    SUMMARY OF STAY:  Ms. Jenkins is a pleasant morbidly obese 88-year-old female with past medical history significant for chronic kidney disease stage IIIa with baseline serum creatinine 1 mg/dL EGFR 50 mill per minute per 1.73 m², long-term resident at [facility].  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 CVA in 2019, with residual dizziness. Patient presented to [facility] December 21, 2023 for evaluation of progressively worsening shortness of breath.  Upon presentation patient was hypoxic requiring 3 L of oxygen.  Home medications: losartan 50 mg p.o. daily, metolazone 5 mg weekly, torsemide 40 mg p.o. daily  Current medications: Newly started Jardiance 10 mg, losartan has been on hold since admission.  Metolazone 5 mg weekly.  Received Lasix 40 mg IV twice daily. Echocardiogram demonstrated ejection fraction 55%.  Labs Serum creatinine up to 1.34 mg deciliter BUN of 28 and GFR of 39.  From serum creatinine of 1 mg deciliter and a GFR of 58 mL/min upon presentation.  Anemia hemoglobin of 8.2 mg deciliter. December 21, 2023 CT with IV contrast: No signs of pulmonary embolism demonstrated cardiomegaly without pericardial effusion.  Scattered  emphysematous changes with small bilateral pleural effusions and mild bibasilar area of infiltrate or atelectasis.    ASSESSMENT AND PLAN:  CHERYL on CKD IIIa:  likely hemodynamically mediated in the setting of diuretics adjustment and vasoactive medications (Jardiance), contrast nephropathy (contrast exposure December 21).  Serum creatinine up to 1.34 mg deciliter BUN of 28 and GFR of 39.  From serum creatinine of 1 mg deciliter and a GFR of 58 mL/min upon presentation.     Volume status: Hypervolemic on presentation improved after IV Lasix     CKD IIIa: Diabetic nephropathy, hypertensive nephrosclerosis  Electrolytes: Managed (with renal diet)  Hypertension: Relative hypotension blood pressure 100 oh 3/60 with a heart rate of 81  Without being on antihypertensive medications  Acid-base: Metabolic alkalosis likely secondary to diuresis  Anemia from renal failure, To check iron panel and start epogen. hemoglobin of 8.2 mg deciliter.  To rule out plasma cell disorder  T2DM on insulin sliding scale  COPD: Emphysematous changes on CT scan requiring 2 L of oxygen  CVA 2019 with residual dizziness  Patient wheelchair dependent  Urinary incontinent: Wearing depends at skilled nursing facility  CHF: Diastolic heart failure treated with IV Lasix likely for acute on chronic  UMA with CPAP intolerance  Paroxysmal A-fib on Eliquis    Plan:  Acute kidney injury likely hemodynamically mediated due to IV diuresis, Jardiance, losartan use (last dose 12/23), hypotension, contrast exposure.  Improving     Renal parameters plateauing serum creatinine 1.8 mg deciliter today comparing to 1.9 yesterday    To cont to  hold Jardiance.  To cont to hold Bumex.    Strict input and output guide diuresis management.   Medication reviewed renally dosed  To continue renal diet, 2 g sodium.  No urgent indication for renal placement therapy.      Discharge medication:  Truly appreciate cardiology recommendations, ejection fraction dropped  significantly.  Tentative plan to start Entresto upon discharge  To discontinue losartan upon discharge  To discontinue torsemide/metolazone.   To continue Jardiance  To cont Bumex 2 mg p.o. daily with extra dose as mentioned above.  To continue CPAP    We will continue to monitor closely with you, thank you!    Medications:    Current Facility-Administered Medications:     acetaminophen (Tylenol) tablet 487.5 mg, 487.5 mg, oral, BID, Steve Sneed MD, 487.5 mg at 12/31/23 0901    acetaminophen (Tylenol) tablet 650 mg, 650 mg, oral, q6h PRN, Steve Sneed MD, 650 mg at 12/25/23 0934    albuterol 2.5 mg /3 mL (0.083 %) nebulizer solution 2.5 mg, 2.5 mg, nebulization, q4h PRN, Steve Sneed MD, 2.5 mg at 12/30/23 1144    apixaban (Eliquis) tablet 2.5 mg, 2.5 mg, oral, BID, Jennifer Manuel, APRN-CNP, 2.5 mg at 12/31/23 0901    atorvastatin (Lipitor) tablet 20 mg, 20 mg, oral, Nightly, Steve Sneed MD, 20 mg at 12/30/23 2231    [Held by provider] bumetanide (Bumex) tablet 2 mg, 2 mg, oral, Daily, Steve Sneed MD, 2 mg at 12/30/23 1144    butalbital-acetaminophen-caff -40 mg per tablet 1 tablet, 1 tablet, oral, q4h PRN, Steve Sneed MD, 1 tablet at 12/27/23 2056    calcium carbonate (Tums) chewable tablet 500 mg, 1 tablet, oral, q4h PRN, Steve Sneed MD, 500 mg at 12/31/23 0030    cefTRIAXone (Rocephin) IVPB 1 g, 1 g, intravenous, q24h, Steve Sneed MD, Stopped at 12/30/23 1245    cyclobenzaprine (Flexeril) tablet 10 mg, 10 mg, oral, BID PRN, Steve Sneed MD    dextrose 10 % in water (D10W) infusion, 0.3 g/kg/hr, intravenous, Once PRN, Steve nSeed MD    dextrose 50 % injection 25 g, 25 g, intravenous, q15 min PRN, Steve Sneed MD    docusate sodium (Colace) capsule 100 mg, 100 mg, oral, BID, Steve Sneed MD, 100 mg at 12/30/23 1147    [Held by provider] empagliflozin (Jardiance) tablet 10 mg, 10 mg, oral, Daily, Steve Sneed MD, 10 mg at 12/30/23 1144     glucagon (Glucagen) injection 1 mg, 1 mg, intramuscular, q15 min PRN, Steve Sneed MD    insulin lispro (HumaLOG) injection 0-5 Units, 0-5 Units, subcutaneous, 4x daily, Steve Sneed MD, 1 Units at 12/23/23 2141    LORazepam (Ativan) tablet 0.25 mg, 0.25 mg, oral, Once, Steve Sneed MD    lubricating eye drops ophthalmic solution 1 drop, 1 drop, Both Eyes, q4h PRN, Steve Sneed MD, 1 drop at 12/27/23 0911    oxygen (O2) therapy, , inhalation, Continuous PRN - O2/gases, Steve Sneed MD, 3 L/min at 12/30/23 0025    perflutren lipid microspheres (Definity) injection 0.5-10 mL of dilution, 0.5-10 mL of dilution, intravenous, Once in imaging, Steve Sneed MD    perflutren protein A microsphere (Optison) injection 0.5 mL, 0.5 mL, intravenous, Once in imaging, Steve Sneed MD    polyethylene glycol (Glycolax, Miralax) packet 17 g, 17 g, oral, Daily, Steve Sneed MD, 17 g at 12/29/23 0837    propofol (Diprivan) injection, , , PRN, Lamine Savage MD, 60 mg at 12/29/23 1343    sennosides (Senokot) tablet 8.6 mg, 1 tablet, oral, BID, Steve Sneed MD, 8.6 mg at 12/31/23 0901    sulfur hexafluoride microsphr (Lumason) injection 24.28 mg, 2 mL, intravenous, Once in imaging, Steve Sneed MD    traMADol (Ultram) tablet 50 mg, 50 mg, oral, q8h PRN, Steve Sneed MD, 50 mg at 12/26/23 2222    PERTINENT ROS:  GENERAL:  positive for fatigue, poor appetite.  No fever/chills  RESPIRATORY:  positive for shortness of breath.  Negative for cough, wheezing.  CARDIOVASCULAR:   Negative for chest pain or palpitation.  GI:  Negative for abdominal pain, diarrhea, heartburn, nausea, vomiting  : Dysuria    Physical Exam:  Vital signs in last 24 hours:  Temp:  [36.2 °C (97.2 °F)-36.6 °C (97.9 °F)] 36.2 °C (97.2 °F)  Heart Rate:  [42-64] 64  Resp:  [9-23] 18  BP: (103-146)/(55-68) 146/59    General: Awake, cooperative, not in acute distress  HEENT:  NCAT,  mucous membranes moist and  pink  NECK:  Elevated JVD, no carotid bruit, supple, no cervical mass or thyromegaly  LUNGS;  Diminished breath sounds, fine Rales  CV:  Distant, regular rate and rhythm, no murmurs  ABDOMEN:  abdomen soft, nontender, BS normal, no masses or organomegaly  EDEMA:  +1 lower extremity edema/dependent edema  SKIN:  dry and normal turgor, no clubbing, cyanosis or petechia.  No rashes noted      Intake/Output last 3 shifts:  I/O last 3 completed shifts:  In: 656.7 (4.6 mL/kg) [I.V.:656.7 (4.6 mL/kg)]  Out: 450 (3.2 mL/kg) [Urine:450 (0.1 mL/kg/hr)]  Weight: 141.9 kg     DATA:  Diagnotic tests reviewed for Todays visit:  Results from last 7 days   Lab Units 12/31/23  0539   WBC AUTO x10*3/uL 7.1   RBC AUTO x10*6/uL 3.06*   HEMOGLOBIN g/dL 8.7*   HEMATOCRIT % 31.3*       Results from last 7 days   Lab Units 12/31/23  0539   SODIUM mmol/L 137   POTASSIUM mmol/L 4.6   CHLORIDE mmol/L 95*   CO2 mmol/L 35*   BUN mg/dL 45*   CREATININE mg/dL 1.86*   CALCIUM mg/dL 8.4*   MAGNESIUM mg/dL 2.03       Results from last 7 days   Lab Units 12/27/23  1114   COLOR U  Airam*   APPEARANCE U  Hazy*   PH U  6.0   SPEC GRAV UR  1.020   PROTEIN U mg/dL 100 (2+)*   BLOOD UR  MODERATE (2+)*   NITRITE U  NEGATIVE   WBC UR /HPF 11-20*   BACTERIA UR /HPF 4+*       IMAGING: CXR reviewed in  images      SIGNATURE: Mary Solomon MD  Nephrology and Hypertension  [address] [facility] [place]  Office phone: [phone]  FAX: [phone]    This note was partially generated using the Dragon voice recognition system, and there may be some incorrect words, spelling's and punctuation that were not noted in checking the note before saving.

## 2024-01-01 ENCOUNTER — APPOINTMENT (OUTPATIENT)
Dept: RADIOLOGY | Facility: HOSPITAL | Age: 89
DRG: 242 | End: 2024-01-01
Payer: COMMERCIAL

## 2024-01-01 LAB
ANION GAP SERPL CALC-SCNC: 10 MMOL/L (ref 10–20)
BUN SERPL-MCNC: 43 MG/DL (ref 6–23)
CALCIUM SERPL-MCNC: 8.1 MG/DL (ref 8.6–10.3)
CHLORIDE SERPL-SCNC: 95 MMOL/L (ref 98–107)
CO2 SERPL-SCNC: 36 MMOL/L (ref 21–32)
CREAT SERPL-MCNC: 1.57 MG/DL (ref 0.5–1.05)
ERYTHROCYTE [DISTWIDTH] IN BLOOD BY AUTOMATED COUNT: 17.2 % (ref 11.5–14.5)
GFR SERPL CREATININE-BSD FRML MDRD: 32 ML/MIN/1.73M*2
GLUCOSE BLD MANUAL STRIP-MCNC: 106 MG/DL (ref 74–99)
GLUCOSE BLD MANUAL STRIP-MCNC: 132 MG/DL (ref 74–99)
GLUCOSE BLD MANUAL STRIP-MCNC: 132 MG/DL (ref 74–99)
GLUCOSE BLD MANUAL STRIP-MCNC: 135 MG/DL (ref 74–99)
GLUCOSE SERPL-MCNC: 110 MG/DL (ref 74–99)
HCT VFR BLD AUTO: 29.6 % (ref 36–46)
HGB BLD-MCNC: 8.2 G/DL (ref 12–16)
MAGNESIUM SERPL-MCNC: 2.1 MG/DL (ref 1.6–2.4)
MCH RBC QN AUTO: 28.4 PG (ref 26–34)
MCHC RBC AUTO-ENTMCNC: 27.7 G/DL (ref 32–36)
MCV RBC AUTO: 102 FL (ref 80–100)
NRBC BLD-RTO: 0 /100 WBCS (ref 0–0)
PLATELET # BLD AUTO: 142 X10*3/UL (ref 150–450)
POTASSIUM SERPL-SCNC: 4.2 MMOL/L (ref 3.5–5.3)
RBC # BLD AUTO: 2.89 X10*6/UL (ref 4–5.2)
SODIUM SERPL-SCNC: 137 MMOL/L (ref 136–145)
WBC # BLD AUTO: 6.4 X10*3/UL (ref 4.4–11.3)

## 2024-01-01 PROCEDURE — 71045 X-RAY EXAM CHEST 1 VIEW: CPT

## 2024-01-01 PROCEDURE — 83735 ASSAY OF MAGNESIUM: CPT | Performed by: INTERNAL MEDICINE

## 2024-01-01 PROCEDURE — 71045 X-RAY EXAM CHEST 1 VIEW: CPT | Performed by: RADIOLOGY

## 2024-01-01 PROCEDURE — 80048 BASIC METABOLIC PNL TOTAL CA: CPT | Performed by: INTERNAL MEDICINE

## 2024-01-01 PROCEDURE — 36415 COLL VENOUS BLD VENIPUNCTURE: CPT | Performed by: INTERNAL MEDICINE

## 2024-01-01 PROCEDURE — 2500000001 HC RX 250 WO HCPCS SELF ADMINISTERED DRUGS (ALT 637 FOR MEDICARE OP): Performed by: INTERNAL MEDICINE

## 2024-01-01 PROCEDURE — 85027 COMPLETE CBC AUTOMATED: CPT | Performed by: INTERNAL MEDICINE

## 2024-01-01 PROCEDURE — 82947 ASSAY GLUCOSE BLOOD QUANT: CPT

## 2024-01-01 PROCEDURE — 2060000001 HC INTERMEDIATE ICU ROOM DAILY

## 2024-01-01 PROCEDURE — 99231 SBSQ HOSP IP/OBS SF/LOW 25: CPT | Performed by: INTERNAL MEDICINE

## 2024-01-01 PROCEDURE — 2500000001 HC RX 250 WO HCPCS SELF ADMINISTERED DRUGS (ALT 637 FOR MEDICARE OP): Performed by: NURSE PRACTITIONER

## 2024-01-01 RX ADMIN — APIXABAN 2.5 MG: 2.5 TABLET, FILM COATED ORAL at 09:02

## 2024-01-01 RX ADMIN — DOCUSATE SODIUM 100 MG: 100 CAPSULE, LIQUID FILLED ORAL at 09:02

## 2024-01-01 RX ADMIN — ACETAMINOPHEN 487.5 MG: 325 TABLET ORAL at 20:35

## 2024-01-01 RX ADMIN — APIXABAN 2.5 MG: 2.5 TABLET, FILM COATED ORAL at 20:36

## 2024-01-01 RX ADMIN — ACETAMINOPHEN 487.5 MG: 325 TABLET ORAL at 09:02

## 2024-01-01 RX ADMIN — ATORVASTATIN CALCIUM 20 MG: 20 TABLET, FILM COATED ORAL at 20:35

## 2024-01-01 ASSESSMENT — PAIN - FUNCTIONAL ASSESSMENT
PAIN_FUNCTIONAL_ASSESSMENT: 0-10

## 2024-01-01 ASSESSMENT — PAIN SCALES - GENERAL
PAINLEVEL_OUTOF10: 0 - NO PAIN
PAINLEVEL_OUTOF10: 0 - NO PAIN
PAINLEVEL_OUTOF10: 3
PAINLEVEL_OUTOF10: 0 - NO PAIN
PAINLEVEL_OUTOF10: 1

## 2024-01-01 NOTE — CARE PLAN
Problem: Fall/Injury  Goal: Be free from injury by end of the shift  Outcome: Met     Problem: Skin  Goal: Promote/optimize nutrition  Outcome: Progressing     Problem: Respiratory  Goal: No signs of respiratory distress (eg. Use of accessory muscles. Peds grunting)  Outcome: Met   The patient's goals for the shift include      The clinical goals for the shift include pt will remain hemodynamically stable    Pt remained hemodynamically stable during shift. Safety maintained

## 2024-01-01 NOTE — PROGRESS NOTES
INPATIENT NEPHROLOGY CONSULT PROGRESS NOTE      Patient Name: Fifi Jenkins MRN: [id number]  DATE of SERVICE: January 1, 2024  TIME of SERVICE: 01:29 PM  CONSULTING SERVICE: Nephrology    REASON for CONSULT: Acute kidney injury    SUBJECTIVE:  Seen and evaluated at bedside resting in bed comfortably.  Renal parameters improving serum creatinine down to 1.5 from 1.8 mg/dl  Hemoglobin of 8.2.  Urine output encouraging still marginal however improving.  Stable blood pressure 117/50 with a heart rate of 45 requiring 2 L of oxygen.    To continue to hold diuretics    SUMMARY OF STAY:  Ms. Jenkins is a pleasant morbidly obese 88-year-old female with past medical history significant for chronic kidney disease stage IIIa with baseline serum creatinine 1 mg/dL EGFR 50 mill per minute per 1.73 m², long-term resident at [facility].  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 CVA in 2019, with residual dizziness. Patient presented to [facility] December 21, 2023 for evaluation of progressively worsening shortness of breath.  Upon presentation patient was hypoxic requiring 3 L of oxygen.  Home medications: losartan 50 mg p.o. daily, metolazone 5 mg weekly, torsemide 40 mg p.o. daily  Current medications: Newly started Jardiance 10 mg, losartan has been on hold since admission.  Metolazone 5 mg weekly.  Received Lasix 40 mg IV twice daily. Echocardiogram demonstrated ejection fraction 55%.  Labs Serum creatinine up to 1.34 mg deciliter BUN of 28 and GFR of 39.  From serum creatinine of 1 mg deciliter and a GFR of 58 mL/min upon presentation.  Anemia hemoglobin of 8.2 mg  deciliter. December 21, 2023 CT with IV contrast: No signs of pulmonary embolism demonstrated cardiomegaly without pericardial effusion.  Scattered emphysematous changes with small bilateral pleural effusions and mild bibasilar area of infiltrate or atelectasis.    ASSESSMENT AND PLAN:  CHERYL on CKD IIIa:  likely hemodynamically mediated in the setting of diuretics adjustment and vasoactive medications (Jardiance), contrast nephropathy (contrast exposure December 21).  Serum creatinine up to 1.34 mg deciliter BUN of 28 and GFR of 39.  From serum creatinine of 1 mg deciliter and a GFR of 58 mL/min upon presentation.     Volume status: Hypervolemic on presentation improved after IV Lasix     CKD IIIa: Diabetic nephropathy, hypertensive nephrosclerosis  Electrolytes: Managed (with renal diet)  Hypertension: Relative hypotension blood pressure 100 oh 3/60 with a heart rate of 81  Without being on antihypertensive medications  Acid-base: Metabolic alkalosis likely secondary to diuresis  Anemia from renal failure, To check iron panel and start epogen. hemoglobin of 8.2 mg deciliter.  To rule out plasma cell disorder  T2DM on insulin sliding scale  COPD: Emphysematous changes on CT scan requiring 2 L of oxygen  CVA 2019 with residual dizziness  Patient wheelchair dependent  Urinary incontinent: Wearing depends at skilled nursing facility  CHF: Diastolic heart failure treated with IV Lasix likely for acute on chronic  UMA with CPAP intolerance  Paroxysmal A-fib on Eliquis    Plan:  Acute kidney injury likely hemodynamically mediated due to IV diuresis, Jardiance, losartan use, an episode of systole after cardioversion, hypotension, contrast exposure.  Improving     Renal parameters plateauing serum creatinine 1.5 mg deciliter today comparing to 1.8 yesterday    To continue to hold diuretics for today.  Tentative plan to resume Bumex by tomorrow if remains hemodynamically stable  To cont to hold Jardiance.    Strict input and  output guide diuresis management.   Medication reviewed renally dosed  To continue renal diet, 2 g sodium.  No urgent indication for renal placement therapy.      Discharge medication:  Truly appreciate cardiology recommendations, ejection fraction dropped significantly.  Tentative plan to start Entresto upon discharge  To discontinue losartan upon discharge  To discontinue torsemide/metolazone.   To continue Jardiance  To cont Bumex 2 mg p.o. daily with extra dose as mentioned above.  To continue CPAP    We will continue to monitor closely with you, thank you!    Medications:    Current Facility-Administered Medications:     acetaminophen (Tylenol) tablet 487.5 mg, 487.5 mg, oral, BID, Steve Sneed MD, 487.5 mg at 01/01/24 0902    acetaminophen (Tylenol) tablet 650 mg, 650 mg, oral, q6h PRN, Steve Sneed MD, 650 mg at 12/25/23 0934    albuterol 2.5 mg /3 mL (0.083 %) nebulizer solution 2.5 mg, 2.5 mg, nebulization, q4h PRN, Steve Sneed MD, 2.5 mg at 12/30/23 1144    apixaban (Eliquis) tablet 2.5 mg, 2.5 mg, oral, BID, Jennifer Manuel, APRN-CNP, 2.5 mg at 01/01/24 0902    atorvastatin (Lipitor) tablet 20 mg, 20 mg, oral, Nightly, Steve Sneed MD, 20 mg at 12/31/23 2156    [Held by provider] bumetanide (Bumex) tablet 2 mg, 2 mg, oral, Daily, Steve Sneed MD, 2 mg at 12/30/23 1144    butalbital-acetaminophen-caff -40 mg per tablet 1 tablet, 1 tablet, oral, q4h PRN, Steve Sneed MD, 1 tablet at 12/27/23 2056    calcium carbonate (Tums) chewable tablet 500 mg, 1 tablet, oral, q4h PRN, Steve Sneed MD, 500 mg at 12/31/23 0030    cyclobenzaprine (Flexeril) tablet 10 mg, 10 mg, oral, BID PRN, Steve Sneed MD    dextrose 10 % in water (D10W) infusion, 0.3 g/kg/hr, intravenous, Once PRN, Steve Sneed MD    dextrose 50 % injection 25 g, 25 g, intravenous, q15 min PRN, Steve Sneed MD    docusate sodium (Colace) capsule 100 mg, 100 mg, oral, BID, Steve Sneed MD,  100 mg at 01/01/24 0902    [Held by provider] empagliflozin (Jardiance) tablet 10 mg, 10 mg, oral, Daily, Steve Sneed MD, 10 mg at 12/30/23 1144    glucagon (Glucagen) injection 1 mg, 1 mg, intramuscular, q15 min PRN, Steve Sneed MD    insulin lispro (HumaLOG) injection 0-5 Units, 0-5 Units, subcutaneous, 4x daily, Steve Sneed MD, 1 Units at 12/23/23 2141    LORazepam (Ativan) tablet 0.25 mg, 0.25 mg, oral, Once, Steve Sneed MD    lubricating eye drops ophthalmic solution 1 drop, 1 drop, Both Eyes, q4h PRN, Steve Sneed MD, 1 drop at 12/27/23 0911    oxygen (O2) therapy, , inhalation, Continuous PRN - O2/gases, Steve Sneed MD, 3 L/min at 12/30/23 0025    perflutren lipid microspheres (Definity) injection 0.5-10 mL of dilution, 0.5-10 mL of dilution, intravenous, Once in imaging, Steve Sneed MD    perflutren protein A microsphere (Optison) injection 0.5 mL, 0.5 mL, intravenous, Once in imaging, Steve Sneed MD    polyethylene glycol (Glycolax, Miralax) packet 17 g, 17 g, oral, Daily, Steve Sneed MD, 17 g at 12/29/23 0837    propofol (Diprivan) injection, , , PRN, Lamine Savage MD, 60 mg at 12/29/23 1343    sennosides (Senokot) tablet 8.6 mg, 1 tablet, oral, BID, Steve Sneed MD, 8.6 mg at 12/31/23 0901    sulfur hexafluoride microsphr (Lumason) injection 24.28 mg, 2 mL, intravenous, Once in imaging, Steve Sneed MD    traMADol (Ultram) tablet 50 mg, 50 mg, oral, q8h PRN, Steve Sneed MD, 50 mg at 12/26/23 2222    PERTINENT ROS:  GENERAL:  positive for fatigue, poor appetite.  No fever/chills  RESPIRATORY:  positive for shortness of breath.  Negative for cough, wheezing.  CARDIOVASCULAR:   Negative for chest pain or palpitation.  GI:  Negative for abdominal pain, diarrhea, heartburn, nausea, vomiting  : Dysuria    Physical Exam:  Vital signs in last 24 hours:  Temp:  [36.3 °C (97.3 °F)-37 °C (98.6 °F)] 36.3 °C (97.3 °F)  Heart Rate:  [45-50]  45  Resp:  [12-27] 18  BP: (112-137)/(51-67) 117/56    General: Awake, cooperative, not in acute distress  HEENT:  NCAT,  mucous membranes moist and pink  NECK:  Elevated JVD, no carotid bruit, supple, no cervical mass or thyromegaly  LUNGS;  Diminished breath sounds, fine Rales  CV:  Distant, regular rate and rhythm, no murmurs  ABDOMEN:  abdomen soft, nontender, BS normal, no masses or organomegaly  EDEMA:  +1 lower extremity edema/dependent edema  SKIN:  dry and normal turgor, no clubbing, cyanosis or petechia.  No rashes noted      Intake/Output last 3 shifts:  I/O last 3 completed shifts:  In: 1136.7 (8 mL/kg) [P.O.:480; I.V.:656.7 (4.6 mL/kg)]  Out: 400 (2.8 mL/kg) [Urine:400 (0.1 mL/kg/hr)]  Weight: 142.8 kg     DATA:  Diagnotic tests reviewed for Todays visit:  Results from last 7 days   Lab Units 01/01/24  0603   WBC AUTO x10*3/uL 6.4   RBC AUTO x10*6/uL 2.89*   HEMOGLOBIN g/dL 8.2*   HEMATOCRIT % 29.6*       Results from last 7 days   Lab Units 01/01/24  0603   SODIUM mmol/L 137   POTASSIUM mmol/L 4.2   CHLORIDE mmol/L 95*   CO2 mmol/L 36*   BUN mg/dL 43*   CREATININE mg/dL 1.57*   CALCIUM mg/dL 8.1*   MAGNESIUM mg/dL 2.10       Results from last 7 days   Lab Units 12/27/23  1114   COLOR U  Airam*   APPEARANCE U  Hazy*   PH U  6.0   SPEC GRAV UR  1.020   PROTEIN U mg/dL 100 (2+)*   BLOOD UR  MODERATE (2+)*   NITRITE U  NEGATIVE   WBC UR /HPF 11-20*   BACTERIA UR /HPF 4+*       IMAGING: CXR reviewed in  images      SIGNATURE: Mary Solomon MD  Nephrology and Hypertension  [address] [place]  Office phone: [phone]  FAX: [phone]    This note was partially generated using the Dragon voice recognition system, and there may be some incorrect words, spelling's and punctuation that were not noted in checking the note before saving.

## 2024-01-01 NOTE — CARE PLAN
Problem: Fall/Injury  Goal: Be free from injury by end of the shift  Outcome: Progressing  Goal: Verbalize understanding of personal risk factors for fall in the hospital  Outcome: Progressing  Goal: Verbalize understanding of risk factor reduction measures to prevent injury from fall in the home  Outcome: Progressing  Goal: Use assistive devices by end of the shift  Outcome: Progressing  Goal: Pace activities to prevent fatigue by end of the shift  Outcome: Progressing     Problem: Heart Failure  Goal: Improved gas exchange this shift  Outcome: Progressing  Goal: Improved urinary output this shift  Outcome: Progressing  Goal: Reduction in peripheral edema within 24 hours  Outcome: Progressing  Goal: Report improvement of dyspnea/breathlessness this shift  Outcome: Progressing  Goal: Weight from fluid excess reduced over 2-3 days, then stabilize  Outcome: Progressing  Goal: Increase self care and/or family involvement in 24 hours  Outcome: Progressing     Problem: Dressings Lower Extremities  Goal: STG - Patient will complete lower body dressing with mod assist with comp strategies PRN  Outcome: Progressing     Problem: Dressing Upper Extremities  Goal: STG - Patient will dress upper body with CGA  Outcome: Progressing     Problem: Skin  Goal: Participates in plan/prevention/treatment measures  Outcome: Progressing  Goal: Prevent/manage excess moisture  Outcome: Progressing  Goal: Prevent/minimize sheer/friction injuries  Outcome: Progressing  Goal: Promote/optimize nutrition  Outcome: Progressing     Problem: Respiratory  Goal: Clear secretions with interventions this shift  Outcome: Progressing  Goal: Minimize anxiety/maximize coping throughout shift  Outcome: Progressing  Goal: Minimal/no exertional discomfort or dyspnea this shift  Outcome: Progressing  Goal: No signs of respiratory distress (eg. Use of accessory muscles. Peds grunting)  Outcome: Progressing   Pt was bradycardiac      The clinical goals for  the shift include to be comfortable in the bed    O

## 2024-01-01 NOTE — PROGRESS NOTES
Message sent via [employer] to [facility] inquiring if pt needs a new auth to return. Awaiting response.     1125 Pt will need a new auth to return to [facility]. Inquiring if the facility will be submitting 1/2/24. Awaiting response.     1330 [facility] will be submitting for auth in AM 1/2/24.

## 2024-01-01 NOTE — PROGRESS NOTES
Internal Medicine Progress Note    Patient Name: Fifi Jenkins          MRN: [id number]  Today's Date: January 1, 2024          Attending: Nick Montaño MD    Subjective:  Patient was seen and examined at bedside     Review Of Systems:  GENERAL: Still complaining of generalized weakness  CARDIOVASCULAR: Negative for chest pain  GI: No nausea, vomiting, or diarrhea    Objective:  Vitals:    01/01/24 0000 01/01/24 0400 01/01/24 0549 01/01/24 0800   BP: 116/56   137/67   BP Location:    Left arm   Patient Position:    Lying   Pulse: (!) 47   (!) 48   Resp: 14   16   Temp: 36.4 °C (97.5 °F)   37 °C (98.6 °F)   TempSrc: Temporal   Temporal   SpO2: 94% 98%  98%   Weight:   143 kg (314 lb 13.1 oz)    Height:           Physical Exam:   General appearance: Alert, in no acute distress  Lungs: diminished breath sounds  Heart:  RRR, without murmur  Abdomen: Soft, non-tender  Neuro: Alert oriented x3    Labs:  Results for orders placed or performed during the hospital encounter of 12/21/23 (from the past 24 hour(s))   POCT GLUCOSE   Result Value Ref Range    POCT Glucose 129 (H) 74 - 99 mg/dL   POCT GLUCOSE   Result Value Ref Range    POCT Glucose 146 (H) 74 - 99 mg/dL   Magnesium   Result Value Ref Range    Magnesium 2.10 1.60 - 2.40 mg/dL   CBC   Result Value Ref Range    WBC 6.4 4.4 - 11.3 x10*3/uL    nRBC 0.0 0.0 - 0.0 /100 WBCs    RBC 2.89 (L) 4.00 - 5.20 x10*6/uL    Hemoglobin 8.2 (L) 12.0 - 16.0 g/dL    Hematocrit 29.6 (L) 36.0 - 46.0 %     (H) 80 - 100 fL    MCH 28.4 26.0 - 34.0 pg    MCHC 27.7 (L) 32.0 - 36.0 g/dL    RDW 17.2 (H) 11.5 - 14.5 %    Platelets 142 (L) 150 - 450 x10*3/uL   Basic Metabolic Panel   Result Value Ref Range    Glucose 110 (H) 74 - 99 mg/dL    Sodium 137 136 - 145 mmol/L    Potassium 4.2 3.5 - 5.3 mmol/L    Chloride 95 (L) 98 - 107 mmol/L    Bicarbonate 36 (H) 21 - 32 mmol/L    Anion Gap 10 10 - 20 mmol/L    Urea Nitrogen 43 (H) 6 - 23 mg/dL    Creatinine 1.57 (H) 0.50 - 1.05 mg/dL    eGFR 32  (L) >60 mL/min/1.73m*2    Calcium 8.1 (L) 8.6 - 10.3 mg/dL   POCT GLUCOSE   Result Value Ref Range    POCT Glucose 106 (H) 74 - 99 mg/dL       Medications:  Scheduled medications  acetaminophen, 487.5 mg, oral, BID  apixaban, 2.5 mg, oral, BID  atorvastatin, 20 mg, oral, Nightly  [Held by provider] bumetanide, 2 mg, oral, Daily  docusate sodium, 100 mg, oral, BID  [Held by provider] empagliflozin, 10 mg, oral, Daily  insulin lispro, 0-5 Units, subcutaneous, 4x daily  LORazepam, 0.25 mg, oral, Once  perflutren lipid microspheres, 0.5-10 mL of dilution, intravenous, Once in imaging  perflutren protein A microsphere, 0.5 mL, intravenous, Once in imaging  polyethylene glycol, 17 g, oral, Daily  sennosides, 1 tablet, oral, BID  sulfur hexafluoride microsphr, 2 mL, intravenous, Once in imaging      Continuous medications     PRN medications  PRN medications: acetaminophen, albuterol, butalbital-acetaminophen-caff, calcium carbonate, cyclobenzaprine, dextrose 10 % in water (D10W), dextrose, glucagon, lubricating eye drops, oxygen, propofol, traMADol      Assessment/Plan:  Medical Problems       Problem List       * (Principal) Acute on chronic heart failure with reducedejection fraction (CMS/HCC)    Continue holding Bumex and Jardiance  Cardiology is following      Paroxysmal atrial fibrillation (CMS/HCC)     Continue Eliquis  Cardiology and EP are following, recommendations for pacemaker, patient still has not decided         Hyperlipidemia     Continue atorvastatin         Acute respiratory failure with hypoxia (CMS/HCC)     On oxygen supplement         Stage 3 chronic kidney disease (CMS/HCC)  Acute kidney injury     Kidney function is slowly improving  Continue monitoring         Type 2 diabetes mellitus with chronic kidney disease, 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 In such instances, original meaning may be extrapolated by contextual derivation  "

## 2024-01-01 NOTE — PROGRESS NOTES
ELECTROPHYSIOLOGY PROGRESS NOTE    PATIENT NAME: Fifi Jenkins  PATIENT MRN: [id number]  SERVICE DATE:  1/1/2024  SERVICE TIME: 12:43 PM    CONSULTANT: Lamine Savage MD  PRIMARY CARE PHYSICIAN: Aung Jacobs MD  ATTENDING PHYSICIAN: Nick Montaño MD      IMPRESSIONS:  1.  HTN, controlled.  2.  Recent persistent AF with RVR, S/P DCC on 12/29/2023, and now with sinus bradycardia, consistent with tachycardia-bradycardia syndrome, and representing an indication for permanent pacemaker placement.  3.  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  4.  Other medical problems, including obesity, DJD, anemia, stage III CKD, GERD, hyperlipidemia, and venous insufficiency.     RECOMMENDATIONS:  1.  I recommend transvenous DDDR pacemaker placement, then amiodarone therapy to seek to maintain sinus rhythm.    2.  If the patient consents to pacemaker placement, I will plan to perform this on 1/3/2024, holding her Eliquis for 24 hours prior to the procedure.  If she elects against it, however, she is free for discharge from my standpoint.      SIGNATURE: Lamine Savage MD   OFFICE: [phone]    ================================================================    SUBJECTIVE: The patient remains in the CCU for monitoring of her rhythm.  She is status post successful cardioversion on 12/29/2023, but had immediate bradycardia thereafter, requiring atropine, and has now settled in sinus bradycardia in the 40s.  We have recommended permanent pacing, but the patient remains ambivalent about consenting to this.    CURRENT CARDIOVASCULAR MEDICATIONS:    apixaban (Eliquis) tablet 2.5 mg, 2.5 mg, oral, BID    atorvastatin (Lipitor) tablet 20 mg, 20 mg, oral, Nightly    [Held by provider] bumetanide (Bumex) tablet 2 mg, 2 mg, oral, Daily    [Held by provider] empagliflozin (Jardiance) tablet 10 mg, 10 mg, oral, Daily    VITALS:  /56 (BP Location: Left arm, Patient Position:  "Lying)   Pulse 45   Temp 36.3 °C (97.3 °F) (Temporal)   Resp 18   Ht 1.6 m (5' 3\")   Wt 143 kg (314 lb 13.1 oz)   SpO2 96%   BMI 55.77 kg/m²     MONITOR: Sinus bradycardia in 40's    LABS:  Lab Results   Component Value Date    WBC 6.4 01/01/2024    HGB 8.2 (L) 01/01/2024    HCT 29.6 (L) 01/01/2024     (H) 01/01/2024     (L) 01/01/2024      Lab Results   Component Value Date    GLUCOSE 110 (H) 01/01/2024    CALCIUM 8.1 (L) 01/01/2024     01/01/2024    K 4.2 01/01/2024    CO2 36 (H) 01/01/2024    CL 95 (L) 01/01/2024    BUN 43 (H) 01/01/2024    CREATININE 1.57 (H) 01/01/2024          "

## 2024-01-02 ENCOUNTER — APPOINTMENT (OUTPATIENT)
Dept: CARDIOLOGY | Facility: HOSPITAL | Age: 89
DRG: 242 | End: 2024-01-02
Payer: COMMERCIAL

## 2024-01-02 LAB
ANION GAP SERPL CALC-SCNC: 9 MMOL/L (ref 10–20)
ATRIAL RATE: 108 BPM
ATRIAL RATE: 110 BPM
BUN SERPL-MCNC: 42 MG/DL (ref 6–23)
CALCIUM SERPL-MCNC: 8.4 MG/DL (ref 8.6–10.3)
CHLORIDE SERPL-SCNC: 96 MMOL/L (ref 98–107)
CO2 SERPL-SCNC: 36 MMOL/L (ref 21–32)
CREAT SERPL-MCNC: 1.16 MG/DL (ref 0.5–1.05)
ERYTHROCYTE [DISTWIDTH] IN BLOOD BY AUTOMATED COUNT: 17.3 % (ref 11.5–14.5)
GFR SERPL CREATININE-BSD FRML MDRD: 46 ML/MIN/1.73M*2
GLUCOSE BLD MANUAL STRIP-MCNC: 104 MG/DL (ref 74–99)
GLUCOSE BLD MANUAL STRIP-MCNC: 110 MG/DL (ref 74–99)
GLUCOSE BLD MANUAL STRIP-MCNC: 129 MG/DL (ref 74–99)
GLUCOSE BLD MANUAL STRIP-MCNC: 137 MG/DL (ref 74–99)
GLUCOSE SERPL-MCNC: 109 MG/DL (ref 74–99)
HCT VFR BLD AUTO: 31.9 % (ref 36–46)
HGB BLD-MCNC: 8.7 G/DL (ref 12–16)
MAGNESIUM SERPL-MCNC: 2.26 MG/DL (ref 1.6–2.4)
MCH RBC QN AUTO: 28.9 PG (ref 26–34)
MCHC RBC AUTO-ENTMCNC: 27.3 G/DL (ref 32–36)
MCV RBC AUTO: 106 FL (ref 80–100)
NRBC BLD-RTO: 0 /100 WBCS (ref 0–0)
P AXIS: -15 DEGREES
P OFFSET: 243 MS
P ONSET: 161 MS
PLATELET # BLD AUTO: 147 X10*3/UL (ref 150–450)
POTASSIUM SERPL-SCNC: 4.2 MMOL/L (ref 3.5–5.3)
PR INTERVAL: 168 MS
Q ONSET: 188 MS
Q ONSET: 245 MS
QRS COUNT: 15 BEATS
QRS COUNT: 18 BEATS
QRS DURATION: 142 MS
QRS DURATION: 26 MS
QT INTERVAL: 278 MS
QT INTERVAL: 400 MS
QTC CALCULATION(BAZETT): 372 MS
QTC CALCULATION(BAZETT): 489 MS
QTC FREDERICIA: 338 MS
QTC FREDERICIA: 458 MS
R AXIS: -60 DEGREES
R AXIS: 180 DEGREES
RBC # BLD AUTO: 3.01 X10*6/UL (ref 4–5.2)
SODIUM SERPL-SCNC: 137 MMOL/L (ref 136–145)
T AXIS: 70 DEGREES
T AXIS: 81 DEGREES
T OFFSET: 384 MS
T OFFSET: 388 MS
VENTRICULAR RATE: 108 BPM
VENTRICULAR RATE: 90 BPM
WBC # BLD AUTO: 6.1 X10*3/UL (ref 4.4–11.3)

## 2024-01-02 PROCEDURE — 83735 ASSAY OF MAGNESIUM: CPT | Performed by: INTERNAL MEDICINE

## 2024-01-02 PROCEDURE — 97535 SELF CARE MNGMENT TRAINING: CPT | Mod: GO,CO

## 2024-01-02 PROCEDURE — 93005 ELECTROCARDIOGRAM TRACING: CPT

## 2024-01-02 PROCEDURE — 2500000001 HC RX 250 WO HCPCS SELF ADMINISTERED DRUGS (ALT 637 FOR MEDICARE OP): Performed by: INTERNAL MEDICINE

## 2024-01-02 PROCEDURE — 2060000001 HC INTERMEDIATE ICU ROOM DAILY

## 2024-01-02 PROCEDURE — 97112 NEUROMUSCULAR REEDUCATION: CPT | Mod: GP,CQ

## 2024-01-02 PROCEDURE — 36415 COLL VENOUS BLD VENIPUNCTURE: CPT | Performed by: INTERNAL MEDICINE

## 2024-01-02 PROCEDURE — 80048 BASIC METABOLIC PNL TOTAL CA: CPT | Performed by: INTERNAL MEDICINE

## 2024-01-02 PROCEDURE — 82947 ASSAY GLUCOSE BLOOD QUANT: CPT

## 2024-01-02 PROCEDURE — 97530 THERAPEUTIC ACTIVITIES: CPT | Mod: GP,CQ

## 2024-01-02 PROCEDURE — 2500000001 HC RX 250 WO HCPCS SELF ADMINISTERED DRUGS (ALT 637 FOR MEDICARE OP): Performed by: NURSE PRACTITIONER

## 2024-01-02 PROCEDURE — 85027 COMPLETE CBC AUTOMATED: CPT | Performed by: INTERNAL MEDICINE

## 2024-01-02 RX ORDER — BUMETANIDE 1 MG/1
2 TABLET ORAL DAILY
Status: DISCONTINUED | OUTPATIENT
Start: 2024-01-04 | End: 2024-01-09 | Stop reason: HOSPADM

## 2024-01-02 RX ADMIN — ACETAMINOPHEN 487.5 MG: 325 TABLET ORAL at 21:04

## 2024-01-02 RX ADMIN — APIXABAN 2.5 MG: 2.5 TABLET, FILM COATED ORAL at 21:04

## 2024-01-02 RX ADMIN — ACETAMINOPHEN 487.5 MG: 325 TABLET ORAL at 09:00

## 2024-01-02 RX ADMIN — ATORVASTATIN CALCIUM 20 MG: 20 TABLET, FILM COATED ORAL at 21:04

## 2024-01-02 RX ADMIN — DOCUSATE SODIUM 100 MG: 100 CAPSULE, LIQUID FILLED ORAL at 21:00

## 2024-01-02 RX ADMIN — CALCIUM CARBONATE (ANTACID) CHEW TAB 500 MG 500 MG: 500 CHEW TAB at 00:04

## 2024-01-02 RX ADMIN — APIXABAN 2.5 MG: 2.5 TABLET, FILM COATED ORAL at 09:00

## 2024-01-02 ASSESSMENT — PAIN - FUNCTIONAL ASSESSMENT
PAIN_FUNCTIONAL_ASSESSMENT: 0-10

## 2024-01-02 ASSESSMENT — PAIN SCALES - GENERAL
PAINLEVEL_OUTOF10: 0 - NO PAIN
PAINLEVEL_OUTOF10: 4
PAINLEVEL_OUTOF10: 0 - NO PAIN
PAINLEVEL_OUTOF10: 0 - NO PAIN
PAINLEVEL_OUTOF10: 5 - MODERATE PAIN

## 2024-01-02 ASSESSMENT — ACTIVITIES OF DAILY LIVING (ADL)
EFFECT OF PAIN ON DAILY ACTIVITIES: .
HOME_MANAGEMENT_TIME_ENTRY: 23

## 2024-01-02 ASSESSMENT — COGNITIVE AND FUNCTIONAL STATUS - GENERAL
CLIMB 3 TO 5 STEPS WITH RAILING: TOTAL
STANDING UP FROM CHAIR USING ARMS: A LOT
PERSONAL GROOMING: A LITTLE
MOVING TO AND FROM BED TO CHAIR: A LOT
HELP NEEDED FOR BATHING: A LOT
MOVING FROM LYING ON BACK TO SITTING ON SIDE OF FLAT BED WITH BEDRAILS: A LOT
TOILETING: TOTAL
TURNING FROM BACK TO SIDE WHILE IN FLAT BAD: A LOT
WALKING IN HOSPITAL ROOM: TOTAL
DAILY ACTIVITIY SCORE: 13
DRESSING REGULAR LOWER BODY CLOTHING: TOTAL
MOBILITY SCORE: 10
DRESSING REGULAR UPPER BODY CLOTHING: A LOT

## 2024-01-02 NOTE — PROGRESS NOTES
Occupational Therapy    OT Treatment    Patient Name: Fifi Jenkins  MRN: [id number]  Today's Date: 1/2/2024  Time Calculation  Start Time: 0939  Stop Time: 1025  Time Calculation (min): 46 min         Assessment:  Prognosis: Fair  Evaluation/Treatment Tolerance: Patient tolerated treatment well, Patient limited by fatigue  End of Session Communication: Bedside nurse  End of Session Patient Position: Bed, 3 rail up, Alarm on  OT Assessment Results: Decreased ADL status, Decreased functional mobility  Prognosis: Fair  Evaluation/Treatment Tolerance: Patient tolerated treatment well, Patient limited by fatigue    Plan:  Treatment Interventions: ADL retraining, Functional transfer training, Endurance training  OT Frequency: 3 times per week  OT Discharge Recommendations: Moderate intensity level of continued care  Equipment Recommended upon Discharge: Wheeled walker, Wheelchair  Treatment Interventions: ADL retraining, Functional transfer training, Endurance training  Subjective     Outcome Measures:[facility] Daily Activity  Putting on and taking off regular lower body clothing: Total  Bathing (including washing, rinsing, drying): A lot  Putting on and taking off regular upper body clothing: A lot  Toileting, which includes using toilet, bedpan or urinal: Total  Taking care of personal grooming such as brushing teeth: A little  Eating Meals: None  Daily Activity - Total Score: 13       01/02/24 0939   OT Last Visit   OT Received On 01/02/24   General   Reason for Referral impaired ADL's   Co-Treatment PT   Co-Treatment Reason for safety   Prior to Session Communication Bedside nurse   Patient Position Received Bed, 3 rail up;Alarm on   Preferred Learning Style verbal   General Comment Pt. cleared by nursing, pt. pleasant and agreeable to tx.   Pain Assessment   Pain Assessment 0-10   Pain Score 0 - No pain   Cognition   Overall Cognitive Status WFL   LE Dressing   LE Dressing   (total assist to adjust socks)   Toileting    Toileting Level of Assistance Dependent   Where Assessed Bed level   Toileting Comments pt stood alongside EOB and therapist completed posterior wanda care in stance   Functional Standing Tolerance   Functional Standing Tolerance Comments Pt stood alongside EOB 2 x , mod A of 2 people , unable to complete a full stand and took a few side steps alongside EOB.   Bed Mobility   Bed Mobility   (min A to EOB, Mod A of 2 to get back into bed)   Transfers   Transfer   (STS 3 x mod A of 2 people)   Static Sitting Balance   Static Sitting-Level of Assistance Close supervision   Static Sitting-Comment/Number of Minutes Pt sat EOB for over 20 minutes, at time c/o dizziness and leaned to L   Static Standing Balance   Static Standing-Level of Assistance Moderate assistance  (x2 people)   Static Standing-Comment/Number of Minutes Pt stood alongside EOB, leaning forearms on ww at times. stands for approximately a minute   IP OT Assessment   Prognosis Fair   Evaluation/Treatment Tolerance Patient tolerated treatment well;Patient limited by fatigue   End of Session Communication Bedside nurse   End of Session Patient Position Bed, 3 rail up;Alarm on   OT Assessment   OT Assessment Results Decreased ADL status;Decreased functional mobility   Education   Individual(s) Educated Patient   Education Provided Fall precautons   Patient Response to Education Patient/Caregiver Verbalized Understanding of Information   Education Comment Pt would benefit from continued reinforcement.   Inpatient Plan   Treatment Interventions ADL retraining;Functional transfer training;Endurance training   OT Frequency 3 times per week   OT Discharge Recommendations Moderate intensity level of continued care   Equipment Recommended upon Discharge Wheeled walker;Wheelchair           Goals:  Encounter Problems       Encounter Problems (Active)       Balance       STG-Patient will be CGA with assistive device dynamic stand task >5 minutes for ADL completion    (Progressing)       Start:  12/24/23    Expected End:  01/07/24               Dressing Upper Extremities       STG - Patient will dress upper body with CGA (Progressing)       Start:  12/24/23    Expected End:  12/30/23               Dressings Lower Extremities       STG - Patient will complete lower body dressing with mod assist with comp strategies PRN (Progressing)       Start:  12/24/23    Expected End:  12/30/23               Transfers       STG-Patient will be min assist with functional transfers demonstrating good safety  (progress only as appropriate) (Progressing)       Start:  12/24/23    Expected End:  01/07/24

## 2024-01-02 NOTE — PROGRESS NOTES
Physical Therapy    Physical Therapy    Physical Therapy Treatment    Patient Name: Fifi Jenkins  MRN: [id number]  Today's Date: 1/2/2024  Time Calculation  Start Time: 0940  Stop Time: 1018  Time Calculation (min): 38 min       Assessment/Plan   PT Assessment  PT Assessment Results: Decreased strength, Decreased endurance, Impaired balance, Decreased mobility  Rehab Prognosis: Good  Evaluation/Treatment Tolerance: Patient limited by fatigue  Medical Staff Made Aware: Yes  End of Session Communication: Bedside nurse  Assessment Comment: Increased time and effort to complete all activities  End of Session Patient Position: Bed, 3 rail up, Alarm on (bed in chair mode)  PT Plan  Inpatient/Swing Bed or Outpatient: Inpatient  PT Plan  Treatment/Interventions: Bed mobility, Transfer training, Gait training, Balance training, Neuromuscular re-education, Strengthening, Therapeutic exercise, Therapeutic activity  PT Plan: Skilled PT  PT Frequency: 3 times per week  PT Discharge Recommendations: Moderate intensity level of continued care  Equipment Recommended upon Discharge: Wheeled walker, Wheelchair  PT Recommended Transfer Status: Assist x2  PT - OK to Discharge: Yes     01/02/24 0940   PT  Visit   PT Received On 01/02/24   Response to Previous Treatment Patient with no complaints from previous session.   General   Prior to Session Communication Bedside nurse   Patient Position Received Bed, 3 rail up;Alarm on   General Comment Pt agreeable to therapy and cleared for participation.   Precautions   Medical Precautions Fall precautions   Vital Signs   SpO2 97 %  (02 doffed upon entering room, Sp02 at 87%, 02 donned, Sp02 quickly returned to 97%.)   Pain Assessment   Pain Assessment 0-10   Pain Score 5 - Moderate pain   Pain Location Head   Effect of Pain on Daily Activities .   Cognition   Overall Cognitive Status WFL   Therapeutic Exercise   Therapeutic Exercise Performed Yes   Therapeutic Exercise Activity 1  AP/LAQ/SAQ/march/heel raises/toe raises 2x10   Balance/Neuromuscular Re-Education   Balance/Neuromuscular Re-Education Activity Performed Yes   Balance/Neuromuscular Re-Education Activity 1 seated balance at EOB, standing balance x3 trials, flexed posture, unable to stand upright, mod cues for hand placement. minAx1-2   Bed Mobility   Bed Mobility Yes   Bed Mobility 1   Bed Mobility 1 Supine to sitting   Level of Assistance 1 Minimum assistance   Bed Mobility Comments 1 Increased time and effort to complete.   Bed Mobility 2   Bed Mobility  2 Sitting to supine   Level of Assistance 2 Moderate assistance   Bed Mobility Comments 2 x2   Ambulation/Gait Training   Ambulation/Gait Training Performed Yes   Ambulation/Gait Training 1   Surface 1 Level tile   Device 1 Rolling walker   Gait Support Devices Gait belt   Assistance 1 Moderate assistance;Moderate verbal cues  (x2)   Comments/Distance (ft) 1 3 side steps woth mod cues for sequencing, flexed posture   Transfers   Transfer Yes   Transfer 1   Transfer From 1 Bed to   Transfer to 1 Stand   Technique 1 Stand to sit;Sit to stand   Transfer Device 1 Walker;Gait belt   Transfer Level of Assistance 1 Moderate assistance;Moderate verbal cues;Moderate tactile cues;+2   Trials/Comments 1 x3 unable to complete full stand, leaning on forearms on WW with v/t cues to facilitate upright stance.   Activity Tolerance   Endurance Tolerates 10 - 20 min exercise with multiple rests   Early Mobility/Exercise Safety Screen Proceed with mobilization - No exclusion criteria met   PT Assessment   PT Assessment Results Decreased strength;Decreased endurance;Impaired balance;Decreased mobility   Rehab Prognosis Good   End of Session Communication Bedside nurse   Assessment Comment Increased time and effort to complete all activities   End of Session Patient Position Bed, 3 rail up;Alarm on  (bed in chair mode)     Outcome Measures:  [facility] Basic Mobility  Turning from your back to your side  while in a flat bed without using bedrails: A lot  Moving from lying on your back to sitting on the side of a flat bed without using bedrails: A lot  Moving to and from bed to chair (including a wheelchair): A lot  Standing up from a chair using your arms (e.g. wheelchair or bedside chair): A lot  To walk in hospital room: Total  Climbing 3-5 steps with railing: Total  Basic Mobility - Total Score: 10  Education Documentation  Body Mechanics, taught by Kayla Conner PTA at 1/2/2024 10:55 AM.  Learner: Patient  Readiness: Acceptance  Method: Explanation  Response: Verbalizes Understanding, Demonstrated Understanding, Needs Reinforcement    Home Exercise Program, taught by Kayla Conner PTA at 1/2/2024 10:55 AM.  Learner: Patient  Readiness: Acceptance  Method: Explanation  Response: Verbalizes Understanding, Demonstrated Understanding, Needs Reinforcement    Mobility Training, taught by Kayla Conner PTA at 1/2/2024 10:55 AM.  Learner: Patient  Readiness: Acceptance  Method: Explanation  Response: Verbalizes Understanding, Demonstrated Understanding, Needs Reinforcement    Education Comments  No comments found.            EDUCATION:  Outpatient Education  Individual(s) Educated: Patient  Education Provided: Fall Risk    GOALS:  Encounter Problems       Encounter Problems (Active)       PT Problem       Pt will be able to perform all bed mobility tasks with CGA.  (Progressing)       Start:  12/24/23    Expected End:  01/07/24            Pt will perform all transfers with Min A with proper safety mechanics.   (Progressing)       Start:  12/24/23    Expected End:  01/07/24            Pt will be able to perform functional transfers while maintaining SpO2 > 90%.  (Progressing)       Start:  12/24/23    Expected End:  01/07/24            Pt will demonstrate good dynamic sit balance for completion of therapeutic exercises and functional tasks.  (Progressing)       Start:  12/24/23    Expected End:  01/07/24

## 2024-01-02 NOTE — PROGRESS NOTES
INPATIENT NEPHROLOGY CONSULT PROGRESS NOTE      Patient Name: Fifi Jenkins MRN: [id number]  DATE of SERVICE: January 2, 2024  TIME of SERVICE: 01:59 PM  CONSULTING SERVICE: Nephrology    REASON for CONSULT: Acute kidney injury    SUBJECTIVE:  Seen and evaluated at bedside, resting in bed comfortably.  Renal parameters improving serum creatinine down to 1.16 from 1.5 mg/dl  Hemoglobin of 8.2.  Urine output encouraging improving.  Stable blood pressure   To resume diuretics by tomorrow     SUMMARY OF STAY:  Ms. Jenkins is a pleasant morbidly obese 88-year-old female with past medical history significant for chronic kidney disease stage IIIa with baseline serum creatinine 1 mg/dL EGFR 50 mill per minute per 1.73 m², long-term resident at [facility].  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 CVA in 2019, with residual dizziness. Patient presented to [facility] December 21, 2023 for evaluation of progressively worsening shortness of breath.  Upon presentation patient was hypoxic requiring 3 L of oxygen.  Home medications: losartan 50 mg p.o. daily, metolazone 5 mg weekly, torsemide 40 mg p.o. daily  Current medications: Newly started Jardiance 10 mg, losartan has been on hold since admission.  Metolazone 5 mg weekly.  Received Lasix 40 mg IV twice daily. Echocardiogram demonstrated ejection fraction 55%.  Labs Serum creatinine up to 1.34 mg deciliter BUN of 28 and GFR of 39.  From serum creatinine of 1 mg deciliter and a GFR of 58 mL/min upon presentation.  Anemia hemoglobin of 8.2 mg deciliter. December 21, 2023 CT with IV contrast: No signs of pulmonary  embolism demonstrated cardiomegaly without pericardial effusion.  Scattered emphysematous changes with small bilateral pleural effusions and mild bibasilar area of infiltrate or atelectasis.    ASSESSMENT AND PLAN:  CHERYL on CKD IIIa:  likely hemodynamically mediated in the setting of diuretics adjustment and vasoactive medications (Jardiance), contrast nephropathy (contrast exposure December 21).  Serum creatinine up to 1.34 mg deciliter BUN of 28 and GFR of 39.  From serum creatinine of 1 mg deciliter and a GFR of 58 mL/min upon presentation.     Volume status: Hypervolemic on presentation improved after IV Lasix     CKD IIIa: Diabetic nephropathy, hypertensive nephrosclerosis  Electrolytes: Managed (with renal diet)  Hypertension: Relative hypotension blood pressure 100 oh 3/60 with a heart rate of 81  Without being on antihypertensive medications  Acid-base: Metabolic alkalosis likely secondary to diuresis  Anemia from renal failure, To check iron panel and start epogen. hemoglobin of 8.2 mg deciliter.  To rule out plasma cell disorder  T2DM on insulin sliding scale  COPD: Emphysematous changes on CT scan requiring 2 L of oxygen  CVA 2019 with residual dizziness  Patient wheelchair dependent  Urinary incontinent: Wearing depends at skilled nursing facility  CHF: Diastolic heart failure treated with IV Lasix likely for acute on chronic  UMA with CPAP intolerance  Paroxysmal A-fib on Eliquis    Plan:  Acute kidney injury likely hemodynamically mediated due to IV diuresis, Jardiance, losartan use, an episode of systole after cardioversion, hypotension, contrast exposure.  Improving     Renal parameters plateauing serum creatinine 1.5 mg deciliter today comparing to 1.16 yesterday    To resume bumex by tomorrow   To cont to hold Jardiance, entresto for now.    Strict input and output guide diuresis management.   Medication reviewed renally dosed  To continue renal diet, 2 g sodium.  No urgent indication for renal  placement therapy.      Discharge medication:  Truly appreciate cardiology recommendations, ejection fraction dropped significantly.  Tentative plan to start Entresto upon discharge  To discontinue losartan upon discharge  To discontinue torsemide/metolazone.   To continue Jardiance  To cont Bumex 2 mg p.o. daily with extra dose as mentioned above.  To continue CPAP    We will continue to monitor closely with you, thank you!    Medications:    Current Facility-Administered Medications:     acetaminophen (Tylenol) tablet 487.5 mg, 487.5 mg, oral, BID, Steve Sneed MD, 487.5 mg at 01/02/24 0900    acetaminophen (Tylenol) tablet 650 mg, 650 mg, oral, q6h PRN, Steve Sneed MD, 650 mg at 12/25/23 0934    albuterol 2.5 mg /3 mL (0.083 %) nebulizer solution 2.5 mg, 2.5 mg, nebulization, q4h PRN, Steve Sneed MD, 2.5 mg at 12/30/23 1144    apixaban (Eliquis) tablet 2.5 mg, 2.5 mg, oral, BID, Jennifer Manuel, APRN-CNP, 2.5 mg at 01/02/24 0900    atorvastatin (Lipitor) tablet 20 mg, 20 mg, oral, Nightly, Steve Sneed MD, 20 mg at 01/01/24 2035    [Held by provider] bumetanide (Bumex) tablet 2 mg, 2 mg, oral, Daily, Steve Sneed MD, 2 mg at 12/30/23 1144    butalbital-acetaminophen-caff -40 mg per tablet 1 tablet, 1 tablet, oral, q4h PRN, Steve Sneed MD, 1 tablet at 12/27/23 2056    calcium carbonate (Tums) chewable tablet 500 mg, 1 tablet, oral, q4h PRN, Steve Sneed MD, 500 mg at 01/02/24 0004    cyclobenzaprine (Flexeril) tablet 10 mg, 10 mg, oral, BID PRN, Steve Sneed MD    dextrose 10 % in water (D10W) infusion, 0.3 g/kg/hr, intravenous, Once PRN, Steve Sneed MD    dextrose 50 % injection 25 g, 25 g, intravenous, q15 min PRN, Steve Sneed MD    docusate sodium (Colace) capsule 100 mg, 100 mg, oral, BID, Steve Sneed MD, 100 mg at 01/01/24 0902    [Held by provider] empagliflozin (Jardiance) tablet 10 mg, 10 mg, oral, Daily, Steve Sneed MD, 10 mg at  12/30/23 1144    glucagon (Glucagen) injection 1 mg, 1 mg, intramuscular, q15 min PRN, Steve Sneed MD    insulin lispro (HumaLOG) injection 0-5 Units, 0-5 Units, subcutaneous, 4x daily, Steve Sneed MD, 1 Units at 12/23/23 2141    LORazepam (Ativan) tablet 0.25 mg, 0.25 mg, oral, Once, Steve Sneed MD    lubricating eye drops ophthalmic solution 1 drop, 1 drop, Both Eyes, q4h PRN, Steve Sneed MD, 1 drop at 12/27/23 0911    oxygen (O2) therapy, , inhalation, Continuous PRN - O2/gases, Steve Sneed MD, 3 L/min at 12/30/23 0025    perflutren lipid microspheres (Definity) injection 0.5-10 mL of dilution, 0.5-10 mL of dilution, intravenous, Once in imaging, Steve Sneed MD    perflutren protein A microsphere (Optison) injection 0.5 mL, 0.5 mL, intravenous, Once in imaging, Steve Sneed MD    polyethylene glycol (Glycolax, Miralax) packet 17 g, 17 g, oral, Daily, Steve Sneed MD, 17 g at 12/29/23 0837    propofol (Diprivan) injection, , , PRN, Lamine Savage MD, 60 mg at 12/29/23 1343    sennosides (Senokot) tablet 8.6 mg, 1 tablet, oral, BID, Steve Sneed MD, 8.6 mg at 12/31/23 0901    sulfur hexafluoride microsphr (Lumason) injection 24.28 mg, 2 mL, intravenous, Once in imaging, Steve Sneed MD    traMADol (Ultram) tablet 50 mg, 50 mg, oral, q8h PRN, Steve Sneed MD, 50 mg at 12/26/23 2222    PERTINENT ROS:  GENERAL:  positive for fatigue, poor appetite.  No fever/chills  RESPIRATORY:  positive for shortness of breath.  Negative for cough, wheezing.  CARDIOVASCULAR:   Negative for chest pain or palpitation.  GI:  Negative for abdominal pain, diarrhea, heartburn, nausea, vomiting  : Dysuria    Physical Exam:  Vital signs in last 24 hours:  Temp:  [35.8 °C (96.4 °F)-36.4 °C (97.5 °F)] 36.4 °C (97.5 °F)  Heart Rate:  [42-52] 46  Resp:  [16-38] 25  BP: (111-135)/(55-61) 124/61    General: Awake, cooperative, not in acute distress  HEENT:  NCAT,  mucous  membranes moist and pink  NECK:  Elevated JVD, no carotid bruit, supple, no cervical mass or thyromegaly  LUNGS;  Diminished breath sounds, fine Rales  CV:  Distant, regular rate and rhythm, no murmurs  ABDOMEN:  abdomen soft, nontender, BS normal, no masses or organomegaly  EDEMA:  +1 lower extremity edema/dependent edema  SKIN:  dry and normal turgor, no clubbing, cyanosis or petechia.  No rashes noted      Intake/Output last 3 shifts:  I/O last 3 completed shifts:  In: - (0 mL/kg)   Out: 100 (0.7 mL/kg) [Urine:100 (0 mL/kg/hr)]  Weight: 143.5 kg     DATA:  Diagnotic tests reviewed for Todays visit:  Results from last 7 days   Lab Units 01/02/24  0602   WBC AUTO x10*3/uL 6.1   RBC AUTO x10*6/uL 3.01*   HEMOGLOBIN g/dL 8.7*   HEMATOCRIT % 31.9*       Results from last 7 days   Lab Units 01/02/24  0602   SODIUM mmol/L 137   POTASSIUM mmol/L 4.2   CHLORIDE mmol/L 96*   CO2 mmol/L 36*   BUN mg/dL 42*   CREATININE mg/dL 1.16*   CALCIUM mg/dL 8.4*   MAGNESIUM mg/dL 2.26       Results from last 7 days   Lab Units 12/27/23  1114   COLOR U  Airam*   APPEARANCE U  Hazy*   PH U  6.0   SPEC GRAV UR  1.020   PROTEIN U mg/dL 100 (2+)*   BLOOD UR  MODERATE (2+)*   NITRITE U  NEGATIVE   WBC UR /HPF 11-20*   BACTERIA UR /HPF 4+*       IMAGING: CXR reviewed in  images      SIGNATURE: Mary Solomon MD  Nephrology and Hypertension  [address] [place]  Office phone: [phone]  FAX: [phone]    This note was partially generated using the Dragon voice recognition system, and there may be some incorrect words, spelling's and punctuation that were not noted in checking the note before saving.

## 2024-01-02 NOTE — CONSULTS
Nutrition Assessment Note  Nutrition Assessment      Reason for Assessment  Reason for Assessment: Dietitian discretion  Nutrition Note:  Fifi Jenkins is a 88 y.o. female presenting 12/21 from [facility] with shortness of breath. Work up revealing bilat pleural effusions and CHF with EF 33%; + A fib. Pt s/p cardioversion 12/29; pt considering PPM. Cardiology, EP, and Nephrology (CHERYL on CKD 3) involved in pt care.     Past Medical History   has a past medical history of Bilateral sensorineural hearing loss (10/12/2023), Cerebellar stroke (CMS/[facility]) (10/12/2023), Chronic anemia (09/27/2022), Chronic diastolic heart failure (CMS/[facility]) (10/12/2023), Elevated blood-pressure reading, without diagnosis of hypertension (05/23/2018), GERD (gastroesophageal reflux disease) (10/12/2023), Heart failure with preserved left ventricular function (HFpEF) (CMS/[facility]) (10/12/2023), Hyperlipidemia (10/12/2023), LAE (left atrial enlargement) (10/12/2023), Obstructive sleep apnea syndrome (04/02/2019), Other chest pain (11/06/2015), Other nonspecific abnormal finding of lung field (07/31/2013), Other symptoms and signs involving the musculoskeletal system (10/22/2015), Paroxysmal atrial fibrillation (CMS/[facility]) (10/12/2023), Personal history of other diseases of the circulatory system (11/28/2022), Personal history of transient ischemic attack (TIA), and cerebral infarction without residual deficits (10/28/2020), Polyp of colon (08/27/2018), Spinal stenosis (10/12/2023), Stage 3 chronic kidney disease (CMS/[facility]) (10/02/2023), Trigeminal neuralgia (10/12/2023), Type 2 diabetes mellitus with chronic kidney disease, without long-term current use of insulin (CMS/[facility]) (12/21/2023), Unspecified systolic (congestive) heart failure (CMS/[facility]) (06/08/2021), Unspecified visual disturbance (10/17/2017), and Urinary retention (10/12/2023).  Surgical History   has a past surgical history that includes Total abdominal hysterectomy (02/21/2013); Carpal  "tunnel release (02/21/2013); Cholecystectomy (02/21/2013); Appendectomy (02/21/2013); Breast biopsy (04/02/2013); Rotator cuff repair (04/02/2013); Other surgical history (02/11/2020); Other surgical history (12/22/2018); Colonoscopy (12/22/2015); Ankle surgery (08/01/2013); Total knee arthroplasty (08/01/2013); CT angio neck (2/23/2019); CT angio head w and wo IV contrast (2/23/2019); and CT angio coronary art with heartflow if score >30% (2/26/2019).   History:  Food and Nutrient History  Energy Intake: Fair 50-75 %  Food and Nutrient History: [facility] from [facility]; pt states all meals provided by facility \"and they put gravy on everything except the desserts.\"  Vitamin/Herbal Supplement Use: home meds include B12, folvite, MVI       Current Diet: Adult diet Cardiac; 70 gm fat; 2 - 3 grams Sodium  Food allergies: NKFA. is allergic to ace inhibitors.    GI assessment: Pt reports no GI issues at this time. Last BM 1/2/2023  Oral Problems: none;pt with some missing teeth and difficulties chewing corn on the cob otherwise eating fine  Mobility: from AL facility    Pain Assessment: 0-10  Pain Score: 0 - No pain  Pain Type: Chronic pain  Pain Location: Head    Vitals: Blood pressure 124/61, pulse (!) 46, temperature 36.4 °C (97.5 °F), temperature source Temporal, resp. rate 25, height 1.6 m (5' 3\"), weight 143 kg (316 lb 5.8 oz), SpO2 100 %.    Recent Lab Results:  Lab Results   Component Value Date    GLUCOSE 109 (H) 01/02/2024    CALCIUM 8.4 (L) 01/02/2024     01/02/2024    K 4.2 01/02/2024    CO2 36 (H) 01/02/2024    CL 96 (L) 01/02/2024    BUN 42 (H) 01/02/2024    CREATININE 1.16 (H) 01/02/2024     Lab Results   Component Value Date    HGBA1C 6.3 (H) 10/02/2023    HGBA1C 5.6 03/16/2019     Results from last 7 days   Lab Units 01/02/24  1621 01/02/24  1200 01/02/24  0727 01/01/24  2026 01/01/24  1610 01/01/24  1210 01/01/24  0733 12/31/23  1618   POCT GLUCOSE mg/dL 137* 129* 104* 132* 135* 132* 106* 146* "       Medications reviewed:  acetaminophen, 487.5 mg, oral, BID  apixaban, 2.5 mg, oral, BID  atorvastatin, 20 mg, oral, Nightly  [Held by provider] bumetanide, 2 mg, oral, Daily  docusate sodium, 100 mg, oral, BID  [Held by provider] empagliflozin, 10 mg, oral, Daily  insulin lispro, 0-5 Units, subcutaneous, 4x daily  LORazepam, 0.25 mg, oral, Once  perflutren lipid microspheres, 0.5-10 mL of dilution, intravenous, Once in imaging  perflutren protein A microsphere, 0.5 mL, intravenous, Once in imaging  polyethylene glycol, 17 g, oral, Daily  sennosides, 1 tablet, oral, BID  sulfur hexafluoride microsphr, 2 mL, intravenous, Once in imaging             Height:  160cm IBW 52.3kg   Admission Weight: 149kg  BMI 58kg/m2  Weight history/ % weight change:   1/2: 143kg; pt net -3.5L  10/2023: 136.1kg  7/2023: 145kg  Significant Weight Loss: no  Interpretation of Weight Loss:  +fluid weight gain                                                                     Estimated Energy Needs  Total Energy Estimated Needs (kCal):  (1600-1800kcal (11-13kcal/kg current wt of 143kg))    Estimated Protein Needs  Total Protein Estimated Needs (g):  (63-73g (1.2-1.4g/kg IBW of 52.3kg))    Estimated Fluid Needs  Total Fluid Estimated Needs (mL):  (1mL/kcal/d or as per physician)         Nutrition Focused Physical Findings:  Subcutaneous Fat Loss  Orbital Fat Pads: Well nourshed (slightly bulging fat pads)  Buccal Fat Pads: Well nourished (full, rounded cheeks)    Muscle Wasting  Temporalis: Well nourished (well-defined muscle)  Pectoralis (Clavicular Region): Well nourished (clavicle not visible)  Deltoid/Trapezius: Well nourished (rounded appearance at arm, shoulder, neck)  Interosseous: Well nourished (muscle bulges)    Edema  Edema: +2 mild  Edema Location: nonpitting BLE         Physical Findings (Nutrition Deficiency/Toxicity)  Skin: Negative (ecchymosis)       Nutrition Diagnosis   Malnutrition Diagnosis  Patient has Malnutrition  "Diagnosis: No    Patient has Nutrition Diagnosis: Yes  Nutrition Diagnosis 1: Decreased nutrient needs  Diagnosis Status (1): New  Related to (1): sodium  As Evidenced by (1): CHF with EF 33%.  Additional Assessment Information (1): Plan for AL upon discharge.                                         Nutrition Interventions/Recommendations   Nutrition Prescription  Individualized Nutrition Prescription Provided for : low sodium diet    Food and/or Nutrient Delivery Interventions  Interventions: Meals and snacks    Meals and Snacks: Mineral-modified diet  Goal: >75% of cardiac meals.             DIET: continue cardiac diet as tolerated                                                           Coordination of Nutrition Care by a Nutrition Professional  Collaboration and Referral of Nutrition Care: Collaboration by nutrition professional with other providers  Goal: RN    Education Documentation  Nutrition Care Manual, taught by Idalia Dodson RD at 1/2/2024  5:06 PM.  Learner: Patient  Readiness: Acceptance  Method: Explanation, Handout  Response: Verbalizes Understanding  Comment: \"Low Sodium Nutrition Therapy\" per Nutrition Care Manual      1/2: Met with pt at bedside. Rationale for cardiac diet reviewed \"My Cardiologist told me no more whole milk; he's the doctor!\" Offered to assist with dinner meal order however pt currently declines despite reporting to prefer to not eat after 6pm (currently ~5pm). Pt aware to ask AL if can have no gravy or little gravy on the side; encouraged to talk with facility dietary manager as well as have on discharge cardiac/2g Na diet order carry over to facility.        Nutrition Monitoring and Evaluation   Food and Nutrient Related History            Amount of Food: Estimated amout of food  Criteria: >75% of meals                             Anthropometrics: Body Composition/Growth/Weight History  Weight: Measured weight  Criteria: daily weight                   Biochemical Data, " Medical Tests and Procedures  Electrolyte and Renal Panel: BUN, Creatinine, Magnesium, Phosphorus, Potassium, Sodium  Criteria: labs WNR                        Nutrition Focused Physical Findings                      Skin: Other (Comment)  Criteria: promote skin integrity              Follow Up  Time Spent (min): 30 minutes  Follow up: Provided information on outpatient nutrition therapy services, Provided inpatient RDN contact information  Last Date of Nutrition Visit: 01/02/24  Nutrition Follow-Up Needed?: 5-7 days  Follow up Comment: ks, ?PPM, edu done

## 2024-01-02 NOTE — CARE PLAN
The patient's goals for the shift include  Be free from injury.    The clinical goals for the shift include patient's SpO2 will remain greater than 92% throughout shift    Over the shift, the patient did not make progress toward the following goals. Barriers to progression include poor mobility. Recommendations to address these barriers include working with PT/OT.

## 2024-01-02 NOTE — CARE PLAN
The clinical goals for the shift include patient's SpO2 will remain greater than 92% throughout shift. The patient met this goal     S: Patient admitted 12/21 for CHF exacerbation   B: History of: CHF, CAD, UMA, CKD, cerebellar stroke, afib on eliquis, diabetes   A: Patient A&Ox4, denies complaints of chest pain but is dyspneic on exertion. Remains NSR/SB and on 2-3 L O2 via NC. Medicated per orders throughout shift. Q2 turns done  R: Patient to possibly have pacemaker and return to [place] when ready for discharge

## 2024-01-02 NOTE — PROGRESS NOTES
Internal Medicine Progress Note    Patient Name: Fifi Jenkins          MRN: [id number]  Today's Date: January 2, 2024          Attending: Nick Montaño MD    Subjective:  Patient was seen and examined at bedside     Review Of Systems:  GENERAL: Still complaining of generalized weakness  CARDIOVASCULAR: Negative for chest pain  GI: No nausea, vomiting, or diarrhea    Objective:  Vitals:    01/02/24 0005 01/02/24 0415 01/02/24 0600 01/02/24 0718   BP: 115/57 113/56  135/59   BP Location: Left arm Left arm  Left arm   Patient Position: Lying Lying  Lying   Pulse: (!) 44 (!) 44  52   Resp: 22 16  (!) 38   Temp: 35.8 °C (96.4 °F) 36 °C (96.8 °F)     TempSrc: Temporal Temporal     SpO2: 97% 98%  99%   Weight:   143 kg (316 lb 5.8 oz)    Height:           Physical Exam:   General appearance: Alert, in no acute distress  Lungs: diminished breath sounds  Heart:  RRR, without murmur  Abdomen: Soft, non-tender  Neuro: Alert oriented x3    Labs:  Results for orders placed or performed during the hospital encounter of 12/21/23 (from the past 24 hour(s))   POCT GLUCOSE   Result Value Ref Range    POCT Glucose 132 (H) 74 - 99 mg/dL   POCT GLUCOSE   Result Value Ref Range    POCT Glucose 135 (H) 74 - 99 mg/dL   POCT GLUCOSE   Result Value Ref Range    POCT Glucose 132 (H) 74 - 99 mg/dL   Magnesium   Result Value Ref Range    Magnesium 2.26 1.60 - 2.40 mg/dL   CBC   Result Value Ref Range    WBC 6.1 4.4 - 11.3 x10*3/uL    nRBC 0.0 0.0 - 0.0 /100 WBCs    RBC 3.01 (L) 4.00 - 5.20 x10*6/uL    Hemoglobin 8.7 (L) 12.0 - 16.0 g/dL    Hematocrit 31.9 (L) 36.0 - 46.0 %     (H) 80 - 100 fL    MCH 28.9 26.0 - 34.0 pg    MCHC 27.3 (L) 32.0 - 36.0 g/dL    RDW 17.3 (H) 11.5 - 14.5 %    Platelets 147 (L) 150 - 450 x10*3/uL   Basic Metabolic Panel   Result Value Ref Range    Glucose 109 (H) 74 - 99 mg/dL    Sodium 137 136 - 145 mmol/L    Potassium 4.2 3.5 - 5.3 mmol/L    Chloride 96 (L) 98 - 107 mmol/L    Bicarbonate 36 (H) 21 - 32 mmol/L     Anion Gap 9 (L) 10 - 20 mmol/L    Urea Nitrogen 42 (H) 6 - 23 mg/dL    Creatinine 1.16 (H) 0.50 - 1.05 mg/dL    eGFR 46 (L) >60 mL/min/1.73m*2    Calcium 8.4 (L) 8.6 - 10.3 mg/dL   POCT GLUCOSE   Result Value Ref Range    POCT Glucose 104 (H) 74 - 99 mg/dL       Medications:  Scheduled medications  acetaminophen, 487.5 mg, oral, BID  apixaban, 2.5 mg, oral, BID  atorvastatin, 20 mg, oral, Nightly  [Held by provider] bumetanide, 2 mg, oral, Daily  docusate sodium, 100 mg, oral, BID  [Held by provider] empagliflozin, 10 mg, oral, Daily  insulin lispro, 0-5 Units, subcutaneous, 4x daily  LORazepam, 0.25 mg, oral, Once  perflutren lipid microspheres, 0.5-10 mL of dilution, intravenous, Once in imaging  perflutren protein A microsphere, 0.5 mL, intravenous, Once in imaging  polyethylene glycol, 17 g, oral, Daily  sennosides, 1 tablet, oral, BID  sulfur hexafluoride microsphr, 2 mL, intravenous, Once in imaging      Continuous medications     PRN medications  PRN medications: acetaminophen, albuterol, butalbital-acetaminophen-caff, calcium carbonate, cyclobenzaprine, dextrose 10 % in water (D10W), dextrose, glucagon, lubricating eye drops, oxygen, propofol, traMADol      Assessment/Plan:  Medical Problems       Problem List       * (Principal) Acute on chronic heart failure with reducedejection fraction (CMS/HCC)    Continue holding Bumex and Jardiance  Cardiology and EP is following      Paroxysmal atrial fibrillation (CMS/[facility])     Continue Eliquis  Cardiology and EP are following  Patient still has not decided on getting pacemaker         Hyperlipidemia     Continue atorvastatin         Acute respiratory failure with hypoxia (CMS/[facility])     On oxygen supplement         Stage 3 chronic kidney disease (CMS/[facility])  Acute kidney injury     Kidney function continues to improve  Continue monitoring  Nephrology is following         Type 2 diabetes mellitus with chronic kidney disease, without long-term current use of insulin  "(CMS/HCC)     Sliding scall insulin     Generalized weakness  PT/OT          Discussed with patient, RN    Nick Montaño MD   Date: 01/02/24  Time: 8:11 AM    This note was partially created using voice recognition software and is inherently subject to errors including those of syntax and \"sound-alike\" substitutions which may escape proofreading. In such instances, original meaning may be extrapolated by contextual derivation  "

## 2024-01-02 NOTE — PROGRESS NOTES
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  CAD status uncertain remote pharmacologic nuclear stress test unremarkable  Remains on Eliquis 5 twice daily previous stroke high [id number] score  Recommend sequential AV pacing to allow use of metoprolol and other heart failure medications to better treat the underlying cardiomyopathy  Discussed with EP who is following unfortunately patient still has not consented to proceeding with recommended pacing interventions but says \"I will think about it over the next 2 days\"  All measures at risk benefit have the been offered to explain the procedure the inadequacy of current heart failure treatment has been stressed to the patient  Status post cardioversion 1229  Other issues of obesity stage III chronic kidney disease GERD venous insufficiency anemia DJD reviewed  Would need Eliquis held for 24 hours prior to any pacing intervention but she has not yet consented  Creatinine down to 1.5 from 1.8 hemoglobin 8.2  Morbidly obese baseline creatinine 1.0    Overnight Events:    See above EP recommendations patient not yet ready to consent     Objective Data:  Last Recorded Vitals:  Vitals:    01/02/24 0600 01/02/24 0718 01/02/24 0940 01/02/24 1221   BP:  135/59  124/61   BP Location:  Left arm  Left arm   Patient Position:  Lying  Lying   Pulse:  52  (!) 46   Resp:  (!) 38  25   Temp:  36 °C (96.8 °F)  36.4 °C (97.5 °F)   TempSrc:  Temporal  Temporal   SpO2:  99% 97% 98%   Weight: 143 kg (316 lb 5.8 oz)      Height:           Last Labs:  CBC - 1/2/2024:  6:02 AM  6.1 8.7 147    31.9      CMP - 1/2/2024:  6:02 AM  8.4 6.2 11 --- 0.8   _ 4.1 9 91 "      PTT - No results in last year.  _   _ _     TROPHS   Date/Time Value Ref Range Status   12/21/2023 01:32 PM 9 0 - 13 ng/L Final   12/21/2023 11:58 AM 8 0 - 13 ng/L Final     BNP   Date/Time Value Ref Range Status   12/21/2023 11:58  0 - 99 pg/mL Final   03/06/2019 04:50  0 - 99 pg/mL Final     Comment:     .  <100 pg/mL - Heart failure unlikely  100-299 pg/mL - Intermediate probability of acute heart  .               failure exacerbation. Correlate with clinical  .               context and patient history.    >=300 pg/mL - Heart Failure likely. Correlate with clinical  .               context and patient history.  BNP testing is performed using different testing   methodology at [facility] than at other   system hospitals. Direct result comparisons should   only be made within the same method.     03/05/2019 08:18  0 - 99 pg/mL Final     Comment:     .  <100 pg/mL - Heart failure unlikely  100-299 pg/mL - Intermediate probability of acute heart  .               failure exacerbation. Correlate with clinical  .               context and patient history.    >=300 pg/mL - Heart Failure likely. Correlate with clinical  .               context and patient history.  BNP testing is performed using different testing   methodology at [facility] than at other   system hospitals. Direct result comparisons should   only be made within the same method.       HGBA1C   Date/Time Value Ref Range Status   10/02/2023 03:06 PM 6.3 4.3 - 5.6 % Final     Comment:     American Diabetes Association guidelines indicate that patients with HgbA1c in the range 5.7-6.4% are at increased risk for development of diabetes, and intervention by lifestyle modification may be beneficial. HgbA1c greater or equal to 6.5% is considered diagnostic of diabetes.   03/16/2019 05:30 AM 5.6 % Final     Comment:          Diagnosis of Diabetes-Adults   Non-Diabetic: < or = 5.6%   Increased risk for developing  diabetes: 5.7-6.4%   Diagnostic of diabetes: > or = 6.5%  .       Monitoring of Diabetes                Age (y)     Therapeutic Goal (%)   Adults:          >18           <7.0   Pediatrics:    13-18           <7.5                   7-12           <8.0                   0- 6            7.5-8.5   American Diabetes Association. Diabetes Care 33(S1), Jan 2010.     02/23/2019 07:30 PM 6.0 % Final     Comment:          Diagnosis of Diabetes-Adults   Non-Diabetic: < or = 5.6%   Increased risk for developing diabetes: 5.7-6.4%   Diagnostic of diabetes: > or = 6.5%  .       Monitoring of Diabetes                Age (y)     Therapeutic Goal (%)   Adults:          >18           <7.0   Pediatrics:    13-18           <7.5                   7-12           <8.0                   0- 6            7.5-8.5   American Diabetes Association. Diabetes Care 33(S1), Jan 2010.       VLDL   Date/Time Value Ref Range Status   08/23/2023 01:25 PM 12 0 - 40 mg/dL Final   02/23/2019 07:30 PM 16 0 - 40 mg/dL Final      Last I/O:  I/O last 3 completed shifts:  In: - (0 mL/kg)   Out: 100 (0.7 mL/kg) [Urine:100 (0 mL/kg/hr)]  Weight: 143.5 kg     Past Cardiology Tests (Last 3 Years):  EKG:  ECG 12 Lead 01/02/2024 (Preliminary)      ECG 12 Lead 12/30/2023 (Preliminary)      ECG 12 lead 12/21/2023      ECG 12 lead 12/21/2023      ECG 12 lead 12/21/2023    Echo:  Transthoracic Echo (TTE) Limited 12/29/2023      Transthoracic Echo (TTE) Complete 12/24/2023    Ejection Fractions:  EF   Date/Time Value Ref Range Status   12/29/2023 02:53 PM 37     12/24/2023 02:23 PM 33       Cath:  No results found for this or any previous visit from the past 1095 days.    Stress Test:  No results found for this or any previous visit from the past 1095 days.    Cardiac Imaging:  No results found for this or any previous visit from the past 1095 days.      Inpatient Medications:  Scheduled medications   Medication Dose Route Frequency    acetaminophen  487.5 mg oral BID     apixaban  2.5 mg oral BID    atorvastatin  20 mg oral Nightly    [Held by provider] bumetanide  2 mg oral Daily    docusate sodium  100 mg oral BID    [Held by provider] empagliflozin  10 mg oral Daily    insulin lispro  0-5 Units subcutaneous 4x daily    LORazepam  0.25 mg oral Once    perflutren lipid microspheres  0.5-10 mL of dilution intravenous Once in imaging    perflutren protein A microsphere  0.5 mL intravenous Once in imaging    polyethylene glycol  17 g oral Daily    sennosides  1 tablet oral BID    sulfur hexafluoride microsphr  2 mL intravenous Once in imaging     PRN medications   Medication    acetaminophen    albuterol    butalbital-acetaminophen-caff    calcium carbonate    cyclobenzaprine    dextrose 10 % in water (D10W)    dextrose    glucagon    lubricating eye drops    oxygen    propofol    traMADol     Continuous Medications   Medication Dose Last Rate     Results for orders placed or performed during the hospital encounter of 12/21/23 (from the past 96 hour(s))   Transthoracic Echo (TTE) Limited   Result Value Ref Range    LV biplane EF 37     LVIDd 5.40     LV A4C EF 34.3    POCT GLUCOSE   Result Value Ref Range    POCT Glucose 96 74 - 99 mg/dL   POCT GLUCOSE   Result Value Ref Range    POCT Glucose 144 (H) 74 - 99 mg/dL   Magnesium   Result Value Ref Range    Magnesium 2.11 1.60 - 2.40 mg/dL   CBC   Result Value Ref Range    WBC 6.6 4.4 - 11.3 x10*3/uL    nRBC 0.0 0.0 - 0.0 /100 WBCs    RBC 3.09 (L) 4.00 - 5.20 x10*6/uL    Hemoglobin 8.8 (L) 12.0 - 16.0 g/dL    Hematocrit 31.4 (L) 36.0 - 46.0 %     (H) 80 - 100 fL    MCH 28.5 26.0 - 34.0 pg    MCHC 28.0 (L) 32.0 - 36.0 g/dL    RDW 17.1 (H) 11.5 - 14.5 %    Platelets 171 150 - 450 x10*3/uL   Basic Metabolic Panel   Result Value Ref Range    Glucose 112 (H) 74 - 99 mg/dL    Sodium 138 136 - 145 mmol/L    Potassium 4.4 3.5 - 5.3 mmol/L    Chloride 93 (L) 98 - 107 mmol/L    Bicarbonate 35 (H) 21 - 32 mmol/L    Anion Gap 14 10 - 20 mmol/L     Urea Nitrogen 39 (H) 6 - 23 mg/dL    Creatinine 1.76 (H) 0.50 - 1.05 mg/dL    eGFR 28 (L) >60 mL/min/1.73m*2    Calcium 8.5 (L) 8.6 - 10.3 mg/dL   ECG 12 Lead   Result Value Ref Range    Ventricular Rate 125 BPM    Atrial Rate 61 BPM    QRS Duration 150 ms    QT Interval 366 ms    QTC Calculation(Bazett) 528 ms    R Axis -58 degrees    T Axis 103 degrees    QRS Count 21 beats    Q Onset 186 ms    T Offset 369 ms    QTC Fredericia 467 ms   POCT GLUCOSE   Result Value Ref Range    POCT Glucose 116 (H) 74 - 99 mg/dL   Basic Metabolic Panel   Result Value Ref Range    Glucose 118 (H) 74 - 99 mg/dL    Sodium 138 136 - 145 mmol/L    Potassium 4.3 3.5 - 5.3 mmol/L    Chloride 94 (L) 98 - 107 mmol/L    Bicarbonate 36 (H) 21 - 32 mmol/L    Anion Gap 12 10 - 20 mmol/L    Urea Nitrogen 43 (H) 6 - 23 mg/dL    Creatinine 1.92 (H) 0.50 - 1.05 mg/dL    eGFR 25 (L) >60 mL/min/1.73m*2    Calcium 8.2 (L) 8.6 - 10.3 mg/dL   POCT GLUCOSE   Result Value Ref Range    POCT Glucose 141 (H) 74 - 99 mg/dL   Magnesium   Result Value Ref Range    Magnesium 2.03 1.60 - 2.40 mg/dL   CBC   Result Value Ref Range    WBC 7.1 4.4 - 11.3 x10*3/uL    nRBC 0.0 0.0 - 0.0 /100 WBCs    RBC 3.06 (L) 4.00 - 5.20 x10*6/uL    Hemoglobin 8.7 (L) 12.0 - 16.0 g/dL    Hematocrit 31.3 (L) 36.0 - 46.0 %     (H) 80 - 100 fL    MCH 28.4 26.0 - 34.0 pg    MCHC 27.8 (L) 32.0 - 36.0 g/dL    RDW 17.2 (H) 11.5 - 14.5 %    Platelets 152 150 - 450 x10*3/uL   Basic Metabolic Panel   Result Value Ref Range    Glucose 113 (H) 74 - 99 mg/dL    Sodium 137 136 - 145 mmol/L    Potassium 4.6 3.5 - 5.3 mmol/L    Chloride 95 (L) 98 - 107 mmol/L    Bicarbonate 35 (H) 21 - 32 mmol/L    Anion Gap 12 10 - 20 mmol/L    Urea Nitrogen 45 (H) 6 - 23 mg/dL    Creatinine 1.86 (H) 0.50 - 1.05 mg/dL    eGFR 26 (L) >60 mL/min/1.73m*2    Calcium 8.4 (L) 8.6 - 10.3 mg/dL   POCT GLUCOSE   Result Value Ref Range    POCT Glucose 104 (H) 74 - 99 mg/dL   POCT GLUCOSE   Result Value Ref Range     POCT Glucose 129 (H) 74 - 99 mg/dL   POCT GLUCOSE   Result Value Ref Range    POCT Glucose 146 (H) 74 - 99 mg/dL   Magnesium   Result Value Ref Range    Magnesium 2.10 1.60 - 2.40 mg/dL   CBC   Result Value Ref Range    WBC 6.4 4.4 - 11.3 x10*3/uL    nRBC 0.0 0.0 - 0.0 /100 WBCs    RBC 2.89 (L) 4.00 - 5.20 x10*6/uL    Hemoglobin 8.2 (L) 12.0 - 16.0 g/dL    Hematocrit 29.6 (L) 36.0 - 46.0 %     (H) 80 - 100 fL    MCH 28.4 26.0 - 34.0 pg    MCHC 27.7 (L) 32.0 - 36.0 g/dL    RDW 17.2 (H) 11.5 - 14.5 %    Platelets 142 (L) 150 - 450 x10*3/uL   Basic Metabolic Panel   Result Value Ref Range    Glucose 110 (H) 74 - 99 mg/dL    Sodium 137 136 - 145 mmol/L    Potassium 4.2 3.5 - 5.3 mmol/L    Chloride 95 (L) 98 - 107 mmol/L    Bicarbonate 36 (H) 21 - 32 mmol/L    Anion Gap 10 10 - 20 mmol/L    Urea Nitrogen 43 (H) 6 - 23 mg/dL    Creatinine 1.57 (H) 0.50 - 1.05 mg/dL    eGFR 32 (L) >60 mL/min/1.73m*2    Calcium 8.1 (L) 8.6 - 10.3 mg/dL   POCT GLUCOSE   Result Value Ref Range    POCT Glucose 106 (H) 74 - 99 mg/dL   POCT GLUCOSE   Result Value Ref Range    POCT Glucose 132 (H) 74 - 99 mg/dL   POCT GLUCOSE   Result Value Ref Range    POCT Glucose 135 (H) 74 - 99 mg/dL   POCT GLUCOSE   Result Value Ref Range    POCT Glucose 132 (H) 74 - 99 mg/dL   Magnesium   Result Value Ref Range    Magnesium 2.26 1.60 - 2.40 mg/dL   CBC   Result Value Ref Range    WBC 6.1 4.4 - 11.3 x10*3/uL    nRBC 0.0 0.0 - 0.0 /100 WBCs    RBC 3.01 (L) 4.00 - 5.20 x10*6/uL    Hemoglobin 8.7 (L) 12.0 - 16.0 g/dL    Hematocrit 31.9 (L) 36.0 - 46.0 %     (H) 80 - 100 fL    MCH 28.9 26.0 - 34.0 pg    MCHC 27.3 (L) 32.0 - 36.0 g/dL    RDW 17.3 (H) 11.5 - 14.5 %    Platelets 147 (L) 150 - 450 x10*3/uL   Basic Metabolic Panel   Result Value Ref Range    Glucose 109 (H) 74 - 99 mg/dL    Sodium 137 136 - 145 mmol/L    Potassium 4.2 3.5 - 5.3 mmol/L    Chloride 96 (L) 98 - 107 mmol/L    Bicarbonate 36 (H) 21 - 32 mmol/L    Anion Gap 9 (L) 10 - 20  mmol/L    Urea Nitrogen 42 (H) 6 - 23 mg/dL    Creatinine 1.16 (H) 0.50 - 1.05 mg/dL    eGFR 46 (L) >60 mL/min/1.73m*2    Calcium 8.4 (L) 8.6 - 10.3 mg/dL   POCT GLUCOSE   Result Value Ref Range    POCT Glucose 104 (H) 74 - 99 mg/dL   POCT GLUCOSE   Result Value Ref Range    POCT Glucose 129 (H) 74 - 99 mg/dL   ECG 12 Lead   Result Value Ref Range    Ventricular Rate 49 BPM    Atrial Rate 49 BPM    WA Interval 216 ms    QRS Duration 140 ms    QT Interval 456 ms    QTC Calculation(Bazett) 411 ms    P Axis 119 degrees    R Axis -62 degrees    T Axis -66 degrees    QRS Count 8 beats    Q Onset 192 ms    T Offset 420 ms    QTC Fredericia 426 ms       Results for orders placed or performed during the hospital encounter of 12/21/23 (from the past 96 hour(s))   Transthoracic Echo (TTE) Limited   Result Value Ref Range    LV biplane EF 37     LVIDd 5.40     LV A4C EF 34.3    POCT GLUCOSE   Result Value Ref Range    POCT Glucose 96 74 - 99 mg/dL   POCT GLUCOSE   Result Value Ref Range    POCT Glucose 144 (H) 74 - 99 mg/dL   Magnesium   Result Value Ref Range    Magnesium 2.11 1.60 - 2.40 mg/dL   CBC   Result Value Ref Range    WBC 6.6 4.4 - 11.3 x10*3/uL    nRBC 0.0 0.0 - 0.0 /100 WBCs    RBC 3.09 (L) 4.00 - 5.20 x10*6/uL    Hemoglobin 8.8 (L) 12.0 - 16.0 g/dL    Hematocrit 31.4 (L) 36.0 - 46.0 %     (H) 80 - 100 fL    MCH 28.5 26.0 - 34.0 pg    MCHC 28.0 (L) 32.0 - 36.0 g/dL    RDW 17.1 (H) 11.5 - 14.5 %    Platelets 171 150 - 450 x10*3/uL   Basic Metabolic Panel   Result Value Ref Range    Glucose 112 (H) 74 - 99 mg/dL    Sodium 138 136 - 145 mmol/L    Potassium 4.4 3.5 - 5.3 mmol/L    Chloride 93 (L) 98 - 107 mmol/L    Bicarbonate 35 (H) 21 - 32 mmol/L    Anion Gap 14 10 - 20 mmol/L    Urea Nitrogen 39 (H) 6 - 23 mg/dL    Creatinine 1.76 (H) 0.50 - 1.05 mg/dL    eGFR 28 (L) >60 mL/min/1.73m*2    Calcium 8.5 (L) 8.6 - 10.3 mg/dL   ECG 12 Lead   Result Value Ref Range    Ventricular Rate 125 BPM    Atrial Rate 61 BPM     QRS Duration 150 ms    QT Interval 366 ms    QTC Calculation(Bazett) 528 ms    R Axis -58 degrees    T Axis 103 degrees    QRS Count 21 beats    Q Onset 186 ms    T Offset 369 ms    QTC Fredericia 467 ms   POCT GLUCOSE   Result Value Ref Range    POCT Glucose 116 (H) 74 - 99 mg/dL   Basic Metabolic Panel   Result Value Ref Range    Glucose 118 (H) 74 - 99 mg/dL    Sodium 138 136 - 145 mmol/L    Potassium 4.3 3.5 - 5.3 mmol/L    Chloride 94 (L) 98 - 107 mmol/L    Bicarbonate 36 (H) 21 - 32 mmol/L    Anion Gap 12 10 - 20 mmol/L    Urea Nitrogen 43 (H) 6 - 23 mg/dL    Creatinine 1.92 (H) 0.50 - 1.05 mg/dL    eGFR 25 (L) >60 mL/min/1.73m*2    Calcium 8.2 (L) 8.6 - 10.3 mg/dL   POCT GLUCOSE   Result Value Ref Range    POCT Glucose 141 (H) 74 - 99 mg/dL   Magnesium   Result Value Ref Range    Magnesium 2.03 1.60 - 2.40 mg/dL   CBC   Result Value Ref Range    WBC 7.1 4.4 - 11.3 x10*3/uL    nRBC 0.0 0.0 - 0.0 /100 WBCs    RBC 3.06 (L) 4.00 - 5.20 x10*6/uL    Hemoglobin 8.7 (L) 12.0 - 16.0 g/dL    Hematocrit 31.3 (L) 36.0 - 46.0 %     (H) 80 - 100 fL    MCH 28.4 26.0 - 34.0 pg    MCHC 27.8 (L) 32.0 - 36.0 g/dL    RDW 17.2 (H) 11.5 - 14.5 %    Platelets 152 150 - 450 x10*3/uL   Basic Metabolic Panel   Result Value Ref Range    Glucose 113 (H) 74 - 99 mg/dL    Sodium 137 136 - 145 mmol/L    Potassium 4.6 3.5 - 5.3 mmol/L    Chloride 95 (L) 98 - 107 mmol/L    Bicarbonate 35 (H) 21 - 32 mmol/L    Anion Gap 12 10 - 20 mmol/L    Urea Nitrogen 45 (H) 6 - 23 mg/dL    Creatinine 1.86 (H) 0.50 - 1.05 mg/dL    eGFR 26 (L) >60 mL/min/1.73m*2    Calcium 8.4 (L) 8.6 - 10.3 mg/dL   POCT GLUCOSE   Result Value Ref Range    POCT Glucose 104 (H) 74 - 99 mg/dL   POCT GLUCOSE   Result Value Ref Range    POCT Glucose 129 (H) 74 - 99 mg/dL   POCT GLUCOSE   Result Value Ref Range    POCT Glucose 146 (H) 74 - 99 mg/dL   Magnesium   Result Value Ref Range    Magnesium 2.10 1.60 - 2.40 mg/dL   CBC   Result Value Ref Range    WBC 6.4 4.4 -  11.3 x10*3/uL    nRBC 0.0 0.0 - 0.0 /100 WBCs    RBC 2.89 (L) 4.00 - 5.20 x10*6/uL    Hemoglobin 8.2 (L) 12.0 - 16.0 g/dL    Hematocrit 29.6 (L) 36.0 - 46.0 %     (H) 80 - 100 fL    MCH 28.4 26.0 - 34.0 pg    MCHC 27.7 (L) 32.0 - 36.0 g/dL    RDW 17.2 (H) 11.5 - 14.5 %    Platelets 142 (L) 150 - 450 x10*3/uL   Basic Metabolic Panel   Result Value Ref Range    Glucose 110 (H) 74 - 99 mg/dL    Sodium 137 136 - 145 mmol/L    Potassium 4.2 3.5 - 5.3 mmol/L    Chloride 95 (L) 98 - 107 mmol/L    Bicarbonate 36 (H) 21 - 32 mmol/L    Anion Gap 10 10 - 20 mmol/L    Urea Nitrogen 43 (H) 6 - 23 mg/dL    Creatinine 1.57 (H) 0.50 - 1.05 mg/dL    eGFR 32 (L) >60 mL/min/1.73m*2    Calcium 8.1 (L) 8.6 - 10.3 mg/dL   POCT GLUCOSE   Result Value Ref Range    POCT Glucose 106 (H) 74 - 99 mg/dL   POCT GLUCOSE   Result Value Ref Range    POCT Glucose 132 (H) 74 - 99 mg/dL   POCT GLUCOSE   Result Value Ref Range    POCT Glucose 135 (H) 74 - 99 mg/dL   POCT GLUCOSE   Result Value Ref Range    POCT Glucose 132 (H) 74 - 99 mg/dL   Magnesium   Result Value Ref Range    Magnesium 2.26 1.60 - 2.40 mg/dL   CBC   Result Value Ref Range    WBC 6.1 4.4 - 11.3 x10*3/uL    nRBC 0.0 0.0 - 0.0 /100 WBCs    RBC 3.01 (L) 4.00 - 5.20 x10*6/uL    Hemoglobin 8.7 (L) 12.0 - 16.0 g/dL    Hematocrit 31.9 (L) 36.0 - 46.0 %     (H) 80 - 100 fL    MCH 28.9 26.0 - 34.0 pg    MCHC 27.3 (L) 32.0 - 36.0 g/dL    RDW 17.3 (H) 11.5 - 14.5 %    Platelets 147 (L) 150 - 450 x10*3/uL   Basic Metabolic Panel   Result Value Ref Range    Glucose 109 (H) 74 - 99 mg/dL    Sodium 137 136 - 145 mmol/L    Potassium 4.2 3.5 - 5.3 mmol/L    Chloride 96 (L) 98 - 107 mmol/L    Bicarbonate 36 (H) 21 - 32 mmol/L    Anion Gap 9 (L) 10 - 20 mmol/L    Urea Nitrogen 42 (H) 6 - 23 mg/dL    Creatinine 1.16 (H) 0.50 - 1.05 mg/dL    eGFR 46 (L) >60 mL/min/1.73m*2    Calcium 8.4 (L) 8.6 - 10.3 mg/dL   POCT GLUCOSE   Result Value Ref Range    POCT Glucose 104 (H) 74 - 99 mg/dL    POCT GLUCOSE   Result Value Ref Range    POCT Glucose 129 (H) 74 - 99 mg/dL   ECG 12 Lead   Result Value Ref Range    Ventricular Rate 49 BPM    Atrial Rate 49 BPM    ID Interval 216 ms    QRS Duration 140 ms    QT Interval 456 ms    QTC Calculation(Bazett) 411 ms    P Axis 119 degrees    R Axis -62 degrees    T Axis -66 degrees    QRS Count 8 beats    Q Onset 192 ms    T Offset 420 ms    QTC Fredericia 426 ms       Physical Exam:  Subjective:   Examination:   General Examination:   General Appearance: Well developed, well nourished, in no acute distress.  Morbidly obese  Head: normocephalic, atraumatic   Eyes: Anicteric sclera. Pupils are equally round and reactive to light.  Extraocular movements are intact.    Ears: normal   Oral: Cavity: mucosa moist.   Throat: clear.   Neck/Thyroid: neck supple, full range of motion, no cervical lymphadenopathy.   Skin: warm and dry, no suspicious lesions.    Heart: regular rate and rhythm, S1, S2 normal, no murmurs.   Lungs: clear to auscultation bilaterally.   Abdomen: soft, non-tender, non-distended, bowl sound present, normal.   Extremities: no clubbing, no cyanosis, 2+  Neuro: non-focal, motor strength normal upper lower extremities, sensory exam intact.  Past stroke     Assessment/Plan   48.91 Atrial fibrillation not present prior to this admission consideration of EP opinion regarding cardioversion, paroxysmal A-fib in the past on Eliquis with CVA in 2019  I42.9 HFrEF previously HFpEF followed by Dr. 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        Steve Sneed MD

## 2024-01-03 LAB
ANION GAP SERPL CALC-SCNC: 13 MMOL/L (ref 10–20)
BUN SERPL-MCNC: 40 MG/DL (ref 6–23)
CALCIUM SERPL-MCNC: 8.1 MG/DL (ref 8.6–10.3)
CHLORIDE SERPL-SCNC: 97 MMOL/L (ref 98–107)
CO2 SERPL-SCNC: 30 MMOL/L (ref 21–32)
CREAT SERPL-MCNC: 1.17 MG/DL (ref 0.5–1.05)
ERYTHROCYTE [DISTWIDTH] IN BLOOD BY AUTOMATED COUNT: 17.4 % (ref 11.5–14.5)
FERRITIN SERPL-MCNC: 32 NG/ML (ref 8–150)
GFR SERPL CREATININE-BSD FRML MDRD: 45 ML/MIN/1.73M*2
GLUCOSE BLD MANUAL STRIP-MCNC: 106 MG/DL (ref 74–99)
GLUCOSE BLD MANUAL STRIP-MCNC: 135 MG/DL (ref 74–99)
GLUCOSE BLD MANUAL STRIP-MCNC: 140 MG/DL (ref 74–99)
GLUCOSE BLD MANUAL STRIP-MCNC: 153 MG/DL (ref 74–99)
GLUCOSE SERPL-MCNC: 98 MG/DL (ref 74–99)
HCT VFR BLD AUTO: 29.6 % (ref 36–46)
HGB BLD-MCNC: 7.9 G/DL (ref 12–16)
IRON SATN MFR SERPL: 8 % (ref 25–45)
IRON SERPL-MCNC: 36 UG/DL (ref 35–150)
MAGNESIUM SERPL-MCNC: 2.4 MG/DL (ref 1.6–2.4)
MCH RBC QN AUTO: 27.7 PG (ref 26–34)
MCHC RBC AUTO-ENTMCNC: 26.7 G/DL (ref 32–36)
MCV RBC AUTO: 104 FL (ref 80–100)
NRBC BLD-RTO: 0 /100 WBCS (ref 0–0)
PLATELET # BLD AUTO: 139 X10*3/UL (ref 150–450)
POTASSIUM SERPL-SCNC: 4.3 MMOL/L (ref 3.5–5.3)
RBC # BLD AUTO: 2.85 X10*6/UL (ref 4–5.2)
SODIUM SERPL-SCNC: 136 MMOL/L (ref 136–145)
TIBC SERPL-MCNC: 431 UG/DL (ref 240–445)
UIBC SERPL-MCNC: 395 UG/DL (ref 110–370)
WBC # BLD AUTO: 5.6 X10*3/UL (ref 4.4–11.3)

## 2024-01-03 PROCEDURE — 2500000001 HC RX 250 WO HCPCS SELF ADMINISTERED DRUGS (ALT 637 FOR MEDICARE OP): Performed by: NURSE PRACTITIONER

## 2024-01-03 PROCEDURE — 94760 N-INVAS EAR/PLS OXIMETRY 1: CPT

## 2024-01-03 PROCEDURE — 80048 BASIC METABOLIC PNL TOTAL CA: CPT | Performed by: INTERNAL MEDICINE

## 2024-01-03 PROCEDURE — 36415 COLL VENOUS BLD VENIPUNCTURE: CPT | Performed by: INTERNAL MEDICINE

## 2024-01-03 PROCEDURE — 83540 ASSAY OF IRON: CPT | Performed by: INTERNAL MEDICINE

## 2024-01-03 PROCEDURE — 2060000001 HC INTERMEDIATE ICU ROOM DAILY

## 2024-01-03 PROCEDURE — 85027 COMPLETE CBC AUTOMATED: CPT | Performed by: INTERNAL MEDICINE

## 2024-01-03 PROCEDURE — 2500000001 HC RX 250 WO HCPCS SELF ADMINISTERED DRUGS (ALT 637 FOR MEDICARE OP): Performed by: INTERNAL MEDICINE

## 2024-01-03 PROCEDURE — 83735 ASSAY OF MAGNESIUM: CPT | Performed by: INTERNAL MEDICINE

## 2024-01-03 PROCEDURE — 82947 ASSAY GLUCOSE BLOOD QUANT: CPT

## 2024-01-03 PROCEDURE — 2500000004 HC RX 250 GENERAL PHARMACY W/ HCPCS (ALT 636 FOR OP/ED): Performed by: INTERNAL MEDICINE

## 2024-01-03 PROCEDURE — 83550 IRON BINDING TEST: CPT | Performed by: INTERNAL MEDICINE

## 2024-01-03 PROCEDURE — 82728 ASSAY OF FERRITIN: CPT | Performed by: INTERNAL MEDICINE

## 2024-01-03 RX ADMIN — ACETAMINOPHEN 487.5 MG: 325 TABLET ORAL at 09:13

## 2024-01-03 RX ADMIN — ACETAMINOPHEN 487.5 MG: 325 TABLET ORAL at 21:46

## 2024-01-03 RX ADMIN — SENNOSIDES 8.6 MG: 8.6 TABLET, FILM COATED ORAL at 09:13

## 2024-01-03 RX ADMIN — CALCIUM CARBONATE (ANTACID) CHEW TAB 500 MG 500 MG: 500 CHEW TAB at 00:44

## 2024-01-03 RX ADMIN — DOCUSATE SODIUM 100 MG: 100 CAPSULE, LIQUID FILLED ORAL at 09:13

## 2024-01-03 RX ADMIN — ATORVASTATIN CALCIUM 20 MG: 20 TABLET, FILM COATED ORAL at 21:46

## 2024-01-03 RX ADMIN — POLYETHYLENE GLYCOL 3350 17 G: 17 POWDER, FOR SOLUTION ORAL at 09:13

## 2024-01-03 RX ADMIN — APIXABAN 2.5 MG: 2.5 TABLET, FILM COATED ORAL at 09:13

## 2024-01-03 ASSESSMENT — PAIN - FUNCTIONAL ASSESSMENT
PAIN_FUNCTIONAL_ASSESSMENT: 0-10

## 2024-01-03 ASSESSMENT — PAIN SCALES - GENERAL
PAINLEVEL_OUTOF10: 4
PAINLEVEL_OUTOF10: 0 - NO PAIN

## 2024-01-03 NOTE — CARE PLAN
The patient's goals for the shift include      The clinical goals for the shift include patient will not complain of dizziness this shift    Problem: Fall/Injury  Goal: Use assistive devices by end of the shift  Outcome: Progressing     Problem: Skin  Goal: Participates in plan/prevention/treatment measures  Outcome: Progressing  Goal: Prevent/manage excess moisture  Outcome: Progressing  Goal: Prevent/minimize sheer/friction injuries  Outcome: Progressing  Goal: Promote/optimize nutrition  Outcome: Progressing     Problem: Heart Failure  Goal: Improved gas exchange this shift  Outcome: Progressing  Goal: Improved urinary output this shift  Outcome: Progressing  Goal: Reduction in peripheral edema within 24 hours  Outcome: Progressing  Goal: Report improvement of dyspnea/breathlessness this shift  Outcome: Progressing  Goal: Weight from fluid excess reduced over 2-3 days, then stabilize  Outcome: Progressing  Goal: Increase self care and/or family involvement in 24 hours  Outcome: Progressing     Problem: Dressing Upper Extremities  Goal: STG - Patient will dress upper body with CGA  Outcome: Progressing     Problem: Respiratory  Goal: Clear secretions with interventions this shift  Outcome: Progressing  Goal: Minimize anxiety/maximize coping throughout shift  Outcome: Progressing  Goal: Minimal/no exertional discomfort or dyspnea this shift  Outcome: Progressing

## 2024-01-03 NOTE — CARE PLAN
The clinical goals for the shift include patient will not complain of dizziness this shift. The patient met this goal    S: Patient admitted 12/21 for CHF exacerbation   B: History of: CHF, CAD, UMA, CKD, cerebellar stroke, afib on eliquis, diabetes   A: Patient A&Ox4, denies complaints of chest pain but is dyspneic on exertion. Remains NSR/SB and on 2 L O2 via NC. Medicated per orders throughout shift. Q2 turns done  R: Patient to possibly have pacemaker and return to [facility] when ready for discharge

## 2024-01-04 PROBLEM — N17.9 AKI (ACUTE KIDNEY INJURY) (CMS-HCC): Status: ACTIVE | Noted: 2023-12-21

## 2024-01-04 LAB
ANION GAP SERPL CALC-SCNC: 7 MMOL/L (ref 10–20)
ATRIAL RATE: 49 BPM
BUN SERPL-MCNC: 35 MG/DL (ref 6–23)
CALCIUM SERPL-MCNC: 8.6 MG/DL (ref 8.6–10.3)
CHLORIDE SERPL-SCNC: 98 MMOL/L (ref 98–107)
CO2 SERPL-SCNC: 38 MMOL/L (ref 21–32)
CREAT SERPL-MCNC: 1.15 MG/DL (ref 0.5–1.05)
ERYTHROCYTE [DISTWIDTH] IN BLOOD BY AUTOMATED COUNT: 17.6 % (ref 11.5–14.5)
GFR SERPL CREATININE-BSD FRML MDRD: 46 ML/MIN/1.73M*2
GLUCOSE BLD MANUAL STRIP-MCNC: 110 MG/DL (ref 74–99)
GLUCOSE BLD MANUAL STRIP-MCNC: 129 MG/DL (ref 74–99)
GLUCOSE BLD MANUAL STRIP-MCNC: 133 MG/DL (ref 74–99)
GLUCOSE BLD MANUAL STRIP-MCNC: 93 MG/DL (ref 74–99)
GLUCOSE SERPL-MCNC: 108 MG/DL (ref 74–99)
HCT VFR BLD AUTO: 31.3 % (ref 36–46)
HGB BLD-MCNC: 8.6 G/DL (ref 12–16)
MAGNESIUM SERPL-MCNC: 2.29 MG/DL (ref 1.6–2.4)
MCH RBC QN AUTO: 28.6 PG (ref 26–34)
MCHC RBC AUTO-ENTMCNC: 27.5 G/DL (ref 32–36)
MCV RBC AUTO: 104 FL (ref 80–100)
NRBC BLD-RTO: 0 /100 WBCS (ref 0–0)
P AXIS: 119 DEGREES
PLATELET # BLD AUTO: 136 X10*3/UL (ref 150–450)
POTASSIUM SERPL-SCNC: 4.4 MMOL/L (ref 3.5–5.3)
PR INTERVAL: 216 MS
Q ONSET: 192 MS
QRS COUNT: 8 BEATS
QRS DURATION: 140 MS
QT INTERVAL: 456 MS
QTC CALCULATION(BAZETT): 411 MS
QTC FREDERICIA: 426 MS
R AXIS: -62 DEGREES
RBC # BLD AUTO: 3.01 X10*6/UL (ref 4–5.2)
SODIUM SERPL-SCNC: 139 MMOL/L (ref 136–145)
T AXIS: -66 DEGREES
T OFFSET: 420 MS
VENTRICULAR RATE: 49 BPM
WBC # BLD AUTO: 5.8 X10*3/UL (ref 4.4–11.3)

## 2024-01-04 PROCEDURE — 85027 COMPLETE CBC AUTOMATED: CPT | Performed by: INTERNAL MEDICINE

## 2024-01-04 PROCEDURE — 82947 ASSAY GLUCOSE BLOOD QUANT: CPT

## 2024-01-04 PROCEDURE — 99232 SBSQ HOSP IP/OBS MODERATE 35: CPT | Performed by: INTERNAL MEDICINE

## 2024-01-04 PROCEDURE — 2500000004 HC RX 250 GENERAL PHARMACY W/ HCPCS (ALT 636 FOR OP/ED): Performed by: INTERNAL MEDICINE

## 2024-01-04 PROCEDURE — 80048 BASIC METABOLIC PNL TOTAL CA: CPT | Performed by: INTERNAL MEDICINE

## 2024-01-04 PROCEDURE — 2500000001 HC RX 250 WO HCPCS SELF ADMINISTERED DRUGS (ALT 637 FOR MEDICARE OP): Performed by: INTERNAL MEDICINE

## 2024-01-04 PROCEDURE — 36415 COLL VENOUS BLD VENIPUNCTURE: CPT | Performed by: INTERNAL MEDICINE

## 2024-01-04 PROCEDURE — 2060000001 HC INTERMEDIATE ICU ROOM DAILY

## 2024-01-04 PROCEDURE — 83735 ASSAY OF MAGNESIUM: CPT | Performed by: INTERNAL MEDICINE

## 2024-01-04 RX ADMIN — SENNOSIDES 8.6 MG: 8.6 TABLET, FILM COATED ORAL at 20:23

## 2024-01-04 RX ADMIN — IRON SUCROSE 200 MG: 20 INJECTION, SOLUTION INTRAVENOUS at 19:00

## 2024-01-04 RX ADMIN — DOCUSATE SODIUM 100 MG: 100 CAPSULE, LIQUID FILLED ORAL at 20:23

## 2024-01-04 RX ADMIN — BUMETANIDE 2 MG: 1 TABLET ORAL at 09:36

## 2024-01-04 RX ADMIN — ACETAMINOPHEN 487.5 MG: 325 TABLET ORAL at 20:23

## 2024-01-04 RX ADMIN — ACETAMINOPHEN 487.5 MG: 325 TABLET ORAL at 09:35

## 2024-01-04 RX ADMIN — ATORVASTATIN CALCIUM 20 MG: 20 TABLET, FILM COATED ORAL at 20:23

## 2024-01-04 ASSESSMENT — PAIN - FUNCTIONAL ASSESSMENT
PAIN_FUNCTIONAL_ASSESSMENT: 0-10

## 2024-01-04 ASSESSMENT — COGNITIVE AND FUNCTIONAL STATUS - GENERAL
DRESSING REGULAR LOWER BODY CLOTHING: A LOT
TOILETING: A LOT
CLIMB 3 TO 5 STEPS WITH RAILING: TOTAL
WALKING IN HOSPITAL ROOM: A LOT
PERSONAL GROOMING: A LOT
HELP NEEDED FOR BATHING: A LOT
MOBILITY SCORE: 11
TURNING FROM BACK TO SIDE WHILE IN FLAT BAD: A LOT
MOVING FROM LYING ON BACK TO SITTING ON SIDE OF FLAT BED WITH BEDRAILS: A LOT
MOVING TO AND FROM BED TO CHAIR: A LOT
DRESSING REGULAR UPPER BODY CLOTHING: A LOT
DAILY ACTIVITIY SCORE: 14
STANDING UP FROM CHAIR USING ARMS: A LOT

## 2024-01-04 ASSESSMENT — PAIN SCALES - GENERAL
PAINLEVEL_OUTOF10: 0 - NO PAIN

## 2024-01-04 NOTE — PROGRESS NOTES
Internal Medicine Progress Note    Patient Name: Fifi Jenkins          MRN: [id number]  Today's Date: January 4, 2024          Attending: Nick Montaño MD    Subjective:  Patient was seen and examined at bedside     Review Of Systems:  GENERAL: More awake, in no distress  CARDIOVASCULAR: Negative for chest pain  GI: No nausea, vomiting, or diarrhea    Objective:  Vitals:    01/04/24 0100 01/04/24 0300 01/04/24 0400 01/04/24 0750   BP:   (!) 112/41 132/64   BP Location:   Left arm Left arm   Patient Position:       Pulse: (!) 42 (!) 46 50    Resp: 20 17 16    Temp:   37 °C (98.6 °F) 36.5 °C (97.7 °F)   TempSrc:   Temporal Temporal   SpO2: 97% 97% 100%    Weight:       Height:           Physical Exam:   General appearance: in no acute distress  Lungs: diminished breath sounds  Heart:  RRR, without murmur  Abdomen: Soft, non-tender  Neuro: Alert oriented x3    Labs:  Results for orders placed or performed during the hospital encounter of 12/21/23 (from the past 24 hour(s))   POCT GLUCOSE   Result Value Ref Range    POCT Glucose 135 (H) 74 - 99 mg/dL   POCT GLUCOSE   Result Value Ref Range    POCT Glucose 153 (H) 74 - 99 mg/dL   POCT GLUCOSE   Result Value Ref Range    POCT Glucose 140 (H) 74 - 99 mg/dL   Magnesium   Result Value Ref Range    Magnesium 2.29 1.60 - 2.40 mg/dL   CBC   Result Value Ref Range    WBC 5.8 4.4 - 11.3 x10*3/uL    nRBC 0.0 0.0 - 0.0 /100 WBCs    RBC 3.01 (L) 4.00 - 5.20 x10*6/uL    Hemoglobin 8.6 (L) 12.0 - 16.0 g/dL    Hematocrit 31.3 (L) 36.0 - 46.0 %     (H) 80 - 100 fL    MCH 28.6 26.0 - 34.0 pg    MCHC 27.5 (L) 32.0 - 36.0 g/dL    RDW 17.6 (H) 11.5 - 14.5 %    Platelets 136 (L) 150 - 450 x10*3/uL   Basic Metabolic Panel   Result Value Ref Range    Glucose 108 (H) 74 - 99 mg/dL    Sodium 139 136 - 145 mmol/L    Potassium 4.4 3.5 - 5.3 mmol/L    Chloride 98 98 - 107 mmol/L    Bicarbonate 38 (H) 21 - 32 mmol/L    Anion Gap 7 (L) 10 - 20 mmol/L    Urea Nitrogen 35 (H) 6 - 23 mg/dL     Creatinine 1.15 (H) 0.50 - 1.05 mg/dL    eGFR 46 (L) >60 mL/min/1.73m*2    Calcium 8.6 8.6 - 10.3 mg/dL   POCT GLUCOSE   Result Value Ref Range    POCT Glucose 93 74 - 99 mg/dL       Medications:  Scheduled medications  acetaminophen, 487.5 mg, oral, BID  [Held by provider] apixaban, 5 mg, oral, BID  atorvastatin, 20 mg, oral, Nightly  bumetanide, 2 mg, oral, Daily  docusate sodium, 100 mg, oral, BID  [Held by provider] empagliflozin, 10 mg, oral, Daily  insulin lispro, 0-5 Units, subcutaneous, 4x daily  LORazepam, 0.25 mg, oral, Once  perflutren lipid microspheres, 0.5-10 mL of dilution, intravenous, Once in imaging  perflutren protein A microsphere, 0.5 mL, intravenous, Once in imaging  polyethylene glycol, 17 g, oral, Daily  sennosides, 1 tablet, oral, BID  sulfur hexafluoride microsphr, 2 mL, intravenous, Once in imaging      Continuous medications     PRN medications  PRN medications: acetaminophen, albuterol, butalbital-acetaminophen-caff, calcium carbonate, cyclobenzaprine, dextrose 10 % in water (D10W), dextrose, glucagon, lubricating eye drops, oxygen, propofol, traMADol      Assessment/Plan:  Medical Problems       Problem List       * (Principal) Acute on chronic heart failure with reducedejection fraction (CMS/HCC)    Continue current medications and measures  Cardiology and EP is following      Paroxysmal atrial fibrillation (CMS/[facility])     Continue Eliquis  Cardiology and EP are following  Plan for pacemaker         Hyperlipidemia     Continue atorvastatin         Acute respiratory failure with hypoxia (CMS/[facility])     On oxygen supplement         Stage 3 chronic kidney disease (CMS/[facility])  Acute kidney injury     Continue monitoring  Nephrology is following         Type 2 diabetes mellitus with chronic kidney disease, without long-term current use of insulin (CMS/[facility])     Sliding scall insulin     Generalized weakness  PT/OT          Discussed with patient, CONY Montaño MD   Date: 01/04/24  Time: 9:31  "AM    This note was partially created using voice recognition software and is inherently subject to errors including those of syntax and \"sound-alike\" substitutions which may escape proofreading. In such instances, original meaning may be extrapolated by contextual derivation  "

## 2024-01-04 NOTE — PROGRESS NOTES
INPATIENT NEPHROLOGY CONSULT PROGRESS NOTE      Patient Name: Fifi Jenkins MRN: [id number]  DATE of SERVICE: January 3, 2024  TIME of SERVICE: 03:05 PM  CONSULTING SERVICE: Nephrology    REASON for CONSULT: Acute kidney injury    SUBJECTIVE:  Seen and evaluated at bedside, resting in bed comfortably.  Renal parameters improving serum creatinine down to 1.17 from 1.5 mg/dl  Hemoglobin 7.9   Urine output encouraging improving.    Bumex resumed and tolerated    SUMMARY OF STAY:  Ms. Jenkins is a pleasant morbidly obese 88-year-old female with past medical history significant for chronic kidney disease stage IIIa with baseline serum creatinine 1 mg/dL EGFR 50 mill per minute per 1.73 m², long-term resident at [facility].  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 CVA in 2019, with residual dizziness. Patient presented to [facility] December 21, 2023 for evaluation of progressively worsening shortness of breath.  Upon presentation patient was hypoxic requiring 3 L of oxygen.  Home medications: losartan 50 mg p.o. daily, metolazone 5 mg weekly, torsemide 40 mg p.o. daily  Current medications: Newly started Jardiance 10 mg, losartan has been on hold since admission.  Metolazone 5 mg weekly.  Received Lasix 40 mg IV twice daily. Echocardiogram demonstrated ejection fraction 55%.  Labs Serum creatinine up to 1.34 mg deciliter BUN of 28 and GFR of 39.  From serum creatinine of 1 mg deciliter and a GFR of 58 mL/min upon presentation.  Anemia hemoglobin of 8.2 mg deciliter. December 21, 2023 CT with IV contrast: No signs of pulmonary embolism demonstrated cardiomegaly  without pericardial effusion.  Scattered emphysematous changes with small bilateral pleural effusions and mild bibasilar area of infiltrate or atelectasis.    ASSESSMENT AND PLAN:  CHERYL on CKD IIIa:  likely hemodynamically mediated in the setting of diuretics adjustment and vasoactive medications (Jardiance), contrast nephropathy (contrast exposure December 21).  Serum creatinine up to 1.34 mg deciliter BUN of 28 and GFR of 39.  From serum creatinine of 1 mg deciliter and a GFR of 58 mL/min upon presentation.     Volume status: Hypervolemic on presentation improved after IV Lasix     CKD IIIa: Diabetic nephropathy, hypertensive nephrosclerosis  Electrolytes: Managed (with renal diet)  Hypertension: Relative hypotension blood pressure 100 oh 3/60 with a heart rate of 81  Without being on antihypertensive medications  Acid-base: Metabolic alkalosis likely secondary to diuresis  Anemia from renal failure, To check iron panel and start epogen. hemoglobin of 8.2 mg deciliter.  To rule out plasma cell disorder  T2DM on insulin sliding scale  COPD: Emphysematous changes on CT scan requiring 2 L of oxygen  CVA 2019 with residual dizziness  Patient wheelchair dependent  Urinary incontinent: Wearing depends at [facility] nursing facility  CHF: Diastolic heart failure treated with IV Lasix likely for acute on chronic  UMA with CPAP intolerance  Paroxysmal A-fib on Eliquis    Plan:  Acute kidney injury likely hemodynamically mediated due to IV diuresis, Jardiance, losartan use, an episode of systole after cardioversion, hypotension, contrast exposure.  Improving     Renal parameters plateauing, to continue Bumex 2 mg p.o. daily.  Acute blood loss anemia hemoglobin down to 7.9 mg deciliter.  To check iron stores and evaluate the need for IV iron versus blood transfusion.    To cont to hold Jardiance, entresto for now.    Strict input and output guide diuresis management.   Medication reviewed renally dosed  To continue renal diet, 2  g sodium.  No urgent indication for renal placement therapy.      Discharge medication:  Truly appreciate cardiology recommendations, ejection fraction dropped significantly.  Tentative plan to start Entresto upon discharge  To discontinue losartan upon discharge  To discontinue torsemide/metolazone.   To continue Jardiance  To cont Bumex 2 mg p.o. daily with extra dose as mentioned above.  To continue CPAP    We will continue to monitor closely with you, thank you!    Medications:    Current Facility-Administered Medications:     acetaminophen (Tylenol) tablet 487.5 mg, 487.5 mg, oral, BID, Steve Sneed MD, 487.5 mg at 01/03/24 0913    acetaminophen (Tylenol) tablet 650 mg, 650 mg, oral, q6h PRN, Steve Sneed MD, 650 mg at 12/25/23 0934    albuterol 2.5 mg /3 mL (0.083 %) nebulizer solution 2.5 mg, 2.5 mg, nebulization, q4h PRN, Steve Sneed MD, 2.5 mg at 12/30/23 1144    [Held by provider] apixaban (Eliquis) tablet 5 mg, 5 mg, oral, BID, Ayanna Giles PA-C    atorvastatin (Lipitor) tablet 20 mg, 20 mg, oral, Nightly, Steve Sneed MD, 20 mg at 01/02/24 2104    [START ON 1/4/2024] bumetanide (Bumex) tablet 2 mg, 2 mg, oral, Daily, Mary Solomon MD    butalbital-acetaminophen-caff -40 mg per tablet 1 tablet, 1 tablet, oral, q4h PRN, Steve Sneed MD, 1 tablet at 12/27/23 2056    calcium carbonate (Tums) chewable tablet 500 mg, 1 tablet, oral, q4h PRN, Steve Sneed MD, 500 mg at 01/03/24 0044    cyclobenzaprine (Flexeril) tablet 10 mg, 10 mg, oral, BID PRN, Steve Sneed MD    dextrose 10 % in water (D10W) infusion, 0.3 g/kg/hr, intravenous, Once PRN, Steve Sneed MD    dextrose 50 % injection 25 g, 25 g, intravenous, q15 min PRN, Steve Sneed MD    docusate sodium (Colace) capsule 100 mg, 100 mg, oral, BID, Steve Sneed MD, 100 mg at 01/03/24 0913    [Held by provider] empagliflozin (Jardiance) tablet 10 mg, 10 mg, oral, Daily, Steve Sneed MD, 10 mg at  12/30/23 1144    glucagon (Glucagen) injection 1 mg, 1 mg, intramuscular, q15 min PRN, Steve Sneed MD    insulin lispro (HumaLOG) injection 0-5 Units, 0-5 Units, subcutaneous, 4x daily, Steve Sneed MD, 1 Units at 12/23/23 2141    LORazepam (Ativan) tablet 0.25 mg, 0.25 mg, oral, Once, Steve Sneed MD    lubricating eye drops ophthalmic solution 1 drop, 1 drop, Both Eyes, q4h PRN, Steve Sneed MD, 1 drop at 12/27/23 0911    oxygen (O2) therapy, , inhalation, Continuous PRN - O2/gases, Steve Sneed MD, 3 L/min at 12/30/23 0025    perflutren lipid microspheres (Definity) injection 0.5-10 mL of dilution, 0.5-10 mL of dilution, intravenous, Once in imaging, Steve Sneed MD    perflutren protein A microsphere (Optison) injection 0.5 mL, 0.5 mL, intravenous, Once in imaging, Steve Sneed MD    polyethylene glycol (Glycolax, Miralax) packet 17 g, 17 g, oral, Daily, Steve Sneed MD, 17 g at 01/03/24 0913    propofol (Diprivan) injection, , , PRN, Lamine Savage MD, 60 mg at 12/29/23 1343    sennosides (Senokot) tablet 8.6 mg, 1 tablet, oral, BID, Steve Sneed MD, 8.6 mg at 01/03/24 0913    sulfur hexafluoride microsphr (Lumason) injection 24.28 mg, 2 mL, intravenous, Once in imaging, Steve Sneed MD    traMADol (Ultram) tablet 50 mg, 50 mg, oral, q8h PRN, Steve Sneed MD, 50 mg at 12/26/23 2222    PERTINENT ROS:  GENERAL:  positive for fatigue, poor appetite.  No fever/chills  RESPIRATORY:  positive for shortness of breath.  Negative for cough, wheezing.  CARDIOVASCULAR:   Negative for chest pain or palpitation.  GI:  Negative for abdominal pain, diarrhea, heartburn, nausea, vomiting  : Dysuria    Physical Exam:  Vital signs in last 24 hours:  Temp:  [35.8 °C (96.4 °F)-36.9 °C (98.4 °F)] 36.4 °C (97.5 °F)  Heart Rate:  [42-44] 42  Resp:  [17-22] 17  BP: (104-135)/(51-65) 118/58    General: Awake, cooperative, not in acute distress  HEENT:  NCAT,  mucous  membranes moist and pink  NECK:  Elevated JVD, no carotid bruit, supple, no cervical mass or thyromegaly  LUNGS;  Diminished breath sounds, fine Rales  CV:  Distant, regular rate and rhythm, no murmurs  ABDOMEN:  abdomen soft, nontender, BS normal, no masses or organomegaly  EDEMA:  +1 lower extremity edema/dependent edema  SKIN:  dry and normal turgor, no clubbing, cyanosis or petechia.  No rashes noted      Intake/Output last 3 shifts:  I/O last 3 completed shifts:  In: 100 (0.7 mL/kg) [P.O.:100]  Out: 975 (6.8 mL/kg) [Urine:975 (0.2 mL/kg/hr)]  Weight: 143.8 kg     DATA:  Diagnotic tests reviewed for Todays visit:  Results from last 7 days   Lab Units 01/03/24  0520   WBC AUTO x10*3/uL 5.6   RBC AUTO x10*6/uL 2.85*   HEMOGLOBIN g/dL 7.9*   HEMATOCRIT % 29.6*       Results from last 7 days   Lab Units 01/03/24  0521   SODIUM mmol/L 136   POTASSIUM mmol/L 4.3   CHLORIDE mmol/L 97*   CO2 mmol/L 30   BUN mg/dL 40*   CREATININE mg/dL 1.17*   CALCIUM mg/dL 8.1*   MAGNESIUM mg/dL 2.40             IMAGING: CXR reviewed in  images      SIGNATURE: Mary Solomon MD  Nephrology and Hypertension  [address] [place]  Office phone: [phone]  FAX: [phone]    This note was partially generated using the Dragon voice recognition system, and there may be some incorrect words, spelling's and punctuation that were not noted in checking the note before saving.

## 2024-01-04 NOTE — PROGRESS NOTES
Internal Medicine Progress Note    Patient Name: Fifi Jenkins          MRN: [id number]  Today's Date: January 3, 2024          Attending: Nick Montaño MD    Subjective:  Patient was seen and examined at bedside     Review Of Systems:  GENERAL: Drowsy   CARDIOVASCULAR: Negative for chest pain  GI: No nausea, vomiting, or diarrhea    Objective:  Vitals:    01/03/24 0900 01/03/24 1156 01/03/24 1522 01/03/24 1533   BP: 118/58      BP Location: Left arm      Patient Position: Lying      Pulse: (!) 42      Resp: 17      Temp: 36.4 °C (97.5 °F)      TempSrc: Temporal      SpO2: 99% 98% 99%    Weight:    144 kg (317 lb 0.3 oz)   Height:           Physical Exam:   General appearance: in no acute distress  Lungs: diminished breath sounds  Heart:  RRR, without murmur  Abdomen: Soft, non-tender  Neuro: Alert oriented x3    Labs:  Results for orders placed or performed during the hospital encounter of 12/21/23 (from the past 24 hour(s))   POCT GLUCOSE   Result Value Ref Range    POCT Glucose 110 (H) 74 - 99 mg/dL   CBC   Result Value Ref Range    WBC 5.6 4.4 - 11.3 x10*3/uL    nRBC 0.0 0.0 - 0.0 /100 WBCs    RBC 2.85 (L) 4.00 - 5.20 x10*6/uL    Hemoglobin 7.9 (L) 12.0 - 16.0 g/dL    Hematocrit 29.6 (L) 36.0 - 46.0 %     (H) 80 - 100 fL    MCH 27.7 26.0 - 34.0 pg    MCHC 26.7 (L) 32.0 - 36.0 g/dL    RDW 17.4 (H) 11.5 - 14.5 %    Platelets 139 (L) 150 - 450 x10*3/uL   Magnesium   Result Value Ref Range    Magnesium 2.40 1.60 - 2.40 mg/dL   Basic Metabolic Panel   Result Value Ref Range    Glucose 98 74 - 99 mg/dL    Sodium 136 136 - 145 mmol/L    Potassium 4.3 3.5 - 5.3 mmol/L    Chloride 97 (L) 98 - 107 mmol/L    Bicarbonate 30 21 - 32 mmol/L    Anion Gap 13 10 - 20 mmol/L    Urea Nitrogen 40 (H) 6 - 23 mg/dL    Creatinine 1.17 (H) 0.50 - 1.05 mg/dL    eGFR 45 (L) >60 mL/min/1.73m*2    Calcium 8.1 (L) 8.6 - 10.3 mg/dL   Iron and TIBC   Result Value Ref Range    Iron 36 35 - 150 ug/dL    UIBC 395 (H) 110 - 370 ug/dL    TIBC  431 240 - 445 ug/dL    % Saturation 8 (L) 25 - 45 %   Ferritin   Result Value Ref Range    Ferritin 32 8 - 150 ng/mL   POCT GLUCOSE   Result Value Ref Range    POCT Glucose 106 (H) 74 - 99 mg/dL   POCT GLUCOSE   Result Value Ref Range    POCT Glucose 135 (H) 74 - 99 mg/dL   POCT GLUCOSE   Result Value Ref Range    POCT Glucose 153 (H) 74 - 99 mg/dL       Medications:  Scheduled medications  acetaminophen, 487.5 mg, oral, BID  [Held by provider] apixaban, 5 mg, oral, BID  atorvastatin, 20 mg, oral, Nightly  [START ON 1/4/2024] bumetanide, 2 mg, oral, Daily  docusate sodium, 100 mg, oral, BID  [Held by provider] empagliflozin, 10 mg, oral, Daily  insulin lispro, 0-5 Units, subcutaneous, 4x daily  LORazepam, 0.25 mg, oral, Once  perflutren lipid microspheres, 0.5-10 mL of dilution, intravenous, Once in imaging  perflutren protein A microsphere, 0.5 mL, intravenous, Once in imaging  polyethylene glycol, 17 g, oral, Daily  sennosides, 1 tablet, oral, BID  sulfur hexafluoride microsphr, 2 mL, intravenous, Once in imaging      Continuous medications     PRN medications  PRN medications: acetaminophen, albuterol, butalbital-acetaminophen-caff, calcium carbonate, cyclobenzaprine, dextrose 10 % in water (D10W), dextrose, glucagon, lubricating eye drops, oxygen, propofol, traMADol      Assessment/Plan:  Medical Problems       Problem List       * (Principal) Acute on chronic heart failure with reducedejection fraction (CMS/HCC)    Continue current medications and measures  Cardiology and EP is following      Paroxysmal atrial fibrillation (CMS/[facility])     Continue Eliquis  Cardiology and EP are following  Patient agreed on getting pacemaker         Hyperlipidemia     Continue atorvastatin         Acute respiratory failure with hypoxia (CMS/[facility])     On oxygen supplement         Stage 3 chronic kidney disease (CMS/[facility])  Acute kidney injury     Continue monitoring  Nephrology is following         Type 2 diabetes mellitus with  "chronic kidney disease, without long-term current use of insulin (CMS/[facility])     Sliding scall insulin     Generalized weakness  PT/OT          Discussed with patient, CONY Montaño MD   Date: 01/03/24  Time: 8:28 PM    This note was partially created using voice recognition software and is inherently subject to errors including those of syntax and \"sound-alike\" substitutions which may escape proofreading. In such instances, original meaning may be extrapolated by contextual derivation  "

## 2024-01-04 NOTE — PROGRESS NOTES
Patient has decided to have the PPM placed. Patient has been accepted to [facility] on discharge. No precert is needed. Patient to be discharged to SNF when medically ready.       Josefa Kraft RN

## 2024-01-04 NOTE — PROGRESS NOTES
Occupational Therapy                 Therapy Communication Note    Patient Name: Fifi Jenkins  MRN: [id number]  Today's Date: 1/4/2024     Discipline: Occupational Therapy    Missed Visit Reason:      Missed Time: Attempt    Comment: Pt declined due to too much pain in the back of the head. Pt is scheduled for a pacemaker tomorrow

## 2024-01-04 NOTE — PROGRESS NOTES
" ELECTROPHYSIOLOGY PROGRESS NOTE    PATIENT NAME: Fifi Jenkins  PATIENT MRN: [id number]  SERVICE DATE:  1/4/2024  SERVICE TIME: 2:57 PM    CONSULTANT: Lamine Savage MD  PRIMARY CARE PHYSICIAN: Aung Jacobs MD  ATTENDING PHYSICIAN: Nick Montaño MD      IMPRESSIONS:  1.  HTN, controlled.  2.  Recent persistent AF with RVR, S/P DCC on 12/29/2023 with a few early recurrences over the next 24 hours.  We would like to treat her with amiodarone therapy, but this was presently contraindicated due to her bradycardia.  3.  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  4.  Sinus node dysfunction following cardioversion, consistent with \"tachycardia-bradycardia syndrome.\"  This represents an indication for permanent pacemaker placement.  The patient has now consented to this.  5.  Other medical problems, including obesity, DJD, anemia, stage III CKD, GERD, hyperlipidemia, and venous insufficiency.     RECOMMENDATIONS:  1.  I plan [employer] DDDR pacemaker placement on the afternoon of 1/5/2024.  2.  Following pacemaker placement, the patient may be started on amiodarone therapy, and may also resume Eliquis on 1/6/2024.   3.  Dr. Sneed is welcome to follow the patient's pacemaker long-term.  4.  A repeat echocardiogram in 3 months or so is also recommended to see if the presumed tachycardia-induced cardiomyopathy has resolved.      SIGNATURE: Lamine Savage MD   OFFICE: [phone]    ================================================================    SUBJECTIVE: The patient remains in the CCU for monitoring because of her known tachycardia-bradycardia syndrome.  She had recent persistent atrial fibrillation and was cardioverted on 12/29/2023 but had significant sinus bradycardia immediately thereafter, requiring atropine.  She was in and out of atrial fibrillation over the next day, though the fibrillation has largely resolved.  " "She has had frequent sinus bradycardia in the 40s, however.  Dr. Sneed and I both recommended permanent pacemaker placement, but the patient refused this for several days.  As of 1/3/2024, however, she has consented to this.  Her Eliquis has been placed on hold in preparation of permanent pacing.    CURRENT CARDIOVASCULAR MEDICATIONS:    [Held by provider] apixaban (Eliquis) tablet 5 mg, 5 mg, oral, BID    atorvastatin (Lipitor) tablet 20 mg, 20 mg, oral, Nightly    bumetanide (Bumex) tablet 2 mg, 2 mg, oral, Daily    [Held by provider] empagliflozin (Jardiance) tablet 10 mg, 10 mg, oral, Daily    VITALS:  /55 (BP Location: Left arm)   Pulse 50   Temp 36.4 °C (97.5 °F) (Temporal)   Resp 16   Ht 1.6 m (5' 3\")   Wt 144 kg (317 lb 0.3 oz)   SpO2 98%   BMI 56.16 kg/m²     MONITOR: Sinus bradycardia in 40's with PAC's and PVC's    LABS:  Lab Results   Component Value Date    WBC 5.8 01/04/2024    HGB 8.6 (L) 01/04/2024    HCT 31.3 (L) 01/04/2024     (H) 01/04/2024     (L) 01/04/2024      Lab Results   Component Value Date    GLUCOSE 108 (H) 01/04/2024    CALCIUM 8.6 01/04/2024     01/04/2024    K 4.4 01/04/2024    CO2 38 (H) 01/04/2024    CL 98 01/04/2024    BUN 35 (H) 01/04/2024    CREATININE 1.15 (H) 01/04/2024          "

## 2024-01-04 NOTE — CARE PLAN
The patient's goals for the shift include      The clinical goals for the shift include to keep patient comfortable in bed    Over the shift, the patient did not make progress toward the following goals.

## 2024-01-04 NOTE — PROGRESS NOTES
INPATIENT NEPHROLOGY CONSULT PROGRESS NOTE      Patient Name: Fifi Jenkins MRN: [id number]  DATE of SERVICE: January 4, 2024  TIME of SERVICE: 01:45 PM  CONSULTING SERVICE: Nephrology    REASON for CONSULT: Acute kidney injury    SUBJECTIVE:  Seen and evaluated at bedside.   Feeling better   Renal parameters improving  Urine output encouraging improving.      SUMMARY OF STAY:  Ms. Jenkins is a pleasant morbidly obese 88-year-old female with past medical history significant for chronic kidney disease stage IIIa with baseline serum creatinine 1 mg/dL EGFR 50 mill per minute per 1.73 m², long-term resident at [facility].  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 CVA in 2019, with residual dizziness. Patient presented to [facility] December 21, 2023 for evaluation of progressively worsening shortness of breath.  Upon presentation patient was hypoxic requiring 3 L of oxygen.  Home medications: losartan 50 mg p.o. daily, metolazone 5 mg weekly, torsemide 40 mg p.o. daily  Current medications: Newly started Jardiance 10 mg, losartan has been on hold since admission.  Metolazone 5 mg weekly.  Received Lasix 40 mg IV twice daily. Echocardiogram demonstrated ejection fraction 55%.  Labs Serum creatinine up to 1.34 mg deciliter BUN of 28 and GFR of 39.  From serum creatinine of 1 mg deciliter and a GFR of 58 mL/min upon presentation.  Anemia hemoglobin of 8.2 mg deciliter. December 21, 2023 CT with IV contrast: No signs of pulmonary embolism demonstrated cardiomegaly without pericardial effusion.  Scattered emphysematous changes with small bilateral pleural effusions  and mild bibasilar area of infiltrate or atelectasis.    ASSESSMENT AND PLAN:  CHERYL on CKD IIIa:  likely hemodynamically mediated in the setting of diuretics adjustment and vasoactive medications (Jardiance), contrast nephropathy (contrast exposure December 21).  Serum creatinine up to 1.34 mg deciliter BUN of 28 and GFR of 39.  From serum creatinine of 1 mg deciliter and a GFR of 58 mL/min upon presentation.     Volume status: Hypervolemic     CKD IIIa: Diabetic nephropathy, hypertensive nephrosclerosis  Electrolytes: Managed (with renal diet)  Hypertension: Relative hypotension blood pressure 100 oh 3/60 with a heart rate of 81  Without being on antihypertensive medications  Acid-base: Metabolic alkalosis likely secondary to diuresis  Anemia from renal failure, To check iron panel and start epogen. hemoglobin of 8.2 mg deciliter.  To rule out plasma cell disorder  T2DM on insulin sliding scale  COPD: Emphysematous changes on CT scan requiring 2 L of oxygen  CVA 2019 with residual dizziness  Patient wheelchair dependent  Urinary incontinent: Wearing depends at [facility] nursing facility  CHF: Diastolic heart failure treated with IV Lasix likely for acute on chronic  UMA with CPAP intolerance  Paroxysmal A-fib on Eliquis    Plan:  Acute kidney injury likely hemodynamically mediated due to IV diuresis, Jardiance, losartan use, an episode of systole after cardioversion, hypotension, contrast exposure.  Gradually Improving.    To continue Bumex 2 mg p.o. daily.     Acute blood loss anemia hemoglobin down to 7.9 mg deciliter.  Iron sat 8   To start IV venofer 200 mg x 4 doses     To cont to hold Jardiance, entresto for now.    Strict input and output guide diuresis management.        Discharge medication:  Truly appreciate cardiology recommendations, ejection fraction dropped significantly.  Tentative plan to start Entresto upon discharge  To discontinue losartan upon discharge  To discontinue torsemide/metolazone.   To  continue Jardiance  To cont Bumex 2 mg p.o. daily with extra dose as mentioned above.  To continue CPAP    We will continue to monitor closely with you, thank you!    Medications:    Current Facility-Administered Medications:     acetaminophen (Tylenol) tablet 487.5 mg, 487.5 mg, oral, BID, Steve Sneed MD, 487.5 mg at 01/04/24 0935    acetaminophen (Tylenol) tablet 650 mg, 650 mg, oral, q6h PRN, Steve Sneed MD, 650 mg at 12/25/23 0934    albuterol 2.5 mg /3 mL (0.083 %) nebulizer solution 2.5 mg, 2.5 mg, nebulization, q4h PRN, Steve Sneed MD, 2.5 mg at 12/30/23 1144    [Held by provider] apixaban (Eliquis) tablet 5 mg, 5 mg, oral, BID, Ayanna Giles PA-C    atorvastatin (Lipitor) tablet 20 mg, 20 mg, oral, Nightly, Steve Sneed MD, 20 mg at 01/03/24 2146    bumetanide (Bumex) tablet 2 mg, 2 mg, oral, Daily, Mary Solomon MD, 2 mg at 01/04/24 0936    butalbital-acetaminophen-caff -40 mg per tablet 1 tablet, 1 tablet, oral, q4h PRN, Steve Sneed MD, 1 tablet at 12/27/23 2056    calcium carbonate (Tums) chewable tablet 500 mg, 1 tablet, oral, q4h PRN, Steve Sneed MD, 500 mg at 01/03/24 0044    cyclobenzaprine (Flexeril) tablet 10 mg, 10 mg, oral, BID PRN, Steve Sneed MD    dextrose 10 % in water (D10W) infusion, 0.3 g/kg/hr, intravenous, Once PRN, Steve Sneed MD    dextrose 50 % injection 25 g, 25 g, intravenous, q15 min PRN, Steve Sneed MD    docusate sodium (Colace) capsule 100 mg, 100 mg, oral, BID, Steve Sneed MD, 100 mg at 01/03/24 0913    [Held by provider] empagliflozin (Jardiance) tablet 10 mg, 10 mg, oral, Daily, Steve Sneed MD, 10 mg at 12/30/23 1144    glucagon (Glucagen) injection 1 mg, 1 mg, intramuscular, q15 min PRN, Steve Sneed MD    insulin lispro (HumaLOG) injection 0-5 Units, 0-5 Units, subcutaneous, 4x daily, Steve Sneed MD, 1 Units at 12/23/23 7692    LORazepam (Ativan) tablet 0.25 mg, 0.25 mg, oral, Once, Steve  FREEDOM Sneed MD    lubricating eye drops ophthalmic solution 1 drop, 1 drop, Both Eyes, q4h PRN, Steve Sneed MD, 1 drop at 12/27/23 0911    oxygen (O2) therapy, , inhalation, Continuous PRN - O2/gases, Steve Sneed MD, 3 L/min at 12/30/23 0025    perflutren lipid microspheres (Definity) injection 0.5-10 mL of dilution, 0.5-10 mL of dilution, intravenous, Once in imaging, Steve Sneed MD    perflutren protein A microsphere (Optison) injection 0.5 mL, 0.5 mL, intravenous, Once in imaging, Steve Sneed MD    polyethylene glycol (Glycolax, Miralax) packet 17 g, 17 g, oral, Daily, Steve Sneed MD, 17 g at 01/03/24 0913    propofol (Diprivan) injection, , , PRN, Lamine Savage MD, 60 mg at 12/29/23 1343    sennosides (Senokot) tablet 8.6 mg, 1 tablet, oral, BID, Steve Sneed MD, 8.6 mg at 01/03/24 0913    sulfur hexafluoride microsphr (Lumason) injection 24.28 mg, 2 mL, intravenous, Once in imaging, Steve Sneed MD    traMADol (Ultram) tablet 50 mg, 50 mg, oral, q8h PRN, Steve Sneed MD, 50 mg at 12/26/23 2222    PERTINENT ROS:  GENERAL:  positive for fatigue, poor appetite.  No fever/chills  RESPIRATORY:  positive for shortness of breath.  Negative for cough, wheezing.  CARDIOVASCULAR:   Negative for chest pain or palpitation.  GI:  Negative for abdominal pain, diarrhea, heartburn, nausea, vomiting  : Dysuria    Physical Exam:  Vital signs in last 24 hours:  Temp:  [36.4 °C (97.5 °F)-37 °C (98.6 °F)] 36.5 °C (97.7 °F)  Heart Rate:  [42-70] 70  Resp:  [12-29] 20  BP: (112-161)/(41-76) 131/76    General: Awake, cooperative, not in acute distress  HEENT:  NCAT,  mucous membranes moist and pink  NECK:  Elevated JVD, no carotid bruit, supple, no cervical mass or thyromegaly  LUNGS;  Diminished breath sounds, fine Rales  CV:  Distant, regular rate and rhythm, no murmurs  ABDOMEN:  abdomen soft, nontender, BS normal, no masses or organomegaly  EDEMA:  +1 lower extremity  edema/dependent edema  SKIN:  dry and normal turgor, no clubbing, cyanosis or petechia.  No rashes noted      Intake/Output last 3 shifts:  I/O last 3 completed shifts:  In: - (0 mL/kg)   Out: 1475 (10.3 mL/kg) [Urine:1475 (0.3 mL/kg/hr)]  Weight: 143.8 kg     DATA:  Diagnotic tests reviewed for Todays visit:  Results from last 7 days   Lab Units 01/04/24  0722   WBC AUTO x10*3/uL 5.8   RBC AUTO x10*6/uL 3.01*   HEMOGLOBIN g/dL 8.6*   HEMATOCRIT % 31.3*       Results from last 7 days   Lab Units 01/04/24  0722   SODIUM mmol/L 139   POTASSIUM mmol/L 4.4   CHLORIDE mmol/L 98   CO2 mmol/L 38*   BUN mg/dL 35*   CREATININE mg/dL 1.15*   CALCIUM mg/dL 8.6   MAGNESIUM mg/dL 2.29             IMAGING: CXR reviewed in  images      SIGNATURE: Mary Solomon MD  Nephrology and Hypertension  [address] [place]  Office phone: [phone]  FAX: [phone]    This note was partially generated using the Dragon voice recognition system, and there may be some incorrect words, spelling's and punctuation that were not noted in checking the note before saving.

## 2024-01-05 ENCOUNTER — APPOINTMENT (OUTPATIENT)
Dept: RADIOLOGY | Facility: HOSPITAL | Age: 89
DRG: 242 | End: 2024-01-05
Payer: COMMERCIAL

## 2024-01-05 LAB
ANION GAP SERPL CALC-SCNC: 8 MMOL/L (ref 10–20)
ATRIAL RATE: 61 BPM
BUN SERPL-MCNC: 30 MG/DL (ref 6–23)
CALCIUM SERPL-MCNC: 8.5 MG/DL (ref 8.6–10.3)
CHLORIDE SERPL-SCNC: 98 MMOL/L (ref 98–107)
CO2 SERPL-SCNC: 39 MMOL/L (ref 21–32)
CREAT SERPL-MCNC: 0.93 MG/DL (ref 0.5–1.05)
ERYTHROCYTE [DISTWIDTH] IN BLOOD BY AUTOMATED COUNT: 17.9 % (ref 11.5–14.5)
GFR SERPL CREATININE-BSD FRML MDRD: 60 ML/MIN/1.73M*2
GLUCOSE BLD MANUAL STRIP-MCNC: 76 MG/DL (ref 74–99)
GLUCOSE BLD MANUAL STRIP-MCNC: 86 MG/DL (ref 74–99)
GLUCOSE BLD MANUAL STRIP-MCNC: 89 MG/DL (ref 74–99)
GLUCOSE BLD MANUAL STRIP-MCNC: 95 MG/DL (ref 74–99)
GLUCOSE SERPL-MCNC: 96 MG/DL (ref 74–99)
HCT VFR BLD AUTO: 29.8 % (ref 36–46)
HGB BLD-MCNC: 8.3 G/DL (ref 12–16)
MAGNESIUM SERPL-MCNC: 2.09 MG/DL (ref 1.6–2.4)
MCH RBC QN AUTO: 28.7 PG (ref 26–34)
MCHC RBC AUTO-ENTMCNC: 27.9 G/DL (ref 32–36)
MCV RBC AUTO: 103 FL (ref 80–100)
NRBC BLD-RTO: 0 /100 WBCS (ref 0–0)
PLATELET # BLD AUTO: 136 X10*3/UL (ref 150–450)
POTASSIUM SERPL-SCNC: 4.3 MMOL/L (ref 3.5–5.3)
Q ONSET: 186 MS
QRS COUNT: 21 BEATS
QRS DURATION: 150 MS
QT INTERVAL: 366 MS
QTC CALCULATION(BAZETT): 528 MS
QTC FREDERICIA: 467 MS
R AXIS: -58 DEGREES
RBC # BLD AUTO: 2.89 X10*6/UL (ref 4–5.2)
SODIUM SERPL-SCNC: 141 MMOL/L (ref 136–145)
T AXIS: 103 DEGREES
T OFFSET: 369 MS
VENTRICULAR RATE: 125 BPM
WBC # BLD AUTO: 5.9 X10*3/UL (ref 4.4–11.3)

## 2024-01-05 PROCEDURE — 0JH606Z INSERTION OF PACEMAKER, DUAL CHAMBER INTO CHEST SUBCUTANEOUS TISSUE AND FASCIA, OPEN APPROACH: ICD-10-PCS | Performed by: INTERNAL MEDICINE

## 2024-01-05 PROCEDURE — 71045 X-RAY EXAM CHEST 1 VIEW: CPT

## 2024-01-05 PROCEDURE — 33208 INSRT HEART PM ATRIAL & VENT: CPT | Performed by: INTERNAL MEDICINE

## 2024-01-05 PROCEDURE — 2500000001 HC RX 250 WO HCPCS SELF ADMINISTERED DRUGS (ALT 637 FOR MEDICARE OP): Performed by: INTERNAL MEDICINE

## 2024-01-05 PROCEDURE — 76000 FLUOROSCOPY <1 HR PHYS/QHP: CPT | Performed by: INTERNAL MEDICINE

## 2024-01-05 PROCEDURE — 36415 COLL VENOUS BLD VENIPUNCTURE: CPT | Performed by: INTERNAL MEDICINE

## 2024-01-05 PROCEDURE — 96376 TX/PRO/DX INJ SAME DRUG ADON: CPT | Performed by: INTERNAL MEDICINE

## 2024-01-05 PROCEDURE — 82947 ASSAY GLUCOSE BLOOD QUANT: CPT

## 2024-01-05 PROCEDURE — 70450 CT HEAD/BRAIN W/O DYE: CPT

## 2024-01-05 PROCEDURE — 2700000047 HC OR 270 NO HCPCS: Performed by: INTERNAL MEDICINE

## 2024-01-05 PROCEDURE — 2720000007 HC OR 272 NO HCPCS: Performed by: INTERNAL MEDICINE

## 2024-01-05 PROCEDURE — 2750000001 HC OR 275 NO HCPCS: Performed by: INTERNAL MEDICINE

## 2024-01-05 PROCEDURE — 2780000003 HC OR 278 NO HCPCS: Performed by: INTERNAL MEDICINE

## 2024-01-05 PROCEDURE — 71045 X-RAY EXAM CHEST 1 VIEW: CPT | Performed by: RADIOLOGY

## 2024-01-05 PROCEDURE — 2060000001 HC INTERMEDIATE ICU ROOM DAILY

## 2024-01-05 PROCEDURE — 70450 CT HEAD/BRAIN W/O DYE: CPT | Performed by: RADIOLOGY

## 2024-01-05 PROCEDURE — 80048 BASIC METABOLIC PNL TOTAL CA: CPT | Performed by: INTERNAL MEDICINE

## 2024-01-05 PROCEDURE — 85027 COMPLETE CBC AUTOMATED: CPT | Performed by: INTERNAL MEDICINE

## 2024-01-05 PROCEDURE — 83735 ASSAY OF MAGNESIUM: CPT | Performed by: INTERNAL MEDICINE

## 2024-01-05 PROCEDURE — 02HK3JZ INSERTION OF PACEMAKER LEAD INTO RIGHT VENTRICLE, PERCUTANEOUS APPROACH: ICD-10-PCS | Performed by: INTERNAL MEDICINE

## 2024-01-05 PROCEDURE — 2500000004 HC RX 250 GENERAL PHARMACY W/ HCPCS (ALT 636 FOR OP/ED): Performed by: INTERNAL MEDICINE

## 2024-01-05 PROCEDURE — C1898 LEAD, PMKR, OTHER THAN TRANS: HCPCS | Performed by: INTERNAL MEDICINE

## 2024-01-05 PROCEDURE — 99152 MOD SED SAME PHYS/QHP 5/>YRS: CPT | Performed by: INTERNAL MEDICINE

## 2024-01-05 PROCEDURE — 99153 MOD SED SAME PHYS/QHP EA: CPT | Performed by: INTERNAL MEDICINE

## 2024-01-05 PROCEDURE — 2500000005 HC RX 250 GENERAL PHARMACY W/O HCPCS: Performed by: INTERNAL MEDICINE

## 2024-01-05 PROCEDURE — C1892 INTRO/SHEATH,FIXED,PEEL-AWAY: HCPCS | Performed by: INTERNAL MEDICINE

## 2024-01-05 PROCEDURE — 02H63JZ INSERTION OF PACEMAKER LEAD INTO RIGHT ATRIUM, PERCUTANEOUS APPROACH: ICD-10-PCS | Performed by: INTERNAL MEDICINE

## 2024-01-05 PROCEDURE — C1785 PMKR, DUAL, RATE-RESP: HCPCS | Performed by: INTERNAL MEDICINE

## 2024-01-05 DEVICE — IPG W1DR01 AZURE XT DR MRI WL USA BCP
Type: IMPLANTABLE DEVICE | Status: FUNCTIONAL
Brand: AZURE™ XT DR MRI SURESCAN™

## 2024-01-05 DEVICE — LEAD 5076-52 CAPSUREFIX NOVUS US EN
Type: IMPLANTABLE DEVICE | Status: FUNCTIONAL
Brand: CAPSUREFIX® NOVUS

## 2024-01-05 DEVICE — LEAD 5076-45 CAPSUREFIX NOVUS US EN
Type: IMPLANTABLE DEVICE | Status: FUNCTIONAL
Brand: CAPSUREFIX® NOVUS

## 2024-01-05 RX ORDER — ACETAMINOPHEN 160 MG/5ML
650 SOLUTION ORAL EVERY 4 HOURS PRN
Status: DISCONTINUED | OUTPATIENT
Start: 2024-01-05 | End: 2024-01-07

## 2024-01-05 RX ORDER — FENTANYL CITRATE 50 UG/ML
INJECTION, SOLUTION INTRAMUSCULAR; INTRAVENOUS AS NEEDED
Status: DISCONTINUED | OUTPATIENT
Start: 2024-01-05 | End: 2024-01-05 | Stop reason: HOSPADM

## 2024-01-05 RX ORDER — TRAMADOL HYDROCHLORIDE 50 MG/1
50 TABLET ORAL EVERY 6 HOURS PRN
Status: DISCONTINUED | OUTPATIENT
Start: 2024-01-05 | End: 2024-01-07

## 2024-01-05 RX ORDER — ACETAMINOPHEN 650 MG/1
650 SUPPOSITORY RECTAL EVERY 4 HOURS PRN
Status: DISCONTINUED | OUTPATIENT
Start: 2024-01-05 | End: 2024-01-07

## 2024-01-05 RX ORDER — VANCOMYCIN HYDROCHLORIDE 1 G/200ML
1000 INJECTION, SOLUTION INTRAVENOUS ONCE
Status: COMPLETED | OUTPATIENT
Start: 2024-01-06 | End: 2024-01-06

## 2024-01-05 RX ORDER — NALOXONE HYDROCHLORIDE 0.4 MG/ML
0.2 INJECTION, SOLUTION INTRAMUSCULAR; INTRAVENOUS; SUBCUTANEOUS EVERY 5 MIN PRN
Status: DISCONTINUED | OUTPATIENT
Start: 2024-01-05 | End: 2024-01-09 | Stop reason: HOSPADM

## 2024-01-05 RX ORDER — ACETAMINOPHEN 325 MG/1
650 TABLET ORAL EVERY 4 HOURS PRN
Status: DISCONTINUED | OUTPATIENT
Start: 2024-01-05 | End: 2024-01-07

## 2024-01-05 RX ORDER — MIDAZOLAM HYDROCHLORIDE 1 MG/ML
INJECTION INTRAMUSCULAR; INTRAVENOUS AS NEEDED
Status: DISCONTINUED | OUTPATIENT
Start: 2024-01-05 | End: 2024-01-05 | Stop reason: HOSPADM

## 2024-01-05 RX ORDER — VANCOMYCIN HYDROCHLORIDE 1 G/200ML
1 INJECTION, SOLUTION INTRAVENOUS ONCE
Status: COMPLETED | OUTPATIENT
Start: 2024-01-05 | End: 2024-01-05

## 2024-01-05 RX ORDER — LIDOCAINE HYDROCHLORIDE 20 MG/ML
INJECTION, SOLUTION INFILTRATION; PERINEURAL AS NEEDED
Status: DISCONTINUED | OUTPATIENT
Start: 2024-01-05 | End: 2024-01-05 | Stop reason: HOSPADM

## 2024-01-05 RX ADMIN — SENNOSIDES 8.6 MG: 8.6 TABLET, FILM COATED ORAL at 21:01

## 2024-01-05 RX ADMIN — DOCUSATE SODIUM 100 MG: 100 CAPSULE, LIQUID FILLED ORAL at 21:01

## 2024-01-05 RX ADMIN — ATORVASTATIN CALCIUM 20 MG: 20 TABLET, FILM COATED ORAL at 21:01

## 2024-01-05 RX ADMIN — ACETAMINOPHEN 487.5 MG: 325 TABLET ORAL at 08:34

## 2024-01-05 RX ADMIN — DOCUSATE SODIUM 100 MG: 100 CAPSULE, LIQUID FILLED ORAL at 08:34

## 2024-01-05 RX ADMIN — SENNOSIDES 8.6 MG: 8.6 TABLET, FILM COATED ORAL at 08:34

## 2024-01-05 RX ADMIN — ACETAMINOPHEN 487.5 MG: 325 TABLET ORAL at 21:00

## 2024-01-05 RX ADMIN — VANCOMYCIN HYDROCHLORIDE 1 G: 1 INJECTION, SOLUTION INTRAVENOUS at 15:15

## 2024-01-05 RX ADMIN — BUMETANIDE 2 MG: 1 TABLET ORAL at 08:32

## 2024-01-05 ASSESSMENT — PAIN SCALES - GENERAL
PAINLEVEL_OUTOF10: 5 - MODERATE PAIN
PAINLEVEL_OUTOF10: 0 - NO PAIN

## 2024-01-05 ASSESSMENT — COGNITIVE AND FUNCTIONAL STATUS - GENERAL
TOILETING: A LOT
EATING MEALS: A LITTLE
TURNING FROM BACK TO SIDE WHILE IN FLAT BAD: A LOT
STANDING UP FROM CHAIR USING ARMS: A LOT
HELP NEEDED FOR BATHING: A LOT
WALKING IN HOSPITAL ROOM: TOTAL
MOVING TO AND FROM BED TO CHAIR: A LOT
DRESSING REGULAR UPPER BODY CLOTHING: A LOT
DAILY ACTIVITIY SCORE: 13
PERSONAL GROOMING: A LOT
CLIMB 3 TO 5 STEPS WITH RAILING: TOTAL
MOBILITY SCORE: 10
DRESSING REGULAR LOWER BODY CLOTHING: A LOT
MOVING FROM LYING ON BACK TO SITTING ON SIDE OF FLAT BED WITH BEDRAILS: A LOT

## 2024-01-05 ASSESSMENT — PAIN DESCRIPTION - DESCRIPTORS: DESCRIPTORS: ACHING

## 2024-01-05 ASSESSMENT — PAIN - FUNCTIONAL ASSESSMENT
PAIN_FUNCTIONAL_ASSESSMENT: 0-10

## 2024-01-05 NOTE — PROGRESS NOTES
Pt scheduled for pace maker today.    1423 Pt had a rapid response, r/o CVA. Pacer not completed yet. Arabella REESE updated.

## 2024-01-05 NOTE — SIGNIFICANT EVENT
Brain Attack Note:    Rapid response was initiated following a report from the bedside RN regarding altered mental status. The patient was last known to be normal 10 minutes ago prior to the onset of the concerning symptoms indicative of a potential cerebrovascular event. Initial vital signs recorded were as follows: /58, HR 59, SpO2 96%, RR 23.    Upon our arrival, the patient presented with anxiety and slurred speech. It is noteworthy that she has a history of a previous stroke with unclear residual weakness. Physical examination revealed clear lung sounds bilaterally, normal S1-S2 without murmurs. The NIHSS score is 5. A neurological exam demonstrated intact cranial nerves I to XII, but with slurred speech and left-sided weakness. Sensation remains intact in both upper and lower extremities. Random blood sugar (RBS) levels were within normal limits.    Given the clinical presentation, a CT head will be obtained promptly to investigate and rule out potential ischemic or hemorrhagic stroke.      Dr. Nicholson present for entirety of rapid response.    Rboby Jimenez MD   Internal Medicine, PGY-1 .

## 2024-01-05 NOTE — PROGRESS NOTES
Subjective Data:    After multiple conversations informed consent was obtained  To proceed with AV node modification and AV pacemaking system.  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  Desire to use amiodarone to treat atrial arrhythmias  Comorbidities include obesity DJD stage III chronic kidney disease dyslipidemia venous insufficiency  Given her emphysematous lung changes on CT and attempts at cephalic vein cutdown for lead placement would be reasonable  Angiotensin receptor blocker was held this admission due to renal insufficiency at home she had been on losartan 50 metolazone 5 weekly Demadex 40 daily  Newly started Jardiance 10 daily  Echo showed decline in ejection fraction from 55% baseline to current values of 26% potentially reversible      Overnight Events:    No new arrhythmogenic or cardiovascular events persistent intermittent sinus bradycardia 44 with bundle branch block and first-degree block     Objective Data:  Last Recorded Vitals:  Vitals:    01/04/24 2342 01/05/24 0411 01/05/24 0800 01/05/24 0949   BP: 121/55 119/71 135/64    BP Location: Left arm Left arm Left arm    Patient Position: Lying Lying Lying    Pulse: (!) 44 (!) 44 (!) 45    Resp: 12 22 22    Temp: 37 °C (98.6 °F) 36.3 °C (97.3 °F) 36.2 °C (97.2 °F)    TempSrc: Temporal Temporal Temporal    SpO2: 95% 96% 96%    Weight:  142 kg (313 lb 15 oz)  142 kg (313 lb 15 oz)   Height:           Last Labs:  CBC - 1/5/2024:  5:22 AM  5.9 8.3 136    29.8      CMP - 1/5/2024:  5:22 AM  8.5 6.2 11 --- 0.8   _ 4.1 9 91      PTT - No results in last year.  _   _ _     TROPHS   Date/Time Value Ref Range Status   12/21/2023  01:32 PM 9 0 - 13 ng/L Final   12/21/2023 11:58 AM 8 0 - 13 ng/L Final     BNP   Date/Time Value Ref Range Status   12/21/2023 11:58  0 - 99 pg/mL Final   03/06/2019 04:50  0 - 99 pg/mL Final     Comment:     .  <100 pg/mL - Heart failure unlikely  100-299 pg/mL - Intermediate probability of acute heart  .               failure exacerbation. Correlate with clinical  .               context and patient history.    >=300 pg/mL - Heart Failure likely. Correlate with clinical  .               context and patient history.  BNP testing is performed using different testing   methodology at [facility] than at other   system hospitals. Direct result comparisons should   only be made within the same method.     03/05/2019 08:18  0 - 99 pg/mL Final     Comment:     .  <100 pg/mL - Heart failure unlikely  100-299 pg/mL - Intermediate probability of acute heart  .               failure exacerbation. Correlate with clinical  .               context and patient history.    >=300 pg/mL - Heart Failure likely. Correlate with clinical  .               context and patient history.  BNP testing is performed using different testing   methodology at [facility] than at other   [facility] hospitals. Direct result comparisons should   only be made within the same method.       HGBA1C   Date/Time Value Ref Range Status   10/02/2023 03:06 PM 6.3 4.3 - 5.6 % Final     Comment:     American Diabetes Association guidelines indicate that patients with HgbA1c in the range 5.7-6.4% are at increased risk for development of diabetes, and intervention by lifestyle modification may be beneficial. HgbA1c greater or equal to 6.5% is considered diagnostic of diabetes.   03/16/2019 05:30 AM 5.6 % Final     Comment:          Diagnosis of Diabetes-Adults   Non-Diabetic: < or = 5.6%   Increased risk for developing diabetes: 5.7-6.4%   Diagnostic of diabetes: > or = 6.5%  .       Monitoring of Diabetes                 Age (y)     Therapeutic Goal (%)   Adults:          >18           <7.0   Pediatrics:    13-18           <7.5                   7-12           <8.0                   0- 6            7.5-8.5   American Diabetes Association. Diabetes Care 33(S1), Jan 2010.     02/23/2019 07:30 PM 6.0 % Final     Comment:          Diagnosis of Diabetes-Adults   Non-Diabetic: < or = 5.6%   Increased risk for developing diabetes: 5.7-6.4%   Diagnostic of diabetes: > or = 6.5%  .       Monitoring of Diabetes                Age (y)     Therapeutic Goal (%)   Adults:          >18           <7.0   Pediatrics:    13-18           <7.5                   7-12           <8.0                   0- 6            7.5-8.5   American Diabetes Association. Diabetes Care 33(S1), Jan 2010.       VLDL   Date/Time Value Ref Range Status   08/23/2023 01:25 PM 12 0 - 40 mg/dL Final   02/23/2019 07:30 PM 16 0 - 40 mg/dL Final      Last I/O:  I/O last 3 completed shifts:  In: 920 (6.5 mL/kg) [P.O.:920]  Out: 2875 (20.2 mL/kg) [Urine:2875 (0.6 mL/kg/hr)]  Weight: 142.4 kg     Past Cardiology Tests (Last 3 Years):  EKG:  ECG 12 Lead 01/02/2024      ECG 12 Lead 12/30/2023      ECG 12 lead 12/21/2023      ECG 12 lead 12/21/2023      ECG 12 lead 12/21/2023    Echo:  Transthoracic Echo (TTE) Limited 12/29/2023      Transthoracic Echo (TTE) Complete 12/24/2023    Ejection Fractions:  EF   Date/Time Value Ref Range Status   12/29/2023 02:53 PM 37     12/24/2023 02:23 PM 33       Cath:  No results found for this or any previous visit from the past 1095 days.    Stress Test:  No results found for this or any previous visit from the past 1095 days.    Cardiac Imaging:  No results found for this or any previous visit from the past 1095 days.      Inpatient Medications:  Scheduled medications   Medication Dose Route Frequency    acetaminophen  487.5 mg oral BID    [Held by provider] apixaban  5 mg oral BID    atorvastatin  20 mg oral Nightly    bumetanide  2 mg oral Daily     docusate sodium  100 mg oral BID    [Held by provider] empagliflozin  10 mg oral Daily    insulin lispro  0-5 Units subcutaneous 4x daily    iron sucrose  200 mg intravenous Daily    LORazepam  0.25 mg oral Once    perflutren lipid microspheres  0.5-10 mL of dilution intravenous Once in imaging    perflutren protein A microsphere  0.5 mL intravenous Once in imaging    polyethylene glycol  17 g oral Daily    sennosides  1 tablet oral BID    sulfur hexafluoride microsphr  2 mL intravenous Once in imaging     PRN medications   Medication    acetaminophen    albuterol    butalbital-acetaminophen-caff    calcium carbonate    cyclobenzaprine    dextrose 10 % in water (D10W)    dextrose    glucagon    lubricating eye drops    oxygen    propofol    traMADol     Continuous Medications   Medication Dose Last Rate     Results for orders placed or performed during the hospital encounter of 12/21/23 (from the past 96 hour(s))   POCT GLUCOSE   Result Value Ref Range    POCT Glucose 132 (H) 74 - 99 mg/dL   POCT GLUCOSE   Result Value Ref Range    POCT Glucose 135 (H) 74 - 99 mg/dL   POCT GLUCOSE   Result Value Ref Range    POCT Glucose 132 (H) 74 - 99 mg/dL   Magnesium   Result Value Ref Range    Magnesium 2.26 1.60 - 2.40 mg/dL   CBC   Result Value Ref Range    WBC 6.1 4.4 - 11.3 x10*3/uL    nRBC 0.0 0.0 - 0.0 /100 WBCs    RBC 3.01 (L) 4.00 - 5.20 x10*6/uL    Hemoglobin 8.7 (L) 12.0 - 16.0 g/dL    Hematocrit 31.9 (L) 36.0 - 46.0 %     (H) 80 - 100 fL    MCH 28.9 26.0 - 34.0 pg    MCHC 27.3 (L) 32.0 - 36.0 g/dL    RDW 17.3 (H) 11.5 - 14.5 %    Platelets 147 (L) 150 - 450 x10*3/uL   Basic Metabolic Panel   Result Value Ref Range    Glucose 109 (H) 74 - 99 mg/dL    Sodium 137 136 - 145 mmol/L    Potassium 4.2 3.5 - 5.3 mmol/L    Chloride 96 (L) 98 - 107 mmol/L    Bicarbonate 36 (H) 21 - 32 mmol/L    Anion Gap 9 (L) 10 - 20 mmol/L    Urea Nitrogen 42 (H) 6 - 23 mg/dL    Creatinine 1.16 (H) 0.50 - 1.05 mg/dL    eGFR 46 (L) >60  mL/min/1.73m*2    Calcium 8.4 (L) 8.6 - 10.3 mg/dL   POCT GLUCOSE   Result Value Ref Range    POCT Glucose 104 (H) 74 - 99 mg/dL   POCT GLUCOSE   Result Value Ref Range    POCT Glucose 129 (H) 74 - 99 mg/dL   ECG 12 Lead   Result Value Ref Range    Ventricular Rate 49 BPM    Atrial Rate 49 BPM    SC Interval 216 ms    QRS Duration 140 ms    QT Interval 456 ms    QTC Calculation(Bazett) 411 ms    P Axis 119 degrees    R Axis -62 degrees    T Axis -66 degrees    QRS Count 8 beats    Q Onset 192 ms    T Offset 420 ms    QTC Fredericia 426 ms   POCT GLUCOSE   Result Value Ref Range    POCT Glucose 137 (H) 74 - 99 mg/dL   POCT GLUCOSE   Result Value Ref Range    POCT Glucose 110 (H) 74 - 99 mg/dL   CBC   Result Value Ref Range    WBC 5.6 4.4 - 11.3 x10*3/uL    nRBC 0.0 0.0 - 0.0 /100 WBCs    RBC 2.85 (L) 4.00 - 5.20 x10*6/uL    Hemoglobin 7.9 (L) 12.0 - 16.0 g/dL    Hematocrit 29.6 (L) 36.0 - 46.0 %     (H) 80 - 100 fL    MCH 27.7 26.0 - 34.0 pg    MCHC 26.7 (L) 32.0 - 36.0 g/dL    RDW 17.4 (H) 11.5 - 14.5 %    Platelets 139 (L) 150 - 450 x10*3/uL   Magnesium   Result Value Ref Range    Magnesium 2.40 1.60 - 2.40 mg/dL   Basic Metabolic Panel   Result Value Ref Range    Glucose 98 74 - 99 mg/dL    Sodium 136 136 - 145 mmol/L    Potassium 4.3 3.5 - 5.3 mmol/L    Chloride 97 (L) 98 - 107 mmol/L    Bicarbonate 30 21 - 32 mmol/L    Anion Gap 13 10 - 20 mmol/L    Urea Nitrogen 40 (H) 6 - 23 mg/dL    Creatinine 1.17 (H) 0.50 - 1.05 mg/dL    eGFR 45 (L) >60 mL/min/1.73m*2    Calcium 8.1 (L) 8.6 - 10.3 mg/dL   Iron and TIBC   Result Value Ref Range    Iron 36 35 - 150 ug/dL    UIBC 395 (H) 110 - 370 ug/dL    TIBC 431 240 - 445 ug/dL    % Saturation 8 (L) 25 - 45 %   Ferritin   Result Value Ref Range    Ferritin 32 8 - 150 ng/mL   POCT GLUCOSE   Result Value Ref Range    POCT Glucose 106 (H) 74 - 99 mg/dL   POCT GLUCOSE   Result Value Ref Range    POCT Glucose 135 (H) 74 - 99 mg/dL   POCT GLUCOSE   Result Value Ref Range     POCT Glucose 153 (H) 74 - 99 mg/dL   POCT GLUCOSE   Result Value Ref Range    POCT Glucose 140 (H) 74 - 99 mg/dL   Magnesium   Result Value Ref Range    Magnesium 2.29 1.60 - 2.40 mg/dL   CBC   Result Value Ref Range    WBC 5.8 4.4 - 11.3 x10*3/uL    nRBC 0.0 0.0 - 0.0 /100 WBCs    RBC 3.01 (L) 4.00 - 5.20 x10*6/uL    Hemoglobin 8.6 (L) 12.0 - 16.0 g/dL    Hematocrit 31.3 (L) 36.0 - 46.0 %     (H) 80 - 100 fL    MCH 28.6 26.0 - 34.0 pg    MCHC 27.5 (L) 32.0 - 36.0 g/dL    RDW 17.6 (H) 11.5 - 14.5 %    Platelets 136 (L) 150 - 450 x10*3/uL   Basic Metabolic Panel   Result Value Ref Range    Glucose 108 (H) 74 - 99 mg/dL    Sodium 139 136 - 145 mmol/L    Potassium 4.4 3.5 - 5.3 mmol/L    Chloride 98 98 - 107 mmol/L    Bicarbonate 38 (H) 21 - 32 mmol/L    Anion Gap 7 (L) 10 - 20 mmol/L    Urea Nitrogen 35 (H) 6 - 23 mg/dL    Creatinine 1.15 (H) 0.50 - 1.05 mg/dL    eGFR 46 (L) >60 mL/min/1.73m*2    Calcium 8.6 8.6 - 10.3 mg/dL   POCT GLUCOSE   Result Value Ref Range    POCT Glucose 93 74 - 99 mg/dL   POCT GLUCOSE   Result Value Ref Range    POCT Glucose 129 (H) 74 - 99 mg/dL   POCT GLUCOSE   Result Value Ref Range    POCT Glucose 133 (H) 74 - 99 mg/dL   POCT GLUCOSE   Result Value Ref Range    POCT Glucose 110 (H) 74 - 99 mg/dL   Magnesium   Result Value Ref Range    Magnesium 2.09 1.60 - 2.40 mg/dL   CBC   Result Value Ref Range    WBC 5.9 4.4 - 11.3 x10*3/uL    nRBC 0.0 0.0 - 0.0 /100 WBCs    RBC 2.89 (L) 4.00 - 5.20 x10*6/uL    Hemoglobin 8.3 (L) 12.0 - 16.0 g/dL    Hematocrit 29.8 (L) 36.0 - 46.0 %     (H) 80 - 100 fL    MCH 28.7 26.0 - 34.0 pg    MCHC 27.9 (L) 32.0 - 36.0 g/dL    RDW 17.9 (H) 11.5 - 14.5 %    Platelets 136 (L) 150 - 450 x10*3/uL   Basic Metabolic Panel   Result Value Ref Range    Glucose 96 74 - 99 mg/dL    Sodium 141 136 - 145 mmol/L    Potassium 4.3 3.5 - 5.3 mmol/L    Chloride 98 98 - 107 mmol/L    Bicarbonate 39 (H) 21 - 32 mmol/L    Anion Gap 8 (L) 10 - 20 mmol/L    Urea  Nitrogen 30 (H) 6 - 23 mg/dL    Creatinine 0.93 0.50 - 1.05 mg/dL    eGFR 60 (L) >60 mL/min/1.73m*2    Calcium 8.5 (L) 8.6 - 10.3 mg/dL   POCT GLUCOSE   Result Value Ref Range    POCT Glucose 95 74 - 99 mg/dL        Physical Exam:  Subjective:   Examination:   General Examination:   General Appearance: Well developed, well nourished, in no acute distress.  Morbidly obese  Head: normocephalic, atraumatic   Eyes: Anicteric sclera. Pupils are equally round and reactive to light.  Extraocular movements are intact.    Ears: normal   Oral: Cavity: mucosa moist.   Throat: clear.   Neck/Thyroid: neck supple, full range of motion, no cervical lymphadenopathy.   Skin: warm and dry, no suspicious lesions.    Heart: regular rate and rhythm, S1, S2 normal, no murmurs.   Lungs: clear to auscultation bilaterally.   Abdomen: soft, non-tender, non-distended, bowl sound present, normal.   Extremities: no clubbing, no cyanosis, 2+  Neuro: non-focal, motor strength normal upper lower extremities, sensory exam intact.  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, induced tachycardia induced cardiomyopathy  R00.1 1229 cardioversion sinus bradycardia 44 with first-degree block bundle branch block  N18.3 Chronic kidney disease on Jardiance losartan on hold potential consideration of ARNI or losartan in future  E66 type II obesity BMI 55  J44.9 Emphysematous lung disease  01.810 preoperative evaluation for pacemaker and ablation AV yair modification long-term use of amiodarone 200 daily Toprol 25 daily post pacemaker and resume Eliquis 2.5 twice daily 24 hours after pacemaker insertion    Code Status:  DNR and No Intubation    I spent 35 minutes in the professional and overall care of this patient.        Steve Sneed MD

## 2024-01-05 NOTE — PROGRESS NOTES
Physical Therapy                 Therapy Communication Note    Patient Name: Fifi Jenkins  MRN: [id number]  Today's Date: 1/5/2024     Discipline: Physical Therapy    Room 5643    Missed Time:   Missed Visit Reason:   Comment:     01/05/24 1205   General   Missed Visit Yes   Missed Visit Reason Patient in a medical procedure    Pt. scheduled for pacemaker placement this PM. Per EMR pt in 1 degree heart block. HR the 40-50's.

## 2024-01-05 NOTE — NURSING NOTE
Pt had altered mental status and difficulty answering questions. Maye Delgadillo bedside, verified assessment and brain attack was called.

## 2024-01-05 NOTE — PROGRESS NOTES
INPATIENT NEPHROLOGY CONSULT PROGRESS NOTE      Patient Name: Fifi Jenkins MRN: [id number]  DATE of SERVICE: January 5, 2024  TIME of SERVICE: 10:09 AM  CONSULTING SERVICE: Nephrology    REASON for CONSULT: Acute kidney injury    SUBJECTIVE:  Seen and evaluated at bedside.   Mild dyspnea persist.  Renal parameters improving serum creatinine down to 0.9 from 1.15 mg deciliter.  BUN still elevated at 30 however improving.  Metabolic alkalosis bicarb up to 39.  Patient n.p.o. for cardiac procedure today    SUMMARY OF STAY:  Ms. Jenkins is a pleasant morbidly obese 88-year-old female with past medical history significant for chronic kidney disease stage IIIa with baseline serum creatinine 1 mg/dL EGFR 50 mill per minute per 1.73 m², long-term resident at [facility].  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 CVA in 2019, with residual dizziness. Patient presented to [facility] December 21, 2023 for evaluation of progressively worsening shortness of breath.  Upon presentation patient was hypoxic requiring 3 L of oxygen.  Home medications: losartan 50 mg p.o. daily, metolazone 5 mg weekly, torsemide 40 mg p.o. daily  Current medications: Newly started Jardiance 10 mg, losartan has been on hold since admission.  Metolazone 5 mg weekly.  Received Lasix 40 mg IV twice daily. Echocardiogram demonstrated ejection fraction 55%.  Labs Serum creatinine up to 1.34 mg deciliter BUN of 28 and GFR of 39.  From serum creatinine of 1 mg deciliter and a GFR of 58 mL/min upon presentation.  Anemia hemoglobin of 8.2 mg deciliter. December 21, 2023 CT with IV contrast: No signs of  pulmonary embolism demonstrated cardiomegaly without pericardial effusion.  Scattered emphysematous changes with small bilateral pleural effusions and mild bibasilar area of infiltrate or atelectasis.    ASSESSMENT AND PLAN:  CHERYL on CKD IIIa:  likely hemodynamically mediated in the setting of diuretics adjustment and vasoactive medications (Jardiance), contrast nephropathy (contrast exposure December 21).  Serum creatinine up to 1.34 mg deciliter BUN of 28 and GFR of 39.  From serum creatinine of 1 mg deciliter and a GFR of 58 mL/min upon presentation.     Volume status: Hypervolemic     CKD IIIa: Diabetic nephropathy, hypertensive nephrosclerosis  Electrolytes: Managed (with renal diet)  Hypertension: Relative hypotension blood pressure 100 oh 3/60 with a heart rate of 81  Without being on antihypertensive medications  Acid-base: Metabolic alkalosis likely secondary to diuresis  Anemia from renal failure, To check iron panel and start epogen. hemoglobin of 8.2 mg deciliter.  To rule out plasma cell disorder  T2DM on insulin sliding scale  COPD: Emphysematous changes on CT scan requiring 2 L of oxygen  CVA 2019 with residual dizziness  Patient wheelchair dependent  Urinary incontinent: Wearing depends at [facility] nursing facility  CHF: Diastolic heart failure treated with IV Lasix likely for acute on chronic  UMA with CPAP intolerance  Paroxysmal A-fib on Eliquis    Plan:  Acute kidney injury likely hemodynamically mediated due to IV diuresis, Jardiance, losartan use, an episode of systole after cardioversion, hypotension, contrast exposure.  Gradually Improving.    Renal parameters improving serum creatinine down to 0.9 from 1.15 mg deciliter.  BUN still elevated at 30 however improving.  Metabolic alkalosis bicarb up to 39.  To add acetazolamide for bicarb more than 40  Patient n.p.o. for cardiac procedure today  To continue Bumex 2 mg p.o. daily.     Acute blood loss anemia hemoglobin down to 7.9 mg  deciliter.  Iron sat 8   To cont IV venofer 200 mg x 4 doses     To cont to hold Jardiance, entresto for now.    Strict input and output guide diuresis management.        Discharge instruction:  Tentative plan to start Entresto upon discharge, appreciate cardiology recommendations.   To discontinue losartan upon discharge  To discontinue torsemide/metolazone.   To continue Jardiance  To cont Bumex 2 mg p.o. daily with extra dose as mentioned above.  To continue CPAP    We will continue to monitor closely with you, thank you!    Medications:    Current Facility-Administered Medications:     acetaminophen (Tylenol) tablet 487.5 mg, 487.5 mg, oral, BID, Steve Sneed MD, 487.5 mg at 01/05/24 0834    acetaminophen (Tylenol) tablet 650 mg, 650 mg, oral, q6h PRN, Steve Sneed MD, 650 mg at 12/25/23 0934    albuterol 2.5 mg /3 mL (0.083 %) nebulizer solution 2.5 mg, 2.5 mg, nebulization, q4h PRN, Steve Sneed MD, 2.5 mg at 12/30/23 1144    [Held by provider] apixaban (Eliquis) tablet 5 mg, 5 mg, oral, BID, Ayanna Giles PA-C    atorvastatin (Lipitor) tablet 20 mg, 20 mg, oral, Nightly, Steve Sneed MD, 20 mg at 01/04/24 2023    bumetanide (Bumex) tablet 2 mg, 2 mg, oral, Daily, Mary Solomon MD, 2 mg at 01/05/24 0832    butalbital-acetaminophen-caff -40 mg per tablet 1 tablet, 1 tablet, oral, q4h PRN, Steve Sneed MD, 1 tablet at 12/27/23 2056    calcium carbonate (Tums) chewable tablet 500 mg, 1 tablet, oral, q4h PRN, Steve Sneed MD, 500 mg at 01/03/24 0044    cyclobenzaprine (Flexeril) tablet 10 mg, 10 mg, oral, BID PRN, Steve Sneed MD    dextrose 10 % in water (D10W) infusion, 0.3 g/kg/hr, intravenous, Once PRN, Steve Sneed MD    dextrose 50 % injection 25 g, 25 g, intravenous, q15 min PRN, Steve Sneed MD    docusate sodium (Colace) capsule 100 mg, 100 mg, oral, BID, Steve Sneed MD, 100 mg at 01/05/24 0834    [Held by provider] empagliflozin (Jardiance)  tablet 10 mg, 10 mg, oral, Daily, Steve Sneed MD, 10 mg at 12/30/23 1144    fentaNYL PF (Sublimaze) injection, , , PRN, Alejandro Mancia MD, 25 mcg at 01/05/24 1733    glucagon (Glucagen) injection 1 mg, 1 mg, intramuscular, q15 min PRN, Steve Sneed MD    insulin lispro (HumaLOG) injection 0-5 Units, 0-5 Units, subcutaneous, 4x daily, Steve Sneed MD, 1 Units at 12/23/23 2141    iron sucrose (Venofer) injection 200 mg, 200 mg, intravenous, Daily, Mary Solomon MD, 200 mg at 01/04/24 1900    lidocaine (Xylocaine) 20 mg/mL (2 %) injection, , , PRN, Alejandro Mancia MD, 20 mL at 01/05/24 1729    LORazepam (Ativan) tablet 0.25 mg, 0.25 mg, oral, Once, Steve Sneed MD    lubricating eye drops ophthalmic solution 1 drop, 1 drop, Both Eyes, q4h PRN, Steve Sneed MD, 1 drop at 12/27/23 0911    midazolam (Versed) injection, , , PRN, Alejandro Mancia MD, 2 mg at 01/05/24 1720    oxygen (O2) therapy, , inhalation, Continuous PRN - O2/gases, Steve Sneed MD, 3 L/min at 12/30/23 0025    perflutren lipid microspheres (Definity) injection 0.5-10 mL of dilution, 0.5-10 mL of dilution, intravenous, Once in imaging, Steve Sneed MD    perflutren protein A microsphere (Optison) injection 0.5 mL, 0.5 mL, intravenous, Once in imaging, Steve Sneed MD    polyethylene glycol (Glycolax, Miralax) packet 17 g, 17 g, oral, Daily, Steve Sneed MD, 17 g at 01/03/24 0913    propofol (Diprivan) injection, , , PRN, Lamine Savage MD, 60 mg at 12/29/23 1343    sennosides (Senokot) tablet 8.6 mg, 1 tablet, oral, BID, Steve Sneed MD, 8.6 mg at 01/05/24 0834    sulfur hexafluoride microsphr (Lumason) injection 24.28 mg, 2 mL, intravenous, Once in imaging, Steve Sneed MD    traMADol (Ultram) tablet 50 mg, 50 mg, oral, q8h PRN, Steve Sneed MD, 50 mg at 12/26/23 2222    PERTINENT ROS:  GENERAL:  positive for fatigue, poor appetite.  No fever/chills  RESPIRATORY:  positive for  shortness of breath.  Negative for cough, wheezing.  CARDIOVASCULAR:   Negative for chest pain or palpitation.  GI:  Negative for abdominal pain, diarrhea, heartburn, nausea, vomiting  : Dysuria    Physical Exam:  Vital signs in last 24 hours:  Temp:  [36.1 °C (97 °F)-37 °C (98.6 °F)] 36.3 °C (97.3 °F)  Heart Rate:  [44-58] 58  Resp:  [12-22] 20  BP: (119-148)/(55-71) 148/58    General: Awake, cooperative, not in acute distress  HEENT:  NCAT,  mucous membranes moist and pink  NECK:  Elevated JVD, no carotid bruit, supple, no cervical mass or thyromegaly  LUNGS;  Diminished breath sounds, fine Rales  CV:  Distant, regular rate and rhythm, no murmurs  ABDOMEN:  abdomen soft, nontender, BS normal, no masses or organomegaly  EDEMA:  +1 lower extremity edema/dependent edema  SKIN:  dry and normal turgor, no clubbing, cyanosis or petechia.  No rashes noted      Intake/Output last 3 shifts:  I/O last 3 completed shifts:  In: 920 (6.5 mL/kg) [P.O.:920]  Out: 2875 (20.2 mL/kg) [Urine:2875 (0.6 mL/kg/hr)]  Weight: 142.4 kg     DATA:  Diagnotic tests reviewed for Todays visit:  Results from last 7 days   Lab Units 01/05/24  0522   WBC AUTO x10*3/uL 5.9   RBC AUTO x10*6/uL 2.89*   HEMOGLOBIN g/dL 8.3*   HEMATOCRIT % 29.8*       Results from last 7 days   Lab Units 01/05/24  0522   SODIUM mmol/L 141   POTASSIUM mmol/L 4.3   CHLORIDE mmol/L 98   CO2 mmol/L 39*   BUN mg/dL 30*   CREATININE mg/dL 0.93   CALCIUM mg/dL 8.5*   MAGNESIUM mg/dL 2.09             IMAGING: CXR reviewed in  images      SIGNATURE: Mary Solomon MD  Nephrology and Hypertension  [address] [place]  Office phone: [phone]  FAX: [phone]    This note was partially generated using the Dragon voice recognition system, and there may be some incorrect words, spelling's and punctuation that were not noted in checking the note before saving.

## 2024-01-05 NOTE — CARE PLAN
Pt remained stable throughout shift. Alert and oriented. No complaints of pain but experiences some SOB with exertion. HR 40s-50s throughout the night, spo2 stable on 2 L NC. Tolerated Q2 turns. Safety maintained. Pt to receive pacemaker today

## 2024-01-06 ENCOUNTER — APPOINTMENT (OUTPATIENT)
Dept: RADIOLOGY | Facility: HOSPITAL | Age: 89
DRG: 242 | End: 2024-01-06
Payer: COMMERCIAL

## 2024-01-06 LAB
ANION GAP SERPL CALC-SCNC: 9 MMOL/L (ref 10–20)
BUN SERPL-MCNC: 26 MG/DL (ref 6–23)
CALCIUM SERPL-MCNC: 8.3 MG/DL (ref 8.6–10.3)
CHLORIDE SERPL-SCNC: 97 MMOL/L (ref 98–107)
CO2 SERPL-SCNC: 38 MMOL/L (ref 21–32)
CREAT SERPL-MCNC: 0.85 MG/DL (ref 0.5–1.05)
ERYTHROCYTE [DISTWIDTH] IN BLOOD BY AUTOMATED COUNT: 17.7 % (ref 11.5–14.5)
GFR SERPL CREATININE-BSD FRML MDRD: 66 ML/MIN/1.73M*2
GLUCOSE BLD MANUAL STRIP-MCNC: 115 MG/DL (ref 74–99)
GLUCOSE BLD MANUAL STRIP-MCNC: 122 MG/DL (ref 74–99)
GLUCOSE BLD MANUAL STRIP-MCNC: 155 MG/DL (ref 74–99)
GLUCOSE BLD MANUAL STRIP-MCNC: 99 MG/DL (ref 74–99)
GLUCOSE SERPL-MCNC: 110 MG/DL (ref 74–99)
HCT VFR BLD AUTO: 27.7 % (ref 36–46)
HGB BLD-MCNC: 7.4 G/DL (ref 12–16)
MAGNESIUM SERPL-MCNC: 1.86 MG/DL (ref 1.6–2.4)
MCH RBC QN AUTO: 28.7 PG (ref 26–34)
MCHC RBC AUTO-ENTMCNC: 26.7 G/DL (ref 32–36)
MCV RBC AUTO: 107 FL (ref 80–100)
NRBC BLD-RTO: 0 /100 WBCS (ref 0–0)
PLATELET # BLD AUTO: 121 X10*3/UL (ref 150–450)
POTASSIUM SERPL-SCNC: 4.3 MMOL/L (ref 3.5–5.3)
RBC # BLD AUTO: 2.58 X10*6/UL (ref 4–5.2)
SODIUM SERPL-SCNC: 140 MMOL/L (ref 136–145)
WBC # BLD AUTO: 6.3 X10*3/UL (ref 4.4–11.3)

## 2024-01-06 PROCEDURE — 2500000001 HC RX 250 WO HCPCS SELF ADMINISTERED DRUGS (ALT 637 FOR MEDICARE OP): Performed by: INTERNAL MEDICINE

## 2024-01-06 PROCEDURE — 99232 SBSQ HOSP IP/OBS MODERATE 35: CPT | Performed by: INTERNAL MEDICINE

## 2024-01-06 PROCEDURE — 71046 X-RAY EXAM CHEST 2 VIEWS: CPT | Performed by: RADIOLOGY

## 2024-01-06 PROCEDURE — 71046 X-RAY EXAM CHEST 2 VIEWS: CPT

## 2024-01-06 PROCEDURE — 2500000004 HC RX 250 GENERAL PHARMACY W/ HCPCS (ALT 636 FOR OP/ED): Performed by: NURSE PRACTITIONER

## 2024-01-06 PROCEDURE — 2500000004 HC RX 250 GENERAL PHARMACY W/ HCPCS (ALT 636 FOR OP/ED): Performed by: INTERNAL MEDICINE

## 2024-01-06 PROCEDURE — 2500000002 HC RX 250 W HCPCS SELF ADMINISTERED DRUGS (ALT 637 FOR MEDICARE OP, ALT 636 FOR OP/ED): Performed by: INTERNAL MEDICINE

## 2024-01-06 PROCEDURE — 80048 BASIC METABOLIC PNL TOTAL CA: CPT | Performed by: INTERNAL MEDICINE

## 2024-01-06 PROCEDURE — 82947 ASSAY GLUCOSE BLOOD QUANT: CPT

## 2024-01-06 PROCEDURE — 85027 COMPLETE CBC AUTOMATED: CPT | Performed by: INTERNAL MEDICINE

## 2024-01-06 PROCEDURE — 83735 ASSAY OF MAGNESIUM: CPT | Performed by: INTERNAL MEDICINE

## 2024-01-06 PROCEDURE — 2060000001 HC INTERMEDIATE ICU ROOM DAILY

## 2024-01-06 PROCEDURE — 36415 COLL VENOUS BLD VENIPUNCTURE: CPT | Performed by: INTERNAL MEDICINE

## 2024-01-06 RX ORDER — SPIRONOLACTONE 25 MG/1
12.5 TABLET ORAL DAILY
Status: DISCONTINUED | OUTPATIENT
Start: 2024-01-06 | End: 2024-01-09 | Stop reason: HOSPADM

## 2024-01-06 RX ORDER — VALSARTAN 40 MG/1
40 TABLET ORAL DAILY
Status: DISCONTINUED | OUTPATIENT
Start: 2024-01-06 | End: 2024-01-09 | Stop reason: HOSPADM

## 2024-01-06 RX ORDER — LANOLIN ALCOHOL/MO/W.PET/CERES
400 CREAM (GRAM) TOPICAL ONCE
Status: COMPLETED | OUTPATIENT
Start: 2024-01-06 | End: 2024-01-06

## 2024-01-06 RX ADMIN — VANCOMYCIN HYDROCHLORIDE 1000 MG: 1 INJECTION, SOLUTION INTRAVENOUS at 08:33

## 2024-01-06 RX ADMIN — ACETAMINOPHEN 487.5 MG: 325 TABLET ORAL at 21:19

## 2024-01-06 RX ADMIN — BUMETANIDE 2 MG: 1 TABLET ORAL at 08:32

## 2024-01-06 RX ADMIN — Medication 400 MG: at 14:25

## 2024-01-06 RX ADMIN — DOCUSATE SODIUM 100 MG: 100 CAPSULE, LIQUID FILLED ORAL at 08:32

## 2024-01-06 RX ADMIN — INSULIN LISPRO 1 UNITS: 100 INJECTION, SOLUTION INTRAVENOUS; SUBCUTANEOUS at 16:48

## 2024-01-06 RX ADMIN — IRON SUCROSE 200 MG: 20 INJECTION, SOLUTION INTRAVENOUS at 08:32

## 2024-01-06 RX ADMIN — ATORVASTATIN CALCIUM 20 MG: 20 TABLET, FILM COATED ORAL at 21:19

## 2024-01-06 RX ADMIN — VALSARTAN 40 MG: 40 TABLET, FILM COATED ORAL at 21:23

## 2024-01-06 RX ADMIN — ACETAMINOPHEN 487.5 MG: 325 TABLET ORAL at 08:31

## 2024-01-06 RX ADMIN — SPIRONOLACTONE 12.5 MG: 25 TABLET ORAL at 16:48

## 2024-01-06 ASSESSMENT — PAIN SCALES - GENERAL
PAINLEVEL_OUTOF10: 0 - NO PAIN
PAINLEVEL_OUTOF10: 3
PAINLEVEL_OUTOF10: 7

## 2024-01-06 ASSESSMENT — PAIN - FUNCTIONAL ASSESSMENT
PAIN_FUNCTIONAL_ASSESSMENT: 0-10

## 2024-01-06 ASSESSMENT — PAIN DESCRIPTION - DESCRIPTORS
DESCRIPTORS: ACHING
DESCRIPTORS: ACHING

## 2024-01-06 NOTE — CARE PLAN
Problem: Skin  Goal: Participates in plan/prevention/treatment measures  1/5/2024 1926 by Vickie Vazquez RN  Outcome: Progressing  1/5/2024 1119 by Vickie Vazquez RN  Flowsheets (Taken 1/5/2024 1119)  Participates in plan/prevention/treatment measures: Elevate heels     Problem: Heart Failure  Goal: Report improvement of dyspnea/breathlessness this shift  Outcome: Met     Problem: Respiratory  Goal: Minimal/no exertional discomfort or dyspnea this shift  Outcome: Met     Problem: Pain  Goal: My pain/discomfort is manageable  Outcome: Met     Problem: Safety  Goal: Patient will be injury free during hospitalization  Outcome: Met   The patient's goals for the shift include      The clinical goals for the shift include pt will remain hemodynamically stable    Pt had a brain attack during shift, for altered mental status, CT scan completed. Pt had pacemaker insertion. Safety maintained.

## 2024-01-06 NOTE — PROGRESS NOTES
Internal Medicine Progress Note    Patient Name: Fifi Jenkins          MRN: [id number]  Today's Date: January 5, 2024          Attending: Nick Montaño MD    Subjective:  Patient was seen and examined at bedside     Review Of Systems:  GENERAL: More awake, in no distress  CARDIOVASCULAR: Negative for chest pain  Respiratory: denies shortness of breath  GI: No nausea, vomiting, or diarrhea    Objective:  Vitals:    01/05/24 2049 01/05/24 2100 01/05/24 2115 01/05/24 2200   BP: 131/63 128/67 119/64 (!) 103/47   BP Location:       Patient Position:       Pulse: 60 58 58 64   Resp: 26 16 16 12   Temp:       TempSrc:       SpO2: 98% 99%  100%   Weight:       Height:           Physical Exam:   General appearance: in no acute distress  Lungs: diminished breath sounds  Heart:  bradycardia, without murmur  Abdomen: Soft, non-tender  Neuro: Alert oriented x3    Labs:  Results for orders placed or performed during the hospital encounter of 12/21/23 (from the past 24 hour(s))   Magnesium   Result Value Ref Range    Magnesium 2.09 1.60 - 2.40 mg/dL   CBC   Result Value Ref Range    WBC 5.9 4.4 - 11.3 x10*3/uL    nRBC 0.0 0.0 - 0.0 /100 WBCs    RBC 2.89 (L) 4.00 - 5.20 x10*6/uL    Hemoglobin 8.3 (L) 12.0 - 16.0 g/dL    Hematocrit 29.8 (L) 36.0 - 46.0 %     (H) 80 - 100 fL    MCH 28.7 26.0 - 34.0 pg    MCHC 27.9 (L) 32.0 - 36.0 g/dL    RDW 17.9 (H) 11.5 - 14.5 %    Platelets 136 (L) 150 - 450 x10*3/uL   Basic Metabolic Panel   Result Value Ref Range    Glucose 96 74 - 99 mg/dL    Sodium 141 136 - 145 mmol/L    Potassium 4.3 3.5 - 5.3 mmol/L    Chloride 98 98 - 107 mmol/L    Bicarbonate 39 (H) 21 - 32 mmol/L    Anion Gap 8 (L) 10 - 20 mmol/L    Urea Nitrogen 30 (H) 6 - 23 mg/dL    Creatinine 0.93 0.50 - 1.05 mg/dL    eGFR 60 (L) >60 mL/min/1.73m*2    Calcium 8.5 (L) 8.6 - 10.3 mg/dL   POCT GLUCOSE   Result Value Ref Range    POCT Glucose 95 74 - 99 mg/dL   POCT GLUCOSE   Result Value Ref Range    POCT Glucose 86 74 - 99 mg/dL    POCT GLUCOSE   Result Value Ref Range    POCT Glucose 89 74 - 99 mg/dL   POCT GLUCOSE   Result Value Ref Range    POCT Glucose 76 74 - 99 mg/dL       Medications:  Scheduled medications  acetaminophen, 487.5 mg, oral, BID  [Held by provider] apixaban, 5 mg, oral, BID  atorvastatin, 20 mg, oral, Nightly  bumetanide, 2 mg, oral, Daily  docusate sodium, 100 mg, oral, BID  [Held by provider] empagliflozin, 10 mg, oral, Daily  insulin lispro, 0-5 Units, subcutaneous, 4x daily  iron sucrose, 200 mg, intravenous, Daily  LORazepam, 0.25 mg, oral, Once  perflutren lipid microspheres, 0.5-10 mL of dilution, intravenous, Once in imaging  perflutren protein A microsphere, 0.5 mL, intravenous, Once in imaging  polyethylene glycol, 17 g, oral, Daily  sennosides, 1 tablet, oral, BID  sulfur hexafluoride microsphr, 2 mL, intravenous, Once in imaging  [START ON 1/6/2024] vancomycin, 1,000 mg, intravenous, Once      Continuous medications     PRN medications  PRN medications: acetaminophen **OR** acetaminophen **OR** acetaminophen, acetaminophen, albuterol, butalbital-acetaminophen-caff, calcium carbonate, cyclobenzaprine, dextrose 10 % in water (D10W), dextrose, glucagon, lubricating eye drops, naloxone, oxygen, propofol, traMADol, traMADol      Assessment/Plan:  Medical Problems       Problem List       * (Principal) Acute on chronic heart failure with reducedejection fraction (CMS/HCC)    Continue current medications and measures  Cardiology and EP is following      Paroxysmal atrial fibrillation (CMS/[facility])     Patient underwent dual-chamber permanent pacemaker placement  Cardiology and EP are following         Hyperlipidemia     Continue atorvastatin         Acute respiratory failure with hypoxia (CMS/[facility])     On oxygen supplement         Stage 3 chronic kidney disease (CMS/[facility])  Acute kidney injury     Continue monitoring  Nephrology is following         Type 2 diabetes mellitus with chronic kidney disease, without long-term  "current use of insulin (CMS/[facility])     Sliding scall insulin     Generalized weakness  PT/OT    Altered mental status  Of notes Rapid response was called on the patient, due to AMS, Brain CT scan did not show acute changes          Discussed with patient, RN    Nick Montaño MD   Date: 01/05/24  Time: 10:54 PM    This note was partially created using voice recognition software and is inherently subject to errors including those of syntax and \"sound-alike\" substitutions which may escape proofreading. In such instances, original meaning may be extrapolated by contextual derivation  "

## 2024-01-06 NOTE — PROGRESS NOTES
Internal Medicine Progress Note    Patient Name: Fifi Jenkins          MRN: [id number]  Today's Date: January 6, 2024          Attending: Nick Montaño MD    Subjective:  Patient was seen and examined at bedside     Review Of Systems:  GENERAL: awake, in no distress  CARDIOVASCULAR: Negative for chest pain  Respiratory: denies shortness of breath  GI: No nausea, vomiting, or diarrhea  Neuro: Alert, in no distress    Objective:  Vitals:    01/06/24 0539 01/06/24 0800 01/06/24 1200 01/06/24 1426   BP:  (!) 108/42  (!) 111/33   BP Location:  Right arm  Right arm   Patient Position:  Lying  Lying   Pulse:  64 64 58   Resp:  11 14 22   Temp:  36.4 °C (97.5 °F) 36.5 °C (97.7 °F)    TempSrc:  Temporal Temporal    SpO2:  96% 98% 97%   Weight: 138 kg (304 lb 3.8 oz)      Height:           Physical Exam:   General appearance: in no acute distress  Lungs: diminished breath sounds  Heart:  bradycardia, without murmur  Abdomen: Soft, non-tender  Neuro: Alert oriented x3    Labs:  Results for orders placed or performed during the hospital encounter of 12/21/23 (from the past 24 hour(s))   POCT GLUCOSE   Result Value Ref Range    POCT Glucose 76 74 - 99 mg/dL   Magnesium   Result Value Ref Range    Magnesium 1.86 1.60 - 2.40 mg/dL   CBC   Result Value Ref Range    WBC 6.3 4.4 - 11.3 x10*3/uL    nRBC 0.0 0.0 - 0.0 /100 WBCs    RBC 2.58 (L) 4.00 - 5.20 x10*6/uL    Hemoglobin 7.4 (L) 12.0 - 16.0 g/dL    Hematocrit 27.7 (L) 36.0 - 46.0 %     (H) 80 - 100 fL    MCH 28.7 26.0 - 34.0 pg    MCHC 26.7 (L) 32.0 - 36.0 g/dL    RDW 17.7 (H) 11.5 - 14.5 %    Platelets 121 (L) 150 - 450 x10*3/uL   Basic Metabolic Panel   Result Value Ref Range    Glucose 110 (H) 74 - 99 mg/dL    Sodium 140 136 - 145 mmol/L    Potassium 4.3 3.5 - 5.3 mmol/L    Chloride 97 (L) 98 - 107 mmol/L    Bicarbonate 38 (H) 21 - 32 mmol/L    Anion Gap 9 (L) 10 - 20 mmol/L    Urea Nitrogen 26 (H) 6 - 23 mg/dL    Creatinine 0.85 0.50 - 1.05 mg/dL    eGFR 66 >60  mL/min/1.73m*2    Calcium 8.3 (L) 8.6 - 10.3 mg/dL   POCT GLUCOSE   Result Value Ref Range    POCT Glucose 99 74 - 99 mg/dL   POCT GLUCOSE   Result Value Ref Range    POCT Glucose 122 (H) 74 - 99 mg/dL       Medications:  Scheduled medications  acetaminophen, 487.5 mg, oral, BID  [Held by provider] apixaban, 5 mg, oral, BID  atorvastatin, 20 mg, oral, Nightly  bumetanide, 2 mg, oral, Daily  docusate sodium, 100 mg, oral, BID  [Held by provider] empagliflozin, 10 mg, oral, Daily  insulin lispro, 0-5 Units, subcutaneous, 4x daily  iron sucrose, 200 mg, intravenous, Daily  LORazepam, 0.25 mg, oral, Once  perflutren lipid microspheres, 0.5-10 mL of dilution, intravenous, Once in imaging  perflutren protein A microsphere, 0.5 mL, intravenous, Once in imaging  polyethylene glycol, 17 g, oral, Daily  sennosides, 1 tablet, oral, BID  sulfur hexafluoride microsphr, 2 mL, intravenous, Once in imaging      Continuous medications     PRN medications  PRN medications: acetaminophen **OR** acetaminophen **OR** acetaminophen, acetaminophen, albuterol, butalbital-acetaminophen-caff, calcium carbonate, cyclobenzaprine, dextrose 10 % in water (D10W), dextrose, glucagon, lubricating eye drops, naloxone, oxygen, propofol, traMADol, traMADol      Assessment/Plan:  Medical Problems       Problem List       * (Principal) Acute on chronic heart failure with reducedejection fraction (CMS/HCC)    Continue current medications and measures  Cardiology and EP is following      Paroxysmal atrial fibrillation (CMS/[facility])     S/P dual-chamber permanent pacemaker placement  Continue current medications  Cardiology and EP are following         Hyperlipidemia     Continue atorvastatin         Acute respiratory failure with hypoxia (CMS/[facility])     On oxygen supplement         Stage 3 chronic kidney disease (CMS/[facility])  Acute kidney injury     Continue monitoring  Nephrology is following         Type 2 diabetes mellitus with chronic kidney disease, without  "long-term current use of insulin (CMS/[facility])     Sliding scall insulin     Generalized weakness  PT/OT    Altered mental status  Patient is back to baseline  Continue monitoring          Discussed with patient, RN    Nick Montaño MD   Date: 01/06/24  Time: 4:22 PM    This note was partially created using voice recognition software and is inherently subject to errors including those of syntax and \"sound-alike\" substitutions which may escape proofreading. In such instances, original meaning may be extrapolated by contextual derivation  "

## 2024-01-06 NOTE — PROGRESS NOTES
Cardiology Progress Note           Rounding Cardiologist:  Dr. Kim Alonso  Primary Cardiologist:  Dr. Steve Sneed     Date:  1/6/2024  Patient:  Fifi Jenkins  YOB: 1935  MRN:  [id number]   Admit Date:  12/21/2023      SUBJECTIVE    Fifi Jenkins was seen and examined today at bedside.     Patient with multiple complaints about not receiving her medications (discussed with her nurse and she is receiving all ordered meds). Has no chest pain or discomfort, mild tenderness at pacer site. Some SOB. Very fatigued. Not up to chair today. No dizziness or lightheadedness.     Review of Systems     VITALS     Vitals:    01/06/24 0539 01/06/24 0800 01/06/24 1200 01/06/24 1426   BP:  (!) 108/42  (!) 111/33   BP Location:  Right arm  Right arm   Patient Position:  Lying  Lying   Pulse:  64 64 58   Resp:  11 14 22   Temp:  36.4 °C (97.5 °F) 36.5 °C (97.7 °F)    TempSrc:  Temporal Temporal    SpO2:  96% 98% 97%   Weight: 138 kg (304 lb 3.8 oz)      Height:           Intake/Output Summary (Last 24 hours) at 1/6/2024 1633  Last data filed at 1/6/2024 1429  Gross per 24 hour   Intake 1120 ml   Output 2650 ml   Net -1530 ml       Vitals:    01/06/24 0539   Weight: 138 kg (304 lb 3.8 oz)       CURRENT MEDICATIONS    acetaminophen, 487.5 mg, oral, BID  [Held by provider] apixaban, 5 mg, oral, BID  atorvastatin, 20 mg, oral, Nightly  bumetanide, 2 mg, oral, Daily  docusate sodium, 100 mg, oral, BID  empagliflozin, 10 mg, oral, Daily  insulin lispro, 0-5 Units, subcutaneous, 4x daily  iron sucrose, 200 mg, intravenous, Daily  LORazepam, 0.25 mg, oral, Once  perflutren lipid microspheres, 0.5-10 mL of dilution, intravenous, Once in imaging  perflutren protein A microsphere, 0.5 mL, intravenous, Once in imaging  polyethylene glycol, 17 g, oral, Daily  sennosides, 1 tablet, oral, BID  spironolactone, 12.5 mg, oral, Daily  sulfur hexafluoride microsphr, 2 mL, intravenous, Once in imaging  valsartan, 40 mg, oral,  Daily         Current Outpatient Medications   Medication Instructions    acetaminophen (TYLENOL) 650 mg, oral, Every 4 hours PRN    acetaminophen (TYLENOL) 500 mg, oral, 2 times daily    albuterol 90 mcg/actuation inhaler Inhale 2 puffs every 4 hours if needed for wheezing or shortness of breath.    atorvastatin (Lipitor) 20 mg tablet 1 tablet, oral, Nightly    calcium carbonate (Tums) 200 mg calcium chewable tablet 1 tablet, oral, Every 4 hours PRN    compress.stocking,knee,reg,med misc MEDICAL COMPRESSION STOCKINGS 20-30 MM HG KNEE HIGH COMPRESSION STOCKINGS, PLEASE MEASURE FOR APPROPRIATE FIT, DISPENSE TWO PAIRS, REFILL 8    cyanocobalamin (VITAMIN B-12) 250 mcg, oral, Daily    cyclobenzaprine (FLEXERIL) 10 mg, oral, 2 times daily PRN    docusate sodium (COLACE) 100 mg, oral, 2 times daily    Eliquis 5 mg, oral, 2 times daily    famotidine (Pepcid) 20 mg tablet 1 tablet, oral, Daily    folic acid (FOLVITE) 1 mg, oral, Daily    losartan (COZAAR) 50 mg, oral, Daily    lubricating eye drops ophthalmic solution 1 drop, Both Eyes, Every 4 hours PRN    metOLazone (Zaroxolyn) 5 mg tablet Take 1 tablet (5 mg) by mouth 1 (one) time per week. Wednesday    multivitamin tablet 1 tablet, oral, Daily    nystatin (Mycostatin) 100,000 unit/gram powder 1 Application, Topical, 2 times daily PRN    polyethylene glycol (GLYCOLAX, MIRALAX) 17 g, oral, Daily    sennosides (Senokot) 8.6 mg tablet 1 tablet, oral, 2 times daily    torsemide 40 mg, oral, Daily        PHYSICAL EXAMINATION   GENERAL:  Morbidly obese woman in NAd  CHEST:  Symmetric and nontender. Pacemaker left subclavian without palpable hematoma.   NECK:  Supple, no  visible JVD, no bruit.  LUNGS:  Clear to auscultation bilaterally anterior and laterally.   HEART:  Rate and rhythm regular with no evident murmur, no gallop appreciated.        There are no rubs, clicks or heaves.  ABDOMEN: Soft, NT, ND without palpable organomegaly, normoactive bowel sounds.   EXTREMITIES:   "Warm with good color, no clubbing or cyanosis.  There is no edema noted.  PERIPHERAL VASCULAR:  Pulses present and equally palpable; 2+ throughout.  NEURO/PSYCH:  Alert and oriented times three with approppriate behavior and responses.  INTEGUMENT: No rashes    LAB DATA     CBC:   Results from last 7 days   Lab Units 01/06/24  0500   WBC AUTO x10*3/uL 6.3   RBC AUTO x10*6/uL 2.58*   HEMOGLOBIN g/dL 7.4*   HEMATOCRIT % 27.7*   PLATELETS AUTO x10*3/uL 121*        CMP:    Results from last 7 days   Lab Units 01/06/24  0500   SODIUM mmol/L 140   POTASSIUM mmol/L 4.3   CHLORIDE mmol/L 97*   CO2 mmol/L 38*   BUN mg/dL 26*   CREATININE mg/dL 0.85   GLUCOSE mg/dL 110*   CALCIUM mg/dL 8.3*       Cardiac Enzymes:    Lab Results   Component Value Date    TROPHS 9 12/21/2023    TROPHS 8 12/21/2023       Magnesium:    Lab Results   Component Value Date    MG 1.86 01/06/2024       Lipid Profile:  No results found for: \"CHLPL\", \"TRIG\", \"HDL\", \"LDLCALC\", \"LDLDIRECT\"    TSH:  No results found for: \"TSH\"    BNP:   Lab Results   Component Value Date     (H) 12/21/2023        PT/INR:  No results found for: \"PROTIME\", \"INR\"    HgBA1c:    Lab Results   Component Value Date    HGBA1C 6.3 (H) 10/02/2023       CBC:  Lab Results   Component Value Date    WBC 6.3 01/06/2024    WBC 5.9 01/05/2024    WBC 5.8 01/04/2024    RBC 2.58 (L) 01/06/2024    RBC 2.89 (L) 01/05/2024    RBC 3.01 (L) 01/04/2024    HGB 7.4 (L) 01/06/2024    HGB 8.3 (L) 01/05/2024    HGB 8.6 (L) 01/04/2024    HCT 27.7 (L) 01/06/2024    HCT 29.8 (L) 01/05/2024    HCT 31.3 (L) 01/04/2024     (H) 01/06/2024     (H) 01/05/2024     (H) 01/04/2024    MCH 28.7 01/06/2024    MCH 28.7 01/05/2024    MCH 28.6 01/04/2024    MCHC 26.7 (L) 01/06/2024    MCHC 27.9 (L) 01/05/2024    MCHC 27.5 (L) 01/04/2024    RDW 17.7 (H) 01/06/2024    RDW 17.9 (H) 01/05/2024    RDW 17.6 (H) 01/04/2024     (L) 01/06/2024     (L) 01/05/2024     (L) 01/04/2024 " "      BMP:  Lab Results   Component Value Date     01/06/2024     01/05/2024     01/04/2024    K 4.3 01/06/2024    K 4.3 01/05/2024    K 4.4 01/04/2024    CL 97 (L) 01/06/2024    CL 98 01/05/2024    CL 98 01/04/2024    CO2 38 (H) 01/06/2024    CO2 39 (H) 01/05/2024    CO2 38 (H) 01/04/2024    BUN 26 (H) 01/06/2024    BUN 30 (H) 01/05/2024    BUN 35 (H) 01/04/2024    CREATININE 0.85 01/06/2024    CREATININE 0.93 01/05/2024    CREATININE 1.15 (H) 01/04/2024       Hepatic Function Panel:  No results found for: \"ALKPHOS\", \"ALT\", \"AST\", \"PROT\", \"BILITOT\", \"BILIDIR\"      DIAGNOSTIC TESTING RESULTS     ECG 12 lead    Result Date: 1/2/2024  Atrial fibrillation Left axis deviation Right bundle branch block Possible Lateral infarct , age undetermined Abnormal ECG When compared with ECG of 21-DEC-2023 11:15, Atrial fibrillation has replaced Sinus rhythm Right bundle branch block is now Present Borderline criteria for Lateral infarct are now Present Confirmed by OSCAR Black ([id number]) on 1/2/2024 7:31:08 AM    ECG 12 lead    Result Date: 1/2/2024  Sinus tachycardia Right axis deviation Pulmonary disease pattern Right ventricular hypertrophy with repolarization abnormality ST elevation, consider inferior injury or acute infarct ** ** ACUTE MI ** ** Abnormal ECG When compared with ECG of 25-JUL-2023 14:50, IL interval has decreased Vent. rate has increased BY  51 BPM (RBBB and left anterior fascicular block) is no longer Present Confirmed by OSCAR Black ([id number]) on 1/2/2024 7:28:37 AM    ECG 12 lead    Result Date: 12/22/2023  Sinus tachycardia Right axis deviation Pulmonary disease pattern Right ventricular hypertrophy with repolarization abnormality ST elevation, consider inferior injury or acute infarct ** ** ACUTE MI ** ** Abnormal ECG When compared with ECG of 25-JUL-2023 14:50, IL interval has decreased Vent. rate has increased BY  51 BPM (RBBB and left anterior fascicular block) is no longer Present Confirmed " by OSCAR Black ([id number]) on 12/22/2023 7:25:51 AM    CT angio chest for pulmonary embolism    Result Date: 12/21/2023  STUDY: CT Angiogram of the Chest; 12/21/2023 2:57 PM. INDICATION: New hypoxemia.  On Eliquis.  Evaluate for pulmonary embolism. COMPARISON: Chest radiograph performed the same date. ACCESSION NUMBER(S): [id number] ORDERING CLINICIAN: BIANCA TIDWELL TECHNIQUE:  CTA of the chest was performed with intravenous contrast. Images are reviewed and processed at a workstation according to the CT angiogram protocol with 3-D and/or MIP post processing imaging generated.  Omnipaque 350, 60 mL was administered intravenously. Automated mA/kV exposure control was utilized and patient examination was performed in strict accordance with principles of ALARA. FINDINGS: Pulmonary arteries are adequately opacified without acute or chronic filling defects.  The thoracic aorta is normal in course and caliber without dissection or aneurysm. Heart is mildly enlarged. No pericardial effusion or thickening. Thoracic lymph nodes are not enlarged. There is no pleural effusion, pleural thickening, or pneumothorax. The airways are patent. Lungs are clear without consolidation, interstitial disease, or suspicious nodules. Upper abdomen demonstrates no acute pathology. There are no acute fractures.  No suspicious bony lesions.    No CT evidence of pulmonary embolism. Scattered emphysematous changes with small bilateral pleural effusions and mild bibasilar areas of infiltrate or atelectasis. Signed by Steve Ramírez MD    XR chest 1 view    Result Date: 12/21/2023  STUDY: Chest Radiograph;  12/21/2023 at 12:21 PM. INDICATION: Shortness of breath. COMPARISON: XR chest 2/25/2019 ACCESSION NUMBER(S): [id number] ORDERING CLINICIAN: MATTHIEU PARISH TECHNIQUE:  Frontal chest was obtained at 12:21 hours. FINDINGS: CARDIOMEDIASTINAL SILHOUETTE: Heart is mildly enlarged.  LUNGS: Lungs are clear. Small left pleural effusion.  ABDOMEN: No  remarkable upper abdominal findings.  BONES: No acute osseous changes.    Mild cardiomegaly with small left pleural effusion. Signed by Steve Ramírez MD         RADIOLOGY     XR chest 2 views   Final Result   Limited exam. The lateral views in particular are very limited.   Post pacemaker placement without definite complication.        Interstitial edema with small pleural effusions.        Signed by: Omar Douglas 1/6/2024 1:18 PM   Dictation workstation:   [id number]      XR chest 1 view   Final Result   Cardiomegaly with pulmonary vascular congestive change and asymmetric   opacity in the right upper lung and superimposed pneumonia not   excluded.        MACRO:   None        Signed by: Chuy Cagle 1/6/2024 1:49 AM   Dictation workstation:   [id number]      Electrophysiology procedure   Final Result      CT brain attack head wo IV contrast   Final Result   No acute intracranial abnormality.        The findings were conveyed to  Vickie at 1:53 p.m..             MACRO:   none        Signed by: Maura Alcazar 1/5/2024 1:54 PM   Dictation workstation:   [id number]      XR chest 1 view   Final Result   Small bilateral pleural effusions.        MACRO:   None        Signed by: Megan Mendiola 1/1/2024 11:55 AM   Dictation workstation:   [id number]      Transthoracic Echo (TTE) Limited   Final Result      Cardioversion external   Final Result      Vascular US upper extremity venous duplex right   Final Result      Transthoracic Echo (TTE) Complete   Final Result      CT angio chest for pulmonary embolism   Final Result   No CT evidence of pulmonary embolism.   Scattered emphysematous changes with small bilateral pleural effusions   and mild bibasilar areas of infiltrate or atelectasis.   Signed by Steve Ramírez MD      XR chest 1 view   Final Result   Mild cardiomegaly with small left pleural effusion.   Signed by Steve Ramírez MD            ASSESSMENT     Problem List Items Addressed This Visit       Paroxysmal atrial  fibrillation (CMS/[facility])    Relevant Orders    Cardioversion external (Completed)    * (Principal) 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 (CMS/[facility]) - Primary    Stage 3 chronic kidney disease (CMS/[facility])    Morbid obesity (CMS/[facility])    Relevant Orders    Transthoracic Echo (TTE) Limited (Completed)    Type 2 diabetes mellitus with chronic kidney disease, without long-term current use of insulin (CMS/[facility])    Relevant Orders    Transthoracic Echo (TTE) Limited (Completed)    Acute heart failure with preserved ejection fraction (CMS/[facility])    Relevant Orders    Transthoracic Echo (TTE) Complete (Completed)    Apnea    Relevant Orders    Transthoracic Echo (TTE) Limited (Completed)    CHERYL (acute kidney injury) (CMS/[facility])    Relevant Orders    Case Request EP Lab: PPM IMPLANT DUAL (Completed)    Electrophysiology procedure (Completed)     Other Visit Diagnoses       Shortness of breath        Elevated brain natriuretic peptide (BNP) level        Pleural effusion        Contrast-induced nephropathy        Anemia due to stage 3a chronic kidney disease (CMS/[facility])        Metabolic alkalosis        Swelling        Relevant Orders    Vascular US upper extremity venous duplex right (Completed)    Swelling of extremity, right        Relevant Orders    Vascular US upper extremity venous duplex right (Completed)            Patient Active Problem List   Diagnosis    Urinary retention    Trigeminal neuralgia    Spontaneous ocular nystagmus    Spinal stenosis    Schwannoma    Pseudophakia of both eyes    Paroxysmal atrial fibrillation (CMS/[facility])    Parotid mass    Cerebellar stroke (CMS/[facility])    Osteoarthritis of knee    Obstructive sleep apnea syndrome    Mass of parapharyngeal space    Lumbar spondylosis    LAE (left atrial enlargement)    Incontinence of urine    Hyperlipidemia    History of CVA (cerebrovascular accident)    Heart failure with preserved left ventricular function (HFpEF) (CMS/[facility])    GERD  (gastroesophageal reflux disease)    Vertigo    Chronic anemia    Bradycardia    Bilateral sensorineural hearing loss    Atherosclerosis of coronary artery    Adnexal mass    Abnormal ECG    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 (CMS/[facility])    Acute respiratory failure with hypoxia (CMS/[facility])    Stage 3 chronic kidney disease (CMS/[facility])    Morbid obesity (CMS/[facility])    Type 2 diabetes mellitus with chronic kidney disease, without long-term current use of insulin (CMS/[facility])    Acute heart failure with preserved ejection fraction (CMS/[facility])    Apnea    CHERYL (acute kidney injury) (CMS/[facility])       PLAN     Acute systolic and diastolic heart failure - patients echo shows moderate LV dysfunction.     Will start jardiance back and add spironolactone - may need to decrease bumetanide, will add valsartan at low doses with parameters to hold for hypotension.   With history of tachycardia / atrial fibrillation will try to add beta blocker tomorrow.     Please do not hesitate to call with questions.  Electronically signed by Kim Alonso MD, on 1/6/2024 at 4:33 PM

## 2024-01-06 NOTE — CARE PLAN
Pt remained HDS this RN shift. Pt had a permanent pacemaker placement, pt remained A fib and A paced on telemetry. Pt complaining of generalized pain, medicated with scheduled tylenol per order. Pt L chest incision site remained WDL, no signs of bleeding noted. Pt remained alert and oriented but drowsy this RN shift. Pt was turned and repositioned in bed every 2 hours, skin integrity maintained. Pt safety maintained.

## 2024-01-06 NOTE — PROGRESS NOTES
,  [facility] Progress Note               Rounding Cardiologist:  Alejandro Mancia MD, MD   Primary Cardiologist: Dr. Alejandro Mancia    Date:  1/6/2024  Patient:  Fifi Jenkins  YOB: 1935  MRN:  [id number]   Admit Date:  12/21/2023      SUBJECTIVE    Fifi Jenkins was seen and examined today at bedside. She denies any chest pain or shortness of breath.   Telemetry shows sinus rhythm-atrial paced rhythm  Device interrogation shows adequate sensing, capture and impedances of both leads.  Pending checks x-ray    VITALS     Vitals:    01/06/24 0200 01/06/24 0400 01/06/24 0539 01/06/24 0800   BP: 126/55 (!) 117/48  (!) 108/42   BP Location:  Right arm  Right arm   Patient Position:  Lying  Lying   Pulse: 58 58  64   Resp: 11 21 11   Temp:  36.3 °C (97.3 °F)  36.4 °C (97.5 °F)   TempSrc:  Temporal  Temporal   SpO2:  96%  96%   Weight:   138 kg (304 lb 3.8 oz)    Height:           Intake/Output Summary (Last 24 hours) at 1/6/2024 0940  Last data filed at 1/6/2024 0539  Gross per 24 hour   Intake 480 ml   Output 3900 ml   Net -3420 ml       [unfilled]    PHYSICAL EXAM   PHYSICAL EXAMINATION:  GENERAL:  Well developed, well nourished, in no acute distress.  CHEST:  Symmetric and nontender.  NEURO/PSYCH:  Alert and oriented times three with approppriate behavior and responses.  NECK:  Supple, no JVD, no bruit.  LUNGS:  Clear to auscultation bilaterally, normal respiratory effort.  HEART:  Rate and rhythm regular with no evident murmur, no gallop appreciated.        There are no rubs, clicks or heaves. Device in the left prepectoral healing well.  No signs of hematoma or infection.    EXTREMITIES:  Warm with good color, no clubbing or cyanosis.  There is no edema noted.  PERIPHERAL VASCULAR:  Pulses present and equally palpable; 2+ throughout.      DIAGNOSTIC RESULTS   EKG:     Telemetry: Sinus rhythm-atrial paced rhythm.      LAB DATA   BMP:  @LABRCNT(Na:3,K:3,CL:3,CO2:3,Bun:3,Creatinine:3,Glu:3,  "CA:3,LABGLOM)@    Cardiac Enzymes:  @LABRCNT(CKTOTAL:3,CKMB:3,CKMBINDEX:3,TROPONINI:3)@    CBC:   Lab Results   Component Value Date    WBC 6.3 01/06/2024    RBC 2.58 (L) 01/06/2024    HGB 7.4 (L) 01/06/2024    HCT 27.7 (L) 01/06/2024     (L) 01/06/2024       CMP:    Lab Results   Component Value Date     01/06/2024    K 4.3 01/06/2024    CL 97 (L) 01/06/2024    CO2 38 (H) 01/06/2024    BUN 26 (H) 01/06/2024    CREATININE 0.85 01/06/2024    GLUCOSE 110 (H) 01/06/2024    CALCIUM 8.3 (L) 01/06/2024       Hepatic Function Panel:  No results found for: \"ALKPHOS\", \"ALT\", \"AST\", \"PROT\", \"BILITOT\", \"BILIDIR\"    Magnesium:    Lab Results   Component Value Date    MG 1.86 01/06/2024       PT/INR:  No results found for: \"PROTIME\", \"INR\"  @LABRCNT(inr:3)@     HgBA1c:  No components found for: \"LABA1C\"    Lipid Profile:  No results found for: \"CHLPL\", \"TRIG\", \"HDL\", \"LDLCALC\", \"LDLDIRECT\"    TSH:  No results found for: \"TSH\"    ABG:  No results found for: \"PH\"    PRO-BNP: No results found for: \"PROBNP\"       RADIOLOGY     XR chest 1 view   Final Result   Cardiomegaly with pulmonary vascular congestive change and asymmetric   opacity in the right upper lung and superimposed pneumonia not   excluded.        MACRO:   None        Signed by: Chuy Cagle 1/6/2024 1:49 AM   Dictation workstation:   [id number]      Electrophysiology procedure   Final Result      CT brain attack head wo IV contrast   Final Result   No acute intracranial abnormality.        The findings were conveyed to  Vickie at 1:53 p.m..             MACRO:   none        Signed by: Maura Alcazar 1/5/2024 1:54 PM   Dictation workstation:   [id number]      XR chest 1 view   Final Result   Small bilateral pleural effusions.        MACRO:   None        Signed by: Megan Mendiola 1/1/2024 11:55 AM   Dictation workstation:   [id number]      Transthoracic Echo (TTE) Limited   Final Result      Cardioversion external   Final Result      Vascular US upper " extremity venous duplex right   Final Result      Transthoracic Echo (TTE) Complete   Final Result      CT angio chest for pulmonary embolism   Final Result   No CT evidence of pulmonary embolism.   Scattered emphysematous changes with small bilateral pleural effusions   and mild bibasilar areas of infiltrate or atelectasis.   Signed by Steve Ramírez MD      XR chest 1 view   Final Result   Mild cardiomegaly with small left pleural effusion.   Signed by Steve Ramírez MD      XR chest 2 views    (Results Pending)       [unfilled]      CURRENT MEDICATIONS    acetaminophen, 487.5 mg, oral, BID  [Held by provider] apixaban, 5 mg, oral, BID  atorvastatin, 20 mg, oral, Nightly  bumetanide, 2 mg, oral, Daily  docusate sodium, 100 mg, oral, BID  [Held by provider] empagliflozin, 10 mg, oral, Daily  insulin lispro, 0-5 Units, subcutaneous, 4x daily  iron sucrose, 200 mg, intravenous, Daily  LORazepam, 0.25 mg, oral, Once  perflutren lipid microspheres, 0.5-10 mL of dilution, intravenous, Once in imaging  perflutren protein A microsphere, 0.5 mL, intravenous, Once in imaging  polyethylene glycol, 17 g, oral, Daily  sennosides, 1 tablet, oral, BID  sulfur hexafluoride microsphr, 2 mL, intravenous, Once in imaging             ASSESSMENT   Principal Problem:    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 (CMS/[facility])  Active Problems:    Paroxysmal atrial fibrillation (CMS/[facility])    Obstructive sleep apnea syndrome    Hyperlipidemia    History of CVA (cerebrovascular accident)    Chronic anemia    Acute respiratory failure with hypoxia (CMS/[facility])    Stage 3 chronic kidney disease (CMS/[facility])    Morbid obesity (CMS/[facility])    Type 2 diabetes mellitus with chronic kidney disease, without long-term current use of insulin (CMS/[facility])    Apnea    CHERYL (acute kidney injury) (CMS/[facility])        Patient Active Problem List   Diagnosis    Urinary retention    Trigeminal neuralgia    Spontaneous ocular nystagmus    Spinal stenosis     "Schwannoma    Pseudophakia of both eyes    Paroxysmal atrial fibrillation (CMS/HCC)    Parotid mass    Cerebellar stroke (CMS/[facility])    Osteoarthritis of knee    Obstructive sleep apnea syndrome    Mass of parapharyngeal space    Lumbar spondylosis    LAE (left atrial enlargement)    Incontinence of urine    Hyperlipidemia    History of CVA (cerebrovascular accident)    Heart failure with preserved left ventricular function (HFpEF) (CMS/[facility])    GERD (gastroesophageal reflux disease)    Vertigo    Chronic anemia    Bradycardia    Bilateral sensorineural hearing loss    Atherosclerosis of coronary artery    Adnexal mass    Abnormal ECG    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 (CMS/[facility])    Acute respiratory failure with hypoxia (CMS/[facility])    Stage 3 chronic kidney disease (CMS/[facility])    Morbid obesity (CMS/[facility])    Type 2 diabetes mellitus with chronic kidney disease, without long-term current use of insulin (CMS/[facility])    Acute heart failure with preserved ejection fraction (CMS/[facility])    Apnea    CHERYL (acute kidney injury) (CMS/[facility])     IMPRESSIONS:  1.  HTN, controlled.  2.  Recent persistent AF with RVR, S/P DCC on 12/29/2023 with a few early recurrences over the next 24 hours.  We would like to treat her with amiodarone therapy, but this was presently contraindicated due to her bradycardia.  3.  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  4.  Sinus node dysfunction following cardioversion, consistent with \"tachycardia-bradycardia syndrome.\"  This represents an indication for permanent pacemaker placement.  The patient has now consented to this.  5.  Other medical problems, including obesity, DJD, anemia, stage III CKD, GERD, hyperlipidemia, and venous insufficiency.  6.  Status post dual-chamber pacemaker implanted in January 2024.     PLAN     Continue with current medical therapy.  Start Eliquis oral 48 hours after " pacemaker has been implanted.  Continue telemetry.    If she develops significant atrial arrhythmias, we can restart amiodarone therapy        Please do not hesitate to call with questions.  Electronically signed by Alejandro Mancia MD, FACC on 1/6/2024 at 9:40 AM

## 2024-01-06 NOTE — CARE PLAN
Problem: Fall/Injury  Goal: Use assistive devices by end of the shift  1/6/2024 0919 by Xiomy Mosley RN  Outcome: Progressing  1/6/2024 0918 by Xiomy Mosley RN  Outcome: Progressing     Problem: Skin  Goal: Decreased wound size/increased tissue granulation at next dressing change  1/6/2024 0919 by Xiomy Mosley RN  Outcome: Progressing  1/6/2024 0918 by Xiomy Mosley RN  Outcome: Progressing     Problem: Heart Failure  Goal: Improved gas exchange this shift  1/6/2024 0919 by Xiomy Mosley RN  Outcome: Progressing  1/6/2024 0918 by Xiomy Mosley RN  Outcome: Progressing  Goal: Improved urinary output this shift  1/6/2024 0919 by Xiomy Mosley RN  Outcome: Progressing  1/6/2024 0918 by Xiomy Mosley RN  Outcome: Progressing  Goal: Reduction in peripheral edema within 24 hours  1/6/2024 0919 by Xiomy Mosley RN  Outcome: Progressing  1/6/2024 0918 by Xiomy Mosley RN  Outcome: Progressing  Goal: Weight from fluid excess reduced over 2-3 days, then stabilize  1/6/2024 0919 by Xiomy Mosley RN  Outcome: Progressing  1/6/2024 0918 by Xiomy Mosley RN  Outcome: Progressing  Goal: Increase self care and/or family involvement in 24 hours  1/6/2024 0919 by Xiomy Mosley RN  Outcome: Progressing  1/6/2024 0918 by Xiomy Mosley RN  Outcome: Progressing

## 2024-01-06 NOTE — NURSING NOTE
Pt arrive to [facility] bed 2109. Pt drowsy, arouses to verbal stimulation. Pt VSS, pt oriented x 4. Pt friend at bedside. Pt denies chest pain and SOB. Will continue to monitor.

## 2024-01-07 PROBLEM — I50.41 ACUTE COMBINED SYSTOLIC AND DIASTOLIC HEART FAILURE (MULTI): Status: ACTIVE | Noted: 2023-12-21

## 2024-01-07 PROBLEM — I50.41 ACUTE COMBINED SYSTOLIC AND DIASTOLIC HEART FAILURE (MULTI): Status: ACTIVE | Noted: 2024-01-07

## 2024-01-07 LAB
ANION GAP SERPL CALC-SCNC: <7 MMOL/L (ref 10–20)
BUN SERPL-MCNC: 22 MG/DL (ref 6–23)
CALCIUM SERPL-MCNC: 8.3 MG/DL (ref 8.6–10.3)
CHLORIDE SERPL-SCNC: 95 MMOL/L (ref 98–107)
CO2 SERPL-SCNC: 45 MMOL/L (ref 21–32)
CREAT SERPL-MCNC: 0.8 MG/DL (ref 0.5–1.05)
ERYTHROCYTE [DISTWIDTH] IN BLOOD BY AUTOMATED COUNT: 18.6 % (ref 11.5–14.5)
GFR SERPL CREATININE-BSD FRML MDRD: 71 ML/MIN/1.73M*2
GLUCOSE BLD MANUAL STRIP-MCNC: 122 MG/DL (ref 74–99)
GLUCOSE BLD MANUAL STRIP-MCNC: 93 MG/DL (ref 74–99)
GLUCOSE SERPL-MCNC: 110 MG/DL (ref 74–99)
HCT VFR BLD AUTO: 25.9 % (ref 36–46)
HGB BLD-MCNC: 7.3 G/DL (ref 12–16)
MAGNESIUM SERPL-MCNC: 1.68 MG/DL (ref 1.6–2.4)
MCH RBC QN AUTO: 28.6 PG (ref 26–34)
MCHC RBC AUTO-ENTMCNC: 28.2 G/DL (ref 32–36)
MCV RBC AUTO: 102 FL (ref 80–100)
NRBC BLD-RTO: 0 /100 WBCS (ref 0–0)
PLATELET # BLD AUTO: 130 X10*3/UL (ref 150–450)
POTASSIUM SERPL-SCNC: 4 MMOL/L (ref 3.5–5.3)
RBC # BLD AUTO: 2.55 X10*6/UL (ref 4–5.2)
SODIUM SERPL-SCNC: 142 MMOL/L (ref 136–145)
WBC # BLD AUTO: 7.5 X10*3/UL (ref 4.4–11.3)

## 2024-01-07 PROCEDURE — 2500000001 HC RX 250 WO HCPCS SELF ADMINISTERED DRUGS (ALT 637 FOR MEDICARE OP): Performed by: INTERNAL MEDICINE

## 2024-01-07 PROCEDURE — 82947 ASSAY GLUCOSE BLOOD QUANT: CPT

## 2024-01-07 PROCEDURE — 83735 ASSAY OF MAGNESIUM: CPT | Performed by: INTERNAL MEDICINE

## 2024-01-07 PROCEDURE — 36415 COLL VENOUS BLD VENIPUNCTURE: CPT | Performed by: INTERNAL MEDICINE

## 2024-01-07 PROCEDURE — 85027 COMPLETE CBC AUTOMATED: CPT | Performed by: INTERNAL MEDICINE

## 2024-01-07 PROCEDURE — 99024 POSTOP FOLLOW-UP VISIT: CPT | Performed by: INTERNAL MEDICINE

## 2024-01-07 PROCEDURE — 80048 BASIC METABOLIC PNL TOTAL CA: CPT | Performed by: INTERNAL MEDICINE

## 2024-01-07 PROCEDURE — 2500000004 HC RX 250 GENERAL PHARMACY W/ HCPCS (ALT 636 FOR OP/ED): Performed by: INTERNAL MEDICINE

## 2024-01-07 PROCEDURE — 2060000001 HC INTERMEDIATE ICU ROOM DAILY

## 2024-01-07 RX ORDER — TRAMADOL HYDROCHLORIDE 50 MG/1
50 TABLET ORAL EVERY 8 HOURS PRN
Status: DISCONTINUED | OUTPATIENT
Start: 2024-01-07 | End: 2024-01-09 | Stop reason: HOSPADM

## 2024-01-07 RX ORDER — DOCUSATE SODIUM 100 MG/1
100 CAPSULE, LIQUID FILLED ORAL 2 TIMES DAILY PRN
Status: DISCONTINUED | OUTPATIENT
Start: 2024-01-07 | End: 2024-01-09 | Stop reason: HOSPADM

## 2024-01-07 RX ORDER — METOPROLOL TARTRATE 25 MG/1
25 TABLET, FILM COATED ORAL 2 TIMES DAILY
Status: DISCONTINUED | OUTPATIENT
Start: 2024-01-07 | End: 2024-01-07

## 2024-01-07 RX ORDER — ACETAZOLAMIDE 500 MG/1
500 CAPSULE, EXTENDED RELEASE ORAL 2 TIMES DAILY
Status: DISCONTINUED | OUTPATIENT
Start: 2024-01-07 | End: 2024-01-09 | Stop reason: HOSPADM

## 2024-01-07 RX ORDER — TALC
3 POWDER (GRAM) TOPICAL NIGHTLY
Status: DISCONTINUED | OUTPATIENT
Start: 2024-01-07 | End: 2024-01-09 | Stop reason: HOSPADM

## 2024-01-07 RX ORDER — SENNOSIDES 8.6 MG/1
1 TABLET ORAL NIGHTLY PRN
Status: DISCONTINUED | OUTPATIENT
Start: 2024-01-07 | End: 2024-01-09 | Stop reason: HOSPADM

## 2024-01-07 RX ORDER — POLYETHYLENE GLYCOL 3350 17 G/17G
17 POWDER, FOR SOLUTION ORAL DAILY PRN
Status: DISCONTINUED | OUTPATIENT
Start: 2024-01-07 | End: 2024-01-09 | Stop reason: HOSPADM

## 2024-01-07 RX ORDER — ACETAMINOPHEN 325 MG/1
500 TABLET ORAL 2 TIMES DAILY PRN
Status: DISCONTINUED | OUTPATIENT
Start: 2024-01-07 | End: 2024-01-09 | Stop reason: HOSPADM

## 2024-01-07 RX ORDER — QUETIAPINE FUMARATE 25 MG/1
25 TABLET, FILM COATED ORAL NIGHTLY
Status: DISCONTINUED | OUTPATIENT
Start: 2024-01-07 | End: 2024-01-09 | Stop reason: HOSPADM

## 2024-01-07 RX ORDER — METOPROLOL SUCCINATE 50 MG/1
50 TABLET, EXTENDED RELEASE ORAL DAILY
Status: DISCONTINUED | OUTPATIENT
Start: 2024-01-08 | End: 2024-01-09 | Stop reason: HOSPADM

## 2024-01-07 RX ORDER — ACETAMINOPHEN 325 MG/1
500 TABLET ORAL 2 TIMES DAILY PRN
Status: DISCONTINUED | OUTPATIENT
Start: 2024-01-07 | End: 2024-01-07

## 2024-01-07 RX ADMIN — ACETAMINOPHEN 487.5 MG: 325 TABLET ORAL at 08:01

## 2024-01-07 RX ADMIN — IRON SUCROSE 200 MG: 20 INJECTION, SOLUTION INTRAVENOUS at 08:01

## 2024-01-07 RX ADMIN — VALSARTAN 40 MG: 40 TABLET, FILM COATED ORAL at 08:01

## 2024-01-07 RX ADMIN — EMPAGLIFLOZIN 10 MG: 10 TABLET, FILM COATED ORAL at 08:01

## 2024-01-07 RX ADMIN — SPIRONOLACTONE 12.5 MG: 25 TABLET ORAL at 08:01

## 2024-01-07 RX ADMIN — BUMETANIDE 2 MG: 1 TABLET ORAL at 08:01

## 2024-01-07 RX ADMIN — DOCUSATE SODIUM 100 MG: 100 CAPSULE, LIQUID FILLED ORAL at 08:01

## 2024-01-07 ASSESSMENT — PAIN - FUNCTIONAL ASSESSMENT
PAIN_FUNCTIONAL_ASSESSMENT: 0-10

## 2024-01-07 ASSESSMENT — PAIN SCALES - GENERAL
PAINLEVEL_OUTOF10: 0 - NO PAIN

## 2024-01-07 NOTE — PROGRESS NOTES
Internal Medicine Progress Note    Patient Name: Fifi Jenkins          MRN: [id number]  Today's Date: January 7, 2024          Attending: Nick Montaño MD    Subjective:  Patient was seen and examined at bedside     Review Of Systems:  GENERAL: awake, in no distress  CARDIOVASCULAR: Negative for chest pain  Respiratory: denies shortness of breath, cough or wheezing  GI: No nausea, vomiting, or diarrhea  Neuro: Alert, in no distress    Objective:  Vitals:    01/07/24 0726 01/07/24 1130 01/07/24 1139 01/07/24 1550   BP: 115/50 (!) 133/45  160/52   BP Location: Right arm Right arm     Patient Position: Lying Lying     Pulse: 64 58 57 62   Resp: 12 13 24   Temp: 36.3 °C (97.3 °F) 36.5 °C (97.7 °F)  36.5 °C (97.7 °F)   TempSrc: Temporal Temporal  Temporal   SpO2: 97% 95%  94%   Weight:       Height:           Physical Exam:   General appearance: in no acute distress  Lungs: diminished breath sounds  Heart:  bradycardia, without murmur  Abdomen: Soft, non-tender  Neuro: Alert oriented x3    Labs:  Results for orders placed or performed during the hospital encounter of 12/21/23 (from the past 24 hour(s))   POCT GLUCOSE   Result Value Ref Range    POCT Glucose 115 (H) 74 - 99 mg/dL   Magnesium   Result Value Ref Range    Magnesium 1.68 1.60 - 2.40 mg/dL   CBC   Result Value Ref Range    WBC 7.5 4.4 - 11.3 x10*3/uL    nRBC 0.0 0.0 - 0.0 /100 WBCs    RBC 2.55 (L) 4.00 - 5.20 x10*6/uL    Hemoglobin 7.3 (L) 12.0 - 16.0 g/dL    Hematocrit 25.9 (L) 36.0 - 46.0 %     (H) 80 - 100 fL    MCH 28.6 26.0 - 34.0 pg    MCHC 28.2 (L) 32.0 - 36.0 g/dL    RDW 18.6 (H) 11.5 - 14.5 %    Platelets 130 (L) 150 - 450 x10*3/uL   Basic Metabolic Panel   Result Value Ref Range    Glucose 110 (H) 74 - 99 mg/dL    Sodium 142 136 - 145 mmol/L    Potassium 4.0 3.5 - 5.3 mmol/L    Chloride 95 (L) 98 - 107 mmol/L    Bicarbonate 45 (HH) 21 - 32 mmol/L    Anion Gap <7 (L) 10 - 20 mmol/L    Urea Nitrogen 22 6 - 23 mg/dL    Creatinine 0.80 0.50 - 1.05  mg/dL    eGFR 71 >60 mL/min/1.73m*2    Calcium 8.3 (L) 8.6 - 10.3 mg/dL   POCT GLUCOSE   Result Value Ref Range    POCT Glucose 93 74 - 99 mg/dL   POCT GLUCOSE   Result Value Ref Range    POCT Glucose 122 (H) 74 - 99 mg/dL       Medications:  Scheduled medications  acetaminophen, 487.5 mg, oral, BID  [Held by provider] apixaban, 5 mg, oral, BID  atorvastatin, 20 mg, oral, Nightly  bumetanide, 2 mg, oral, Daily  docusate sodium, 100 mg, oral, BID  empagliflozin, 10 mg, oral, Daily  insulin lispro, 0-5 Units, subcutaneous, 4x daily  iron sucrose, 200 mg, intravenous, Daily  LORazepam, 0.25 mg, oral, Once  [START ON 1/8/2024] metoprolol succinate XL, 50 mg, oral, Daily  perflutren lipid microspheres, 0.5-10 mL of dilution, intravenous, Once in imaging  perflutren protein A microsphere, 0.5 mL, intravenous, Once in imaging  polyethylene glycol, 17 g, oral, Daily  sennosides, 1 tablet, oral, BID  spironolactone, 12.5 mg, oral, Daily  sulfur hexafluoride microsphr, 2 mL, intravenous, Once in imaging  valsartan, 40 mg, oral, Daily      Continuous medications     PRN medications  PRN medications: acetaminophen **OR** acetaminophen **OR** acetaminophen, acetaminophen, albuterol, butalbital-acetaminophen-caff, calcium carbonate, cyclobenzaprine, dextrose 10 % in water (D10W), dextrose, glucagon, lubricating eye drops, naloxone, oxygen, propofol, traMADol, traMADol      Assessment/Plan:  Medical Problems       Problem List       * (Principal) Acute on chronic heart failure with reducedejection fraction (CMS/HCC)    Continue current medications and measures  Cardiology and EP is following      Paroxysmal atrial fibrillation (CMS/[facility])     S/P dual-chamber permanent pacemaker placement  Cardiology is adjusting metoprolol dose  Continue monitoring  Cardiology and EP are following         Hyperlipidemia     Continue atorvastatin         Acute respiratory failure with hypoxia (CMS/[facility])     On oxygen supplement         Stage 3  "chronic kidney disease (CMS/HCC)  Acute kidney injury     Continue monitoring  Nephrology is following         Type 2 diabetes mellitus with chronic kidney disease, without long-term current use of insulin (CMS/[facility])     Sliding scall insulin     Generalized weakness  PT/OT    Altered mental status  Patient is back to baseline  Continue monitoring          Discussed with patient, RN    Nick Montaño MD   Date: 01/07/24  Time: 5:44 PM    This note was partially created using voice recognition software and is inherently subject to errors including those of syntax and \"sound-alike\" substitutions which may escape proofreading. In such instances, original meaning may be extrapolated by contextual derivation  "

## 2024-01-07 NOTE — PROGRESS NOTES
[facility] Progress Note               Rounding Cardiologist:  Alejandro Mancia MD, MD   Primary Cardiologist: Dr. Alejandro Mancia    Date:  1/7/2024  Patient:  Fifi Jenkins  YOB: 1935  MRN:  [id number]   Admit Date:  12/21/2023      SUBJECTIVE    Fifi Jenkins was seen and examined today at bedside. She denies any chest pain or shortness of breath.   No events overnight  Telemetry shows atrial paced rhythm    VITALS     Vitals:    01/07/24 0030 01/07/24 0455 01/07/24 0600 01/07/24 0726   BP: (!) 122/43 (!) 107/39  115/50   BP Location: Right arm Right arm  Right arm   Patient Position: Lying Lying  Lying   Pulse: 58 60  64   Resp: 12 13  12   Temp: 36.2 °C (97.2 °F) 36 °C (96.8 °F)  36.3 °C (97.3 °F)   TempSrc: Temporal Temporal  Temporal   SpO2: 97% 96%  97%   Weight:   135 kg (298 lb 11.6 oz)    Height:           Intake/Output Summary (Last 24 hours) at 1/7/2024 1027  Last data filed at 1/7/2024 0813  Gross per 24 hour   Intake 680 ml   Output 1700 ml   Net -1020 ml       [unfilled]    PHYSICAL EXAM   PHYSICAL EXAMINATION:  GENERAL:  Well developed, well nourished, in no acute distress.  CHEST:  Symmetric and nontender.  NEURO/PSYCH:  Alert and oriented times three with approppriate behavior and responses.  NECK:  Supple, no JVD, no bruit.  LUNGS:  Clear to auscultation bilaterally, normal respiratory effort.  HEART:  Rate and rhythm regular with no evident murmur, no gallop appreciated.                   There are no rubs, clicks or heaves. Device in the left prepectoral healing well.  No signs of hematoma or infection.     EXTREMITIES:  Warm with good color, no clubbing or cyanosis.  There is no edema noted.  PERIPHERAL VASCULAR:  Pulses present and equally palpable; 2+ throughout.    DIAGNOSTIC RESULTS   EKG:     Telemetry: Atrial paced rhythm.      LAB DATA       CBC:   Lab Results   Component Value Date    WBC 7.5 01/07/2024    RBC 2.55 (L) 01/07/2024    HGB 7.3 (L) 01/07/2024    HCT 25.9 (L)  "01/07/2024     (L) 01/07/2024       CMP:    Lab Results   Component Value Date     01/07/2024    K 4.0 01/07/2024    CL 95 (L) 01/07/2024    CO2 45 (HH) 01/07/2024    BUN 22 01/07/2024    CREATININE 0.80 01/07/2024    GLUCOSE 110 (H) 01/07/2024    CALCIUM 8.3 (L) 01/07/2024       Hepatic Function Panel:  No results found for: \"ALKPHOS\", \"ALT\", \"AST\", \"PROT\", \"BILITOT\", \"BILIDIR\"    Magnesium:    Lab Results   Component Value Date    MG 1.68 01/07/2024       PT/INR:  No results found for: \"PROTIME\", \"INR\"  @LABRCNT(inr:3)@     HgBA1c:  No components found for: \"LABA1C\"    Lipid Profile:  No results found for: \"CHLPL\", \"TRIG\", \"HDL\", \"LDLCALC\", \"LDLDIRECT\"    TSH:  No results found for: \"TSH\"    ABG:  No results found for: \"PH\"    PRO-BNP: No results found for: \"PROBNP\"       RADIOLOGY     XR chest 2 views   Final Result   Limited exam. The lateral views in particular are very limited.   Post pacemaker placement without definite complication.        Interstitial edema with small pleural effusions.        Signed by: Omar Douglas 1/6/2024 1:18 PM   Dictation workstation:   [id number]      XR chest 1 view   Final Result   Cardiomegaly with pulmonary vascular congestive change and asymmetric   opacity in the right upper lung and superimposed pneumonia not   excluded.        MACRO:   None        Signed by: Chuy Cagle 1/6/2024 1:49 AM   Dictation workstation:   [id number]      Electrophysiology procedure   Final Result      CT brain attack head wo IV contrast   Final Result   No acute intracranial abnormality.        The findings were conveyed to  Vickie at 1:53 p.m..             MACRO:   none        Signed by: Maura Alcazar 1/5/2024 1:54 PM   Dictation workstation:   [id number]      XR chest 1 view   Final Result   Small bilateral pleural effusions.        MACRO:   None        Signed by: Megan Mendiola 1/1/2024 11:55 AM   Dictation workstation:   [id number]      Transthoracic Echo (TTE) Limited   Final " Result      Cardioversion external   Final Result      Vascular US upper extremity venous duplex right   Final Result      Transthoracic Echo (TTE) Complete   Final Result      CT angio chest for pulmonary embolism   Final Result   No CT evidence of pulmonary embolism.   Scattered emphysematous changes with small bilateral pleural effusions   and mild bibasilar areas of infiltrate or atelectasis.   Signed by Steve Ramírez MD      XR chest 1 view   Final Result   Mild cardiomegaly with small left pleural effusion.   Signed by Steve Ramírez MD          [unfilled]      CURRENT MEDICATIONS    acetaminophen, 487.5 mg, oral, BID  [Held by provider] apixaban, 5 mg, oral, BID  atorvastatin, 20 mg, oral, Nightly  bumetanide, 2 mg, oral, Daily  docusate sodium, 100 mg, oral, BID  empagliflozin, 10 mg, oral, Daily  insulin lispro, 0-5 Units, subcutaneous, 4x daily  iron sucrose, 200 mg, intravenous, Daily  LORazepam, 0.25 mg, oral, Once  perflutren lipid microspheres, 0.5-10 mL of dilution, intravenous, Once in imaging  perflutren protein A microsphere, 0.5 mL, intravenous, Once in imaging  polyethylene glycol, 17 g, oral, Daily  sennosides, 1 tablet, oral, BID  spironolactone, 12.5 mg, oral, Daily  sulfur hexafluoride microsphr, 2 mL, intravenous, Once in imaging  valsartan, 40 mg, oral, Daily             ASSESSMENT   Principal Problem:    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 (CMS/[facility])  Active Problems:    Paroxysmal atrial fibrillation (CMS/[facility])    Obstructive sleep apnea syndrome    Hyperlipidemia    History of CVA (cerebrovascular accident)    Chronic anemia    Acute respiratory failure with hypoxia (CMS/[facility])    Stage 3 chronic kidney disease (CMS/[facility])    Morbid obesity (CMS/[facility])    Type 2 diabetes mellitus with chronic kidney disease, without long-term current use of insulin (CMS/[facility])    Apnea    CHERYL (acute kidney injury) (CMS/[facility])        Patient Active Problem List   Diagnosis    Urinary  "retention    Trigeminal neuralgia    Spontaneous ocular nystagmus    Spinal stenosis    Schwannoma    Pseudophakia of both eyes    Paroxysmal atrial fibrillation (CMS/[facility])    Parotid mass    Cerebellar stroke (CMS/HCC)    Osteoarthritis of knee    Obstructive sleep apnea syndrome    Mass of parapharyngeal space    Lumbar spondylosis    LAE (left atrial enlargement)    Incontinence of urine    Hyperlipidemia    History of CVA (cerebrovascular accident)    Heart failure with preserved left ventricular function (HFpEF) (CMS/[facility])    GERD (gastroesophageal reflux disease)    Vertigo    Chronic anemia    Bradycardia    Bilateral sensorineural hearing loss    Atherosclerosis of coronary artery    Adnexal mass    Abnormal ECG    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 (CMS/[facility])    Acute respiratory failure with hypoxia (CMS/[facility])    Stage 3 chronic kidney disease (CMS/[facility])    Morbid obesity (CMS/[facility])    Type 2 diabetes mellitus with chronic kidney disease, without long-term current use of insulin (CMS/[facility])    Acute heart failure with preserved ejection fraction (CMS/[facility])    Apnea    CHERYL (acute kidney injury) (CMS/[facility])     IMPRESSIONS:  1.  HTN, controlled.  2.  Recent persistent AF with RVR, S/P DCC on 12/29/2023 with a few early recurrences over the next 24 hours.  We would like to treat her with amiodarone therapy, but this was presently contraindicated due to her bradycardia.  3.  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  4.  Sinus node dysfunction following cardioversion, consistent with \"tachycardia-bradycardia syndrome.\"  This represents an indication for permanent pacemaker placement.  The patient has now consented to this.  5.  Other medical problems, including obesity, DJD, anemia, stage III CKD, GERD, hyperlipidemia, and venous insufficiency.  6.  Status post dual-chamber pacemaker implanted in January 2024. "     PLAN     Patient is doing well from the electrophysiologist on point.  We can start Lopressor 25 mg twice a day patient can be changed to long-acting at the time of the discharge from the hospital  So far no need for amiodarone therapy at this time.  Will continue watching the burden of atrial fibrillation by device interrogation.  Patient can resume Eliquis in a.m. tomorrow.  Electrophysiology service will sign off.  Please call us if any questions      Alejandro Mancia MD    Electronically signed by Alejandro Mancia MD, [facility] on 1/7/2024 at 10:27 AM

## 2024-01-07 NOTE — PROGRESS NOTES
Cardiology Progress Note           Rounding Cardiologist:  Dr. Kim Alonso  Primary Cardiologist:  Dr. Steve Sneed     Date:  1/7/2024  Patient:  Fifi Jenkins  YOB: 1935  MRN:  [id number]   Admit Date:  12/21/2023      SUBJECTIVE    Fifi Jenkins was seen and examined today at bedside.     Patient more alert and feels better today. No chest pain / SOB. Still tired and feels weak.   Review of Systems   No new complaints  VITALS     Vitals:    01/07/24 0600 01/07/24 0726 01/07/24 1130 01/07/24 1139   BP:  115/50 (!) 133/45    BP Location:  Right arm Right arm    Patient Position:  Lying Lying    Pulse:  64 58 57   Resp:  12 13    Temp:  36.3 °C (97.3 °F) 36.5 °C (97.7 °F)    TempSrc:  Temporal Temporal    SpO2:  97% 95%    Weight: 135 kg (298 lb 11.6 oz)      Height:           Intake/Output Summary (Last 24 hours) at 1/7/2024 1314  Last data filed at 1/7/2024 1300  Gross per 24 hour   Intake 820 ml   Output 1900 ml   Net -1080 ml       Vitals:    01/07/24 0600   Weight: 135 kg (298 lb 11.6 oz)       CURRENT MEDICATIONS    acetaminophen, 487.5 mg, oral, BID  [Held by provider] apixaban, 5 mg, oral, BID  atorvastatin, 20 mg, oral, Nightly  bumetanide, 2 mg, oral, Daily  docusate sodium, 100 mg, oral, BID  empagliflozin, 10 mg, oral, Daily  insulin lispro, 0-5 Units, subcutaneous, 4x daily  iron sucrose, 200 mg, intravenous, Daily  LORazepam, 0.25 mg, oral, Once  [START ON 1/8/2024] metoprolol succinate XL, 50 mg, oral, Daily  perflutren lipid microspheres, 0.5-10 mL of dilution, intravenous, Once in imaging  perflutren protein A microsphere, 0.5 mL, intravenous, Once in imaging  polyethylene glycol, 17 g, oral, Daily  sennosides, 1 tablet, oral, BID  spironolactone, 12.5 mg, oral, Daily  sulfur hexafluoride microsphr, 2 mL, intravenous, Once in imaging  valsartan, 40 mg, oral, Daily         Current Outpatient Medications   Medication Instructions    acetaminophen (TYLENOL) 650 mg, oral, Every  4 hours PRN    acetaminophen (TYLENOL) 500 mg, oral, 2 times daily    albuterol 90 mcg/actuation inhaler Inhale 2 puffs every 4 hours if needed for wheezing or shortness of breath.    atorvastatin (Lipitor) 20 mg tablet 1 tablet, oral, Nightly    calcium carbonate (Tums) 200 mg calcium chewable tablet 1 tablet, oral, Every 4 hours PRN    compress.stocking,knee,reg,med misc MEDICAL COMPRESSION STOCKINGS 20-30 MM HG KNEE HIGH COMPRESSION STOCKINGS, PLEASE MEASURE FOR APPROPRIATE FIT, DISPENSE TWO PAIRS, REFILL 8    cyanocobalamin (VITAMIN B-12) 250 mcg, oral, Daily    cyclobenzaprine (FLEXERIL) 10 mg, oral, 2 times daily PRN    docusate sodium (COLACE) 100 mg, oral, 2 times daily    Eliquis 5 mg, oral, 2 times daily    famotidine (Pepcid) 20 mg tablet 1 tablet, oral, Daily    folic acid (FOLVITE) 1 mg, oral, Daily    losartan (COZAAR) 50 mg, oral, Daily    lubricating eye drops ophthalmic solution 1 drop, Both Eyes, Every 4 hours PRN    metOLazone (Zaroxolyn) 5 mg tablet Take 1 tablet (5 mg) by mouth 1 (one) time per week. Wednesday    multivitamin tablet 1 tablet, oral, Daily    nystatin (Mycostatin) 100,000 unit/gram powder 1 Application, Topical, 2 times daily PRN    polyethylene glycol (GLYCOLAX, MIRALAX) 17 g, oral, Daily    sennosides (Senokot) 8.6 mg tablet 1 tablet, oral, 2 times daily    torsemide 40 mg, oral, Daily        PHYSICAL EXAMINATION   GENERAL:  Well developed, well nourished, in no acute distress.  CHEST:  Symmetric and nontender. Pacer site without erythema or significant hematoma.  NECK:  no JVD, no bruit.  LUNGS:  Clear to auscultation bilaterally, normal respiratory effort.  HEART:  PMI nonpalapable, heart sounds distant. RR, normal S1 and S2, no S3. Soft EULOGIO RUSB.   ABDOMEN: Soft, NT, ND without palpable organomegaly, normoactive bowel sounds.   EXTREMITIES:  Warm with good color, no clubbing or cyanosis.  There is no edema noted.  PERIPHERAL VASCULAR:  Pulses present and equally palpable; 2+  "throughout.  NEURO/PSYCH:  Alert and oriented times three with approppriate behavior and responses.  INTEGUMENT: No rashes    LAB DATA     CBC:   Results from last 7 days   Lab Units 01/07/24  0622   WBC AUTO x10*3/uL 7.5   RBC AUTO x10*6/uL 2.55*   HEMOGLOBIN g/dL 7.3*   HEMATOCRIT % 25.9*   PLATELETS AUTO x10*3/uL 130*        CMP:    Results from last 7 days   Lab Units 01/07/24  0622   SODIUM mmol/L 142   POTASSIUM mmol/L 4.0   CHLORIDE mmol/L 95*   CO2 mmol/L 45*   BUN mg/dL 22   CREATININE mg/dL 0.80   GLUCOSE mg/dL 110*   CALCIUM mg/dL 8.3*       Cardiac Enzymes:    Lab Results   Component Value Date    TROPHS 9 12/21/2023    TROPHS 8 12/21/2023       Magnesium:    Lab Results   Component Value Date    MG 1.68 01/07/2024       Lipid Profile:  No results found for: \"CHLPL\", \"TRIG\", \"HDL\", \"LDLCALC\", \"LDLDIRECT\"    TSH:  No results found for: \"TSH\"    BNP:   Lab Results   Component Value Date     (H) 12/21/2023        PT/INR:  No results found for: \"PROTIME\", \"INR\"    HgBA1c:    Lab Results   Component Value Date    HGBA1C 6.3 (H) 10/02/2023       CBC:  Lab Results   Component Value Date    WBC 7.5 01/07/2024    WBC 6.3 01/06/2024    WBC 5.9 01/05/2024    RBC 2.55 (L) 01/07/2024    RBC 2.58 (L) 01/06/2024    RBC 2.89 (L) 01/05/2024    HGB 7.3 (L) 01/07/2024    HGB 7.4 (L) 01/06/2024    HGB 8.3 (L) 01/05/2024    HCT 25.9 (L) 01/07/2024    HCT 27.7 (L) 01/06/2024    HCT 29.8 (L) 01/05/2024     (H) 01/07/2024     (H) 01/06/2024     (H) 01/05/2024    MCH 28.6 01/07/2024    MCH 28.7 01/06/2024    MCH 28.7 01/05/2024    MCHC 28.2 (L) 01/07/2024    MCHC 26.7 (L) 01/06/2024    MCHC 27.9 (L) 01/05/2024    RDW 18.6 (H) 01/07/2024    RDW 17.7 (H) 01/06/2024    RDW 17.9 (H) 01/05/2024     (L) 01/07/2024     (L) 01/06/2024     (L) 01/05/2024       BMP:  Lab Results   Component Value Date     01/07/2024     01/06/2024     01/05/2024    K 4.0 01/07/2024    K " "4.3 01/06/2024    K 4.3 01/05/2024    CL 95 (L) 01/07/2024    CL 97 (L) 01/06/2024    CL 98 01/05/2024    CO2 45 (HH) 01/07/2024    CO2 38 (H) 01/06/2024    CO2 39 (H) 01/05/2024    BUN 22 01/07/2024    BUN 26 (H) 01/06/2024    BUN 30 (H) 01/05/2024    CREATININE 0.80 01/07/2024    CREATININE 0.85 01/06/2024    CREATININE 0.93 01/05/2024       Hepatic Function Panel:  No results found for: \"ALKPHOS\", \"ALT\", \"AST\", \"PROT\", \"BILITOT\", \"BILIDIR\"      DIAGNOSTIC TESTING RESULTS     ECG 12 lead    Result Date: 1/2/2024  Atrial fibrillation Left axis deviation Right bundle branch block Possible Lateral infarct , age undetermined Abnormal ECG When compared with ECG of 21-DEC-2023 11:15, Atrial fibrillation has replaced Sinus rhythm Right bundle branch block is now Present Borderline criteria for Lateral infarct are now Present Confirmed by OSCAR Black ([id number]) on 1/2/2024 7:31:08 AM    ECG 12 lead    Result Date: 1/2/2024  Sinus tachycardia Right axis deviation Pulmonary disease pattern Right ventricular hypertrophy with repolarization abnormality ST elevation, consider inferior injury or acute infarct ** ** ACUTE MI ** ** Abnormal ECG When compared with ECG of 25-JUL-2023 14:50, W[place] interval has decreased Vent. rate has increased BY  51 BPM (RBBB and left anterior fascicular block) is no longer Present Confirmed by OSCAR Black ([id number]) on 1/2/2024 7:28:37 AM    ECG 12 lead    Result Date: 12/22/2023  Sinus tachycardia Right axis deviation Pulmonary disease pattern Right ventricular hypertrophy with repolarization abnormality ST elevation, consider inferior injury or acute infarct ** ** ACUTE MI ** ** Abnormal ECG When compared with ECG of 25-JUL-2023 14:50, WV interval has decreased Vent. rate has increased BY  51 BPM (RBBB and left anterior fascicular block) is no longer Present Confirmed by OSCAR Black ([id number]) on 12/22/2023 7:25:51 AM    CT angio chest for pulmonary embolism    Result Date: 12/21/2023  STUDY: CT Angiogram " of the Chest; 12/21/2023 2:57 PM. INDICATION: New hypoxemia.  On Eliquis.  Evaluate for pulmonary embolism. COMPARISON: Chest radiograph performed the same date. ACCESSION NUMBER(S): [id number] ORDERING CLINICIAN: BIANCA TIDWELL TECHNIQUE:  CTA of the chest was performed with intravenous contrast. Images are reviewed and processed at a workstation according to the CT angiogram protocol with 3-D and/or MIP post processing imaging generated.  Omnipaque 350, 60 mL was administered intravenously. Automated mA/kV exposure control was utilized and patient examination was performed in strict accordance with principles of ALARA. FINDINGS: Pulmonary arteries are adequately opacified without acute or chronic filling defects.  The thoracic aorta is normal in course and caliber without dissection or aneurysm. Heart is mildly enlarged. No pericardial effusion or thickening. Thoracic lymph nodes are not enlarged. There is no pleural effusion, pleural thickening, or pneumothorax. The airways are patent. Lungs are clear without consolidation, interstitial disease, or suspicious nodules. Upper abdomen demonstrates no acute pathology. There are no acute fractures.  No suspicious bony lesions.    No CT evidence of pulmonary embolism. Scattered emphysematous changes with small bilateral pleural effusions and mild bibasilar areas of infiltrate or atelectasis. Signed by Steve Ramírez MD    XR chest 1 view    Result Date: 12/21/2023  STUDY: Chest Radiograph;  12/21/2023 at 12:21 PM. INDICATION: Shortness of breath. COMPARISON: XR chest 2/25/2019 ACCESSION NUMBER(S): [id number] ORDERING CLINICIAN: MATTHIEU PARISH TECHNIQUE:  Frontal chest was obtained at 12:21 hours. FINDINGS: CARDIOMEDIASTINAL SILHOUETTE: Heart is mildly enlarged.  LUNGS: Lungs are clear. Small left pleural effusion.  ABDOMEN: No remarkable upper abdominal findings.  BONES: No acute osseous changes.    Mild cardiomegaly with small left pleural effusion. Signed by Setve  MD Desiree         RADIOLOGY     XR chest 2 views   Final Result   Limited exam. The lateral views in particular are very limited.   Post pacemaker placement without definite complication.        Interstitial edema with small pleural effusions.        Signed by: Omar Douglas 1/6/2024 1:18 PM   Dictation workstation:   [id number]      XR chest 1 view   Final Result   Cardiomegaly with pulmonary vascular congestive change and asymmetric   opacity in the right upper lung and superimposed pneumonia not   excluded.        MACRO:   None        Signed by: Chuy Cagle 1/6/2024 1:49 AM   Dictation workstation:   [id number]      Electrophysiology procedure   Final Result      CT brain attack head wo IV contrast   Final Result   No acute intracranial abnormality.        The findings were conveyed to  [place] at 1:53 p.m..             MACRO:   none        Signed by: Maura Alcazar 1/5/2024 1:54 PM   Dictation workstation:   [id number]      XR chest 1 view   Final Result   Small bilateral pleural effusions.        MACRO:   None        Signed by: Megan Mendiola 1/1/2024 11:55 AM   Dictation workstation:   [id number]      Transthoracic Echo (TTE) Limited   Final Result      Cardioversion external   Final Result      Vascular US upper extremity venous duplex right   Final Result      Transthoracic Echo (TTE) Complete   Final Result      CT angio chest for pulmonary embolism   Final Result   No CT evidence of pulmonary embolism.   Scattered emphysematous changes with small bilateral pleural effusions   and mild bibasilar areas of infiltrate or atelectasis.   Signed by Steve Ramírez MD      XR chest 1 view   Final Result   Mild cardiomegaly with small left pleural effusion.   Signed by Steve Ramírez MD            ASSESSMENT     Problem List Items Addressed This Visit       Paroxysmal atrial fibrillation (CMS/HCC)    Relevant Medications    metoprolol succinate XL (Toprol-XL) 24 hr tablet 50 mg (Start on 1/8/2024  9:00 AM)     Other Relevant Orders    Cardioversion external (Completed)    Stage 3 chronic kidney disease (CMS/HCC)    Morbid obesity (CMS/[facility])    Relevant Orders    Transthoracic Echo (TTE) Limited (Completed)    Type 2 diabetes mellitus with chronic kidney disease, without long-term current use of insulin (CMS/[facility])    Relevant Orders    Transthoracic Echo (TTE) Limited (Completed)    Acute heart failure with preserved ejection fraction (CMS/[facility])    Relevant Medications    metoprolol succinate XL (Toprol-XL) 24 hr tablet 50 mg (Start on 1/8/2024  9:00 AM)    Other Relevant Orders    Transthoracic Echo (TTE) Complete (Completed)    Apnea    Relevant Orders    Transthoracic Echo (TTE) Limited (Completed)    CHERYL (acute kidney injury) (CMS/[facility])    Relevant Orders    Case Request EP Lab: PPM IMPLANT DUAL (Completed)    Electrophysiology procedure (Completed)     Other Visit Diagnoses       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 (CMS/[facility])    -  Primary    Relevant Medications    metoprolol succinate XL (Toprol-XL) 24 hr tablet 50 mg (Start on 1/8/2024  9:00 AM)    Shortness of breath        Elevated brain natriuretic peptide (BNP) level        Pleural effusion        Contrast-induced nephropathy        Anemia due to stage 3a chronic kidney disease (CMS/[facility])        Metabolic alkalosis        Swelling        Relevant Orders    Vascular US upper extremity venous duplex right (Completed)    Swelling of extremity, right        Relevant Orders    Vascular US upper extremity venous duplex right (Completed)            Patient Active Problem List   Diagnosis    Urinary retention    Trigeminal neuralgia    Spontaneous ocular nystagmus    Spinal stenosis    Schwannoma    Pseudophakia of both eyes    Paroxysmal atrial fibrillation (CMS/HCC)    Parotid mass    Cerebellar stroke (CMS/HCC)    Osteoarthritis of knee    Obstructive sleep apnea syndrome    Mass of parapharyngeal space    Lumbar spondylosis    LAE (left atrial  enlargement)    Incontinence of urine    Hyperlipidemia    History of CVA (cerebrovascular accident)    Heart failure with preserved left ventricular function (HFpEF) (CMS/[facility])    GERD (gastroesophageal reflux disease)    Vertigo    Chronic anemia    Bradycardia    Bilateral sensorineural hearing loss    Atherosclerosis of coronary artery    Adnexal mass    Abnormal ECG    Acute respiratory failure with hypoxia (CMS/[facility])    Stage 3 chronic kidney disease (CMS/[facility])    Morbid obesity (CMS/[facility])    Type 2 diabetes mellitus with chronic kidney disease, without long-term current use of insulin (CMS/[facility])    Acute heart failure with preserved ejection fraction (CMS/[facility])    Apnea    CHERYL (acute kidney injury) (CMS/[facility])    Acute combined systolic and diastolic heart failure (CMS/[facility])       PLAN   Acute combined systolic and diastolic heart failure - patient with moderate LV dysfunction. Medical therapy adjusted with that in mind. Tolerating ARB, spironolactone and jardiance. Changed beta blockers to metoprolol succinate today.   SSS - Demand pacing. Pacer site looks good.   Paroxysmal atrial fibrillation   Morbid obesity - chronic problem limiting patients activity.     Would check labs tomorrow. Look for rehab / DC early this week. Dr. Sneed back tomorrow.       Please do not hesitate to call with questions.  Electronically signed by Kim Alonso MD, on 1/7/2024 at 1:14 PM

## 2024-01-07 NOTE — CARE PLAN
The patient's goals for the shift include      The clinical goals for the shift include pt will remain hds throughout the shift    Problem: Fall/Injury  Goal: Use assistive devices by end of the shift  Outcome: Progressing     Problem: Skin  Goal: Decreased wound size/increased tissue granulation at next dressing change  Outcome: Progressing     Problem: Heart Failure  Goal: Improved gas exchange this shift  Outcome: Progressing  Goal: Improved urinary output this shift  Outcome: Progressing  Goal: Reduction in peripheral edema within 24 hours  Outcome: Progressing  Goal: Weight from fluid excess reduced over 2-3 days, then stabilize  Outcome: Progressing  Goal: Increase self care and/or family involvement in 24 hours  Outcome: Progressing     Problem: Dressings Lower Extremities  Goal: STG - Patient will complete lower body dressing with mod assist with comp strategies PRN  Outcome: Progressing     Problem: Dressing Upper Extremities  Goal: STG - Patient will dress upper body with CGA  Outcome: Progressing     Problem: Respiratory  Goal: Clear secretions with interventions this shift  Outcome: Progressing  Goal: Minimize anxiety/maximize coping throughout shift  Outcome: Progressing  Goal: Wean oxygen to maintain O2 saturation per order/standard this shift  Outcome: Progressing  Goal: Increase self care and/or family involvement in next 24 hours  Outcome: Progressing     Problem: Safety  Goal: I will remain free of falls  Outcome: Progressing     Problem: Psychosocial Needs  Goal: Demonstrates ability to cope with hospitalization/illness  Outcome: Progressing  Goal: Collaborate with me, my family, and caregiver to identify my specific goals  Outcome: Progressing     Problem: Daily Care  Goal: Daily care needs are met  Outcome: Progressing     Problem: Discharge Barriers  Goal: My discharge needs are met  Outcome: Progressing

## 2024-01-07 NOTE — NURSING NOTE
Patient is agitated and upset because she states she is not a diabetic and does not take insulin. Attempts to educate the patient was unsuccessful. She is refusing glucose check.      Son- Kevin wants a call every shift for updates.  Leave a message if no answer, he will call back.  [phone]

## 2024-01-07 NOTE — CARE PLAN
The clinical goals for the shift include patient will remain HDS throughout shift. The patient met this goal    S: Patient admitted 12/21 for CHF exacerbation   B: History of: CHF, CAD, UMA, CKD, cerebellar stroke, afib on eliquis, diabetes   A: Patient A&Ox3, denies complaints of chest pain but is dyspneic on exertion. Remains a paced on tele and on 3 L O2 via NC. Medicated per orders throughout shift. Q2 turns done  R: Patient to return to [facility] when ready for discharge

## 2024-01-08 ENCOUNTER — APPOINTMENT (OUTPATIENT)
Dept: CARDIOLOGY | Facility: HOSPITAL | Age: 89
DRG: 242 | End: 2024-01-08
Payer: COMMERCIAL

## 2024-01-08 LAB
ANION GAP SERPL CALC-SCNC: 10 MMOL/L (ref 10–20)
BUN SERPL-MCNC: 18 MG/DL (ref 6–23)
CALCIUM SERPL-MCNC: 8.9 MG/DL (ref 8.6–10.3)
CHLORIDE SERPL-SCNC: 93 MMOL/L (ref 98–107)
CO2 SERPL-SCNC: 44 MMOL/L (ref 21–32)
CREAT SERPL-MCNC: 0.8 MG/DL (ref 0.5–1.05)
ERYTHROCYTE [DISTWIDTH] IN BLOOD BY AUTOMATED COUNT: 19 % (ref 11.5–14.5)
GFR SERPL CREATININE-BSD FRML MDRD: 71 ML/MIN/1.73M*2
GLUCOSE BLD MANUAL STRIP-MCNC: 95 MG/DL (ref 74–99)
GLUCOSE SERPL-MCNC: 94 MG/DL (ref 74–99)
HCT VFR BLD AUTO: 27.7 % (ref 36–46)
HGB BLD-MCNC: 7.7 G/DL (ref 12–16)
MAGNESIUM SERPL-MCNC: 1.67 MG/DL (ref 1.6–2.4)
MCH RBC QN AUTO: 28.5 PG (ref 26–34)
MCHC RBC AUTO-ENTMCNC: 27.8 G/DL (ref 32–36)
MCV RBC AUTO: 103 FL (ref 80–100)
NRBC BLD-RTO: 0 /100 WBCS (ref 0–0)
PLATELET # BLD AUTO: 149 X10*3/UL (ref 150–450)
POTASSIUM SERPL-SCNC: 3.9 MMOL/L (ref 3.5–5.3)
RBC # BLD AUTO: 2.7 X10*6/UL (ref 4–5.2)
SODIUM SERPL-SCNC: 143 MMOL/L (ref 136–145)
WBC # BLD AUTO: 7.4 X10*3/UL (ref 4.4–11.3)

## 2024-01-08 PROCEDURE — 2500000002 HC RX 250 W HCPCS SELF ADMINISTERED DRUGS (ALT 637 FOR MEDICARE OP, ALT 636 FOR OP/ED): Performed by: NURSE PRACTITIONER

## 2024-01-08 PROCEDURE — 85027 COMPLETE CBC AUTOMATED: CPT | Performed by: INTERNAL MEDICINE

## 2024-01-08 PROCEDURE — 2500000002 HC RX 250 W HCPCS SELF ADMINISTERED DRUGS (ALT 637 FOR MEDICARE OP, ALT 636 FOR OP/ED): Performed by: INTERNAL MEDICINE

## 2024-01-08 PROCEDURE — 80048 BASIC METABOLIC PNL TOTAL CA: CPT | Performed by: INTERNAL MEDICINE

## 2024-01-08 PROCEDURE — 2500000004 HC RX 250 GENERAL PHARMACY W/ HCPCS (ALT 636 FOR OP/ED): Performed by: INTERNAL MEDICINE

## 2024-01-08 PROCEDURE — 2500000001 HC RX 250 WO HCPCS SELF ADMINISTERED DRUGS (ALT 637 FOR MEDICARE OP): Performed by: INTERNAL MEDICINE

## 2024-01-08 PROCEDURE — 1200000002 HC GENERAL ROOM WITH TELEMETRY DAILY

## 2024-01-08 PROCEDURE — 36415 COLL VENOUS BLD VENIPUNCTURE: CPT | Performed by: INTERNAL MEDICINE

## 2024-01-08 PROCEDURE — 83735 ASSAY OF MAGNESIUM: CPT | Performed by: INTERNAL MEDICINE

## 2024-01-08 PROCEDURE — 82947 ASSAY GLUCOSE BLOOD QUANT: CPT

## 2024-01-08 PROCEDURE — 2500000001 HC RX 250 WO HCPCS SELF ADMINISTERED DRUGS (ALT 637 FOR MEDICARE OP): Performed by: NURSE PRACTITIONER

## 2024-01-08 PROCEDURE — 93005 ELECTROCARDIOGRAM TRACING: CPT

## 2024-01-08 PROCEDURE — 94760 N-INVAS EAR/PLS OXIMETRY 1: CPT

## 2024-01-08 PROCEDURE — 2500000001 HC RX 250 WO HCPCS SELF ADMINISTERED DRUGS (ALT 637 FOR MEDICARE OP): Performed by: PHYSICIAN ASSISTANT

## 2024-01-08 PROCEDURE — 2500000004 HC RX 250 GENERAL PHARMACY W/ HCPCS (ALT 636 FOR OP/ED): Performed by: NURSE PRACTITIONER

## 2024-01-08 RX ORDER — LANOLIN ALCOHOL/MO/W.PET/CERES
800 CREAM (GRAM) TOPICAL 2 TIMES DAILY
Status: COMPLETED | OUTPATIENT
Start: 2024-01-08 | End: 2024-01-08

## 2024-01-08 RX ORDER — AMIODARONE HYDROCHLORIDE 200 MG/1
200 TABLET ORAL 2 TIMES DAILY
Status: COMPLETED | OUTPATIENT
Start: 2024-01-08 | End: 2024-01-08

## 2024-01-08 RX ORDER — AMIODARONE HYDROCHLORIDE 200 MG/1
200 TABLET ORAL DAILY
Status: DISCONTINUED | OUTPATIENT
Start: 2024-01-09 | End: 2024-01-09 | Stop reason: HOSPADM

## 2024-01-08 RX ADMIN — EMPAGLIFLOZIN 10 MG: 10 TABLET, FILM COATED ORAL at 09:20

## 2024-01-08 RX ADMIN — ACETAZOLAMIDE 500 MG: 500 CAPSULE ORAL at 09:21

## 2024-01-08 RX ADMIN — Medication 800 MG: at 09:33

## 2024-01-08 RX ADMIN — VALSARTAN 40 MG: 40 TABLET, FILM COATED ORAL at 09:21

## 2024-01-08 RX ADMIN — IRON SUCROSE 200 MG: 20 INJECTION, SOLUTION INTRAVENOUS at 11:29

## 2024-01-08 RX ADMIN — BUMETANIDE 2 MG: 1 TABLET ORAL at 09:19

## 2024-01-08 RX ADMIN — METOPROLOL SUCCINATE 50 MG: 50 TABLET, EXTENDED RELEASE ORAL at 09:19

## 2024-01-08 RX ADMIN — APIXABAN 5 MG: 5 TABLET, FILM COATED ORAL at 11:36

## 2024-01-08 RX ADMIN — AMIODARONE HYDROCHLORIDE 200 MG: 200 TABLET ORAL at 21:02

## 2024-01-08 RX ADMIN — Medication 3 MG: at 21:02

## 2024-01-08 RX ADMIN — APIXABAN 5 MG: 5 TABLET, FILM COATED ORAL at 21:02

## 2024-01-08 RX ADMIN — ACETAZOLAMIDE 500 MG: 500 CAPSULE ORAL at 21:01

## 2024-01-08 RX ADMIN — AMIODARONE HYDROCHLORIDE 200 MG: 200 TABLET ORAL at 09:20

## 2024-01-08 RX ADMIN — SPIRONOLACTONE 12.5 MG: 25 TABLET ORAL at 09:20

## 2024-01-08 RX ADMIN — Medication 800 MG: at 21:02

## 2024-01-08 RX ADMIN — ATORVASTATIN CALCIUM 20 MG: 20 TABLET, FILM COATED ORAL at 21:02

## 2024-01-08 ASSESSMENT — PAIN - FUNCTIONAL ASSESSMENT
PAIN_FUNCTIONAL_ASSESSMENT: 0-10

## 2024-01-08 ASSESSMENT — PAIN SCALES - GENERAL
PAINLEVEL_OUTOF10: 0 - NO PAIN

## 2024-01-08 NOTE — CARE PLAN
The clinical goals for the shift include patient's SBP will remain greater than 90 and less than 170 throughout shift. The patient met this goal    S: Patient admitted 12/21 for CHF exacerbation   B: History of: CHF, CAD, UMA, CKD, cerebellar stroke, afib on eliquis, diabetes   A: Patient originally uncooperative, refusing medications, refusing turns, taking O2 off, not able to be redirected. NP notified and patient was placed in restraints. Patient finally cooperative this AM, restraints discontinued. Remains a paced on the monitor and on 3 L O2 via NC.   R: Patient to return to [place] when ready for discharge

## 2024-01-08 NOTE — PROGRESS NOTES
INPATIENT NEPHROLOGY CONSULT PROGRESS NOTE      Patient Name: Fifi Jenkins MRN: [id number]  DATE of SERVICE: January 7, 2024  TIME of SERVICE: 10:21 AM  CONSULTING SERVICE: Nephrology    REASON for CONSULT: Metabolic alkalosis    SUBJECTIVE:  Seen and evaluated at bedside.   Dyspnea improved  Tolerating diuretics including Bumex and spironolactone  Metabolic alkalosis with bicarb more than 45    SUMMARY OF STAY:  Ms. Jenkins is a pleasant morbidly obese 88-year-old female with past medical history significant for chronic kidney disease stage IIIa with baseline serum creatinine 1 mg/dL EGFR 50 mill per minute per 1.73 m², long-term resident at [facility].  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 CVA in 2019, with residual dizziness. Patient presented to [facility] December 21, 2023 for evaluation of progressively worsening shortness of breath.  Upon presentation patient was hypoxic requiring 3 L of oxygen.  Home medications: losartan 50 mg p.o. daily, metolazone 5 mg weekly, torsemide 40 mg p.o. daily  Current medications: Newly started Jardiance 10 mg, losartan has been on hold since admission.  Metolazone 5 mg weekly.  Received Lasix 40 mg IV twice daily. Echocardiogram demonstrated ejection fraction 55%.  Labs Serum creatinine up to 1.34 mg deciliter BUN of 28 and GFR of 39.  From serum creatinine of 1 mg deciliter and a GFR of 58 mL/min upon presentation.  Anemia hemoglobin of 8.2 mg deciliter. December 21, 2023 CT with IV contrast: No signs of pulmonary embolism demonstrated cardiomegaly without pericardial effusion.  Scattered emphysematous changes  with small bilateral pleural effusions and mild bibasilar area of infiltrate or atelectasis.    ASSESSMENT AND PLAN:  CHERYL on CKD IIIa:  likely hemodynamically mediated in the setting of diuretics adjustment and vasoactive medications (Jardiance), contrast nephropathy (contrast exposure December 21).  Serum creatinine up to 1.34 mg deciliter BUN of 28 and GFR of 39.  From serum creatinine of 1 mg deciliter and a GFR of 58 mL/min upon presentation.     Volume status: Hypervolemic     CKD IIIa: Diabetic nephropathy, hypertensive nephrosclerosis  Electrolytes: Managed (with renal diet)  Hypertension: Relative hypotension blood pressure 100 oh 3/60 with a heart rate of 81  Without being on antihypertensive medications  Acid-base: Metabolic alkalosis likely secondary to diuresis  Anemia from renal failure, To check iron panel and start epogen. hemoglobin of 8.2 mg deciliter.  To rule out plasma cell disorder  T2DM on insulin sliding scale  COPD: Emphysematous changes on CT scan requiring 2 L of oxygen  CVA 2019 with residual dizziness  Patient wheelchair dependent  Urinary incontinent: Wearing depends at [facility] nursing facility  CHF: Diastolic heart failure treated with IV Lasix likely for acute on chronic  UMA with CPAP intolerance  Paroxysmal A-fib on Eliquis    Plan:  Metabolic alkalosis with bicarb more than 45 to start acetazolamide.    Renal parameters improved.  Iron deficiency anemia status post IV Venofer 4 doses.    Vasoactive medication resumed.     We will continue to monitor closely with you, thank you!    Medications:    Current Facility-Administered Medications:     acetaminophen (Tylenol) tablet 650 mg, 650 mg, oral, q4h PRN **OR** acetaminophen (Tylenol) oral liquid 650 mg, 650 mg, oral, q4h PRN **OR** acetaminophen (Tylenol) suppository 650 mg, 650 mg, rectal, q4h PRN, Alejandro Mancia MD    acetaminophen (Tylenol) tablet 487.5 mg, 487.5 mg, oral, BID, Steve Sneed MD, 487.5 mg at 01/07/24 0801     acetaminophen (Tylenol) tablet 650 mg, 650 mg, oral, q6h PRN, Steve Sneed MD, 650 mg at 12/25/23 0934    albuterol 2.5 mg /3 mL (0.083 %) nebulizer solution 2.5 mg, 2.5 mg, nebulization, q4h PRN, Steve Sneed MD, 2.5 mg at 12/30/23 1144    [Held by provider] apixaban (Eliquis) tablet 5 mg, 5 mg, oral, BID, Ayanna Giles PA-C    atorvastatin (Lipitor) tablet 20 mg, 20 mg, oral, Nightly, Steve Sneed MD, 20 mg at 01/06/24 2119    bumetanide (Bumex) tablet 2 mg, 2 mg, oral, Daily, Mary Solomon MD, 2 mg at 01/07/24 0801    butalbital-acetaminophen-caff -40 mg per tablet 1 tablet, 1 tablet, oral, q4h PRN, Steve Sneed MD, 1 tablet at 12/27/23 2056    calcium carbonate (Tums) chewable tablet 500 mg, 1 tablet, oral, q4h PRN, Steve Sneed MD, 500 mg at 01/03/24 0044    cyclobenzaprine (Flexeril) tablet 10 mg, 10 mg, oral, BID PRN, Steve Sneed MD    dextrose 10 % in water (D10W) infusion, 0.3 g/kg/hr, intravenous, Once PRN, Steve Sneed MD    dextrose 50 % injection 25 g, 25 g, intravenous, q15 min PRN, Steve Sneed MD    docusate sodium (Colace) capsule 100 mg, 100 mg, oral, BID, Steve Sneed MD, 100 mg at 01/07/24 0801    empagliflozin (Jardiance) tablet 10 mg, 10 mg, oral, Daily, Steve Sneed MD, 10 mg at 01/07/24 0801    glucagon (Glucagen) injection 1 mg, 1 mg, intramuscular, q15 min PRN, Steve Sneed MD    insulin lispro (HumaLOG) injection 0-5 Units, 0-5 Units, subcutaneous, 4x daily, Steve Sneed MD, 1 Units at 01/06/24 1648    iron sucrose (Venofer) injection 200 mg, 200 mg, intravenous, Daily, Mary Solomon MD, 200 mg at 01/07/24 0801    LORazepam (Ativan) tablet 0.25 mg, 0.25 mg, oral, Once, Steve Sneed MD    lubricating eye drops ophthalmic solution 1 drop, 1 drop, Both Eyes, q4h PRN, Steve Sneed MD, 1 drop at 12/27/23 0911    [START ON 1/8/2024] metoprolol succinate XL (Toprol-XL) 24 hr tablet 50 mg, 50 mg, oral, Daily,  Kim Alonso MD    naloxone (Narcan) injection 0.2 mg, 0.2 mg, intravenous, q5 min PRN, Alejandro Mancia MD    oxygen (O2) therapy, , inhalation, Continuous PRN - O2/gases, Tiara Laws, APRN-CNP    perflutren lipid microspheres (Definity) injection 0.5-10 mL of dilution, 0.5-10 mL of dilution, intravenous, Once in imaging, Steve Sneed MD    perflutren protein A microsphere (Optison) injection 0.5 mL, 0.5 mL, intravenous, Once in imaging, Steve Sneed MD    polyethylene glycol (Glycolax, Miralax) packet 17 g, 17 g, oral, Daily, Steve Sneed MD, 17 g at 01/03/24 0913    propofol (Diprivan) injection, , , PRN, Lamine Savage MD, 60 mg at 12/29/23 1343    sennosides (Senokot) tablet 8.6 mg, 1 tablet, oral, BID, Steve Sneed MD, 8.6 mg at 01/05/24 2101    spironolactone (Aldactone) tablet 12.5 mg, 12.5 mg, oral, Daily, Kim Alonso MD, 12.5 mg at 01/07/24 0801    sulfur hexafluoride microsphr (Lumason) injection 24.28 mg, 2 mL, intravenous, Once in imaging, Steve Sneed MD    traMADol (Ultram) tablet 50 mg, 50 mg, oral, q8h PRN, Steve Sneed MD, 50 mg at 12/26/23 2222    traMADol (Ultram) tablet 50 mg, 50 mg, oral, q6h PRN, Alejandro Mancia MD    valsartan (Diovan) tablet 40 mg, 40 mg, oral, Daily, Kim Alonso MD, 40 mg at 01/07/24 0801    PERTINENT ROS:  GENERAL:  positive for fatigue, poor appetite.  No fever/chills  RESPIRATORY:  positive for shortness of breath.  Negative for cough, wheezing.  CARDIOVASCULAR:   Negative for chest pain or palpitation.  GI:  Negative for abdominal pain, diarrhea, heartburn, nausea, vomiting  : Dysuria    Physical Exam:  Vital signs in last 24 hours:  Temp:  [36 °C (96.8 °F)-36.5 °C (97.7 °F)] 36.5 °C (97.7 °F)  Heart Rate:  [57-64] 58  Resp:  [12-24] 15  BP: (107-160)/(39-52) 144/51    General: Awake, cooperative, not in acute distress  HEENT:  NCAT,  mucous membranes moist and pink  NECK:  Elevated JVD, no carotid  bruit, supple, no cervical mass or thyromegaly  LUNGS;  Diminished breath sounds, fine Rales  CV:  Distant, regular rate and rhythm, no murmurs  ABDOMEN:  abdomen soft, nontender, BS normal, no masses or organomegaly  EDEMA:  +1 lower extremity edema/dependent edema  SKIN:  dry and normal turgor, no clubbing, cyanosis or petechia.  No rashes noted      Intake/Output last 3 shifts:  I/O last 3 completed shifts:  In: 1360 (10 mL/kg) [P.O.:1160; IV Piggyback:200]  Out: 3400 (25.1 mL/kg) [Urine:3400 (0.7 mL/kg/hr)]  Weight: 135.5 kg     DATA:  Diagnotic tests reviewed for Todays visit:  Results from last 7 days   Lab Units 01/07/24  0622   WBC AUTO x10*3/uL 7.5   RBC AUTO x10*6/uL 2.55*   HEMOGLOBIN g/dL 7.3*   HEMATOCRIT % 25.9*       Results from last 7 days   Lab Units 01/07/24  0622   SODIUM mmol/L 142   POTASSIUM mmol/L 4.0   CHLORIDE mmol/L 95*   CO2 mmol/L 45*   BUN mg/dL 22   CREATININE mg/dL 0.80   CALCIUM mg/dL 8.3*   MAGNESIUM mg/dL 1.68             IMAGING: CXR reviewed in  images      SIGNATURE: Mary Solomon MD  Nephrology and Hypertension  [address] [facility] [place]  Office phone: [phone]  FAX: [phone]    This note was partially generated using the Dragon voice recognition system, and there may be some incorrect words, spelling's and punctuation that were not noted in checking the note before saving.

## 2024-01-08 NOTE — CARE PLAN
Problem: Psychosocial Needs  Goal: Demonstrates ability to cope with hospitalization/illness  Outcome: Progressing     Problem: Daily Care  Goal: Daily care needs are met  Outcome: Progressing     Problem: Respiratory  Goal: Increase self care and/or family involvement in next 24 hours  Outcome: Progressing     Problem: Fall/Injury  Goal: Use assistive devices by end of the shift  Outcome: Progressing    Problem: Heart Failure  Goal: Improved gas exchange this shift  Outcome: Progressing     Problem: Heart Failure  Goal: Improved urinary output this shift  Outcome: Progressing     Problem: Heart Failure  Goal: Improved gas exchange this shift  Outcome: Progressing  Goal: Improved urinary output this shift  Outcome: Progressing  Goal: Reduction in peripheral edema within 24 hours  Outcome: Progressing  Goal: Weight from fluid excess reduced over 2-3 days, then stabilize  Outcome: Progressing  Goal: Increase self care and/or family involvement in 24 hours  Outcome: Progressing      The patient's goals for the shift include      The clinical goals for the shift include Patient's SBP will remain >90 and <170 through out this shift.  Patient had pacemaker check today, remains in apacedwith no afib. Patient started on amiodarone po today. Left upper chest dressing aquacel dressing dry and intact with sling continued on left arm.

## 2024-01-08 NOTE — PROGRESS NOTES
INPATIENT NEPHROLOGY CONSULT PROGRESS NOTE      Patient Name: Fifi Jenkins MRN: [id number]  DATE of SERVICE: January 8, 2024  TIME of SERVICE: 11:54 AM  CONSULTING SERVICE: Nephrology    REASON for CONSULT: Metabolic alkalosis    SUBJECTIVE:  Seen and evaluated at bedside.  Continues to endorse weakness however better than yesterday.  Oxygen requirement stable at 3 L.  Urine output 2.6 L on current regimen including Bumex and spironolactone.  In addition to Jardiance. metabolic alkalosis managed with acetazolamide    SUMMARY OF STAY:  Ms. Jenkins is a pleasant morbidly obese 88-year-old female with past medical history significant for chronic kidney disease stage IIIa with baseline serum creatinine 1 mg/dL EGFR 50 mill per minute per 1.73 m², long-term resident at [facility].  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 CVA in 2019, with residual dizziness. Patient presented to [facility] December 21, 2023 for evaluation of progressively worsening shortness of breath.  Upon presentation patient was hypoxic requiring 3 L of oxygen.  Home medications: losartan 50 mg p.o. daily, metolazone 5 mg weekly, torsemide 40 mg p.o. daily  Current medications: Newly started Jardiance 10 mg, losartan has been on hold since admission.  Metolazone 5 mg weekly.  Received Lasix 40 mg IV twice daily. Echocardiogram demonstrated ejection fraction 55%.  Labs Serum creatinine up to 1.34 mg deciliter BUN of 28 and GFR of 39.  From serum creatinine of 1 mg deciliter and a GFR of 58 mL/min upon presentation.  Anemia hemoglobin of 8.2 mg deciliter. December 21, 2023 CT with IV  contrast: No signs of pulmonary embolism demonstrated cardiomegaly without pericardial effusion.  Scattered emphysematous changes with small bilateral pleural effusions and mild bibasilar area of infiltrate or atelectasis.    ASSESSMENT AND PLAN:  CHERYL on CKD IIIa:  likely hemodynamically mediated in the setting of diuretics adjustment and vasoactive medications (Jardiance), contrast nephropathy (contrast exposure December 21).  Serum creatinine up to 1.34 mg deciliter BUN of 28 and GFR of 39.  From serum creatinine of 1 mg deciliter and a GFR of 58 mL/min upon presentation.     Volume status: Hypervolemic     CKD IIIa: Diabetic nephropathy, hypertensive nephrosclerosis  Electrolytes: Managed (with renal diet)  Hypertension: Relative hypotension blood pressure 100 oh 3/60 with a heart rate of 81  Without being on antihypertensive medications  Acid-base: Metabolic alkalosis likely secondary to diuresis  Anemia from renal failure, To check iron panel and start epogen. hemoglobin of 8.2 mg deciliter.  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    Plan:  Metabolic alkalosis improving to continue acetazolamide.  Urine output still significant 2.6 L, if remains more than 2 L within the next 24 hours then we will reduce Bumex dose from 2 mg to 1 mg p.o. daily.    Tolerating spironolactone 12.5 mg and Jardiance 10 mg.    Oxygen requirement stable at 3 L.    Iron deficiency anemia receiving IV Venofer X5 doses    Vasoactive medication resumed and tolerated including valsartan.     We will continue to monitor closely with  you, thank you!    Medications:    Current Facility-Administered Medications:     acetaminophen (Tylenol) tablet 487.5 mg, 487.5 mg, oral, BID PRN, BREE Chavez    acetaZOLAMIDE (Diamox) 12 hr capsule 500 mg, 500 mg, oral, BID, Mary Solomon MD, 500 mg at 01/08/24 0921    albuterol 2.5 mg /3 mL (0.083 %) nebulizer solution 2.5 mg, 2.5 mg, nebulization, q4h PRN, Steve Sneed MD, 2.5 mg at 12/30/23 1144    amiodarone (Pacerone) tablet 200 mg, 200 mg, oral, BID, Steve Sneed MD, 200 mg at 01/08/24 0920    apixaban (Eliquis) tablet 5 mg, 5 mg, oral, BID, Ayanna Giles PA-C, 5 mg at 01/08/24 1136    atorvastatin (Lipitor) tablet 20 mg, 20 mg, oral, Nightly, Steve Sneed MD, 20 mg at 01/06/24 2119    bumetanide (Bumex) tablet 2 mg, 2 mg, oral, Daily, Mary Solomon MD, 2 mg at 01/08/24 0919    calcium carbonate (Tums) chewable tablet 500 mg, 1 tablet, oral, q4h PRN, Steve Sneed MD, 500 mg at 01/03/24 0044    cyclobenzaprine (Flexeril) tablet 10 mg, 10 mg, oral, BID PRN, Steve Sneed MD    dextrose 10 % in water (D10W) infusion, 0.3 g/kg/hr, intravenous, Once PRN, Steve Sneed MD    dextrose 50 % injection 25 g, 25 g, intravenous, q15 min PRN, Steve Sneed MD    docusate sodium (Colace) capsule 100 mg, 100 mg, oral, BID PRN, BREE Chavez    empagliflozin (Jardiance) tablet 10 mg, 10 mg, oral, Daily, Steve Sneed MD, 10 mg at 01/08/24 0920    glucagon (Glucagen) injection 1 mg, 1 mg, intramuscular, q15 min PRN, Steve Sneed MD    iron sucrose (Venofer) injection 200 mg, 200 mg, intravenous, Daily, Mary Solomon MD, 200 mg at 01/08/24 1129    lubricating eye drops ophthalmic solution 1 drop, 1 drop, Both Eyes, q4h PRN, Steve Sneed MD, 1 drop at 12/27/23 0911    magnesium oxide (Mag-Ox) tablet 800 mg, 800 mg, oral, BID, Maye Delgadillo, APRN-CNP, 800 mg at 01/08/24 0933    melatonin tablet 3 mg, 3 mg, oral, Nightly, Anayeli L  BREE Harding    metoprolol succinate XL (Toprol-XL) 24 hr tablet 50 mg, 50 mg, oral, Daily, Kim Alonso MD, 50 mg at 01/08/24 0919    naloxone (Narcan) injection 0.2 mg, 0.2 mg, intravenous, q5 min PRN, Alejandro Mancia MD    oxygen (O2) therapy, , inhalation, Continuous PRN - O2/gases, BREE Ortiz    perflutren lipid microspheres (Definity) injection 0.5-10 mL of dilution, 0.5-10 mL of dilution, intravenous, Once in imaging, Steve Sneed MD    perflutren protein A microsphere (Optison) injection 0.5 mL, 0.5 mL, intravenous, Once in imaging, Steve Sneed MD    polyethylene glycol (Glycolax, Miralax) packet 17 g, 17 g, oral, Daily PRN, BREE Chavez    propofol (Diprivan) injection, , , PRN, Lamine Savage MD, 60 mg at 12/29/23 1343    QUEtiapine (SEROquel) tablet 25 mg, 25 mg, oral, Nightly, BREE Chavez    sennosides (Senokot) tablet 8.6 mg, 1 tablet, oral, Nightly PRN, BREE Chavez    spironolactone (Aldactone) tablet 12.5 mg, 12.5 mg, oral, Daily, Kim Alonso MD, 12.5 mg at 01/08/24 0920    sulfur hexafluoride microsphr (Lumason) injection 24.28 mg, 2 mL, intravenous, Once in imaging, Steve Sneed MD    traMADol (Ultram) tablet 50 mg, 50 mg, oral, q8h PRN, BREE Chavez    valsartan (Diovan) tablet 40 mg, 40 mg, oral, Daily, Kim Alonso MD, 40 mg at 01/08/24 0921    PERTINENT ROS:  GENERAL:  positive for fatigue, poor appetite.  No fever/chills  RESPIRATORY:  positive for shortness of breath.  Negative for cough, wheezing.  CARDIOVASCULAR:   Negative for chest pain or palpitation.  GI:  Negative for abdominal pain, diarrhea, heartburn, nausea, vomiting  : Dysuria    Physical Exam:  Vital signs in last 24 hours:  Temp:  [36.2 °C (97.2 °F)-36.5 °C (97.7 °F)] 36.2 °C (97.2 °F)  Heart Rate:  [58-68] 68  Resp:  [13-24] 13  BP: (144-165)/(51-65) 158/65    General: Awake, cooperative, requiring  3 L of oxygen  HEENT:  NCAT,  mucous membranes moist and pink  NECK:  Elevated JVD, no carotid bruit, supple, no cervical mass or thyromegaly  LUNGS;  Diminished breath sounds, fine Rales  CV:  Distant, regular rate and rhythm, no murmurs  ABDOMEN:  abdomen soft, nontender, BS normal, no masses or organomegaly  EDEMA:  +1 lower extremity edema/dependent edema  SKIN:  dry and normal turgor, no clubbing, cyanosis or petechia.  No rashes noted      Intake/Output last 3 shifts:  I/O last 3 completed shifts:  In: 720 (5.5 mL/kg) [P.O.:720]  Out: 3100 (23.5 mL/kg) [Urine:3100 (0.7 mL/kg/hr)]  Weight: 131.7 kg     DATA:  Diagnotic tests reviewed for Todays visit:  Results from last 7 days   Lab Units 01/08/24  0424   WBC AUTO x10*3/uL 7.4   RBC AUTO x10*6/uL 2.70*   HEMOGLOBIN g/dL 7.7*   HEMATOCRIT % 27.7*       Results from last 7 days   Lab Units 01/08/24  0424   SODIUM mmol/L 143   POTASSIUM mmol/L 3.9   CHLORIDE mmol/L 93*   CO2 mmol/L 44*   BUN mg/dL 18   CREATININE mg/dL 0.80   CALCIUM mg/dL 8.9   MAGNESIUM mg/dL 1.67             IMAGING: CXR reviewed in  images      SIGNATURE: Mary Solomon MD  Nephrology and Hypertension  [address] [facility] [place]  Office phone: [phone]  FAX: [phone]    This note was partially generated using the Dragon voice recognition system, and there may be some incorrect words, spelling's and punctuation that were not noted in checking the note before saving.

## 2024-01-08 NOTE — PROGRESS NOTES
Internal Medicine Progress Note    Patient Name: Fifi Jenkins          MRN: [id number]  Today's Date: January 8, 2024          Attending: Nick Montaño MD    Subjective:  Patient was seen and examined at bedside     Review Of Systems:  GENERAL: awake, in no distress  CARDIOVASCULAR: Negative for chest pain  Respiratory: denies shortness of breath  GI: No nausea, vomiting, or diarrhea    Objective:  Vitals:    01/07/24 2325 01/08/24 0500 01/08/24 0600 01/08/24 0816   BP: 148/54 165/53     BP Location: Right arm Right arm     Patient Position: Lying Lying     Pulse: 62 64     Resp: 13 13     Temp: 36.2 °C (97.2 °F) 36.3 °C (97.3 °F)  36.2 °C (97.2 °F)   TempSrc: Temporal Temporal  Temporal   SpO2: 98% 98%     Weight:   132 kg (290 lb 5.5 oz)    Height:           Physical Exam:   General appearance: in no acute distress  Lungs: diminished breath sounds  Heart:  bradycardia, without murmur  Abdomen: Soft, non-tender  Neuro: Alert oriented x3    Labs:  Results for orders placed or performed during the hospital encounter of 12/21/23 (from the past 24 hour(s))   POCT GLUCOSE   Result Value Ref Range    POCT Glucose 122 (H) 74 - 99 mg/dL   Magnesium   Result Value Ref Range    Magnesium 1.67 1.60 - 2.40 mg/dL   CBC   Result Value Ref Range    WBC 7.4 4.4 - 11.3 x10*3/uL    nRBC 0.0 0.0 - 0.0 /100 WBCs    RBC 2.70 (L) 4.00 - 5.20 x10*6/uL    Hemoglobin 7.7 (L) 12.0 - 16.0 g/dL    Hematocrit 27.7 (L) 36.0 - 46.0 %     (H) 80 - 100 fL    MCH 28.5 26.0 - 34.0 pg    MCHC 27.8 (L) 32.0 - 36.0 g/dL    RDW 19.0 (H) 11.5 - 14.5 %    Platelets 149 (L) 150 - 450 x10*3/uL   Basic Metabolic Panel   Result Value Ref Range    Glucose 94 74 - 99 mg/dL    Sodium 143 136 - 145 mmol/L    Potassium 3.9 3.5 - 5.3 mmol/L    Chloride 93 (L) 98 - 107 mmol/L    Bicarbonate 44 (HH) 21 - 32 mmol/L    Anion Gap 10 10 - 20 mmol/L    Urea Nitrogen 18 6 - 23 mg/dL    Creatinine 0.80 0.50 - 1.05 mg/dL    eGFR 71 >60 mL/min/1.73m*2    Calcium 8.9 8.6  - 10.3 mg/dL   POCT GLUCOSE   Result Value Ref Range    POCT Glucose 95 74 - 99 mg/dL       Medications:  Scheduled medications  acetaZOLAMIDE, 500 mg, oral, BID  amiodarone, 200 mg, oral, BID  apixaban, 5 mg, oral, BID  atorvastatin, 20 mg, oral, Nightly  bumetanide, 2 mg, oral, Daily  empagliflozin, 10 mg, oral, Daily  iron sucrose, 200 mg, intravenous, Daily  melatonin, 3 mg, oral, Nightly  metoprolol succinate XL, 50 mg, oral, Daily  perflutren lipid microspheres, 0.5-10 mL of dilution, intravenous, Once in imaging  perflutren protein A microsphere, 0.5 mL, intravenous, Once in imaging  QUEtiapine, 25 mg, oral, Nightly  spironolactone, 12.5 mg, oral, Daily  sulfur hexafluoride microsphr, 2 mL, intravenous, Once in imaging  valsartan, 40 mg, oral, Daily      Continuous medications     PRN medications  PRN medications: acetaminophen, albuterol, calcium carbonate, cyclobenzaprine, dextrose 10 % in water (D10W), dextrose, docusate sodium, glucagon, lubricating eye drops, naloxone, oxygen, polyethylene glycol, propofol, sennosides, traMADol      Assessment/Plan:  Medical Problems       Problem List       * (Principal) Acute on chronic heart failure with reducedejection fraction (CMS/HCC)    Improving  Continue current medications and measures  Cardiology and EP is following      Paroxysmal atrial fibrillation (CMS/[facility])     S/P dual-chamber permanent pacemaker placement  Continue amiodarone, metoprolol and Eliquis  Will get pacemaker check today  Continue monitoring  Cardiology and EP are following         Hyperlipidemia     Continue atorvastatin         Acute respiratory failure with hypoxia (CMS/[facility])     On oxygen supplement         Stage 3 chronic kidney disease (CMS/[facility])  Acute kidney injury     Continue monitoring  Nephrology is following         Type 2 diabetes mellitus with chronic kidney disease, without long-term current use of insulin (CMS/[facility])     Sliding scall insulin     Generalized  "weakness  PT/OT    Altered mental status  Continue monitoring        Possible discharge tomorrow    Discussed with patient, RN    Nick Montaño MD   Date: 01/08/24  Time: 9:21 AM    This note was partially created using voice recognition software and is inherently subject to errors including those of syntax and \"sound-alike\" substitutions which may escape proofreading. In such instances, original meaning may be extrapolated by contextual derivation  "

## 2024-01-08 NOTE — PROGRESS NOTES
Subjective Data:  Status post permanent pacemaker  6 sinus syndrome paroxysmal A-fib status post cardioversion  Chronic kidney disease  Resumed on Eliquis  Seen by EP over the weekend Dr. 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  On Jardiance 10 daily  [employer] [place] UR ED DR MRI W1 DR 01 with 19231 leads  RA threshold by device check was 0.5 V as well as RV threshold  P waves 0.5 R wave 9.5  MVP 60-1 20  Arranging placement    Overnight Events:    Monitoring atrial rhythm no new developments     Objective Data:  Last Recorded Vitals:  Vitals:    01/07/24 1925 01/07/24 2325 01/08/24 0500 01/08/24 0600   BP: 144/51 148/54 165/53    BP Location: Right arm Right arm Right arm    Patient Position: Lying Lying Lying    Pulse: 58 62 64    Resp: 15 13 13    Temp: 36.3 °C (97.3 °F) 36.2 °C (97.2 °F) 36.3 °C (97.3 °F)    TempSrc: Temporal Temporal Temporal    SpO2: 93% 98% 98%    Weight:    132 kg (290 lb 5.5 oz)   Height:           Last Labs:  CBC - 1/8/2024:  4:24 AM  7.4 7.7 149    27.7      CMP - 1/8/2024:  4:24 AM  8.9 6.2 11 --- 0.8   _ 4.1 9 91      PTT - No results in last year.  _   _ _     TROPHS   Date/Time Value Ref Range Status   12/21/2023 01:32 PM 9 0 - 13 ng/L Final   12/21/2023 11:58 AM 8 0 - 13 ng/L Final     BNP   Date/Time Value Ref Range Status   12/21/2023 11:58  0 - 99 pg/mL Final   03/06/2019 04:50  0 - 99 pg/mL Final     Comment:     .  <100 pg/mL - Heart failure unlikely  100-299 pg/mL - Intermediate probability of acute heart  .               failure exacerbation. Correlate with clinical  .               context and patient history.    >=300 pg/mL - Heart Failure likely. Correlate with clinical  .               context and patient history.  BNP testing is performed using different testing   methodology  at [facility] than at other   system [facility]. Direct result comparisons should   only be made within the same method.     03/05/2019 08:18  0 - 99 pg/mL Final     Comment:     .  <100 pg/mL - Heart failure unlikely  100-299 pg/mL - Intermediate probability of acute heart  .               failure exacerbation. Correlate with clinical  .               context and patient history.    >=300 pg/mL - Heart Failure likely. Correlate with clinical  .               context and patient history.  BNP testing is performed using different testing   methodology at [facility] than at other   [facility]. Direct result comparisons should   only be made within the same method.       HGBA1C   Date/Time Value Ref Range Status   10/02/2023 03:06 PM 6.3 4.3 - 5.6 % Final     Comment:     American Diabetes Association guidelines indicate that patients with HgbA1c in the range 5.7-6.4% are at increased risk for development of diabetes, and intervention by lifestyle modification may be beneficial. HgbA1c greater or equal to 6.5% is considered diagnostic of diabetes.   03/16/2019 05:30 AM 5.6 % Final     Comment:          Diagnosis of Diabetes-Adults   Non-Diabetic: < or = 5.6%   Increased risk for developing diabetes: 5.7-6.4%   Diagnostic of diabetes: > or = 6.5%  .       Monitoring of Diabetes                Age (y)     Therapeutic Goal (%)   Adults:          >18           <7.0   Pediatrics:    13-18           <7.5                   7-12           <8.0                   0- 6            7.5-8.5   American Diabetes Association. Diabetes Care 33(S1), Jan 2010.     02/23/2019 07:30 PM 6.0 % Final     Comment:          Diagnosis of Diabetes-Adults   Non-Diabetic: < or = 5.6%   Increased risk for developing diabetes: 5.7-6.4%   Diagnostic of diabetes: > or = 6.5%  .       Monitoring of Diabetes                Age (y)     Therapeutic Goal (%)   Adults:          >18           <7.0   Pediatrics:    13-18            <7.5                   7-12           <8.0                   0- 6            7.5-8.5   American Diabetes Association. Diabetes Care 33(S1), Jan 2010.       VLDL   Date/Time Value Ref Range Status   08/23/2023 01:25 PM 12 0 - 40 mg/dL Final   02/23/2019 07:30 PM 16 0 - 40 mg/dL Final      Last I/O:  I/O last 3 completed shifts:  In: 720 (5.5 mL/kg) [P.O.:720]  Out: 3100 (23.5 mL/kg) [Urine:3100 (0.7 mL/kg/hr)]  Weight: 131.7 kg     Past Cardiology Tests (Last 3 Years):  EKG:  ECG 12 Lead 01/02/2024      ECG 12 Lead 12/30/2023      ECG 12 lead 12/21/2023      ECG 12 lead 12/21/2023      ECG 12 lead 12/21/2023    Echo:  Transthoracic Echo (TTE) Limited 12/29/2023      Transthoracic Echo (TTE) Complete 12/24/2023    Ejection Fractions:  EF   Date/Time Value Ref Range Status   12/29/2023 02:53 PM 37     12/24/2023 02:23 PM 33       Cath:  No results found for this or any previous visit from the past 1095 days.    Stress Test:  No results found for this or any previous visit from the past 1095 days.    Cardiac Imaging:  No results found for this or any previous visit from the past 1095 days.      Inpatient Medications:  Scheduled medications   Medication Dose Route Frequency    acetaZOLAMIDE  500 mg oral BID    [Held by provider] apixaban  5 mg oral BID    atorvastatin  20 mg oral Nightly    bumetanide  2 mg oral Daily    empagliflozin  10 mg oral Daily    iron sucrose  200 mg intravenous Daily    melatonin  3 mg oral Nightly    metoprolol succinate XL  50 mg oral Daily    perflutren lipid microspheres  0.5-10 mL of dilution intravenous Once in imaging    perflutren protein A microsphere  0.5 mL intravenous Once in imaging    QUEtiapine  25 mg oral Nightly    spironolactone  12.5 mg oral Daily    sulfur hexafluoride microsphr  2 mL intravenous Once in imaging    valsartan  40 mg oral Daily     PRN medications   Medication    acetaminophen    albuterol    calcium carbonate    cyclobenzaprine    dextrose 10 % in  water (D10W)    dextrose    docusate sodium    glucagon    lubricating eye drops    naloxone    oxygen    polyethylene glycol    propofol    sennosides    traMADol     Continuous Medications   Medication Dose Last Rate     Results for orders placed or performed during the hospital encounter of 12/21/23 (from the past 96 hour(s))   POCT GLUCOSE   Result Value Ref Range    POCT Glucose 129 (H) 74 - 99 mg/dL   POCT GLUCOSE   Result Value Ref Range    POCT Glucose 133 (H) 74 - 99 mg/dL   POCT GLUCOSE   Result Value Ref Range    POCT Glucose 110 (H) 74 - 99 mg/dL   Magnesium   Result Value Ref Range    Magnesium 2.09 1.60 - 2.40 mg/dL   CBC   Result Value Ref Range    WBC 5.9 4.4 - 11.3 x10*3/uL    nRBC 0.0 0.0 - 0.0 /100 WBCs    RBC 2.89 (L) 4.00 - 5.20 x10*6/uL    Hemoglobin 8.3 (L) 12.0 - 16.0 g/dL    Hematocrit 29.8 (L) 36.0 - 46.0 %     (H) 80 - 100 fL    MCH 28.7 26.0 - 34.0 pg    MCHC 27.9 (L) 32.0 - 36.0 g/dL    RDW 17.9 (H) 11.5 - 14.5 %    Platelets 136 (L) 150 - 450 x10*3/uL   Basic Metabolic Panel   Result Value Ref Range    Glucose 96 74 - 99 mg/dL    Sodium 141 136 - 145 mmol/L    Potassium 4.3 3.5 - 5.3 mmol/L    Chloride 98 98 - 107 mmol/L    Bicarbonate 39 (H) 21 - 32 mmol/L    Anion Gap 8 (L) 10 - 20 mmol/L    Urea Nitrogen 30 (H) 6 - 23 mg/dL    Creatinine 0.93 0.50 - 1.05 mg/dL    eGFR 60 (L) >60 mL/min/1.73m*2    Calcium 8.5 (L) 8.6 - 10.3 mg/dL   POCT GLUCOSE   Result Value Ref Range    POCT Glucose 95 74 - 99 mg/dL   POCT GLUCOSE   Result Value Ref Range    POCT Glucose 86 74 - 99 mg/dL   POCT GLUCOSE   Result Value Ref Range    POCT Glucose 89 74 - 99 mg/dL   POCT GLUCOSE   Result Value Ref Range    POCT Glucose 76 74 - 99 mg/dL   Magnesium   Result Value Ref Range    Magnesium 1.86 1.60 - 2.40 mg/dL   CBC   Result Value Ref Range    WBC 6.3 4.4 - 11.3 x10*3/uL    nRBC 0.0 0.0 - 0.0 /100 WBCs    RBC 2.58 (L) 4.00 - 5.20 x10*6/uL    Hemoglobin 7.4 (L) 12.0 - 16.0 g/dL    Hematocrit 27.7 (L)  36.0 - 46.0 %     (H) 80 - 100 fL    MCH 28.7 26.0 - 34.0 pg    MCHC 26.7 (L) 32.0 - 36.0 g/dL    RDW 17.7 (H) 11.5 - 14.5 %    Platelets 121 (L) 150 - 450 x10*3/uL   Basic Metabolic Panel   Result Value Ref Range    Glucose 110 (H) 74 - 99 mg/dL    Sodium 140 136 - 145 mmol/L    Potassium 4.3 3.5 - 5.3 mmol/L    Chloride 97 (L) 98 - 107 mmol/L    Bicarbonate 38 (H) 21 - 32 mmol/L    Anion Gap 9 (L) 10 - 20 mmol/L    Urea Nitrogen 26 (H) 6 - 23 mg/dL    Creatinine 0.85 0.50 - 1.05 mg/dL    eGFR 66 >60 mL/min/1.73m*2    Calcium 8.3 (L) 8.6 - 10.3 mg/dL   POCT GLUCOSE   Result Value Ref Range    POCT Glucose 99 74 - 99 mg/dL   POCT GLUCOSE   Result Value Ref Range    POCT Glucose 122 (H) 74 - 99 mg/dL   POCT GLUCOSE   Result Value Ref Range    POCT Glucose 155 (H) 74 - 99 mg/dL   POCT GLUCOSE   Result Value Ref Range    POCT Glucose 115 (H) 74 - 99 mg/dL   Magnesium   Result Value Ref Range    Magnesium 1.68 1.60 - 2.40 mg/dL   CBC   Result Value Ref Range    WBC 7.5 4.4 - 11.3 x10*3/uL    nRBC 0.0 0.0 - 0.0 /100 WBCs    RBC 2.55 (L) 4.00 - 5.20 x10*6/uL    Hemoglobin 7.3 (L) 12.0 - 16.0 g/dL    Hematocrit 25.9 (L) 36.0 - 46.0 %     (H) 80 - 100 fL    MCH 28.6 26.0 - 34.0 pg    MCHC 28.2 (L) 32.0 - 36.0 g/dL    RDW 18.6 (H) 11.5 - 14.5 %    Platelets 130 (L) 150 - 450 x10*3/uL   Basic Metabolic Panel   Result Value Ref Range    Glucose 110 (H) 74 - 99 mg/dL    Sodium 142 136 - 145 mmol/L    Potassium 4.0 3.5 - 5.3 mmol/L    Chloride 95 (L) 98 - 107 mmol/L    Bicarbonate 45 (HH) 21 - 32 mmol/L    Anion Gap <7 (L) 10 - 20 mmol/L    Urea Nitrogen 22 6 - 23 mg/dL    Creatinine 0.80 0.50 - 1.05 mg/dL    eGFR 71 >60 mL/min/1.73m*2    Calcium 8.3 (L) 8.6 - 10.3 mg/dL   POCT GLUCOSE   Result Value Ref Range    POCT Glucose 93 74 - 99 mg/dL   POCT GLUCOSE   Result Value Ref Range    POCT Glucose 122 (H) 74 - 99 mg/dL   Magnesium   Result Value Ref Range    Magnesium 1.67 1.60 - 2.40 mg/dL   CBC   Result Value Ref  Range    WBC 7.4 4.4 - 11.3 x10*3/uL    nRBC 0.0 0.0 - 0.0 /100 WBCs    RBC 2.70 (L) 4.00 - 5.20 x10*6/uL    Hemoglobin 7.7 (L) 12.0 - 16.0 g/dL    Hematocrit 27.7 (L) 36.0 - 46.0 %     (H) 80 - 100 fL    MCH 28.5 26.0 - 34.0 pg    MCHC 27.8 (L) 32.0 - 36.0 g/dL    RDW 19.0 (H) 11.5 - 14.5 %    Platelets 149 (L) 150 - 450 x10*3/uL   Basic Metabolic Panel   Result Value Ref Range    Glucose 94 74 - 99 mg/dL    Sodium 143 136 - 145 mmol/L    Potassium 3.9 3.5 - 5.3 mmol/L    Chloride 93 (L) 98 - 107 mmol/L    Bicarbonate 44 (HH) 21 - 32 mmol/L    Anion Gap 10 10 - 20 mmol/L    Urea Nitrogen 18 6 - 23 mg/dL    Creatinine 0.80 0.50 - 1.05 mg/dL    eGFR 71 >60 mL/min/1.73m*2    Calcium 8.9 8.6 - 10.3 mg/dL   POCT GLUCOSE   Result Value Ref Range    POCT Glucose 95 74 - 99 mg/dL        Physical Exam:  Subjective:   Examination:   General Examination:   General Appearance: Well developed, well nourished, in no acute distress.  Morbidly obese  Head: normocephalic, atraumatic   Eyes: Anicteric sclera. Pupils are equally round and reactive to light.  Extraocular movements are intact.    Ears: normal   Oral: Cavity: mucosa moist.   Throat: clear.   Neck/Thyroid: neck supple, full range of motion, no cervical lymphadenopathy.   Skin: warm and dry, no suspicious lesions.    Heart: regular rate and rhythm, S1, S2 normal, no murmurs.   Lungs: clear to auscultation bilaterally.   Abdomen: soft, non-tender, non-distended, bowl sound present, normal.   Extremities: no clubbing, no cyanosis, 2+  Neuro: non-focal, motor strength normal upper lower extremities, sensory exam intact.  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        Steve Sneed,  MD

## 2024-01-08 NOTE — PROGRESS NOTES
Updates sent to [facility] in [facility]. Per physcian documentation, possibe discharge tomorrow.    Martina Syed RN

## 2024-01-08 NOTE — NURSING NOTE
1925 - went into patient's room for vitals and assessment. Patient uncooperative during assessment, not answering any questions   2100 - patient refusing to have blood sugar checked and refusing all medications   2200 - patient's SpO2 87-89%, patient had O2 off, refusing to place it back on. Educated patient on importance of wearing O2. [employer] notified   2210 - Patient agreed to put O2 back in   0033 - patient's SpO2 in the mid-low 80s, O2 is off, refusing to place it back on. Multiple attempts to redirect patient unsuccessful. NP notified   0042 - Patient placed in bilateral soft wrist restraints per orders   0500 - patient being cooperative during vitals and assessment. Agreeable to leave O2 in. NP notified. Restraints removed and order discontinued

## 2024-01-09 VITALS
TEMPERATURE: 97.3 F | BODY MASS INDEX: 50.7 KG/M2 | HEIGHT: 63 IN | WEIGHT: 286.16 LBS | DIASTOLIC BLOOD PRESSURE: 55 MMHG | HEART RATE: 62 BPM | SYSTOLIC BLOOD PRESSURE: 115 MMHG | RESPIRATION RATE: 18 BRPM | OXYGEN SATURATION: 95 %

## 2024-01-09 LAB
ANION GAP SERPL CALC-SCNC: 9 MMOL/L (ref 10–20)
ATRIAL RATE: 64 BPM
BUN SERPL-MCNC: 17 MG/DL (ref 6–23)
CALCIUM SERPL-MCNC: 8.5 MG/DL (ref 8.6–10.3)
CHLORIDE SERPL-SCNC: 92 MMOL/L (ref 98–107)
CO2 SERPL-SCNC: 44 MMOL/L (ref 21–32)
CREAT SERPL-MCNC: 0.92 MG/DL (ref 0.5–1.05)
EGFRCR SERPLBLD CKD-EPI 2021: 60 ML/MIN/1.73M*2
ERYTHROCYTE [DISTWIDTH] IN BLOOD BY AUTOMATED COUNT: 19.3 % (ref 11.5–14.5)
GLUCOSE SERPL-MCNC: 99 MG/DL (ref 74–99)
HCT VFR BLD AUTO: 27.1 % (ref 36–46)
HGB BLD-MCNC: 7.5 G/DL (ref 12–16)
MAGNESIUM SERPL-MCNC: 1.89 MG/DL (ref 1.6–2.4)
MCH RBC QN AUTO: 29.4 PG (ref 26–34)
MCHC RBC AUTO-ENTMCNC: 27.7 G/DL (ref 32–36)
MCV RBC AUTO: 106 FL (ref 80–100)
NRBC BLD-RTO: 0 /100 WBCS (ref 0–0)
PLATELET # BLD AUTO: 153 X10*3/UL (ref 150–450)
POTASSIUM SERPL-SCNC: 3.9 MMOL/L (ref 3.5–5.3)
Q ONSET: 195 MS
QRS COUNT: 11 BEATS
QRS DURATION: 140 MS
QT INTERVAL: 442 MS
QTC CALCULATION(BAZETT): 448 MS
QTC FREDERICIA: 447 MS
R AXIS: -74 DEGREES
RBC # BLD AUTO: 2.55 X10*6/UL (ref 4–5.2)
SODIUM SERPL-SCNC: 141 MMOL/L (ref 136–145)
T AXIS: -48 DEGREES
T OFFSET: 416 MS
VENTRICULAR RATE: 62 BPM
WBC # BLD AUTO: 7 X10*3/UL (ref 4.4–11.3)

## 2024-01-09 PROCEDURE — 80048 BASIC METABOLIC PNL TOTAL CA: CPT | Performed by: INTERNAL MEDICINE

## 2024-01-09 PROCEDURE — 36415 COLL VENOUS BLD VENIPUNCTURE: CPT | Performed by: INTERNAL MEDICINE

## 2024-01-09 PROCEDURE — 2500000004 HC RX 250 GENERAL PHARMACY W/ HCPCS (ALT 636 FOR OP/ED): Performed by: INTERNAL MEDICINE

## 2024-01-09 PROCEDURE — 2500000002 HC RX 250 W HCPCS SELF ADMINISTERED DRUGS (ALT 637 FOR MEDICARE OP, ALT 636 FOR OP/ED): Performed by: NURSE PRACTITIONER

## 2024-01-09 PROCEDURE — 2500000004 HC RX 250 GENERAL PHARMACY W/ HCPCS (ALT 636 FOR OP/ED): Performed by: NURSE PRACTITIONER

## 2024-01-09 PROCEDURE — 2500000001 HC RX 250 WO HCPCS SELF ADMINISTERED DRUGS (ALT 637 FOR MEDICARE OP): Performed by: INTERNAL MEDICINE

## 2024-01-09 PROCEDURE — 84520 ASSAY OF UREA NITROGEN: CPT | Performed by: INTERNAL MEDICINE

## 2024-01-09 PROCEDURE — 85027 COMPLETE CBC AUTOMATED: CPT | Performed by: INTERNAL MEDICINE

## 2024-01-09 PROCEDURE — 2500000001 HC RX 250 WO HCPCS SELF ADMINISTERED DRUGS (ALT 637 FOR MEDICARE OP): Performed by: PHYSICIAN ASSISTANT

## 2024-01-09 PROCEDURE — 83735 ASSAY OF MAGNESIUM: CPT | Performed by: INTERNAL MEDICINE

## 2024-01-09 RX ORDER — VALSARTAN 40 MG/1
40 TABLET ORAL DAILY
Start: 2024-01-10 | End: 2024-03-18 | Stop reason: WASHOUT

## 2024-01-09 RX ORDER — QUETIAPINE FUMARATE 25 MG/1
25 TABLET, FILM COATED ORAL NIGHTLY
Start: 2024-01-09 | End: 2024-04-30 | Stop reason: WASHOUT

## 2024-01-09 RX ORDER — SPIRONOLACTONE 25 MG/1
12.5 TABLET ORAL DAILY
Start: 2024-01-10 | End: 2024-03-18 | Stop reason: WASHOUT

## 2024-01-09 RX ORDER — AMIODARONE HYDROCHLORIDE 200 MG/1
200 TABLET ORAL DAILY
Start: 2024-01-10

## 2024-01-09 RX ORDER — BUMETANIDE 2 MG/1
2 TABLET ORAL DAILY
Start: 2024-01-10 | End: 2024-03-18 | Stop reason: DRUGHIGH

## 2024-01-09 RX ORDER — LANOLIN ALCOHOL/MO/W.PET/CERES
800 CREAM (GRAM) TOPICAL ONCE
Status: COMPLETED | OUTPATIENT
Start: 2024-01-09 | End: 2024-01-09

## 2024-01-09 RX ORDER — METOPROLOL SUCCINATE 50 MG/1
50 TABLET, EXTENDED RELEASE ORAL DAILY
Start: 2024-01-10

## 2024-01-09 RX ADMIN — AMIODARONE HYDROCHLORIDE 200 MG: 200 TABLET ORAL at 09:22

## 2024-01-09 RX ADMIN — APIXABAN 5 MG: 5 TABLET, FILM COATED ORAL at 09:23

## 2024-01-09 RX ADMIN — VALSARTAN 40 MG: 40 TABLET, FILM COATED ORAL at 09:24

## 2024-01-09 RX ADMIN — BUMETANIDE 2 MG: 1 TABLET ORAL at 09:22

## 2024-01-09 RX ADMIN — EMPAGLIFLOZIN 10 MG: 10 TABLET, FILM COATED ORAL at 09:23

## 2024-01-09 RX ADMIN — ACETAZOLAMIDE 500 MG: 500 CAPSULE ORAL at 09:23

## 2024-01-09 RX ADMIN — SODIUM CHLORIDE 250 ML: 9 INJECTION, SOLUTION INTRAVENOUS at 05:09

## 2024-01-09 RX ADMIN — Medication 800 MG: at 10:03

## 2024-01-09 RX ADMIN — METOPROLOL SUCCINATE 50 MG: 50 TABLET, EXTENDED RELEASE ORAL at 09:23

## 2024-01-09 RX ADMIN — SPIRONOLACTONE 12.5 MG: 25 TABLET ORAL at 09:23

## 2024-01-09 RX ADMIN — IRON SUCROSE 200 MG: 20 INJECTION, SOLUTION INTRAVENOUS at 09:22

## 2024-01-09 RX ADMIN — ACETAMINOPHEN 487.5 MG: 325 TABLET ORAL at 09:24

## 2024-01-09 ASSESSMENT — PAIN SCALES - GENERAL
PAINLEVEL_OUTOF10: 0 - NO PAIN
PAINLEVEL_OUTOF10: 6
PAINLEVEL_OUTOF10: 0 - NO PAIN
PAINLEVEL_OUTOF10: 9
PAINLEVEL_OUTOF10: 0 - NO PAIN
PAINLEVEL_OUTOF10: 0 - NO PAIN

## 2024-01-09 ASSESSMENT — PAIN - FUNCTIONAL ASSESSMENT
PAIN_FUNCTIONAL_ASSESSMENT: 0-10

## 2024-01-09 ASSESSMENT — COGNITIVE AND FUNCTIONAL STATUS - GENERAL
MOVING TO AND FROM BED TO CHAIR: A LOT
WALKING IN HOSPITAL ROOM: TOTAL
MOBILITY SCORE: 11
CLIMB 3 TO 5 STEPS WITH RAILING: TOTAL
STANDING UP FROM CHAIR USING ARMS: A LOT
TURNING FROM BACK TO SIDE WHILE IN FLAT BAD: A LOT
MOVING FROM LYING ON BACK TO SITTING ON SIDE OF FLAT BED WITH BEDRAILS: A LITTLE

## 2024-01-09 ASSESSMENT — PAIN DESCRIPTION - LOCATION: LOCATION: HEAD

## 2024-01-09 NOTE — NURSING NOTE
Cardiovascular Nurse Navigator Education     Patient was admitted 12/21/23 with worsened shortness of breath with exertion and diagnosed with acute combined systolic/diastolic HF and Jardiance and IV Lasix was added, Metolazone adjusted. Developed sustained a-fib/RVR and Metoprolol and Digoxin were initiated. Echo revealed decompensated HF with decreased EF from 55% down to 36% considered to possibly be tachycardic-induced. During her stay she developed hypotension and renal insufficiency and Losartan was held. Dr. Solomon saw and is ok with Jardiance with UTI precautions, stop Torsemide/Metolazone and start Bumetanide. 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  She was cardioverted 12/29/23 with subsequent SB and intermittent junctional escape rhythm, and pauses with 4-5 sec periods of apnea.  Metoprolol and Digoxin were held. Limited echo post cardioversion showed dilated LV with EF 27-34% and marked biatrial enlargement. By 12/29 evening she went back into bursts of a-fib/RVR with SB in between. DDDR pacemaker implanted 1/5/23 with subsequent AMS and brain attack called, CT negative. 1/6 Spironolactone 12.5 mg and Valsartan 40 mg added. 1/7 Metoprolol Tartrate 25 mg BID started then changed to Metoprolol succinate 50 mg daily. Elevated bicarb, Acetazolamide added. Treated with Venofer IV x4 doses total for iron deficiency anemia. She is considered medically stable to discharge back to [facility] today.    Patient has had fluctuating orientation throughout her stay. She failed the mini Cog test today. I gave her the  Living with HF booklet but her ability to comprehend is uncertain. I advised her about selecting low sodium foods and to wear her CPAP. I will instruct her on LUE restrictions post pacemaker. I placed both HF and pacemaker  instructions on the AVS for the facility to follow. I have been watching for her POA to come in to visit but she has not when I have been here. GDMT has been carefully added and managed (as above) during this stay. She is to follow up with her own cardiologist and EP as in the AVS.

## 2024-01-09 NOTE — CARE PLAN
The clinical goals for the shift include patient's SBP will remain greater than 90 and less than 170 throughout shift. The patient did not meet this goal     S: Patient admitted 12/21 for CHF exacerbation   B: History of: CHF, CAD, UMA, CKD, cerebellar stroke, afib on eliquis, diabetes   A: Patient A&Ox2, denies complaints of chest pain and shortness of breath. Remains a-paced on tele and on 2-3 L O2 via NC. Medicated per orders throughout shift. BP low at 0400 vitals, 250 ml bolus given per orders.  R: Patient to return to [place] when ready for discharge, possibly today

## 2024-01-09 NOTE — PROGRESS NOTES
Pt has a dc order.  Spoke with pt's Celeste DAMON, and provided update that pt would be going to [facility] today.  Updated Arabella and sent goldenrod via [facility].  Request to [facility] to complete 7000 and arrange transport.    11:39am:  Transportation arranged for 1:30pm.  Pt's POA, bedside RN and Arabella all aware.    WILNER Barakat

## 2024-01-09 NOTE — CARE PLAN
Problem: Heart Failure  Goal: Reduction in peripheral edema within 24 hours  Outcome: Progressing  Goal: Weight from fluid excess reduced over 2-3 days, then stabilize  Outcome: Progressing  Goal: Increase self care and/or family involvement in 24 hours  Outcome: Progressing     Problem: Skin  Goal: Participates in plan/prevention/treatment measures  Outcome: Progressing  Goal: Prevent/manage excess moisture  Outcome: Progressing  Goal: Prevent/minimize sheer/friction injuries  Outcome: Progressing  Goal: Promote/optimize nutrition  Outcome: Progressing  Goal: Decreased wound size/increased tissue granulation at next dressing change  Outcome: Progressing  Goal: Promote skin healing  Outcome: Progressing     Problem: Respiratory  Goal: Clear secretions with interventions this shift  Outcome: Progressing  Goal: Minimize anxiety/maximize coping throughout shift  Outcome: Progressing  Goal: Wean oxygen to maintain O2 saturation per order/standard this shift  Outcome: Progressing  Goal: Increase self care and/or family involvement in next 24 hours  Outcome: Progressing     Problem: Psychosocial Needs  Goal: Demonstrates ability to cope with hospitalization/illness  Outcome: Progressing  Goal: Collaborate with me, my family, and caregiver to identify my specific goals  Outcome: Progressing   The patient's goals for the shift include      The clinical goals for the shift include Pt will remain free of injuries this shift.

## 2024-01-09 NOTE — DISCHARGE INSTRUCTIONS
Home going instructions after a Pacemaker/ Defibrillator Implant    Wound care  Leave the surgical dressing in place until Dr. Mancia follow up appointment 1/15/23.  The dressing will be removed at the 1 week follow up appointment.    Please keep your incision dry, the dressing is water resistant.    You may shower avoid water directly hitting the dressing.     Inspect around the incisional site/ dressing each day - DO NOT LIFT DRESSING  It is normal for the area around the incision to be tender for a few weeks following surgery - may use ice for 20 minute intervals for discomfort.     Pain relievers such as Tylenol or Motrin are usually sufficient for pain relief.        Activity  For the first 4 to 6 weeks after implant avoid excessive/repetitive arm movement on the side of your new implant.    Do not raise, push, pull, or stretch your arm above shoulder level.    Do not  items that weigh greater than 10 lbs with the device arm.      Report to your provider Dr. Mancia:  Increased redness, swelling, drainage or gaping of your incisional site  Increased pain at site unrelieved by pain medication  Fever or chills prior to the 1 week appointment  Bright red bleeding from the incisional site or complete saturation of the dressing  Dizziness, lightheadedness, passing out, or defibrillator shocks    Remote monitoring/ Device ID card  You have been instructed by the device company representative regarding remote home monitoring.   There are multiple types of home monitoring units, please follow the instructions given  If you have questions please contact the device clinic for further instruction  After implant you will receive a temporary card.    Permanent card will come in the mail in the next few weeks.  It is important that you carry your pacemaker ID card with you at all times.    Inform all doctors and healthcare providers that you have a pacemaker.      Electromagnetic Interference:    Microwave ovens are  safe to use.    Please inform any physicians of your pacemaker implant prior to MRI for appropriate scheduling.    You should be prepared to show your pacemaker identification card to the security guards at metal detectors.    Hand wand detector should be kept away from the pacemaker.    Read the patient booklet for more information.      Follow up appointments  Incision (wound) check in 1 week  Appointment for Chest xray, device check, and provider in 3 months  These appointments will be scheduled and appear on your after visit summary

## 2024-01-09 NOTE — PROGRESS NOTES
INPATIENT NEPHROLOGY CONSULT PROGRESS NOTE      Patient Name: Fifi Jenkins MRN: [id number]  DATE of SERVICE: January 9, 2024  TIME of SERVICE: 10:47 AM  CONSULTING SERVICE: Nephrology    REASON for CONSULT: Metabolic alkalosis.     SUBJECTIVE:  Seen and evaluated at bedside.    Feeling much better today.  Urine output more concentrated down to 2 L.  Safe to continue Bumex 2 mg p.o. daily upon discharge no need to adjust the dose.      SUMMARY OF STAY:  Ms. Jenkins is a pleasant morbidly obese 88-year-old female with past medical history significant for chronic kidney disease stage IIIa with baseline serum creatinine 1 mg/dL EGFR 50 mill per minute per 1.73 m², long-term resident at [facility].  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 CVA in 2019, with residual dizziness. Patient presented to [facility] December 21, 2023 for evaluation of progressively worsening shortness of breath.  Upon presentation patient was hypoxic requiring 3 L of oxygen.  Home medications: losartan 50 mg p.o. daily, metolazone 5 mg weekly, torsemide 40 mg p.o. daily  Current medications: Newly started Jardiance 10 mg, losartan has been on hold since admission.  Metolazone 5 mg weekly.  Received Lasix 40 mg IV twice daily. Echocardiogram demonstrated ejection fraction 55%.  Labs Serum creatinine up to 1.34 mg deciliter BUN of 28 and GFR of 39.  From serum creatinine of 1 mg deciliter and a GFR of 58 mL/min upon presentation.  Anemia hemoglobin of 8.2 mg deciliter. December 21, 2023 CT with IV contrast: No signs of pulmonary embolism demonstrated cardiomegaly without pericardial  effusion.  Scattered emphysematous changes with small bilateral pleural effusions and mild bibasilar area of infiltrate or atelectasis.    ASSESSMENT AND PLAN:  CHERYL on CKD IIIa:  likely hemodynamically mediated in the setting of diuretics adjustment and vasoactive medications (Jardiance), contrast nephropathy (contrast exposure December 21).  Serum creatinine up to 1.34 mg deciliter BUN of 28 and GFR of 39.  From serum creatinine of 1 mg deciliter and a GFR of 58 mL/min upon presentation.     Volume status: Hypervolemic     CKD IIIa: Diabetic nephropathy, hypertensive nephrosclerosis  Electrolytes: Managed (with renal diet)  Hypertension: Relative hypotension blood pressure 100 oh 3/60 with a heart rate of 81  Without being on antihypertensive medications  Acid-base: Metabolic alkalosis likely secondary to diuresis  Anemia from renal failure, To check iron panel and start epogen. hemoglobin of 8.2 mg deciliter.  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    Plan:  Metabolic alkalosis improving to discontinue acetazolamide  To continue Bumex 2 mg p.o. daily  Additional discharge medications including spironolactone 25 mg p.o. daily.  Jardiance 10 mg p.o. daily.  Valsartan dose reduced  Oxygen requirement stable   Iron deficiency anemia receiving IV Venofer X5 doses.  Completed 5 doses    Cleared for discharge from nephrology standpoint    Medications:    Current Facility-Administered Medications:     acetaminophen (Tylenol) tablet 487.5 mg, 487.5 mg, oral, BID PRN, ESTEBAN Chavez-CNP, 487.5 mg at 01/09/24  0924    acetaZOLAMIDE (Diamox) 12 hr capsule 500 mg, 500 mg, oral, BID, Mary Solomon MD, 500 mg at 01/09/24 0923    albuterol 2.5 mg /3 mL (0.083 %) nebulizer solution 2.5 mg, 2.5 mg, nebulization, q4h PRN, Steve Sneed MD, 2.5 mg at 12/30/23 1144    amiodarone (Pacerone) tablet 200 mg, 200 mg, oral, Daily, Maye Delgadillo APRN-CNP, 200 mg at 01/09/24 0922    apixaban (Eliquis) tablet 5 mg, 5 mg, oral, BID, Ayanna Giles PA-C, 5 mg at 01/09/24 0923    atorvastatin (Lipitor) tablet 20 mg, 20 mg, oral, Nightly, Steve Sneed MD, 20 mg at 01/08/24 2102    bumetanide (Bumex) tablet 2 mg, 2 mg, oral, Daily, Mary Solomon MD, 2 mg at 01/09/24 0922    calcium carbonate (Tums) chewable tablet 500 mg, 1 tablet, oral, q4h PRN, Steve Sneed MD, 500 mg at 01/03/24 0044    cyclobenzaprine (Flexeril) tablet 10 mg, 10 mg, oral, BID PRN, Steve Sneed MD    dextrose 10 % in water (D10W) infusion, 0.3 g/kg/hr, intravenous, Once PRN, Steve Sneed MD    dextrose 50 % injection 25 g, 25 g, intravenous, q15 min PRN, Steve Sneed MD    docusate sodium (Colace) capsule 100 mg, 100 mg, oral, BID PRN, ESTEBAN Chavez-CNP    empagliflozin (Jardiance) tablet 10 mg, 10 mg, oral, Daily, Steve Sneed MD, 10 mg at 01/09/24 0923    glucagon (Glucagen) injection 1 mg, 1 mg, intramuscular, q15 min PRN, Steve Sneed MD    lubricating eye drops ophthalmic solution 1 drop, 1 drop, Both Eyes, q4h PRN, Steve Sneed MD, 1 drop at 12/27/23 0911    melatonin tablet 3 mg, 3 mg, oral, Nightly, Anayeli Harding, ESTEBAN-CNP, 3 mg at 01/08/24 2102    metoprolol succinate XL (Toprol-XL) 24 hr tablet 50 mg, 50 mg, oral, Daily, Kim Alonso MD, 50 mg at 01/09/24 0923    naloxone (Narcan) injection 0.2 mg, 0.2 mg, intravenous, q5 min PRN, Alejandro Mancia MD    oxygen (O2) therapy, , inhalation, Continuous PRN - O2/gases, Tiara Laws, ESTEBAN-CNP, Rate Verify at 01/09/24 0830    perflutren  lipid microspheres (Definity) injection 0.5-10 mL of dilution, 0.5-10 mL of dilution, intravenous, Once in imaging, Steve Sneed MD    perflutren protein A microsphere (Optison) injection 0.5 mL, 0.5 mL, intravenous, Once in imaging, Steve Sneed MD    polyethylene glycol (Glycolax, Miralax) packet 17 g, 17 g, oral, Daily PRN, BREE Chavez    propofol (Diprivan) injection, , , PRN, Lamine Savage MD, 60 mg at 12/29/23 1343    QUEtiapine (SEROquel) tablet 25 mg, 25 mg, oral, Nightly, BREE Chavez    sennosides (Senokot) tablet 8.6 mg, 1 tablet, oral, Nightly PRN, BREE Chavez    spironolactone (Aldactone) tablet 12.5 mg, 12.5 mg, oral, Daily, Kim Alonso MD, 12.5 mg at 01/09/24 0923    sulfur hexafluoride microsphr (Lumason) injection 24.28 mg, 2 mL, intravenous, Once in imaging, Steve Sneed MD    traMADol (Ultram) tablet 50 mg, 50 mg, oral, q8h PRN, BREE Chavez    valsartan (Diovan) tablet 40 mg, 40 mg, oral, Daily, Kim Alonso MD, 40 mg at 01/09/24 0924    PERTINENT ROS:  GENERAL:  positive for fatigue, poor appetite.  No fever/chills  RESPIRATORY:  positive for shortness of breath.  Negative for cough, wheezing.  CARDIOVASCULAR:   Negative for chest pain or palpitation.  GI:  Negative for abdominal pain, diarrhea, heartburn, nausea, vomiting  : Dysuria    Physical Exam:  Vital signs in last 24 hours:  Temp:  [36 °C (96.8 °F)-36.3 °C (97.3 °F)] 36.3 °C (97.3 °F)  Heart Rate:  [58-62] 62  Resp:  [16-20] 18  BP: ()/(46-58) 115/55    General: Awake, cooperative, oxygen requirement stable  HEENT:  NCAT,  mucous membranes moist and pink  NECK:  Elevated JVD, no carotid bruit, supple, no cervical mass or thyromegaly  LUNGS;  Diminished breath sounds, fine Rales  CV:  Distant, regular rate and rhythm, no murmurs  ABDOMEN:  abdomen soft, nontender, BS normal, no masses or organomegaly  EDEMA:  +1 lower extremity  edema/dependent edema  SKIN:  dry and normal turgor, no clubbing, cyanosis or petechia.  No rashes noted      Intake/Output last 3 shifts:  I/O last 3 completed shifts:  In: 490 (3.8 mL/kg) [P.O.:240; IV Piggyback:250]  Out: 3500 (27 mL/kg) [Urine:3500 (0.7 mL/kg/hr)]  Weight: 129.8 kg     DATA:  Diagnotic tests reviewed for Todays visit:  Results from last 7 days   Lab Units 01/09/24  0550   WBC AUTO x10*3/uL 7.0   RBC AUTO x10*6/uL 2.55*   HEMOGLOBIN g/dL 7.5*   HEMATOCRIT % 27.1*       Results from last 7 days   Lab Units 01/09/24  0550   SODIUM mmol/L 141   POTASSIUM mmol/L 3.9   CHLORIDE mmol/L 92*   CO2 mmol/L 44*   BUN mg/dL 17   CREATININE mg/dL 0.92   CALCIUM mg/dL 8.5*   MAGNESIUM mg/dL 1.89             IMAGING: CXR reviewed in  images      SIGNATURE: Mary Solomon MD  Nephrology and Hypertension  [address] [place]  Office phone: [phone]  FAX: [phone]    This note was partially generated using the Dragon voice recognition system, and there may be some incorrect words, spelling's and punctuation that were not noted in checking the note before saving.

## 2024-01-13 ENCOUNTER — LAB REQUISITION (OUTPATIENT)
Dept: LAB | Facility: HOSPITAL | Age: 89
End: 2024-01-13
Payer: COMMERCIAL

## 2024-01-13 DIAGNOSIS — D64.9 ANEMIA, UNSPECIFIED: ICD-10-CM

## 2024-01-13 DIAGNOSIS — R79.89 OTHER SPECIFIED ABNORMAL FINDINGS OF BLOOD CHEMISTRY: ICD-10-CM

## 2024-01-13 LAB — HGB BLD-MCNC: 8.2 G/DL (ref 12–16)

## 2024-01-13 PROCEDURE — 85018 HEMOGLOBIN: CPT

## 2024-01-14 NOTE — DISCHARGE SUMMARY
Discharge Diagnosis  Acute combined systolic and diastolic heart failure (CMS/[facility])  Paroxysmal atrial fibrillation  Hyperlipidemia  Acute respiratory failure  DM type 2  Altered mental status      Discharge Meds     Your medication list        START taking these medications        Instructions Last Dose Given Next Dose Due   amiodarone 200 mg tablet  Commonly known as: Pacerone  Start taking on: January 10, 2024      Take 1 tablet (200 mg) by mouth once daily. Do not start before January 10, 2024.       bumetanide 2 mg tablet  Commonly known as: Bumex  Start taking on: January 10, 2024      Take 1 tablet (2 mg) by mouth once daily. Do not start before January 10, 2024.       empagliflozin 10 mg  Commonly known as: Jardiance  Start taking on: January 10, 2024      Take 1 tablet (10 mg) by mouth once daily. Do not start before January 10, 2024.       metoprolol succinate XL 50 mg 24 hr tablet  Commonly known as: Toprol-XL  Start taking on: January 10, 2024      Take 1 tablet (50 mg) by mouth once daily. Do not crush or chew. Do not start before January 10, 2024.       oxygen gas therapy  Commonly known as: O2      Inhale 1 each once every 24 hours.       QUEtiapine 25 mg tablet  Commonly known as: SEROquel      Take 1 tablet (25 mg) by mouth once daily at bedtime.       spironolactone 25 mg tablet  Commonly known as: Aldactone  Start taking on: January 10, 2024      Take 0.5 tablets (12.5 mg) by mouth once daily. Do not start before January 10, 2024.       valsartan 40 mg tablet  Commonly known as: Diovan  Start taking on: January 10, 2024      Take 1 tablet (40 mg) by mouth once daily. Do not start before January 10, 2024.              CONTINUE taking these medications        Instructions Last Dose Given Next Dose Due   acetaminophen 325 mg tablet  Commonly known as: Tylenol           acetaminophen 500 mg tablet  Commonly known as: Tylenol           albuterol 90 mcg/actuation inhaler           atorvastatin 20 mg  tablet  Commonly known as: Lipitor           calcium carbonate 200 mg calcium chewable tablet  Commonly known as: Tums           compress.stocking,knee,reg,med misc      MEDICAL COMPRESSION STOCKINGS 20-30 MM HG KNEE HIGH COMPRESSION STOCKINGS, PLEASE MEASURE FOR APPROPRIATE FIT, DISPENSE TWO PAIRS, REFILL 8       cyanocobalamin 250 mcg tablet  Commonly known as: Vitamin B-12           cyclobenzaprine 10 mg tablet  Commonly known as: Flexeril           docusate sodium 100 mg capsule  Commonly known as: Colace           Eliquis 5 mg tablet  Generic drug: apixaban           famotidine 20 mg tablet  Commonly known as: Pepcid           folic acid 1 mg tablet  Commonly known as: Folvite           lubricating eye drops ophthalmic solution           multivitamin tablet           nystatin 100,000 unit/gram powder  Commonly known as: Mycostatin           polyethylene glycol 17 gram packet  Commonly known as: Glycolax, Miralax           sennosides 8.6 mg tablet  Commonly known as: Senokot                  STOP taking these medications      losartan 50 mg tablet  Commonly known as: Cozaar        metOLazone 5 mg tablet  Commonly known as: Zaroxolyn        torsemide 40 mg tablet                  Where to Get Your Medications        Information about where to get these medications is not yet available    Ask your nurse or doctor about these medications  amiodarone 200 mg tablet  bumetanide 2 mg tablet  empagliflozin 10 mg  metoprolol succinate XL 50 mg 24 hr tablet  oxygen gas therapy  QUEtiapine 25 mg tablet  spironolactone 25 mg tablet  valsartan 40 mg tablet         Test Results Pending At Discharge  Pending Labs       No current pending labs.            Hospital Course  Patient is an 88 year old female, admitted to the hospital with ABHILASH Allan with RVR, acute heart failure, patient was evaluated by cardiology and EP, patient underwent permanent pace make placement, while hospitalized after the pacemaker placement, she had episode  of altered mental status, she had head CT scan, it was negative, and patient's symptoms improved, patient got discharged to SNF.    Discharge time 40 minutes    Pertinent Physical Exam At Time of Discharge  Physical Exam  Constitutional:       Appearance: She is normal weight.   HENT:      Head: Normocephalic and atraumatic.      Mouth/Throat:      Mouth: Mucous membranes are moist.      Pharynx: Oropharynx is clear.   Eyes:      Conjunctiva/sclera: Conjunctivae normal.      Pupils: Pupils are equal, round, and reactive to light.   Cardiovascular:      Rate and Rhythm: Normal rate and regular rhythm.   Pulmonary:      Effort: Pulmonary effort is normal.      Breath sounds: Normal breath sounds.   Abdominal:      General: Abdomen is flat. Bowel sounds are normal.      Palpations: Abdomen is soft.   Musculoskeletal:         General: Normal range of motion.   Neurological:      General: No focal deficit present.      Mental Status: She is alert and oriented to person, place, and time.   Psychiatric:         Mood and Affect: Mood normal.         Behavior: Behavior normal.         Outpatient Follow-Up  Future Appointments   Date Time Provider Department Center   1/15/2024  2:00 PM [facility] [id number] CARDIOLOGY RN 1 [id number] [place]   2/21/2024  1:15 PM Alejandro Mancia MD [id number] [place]   2/27/2024  2:00 PM Marcie Terrazas MD [id number] [place]         Nick Montaño MD

## 2024-01-15 ENCOUNTER — TELEPHONE (OUTPATIENT)
Dept: CARDIOLOGY | Facility: CLINIC | Age: 89
End: 2024-01-15

## 2024-01-15 ENCOUNTER — APPOINTMENT (OUTPATIENT)
Dept: CARDIOLOGY | Facility: CLINIC | Age: 89
End: 2024-01-15
Payer: COMMERCIAL

## 2024-01-15 NOTE — TELEPHONE ENCOUNTER
Nursing home called regarding wound check appt today. Spoke with nurse who can assess incision. Instructions given. Appt cancelled.

## 2024-01-30 LAB
ATRIAL RATE: 69 BPM
PR INTERVAL: 240 MS
Q ONSET: 207 MS
QRS COUNT: 11 BEATS
QRS DURATION: 148 MS
QT INTERVAL: 434 MS
QTC CALCULATION(BAZETT): 465 MS
QTC FREDERICIA: 454 MS
R AXIS: -58 DEGREES
T AXIS: -42 DEGREES
T OFFSET: 424 MS
VENTRICULAR RATE: 69 BPM

## 2024-02-07 ENCOUNTER — LAB REQUISITION (OUTPATIENT)
Dept: LAB | Facility: HOSPITAL | Age: 89
End: 2024-02-07
Payer: COMMERCIAL

## 2024-02-07 DIAGNOSIS — R42 DIZZINESS AND GIDDINESS: ICD-10-CM

## 2024-02-07 LAB
ALBUMIN SERPL BCP-MCNC: 3.9 G/DL (ref 3.4–5)
ALP SERPL-CCNC: 89 U/L (ref 33–136)
ALT SERPL W P-5'-P-CCNC: 12 U/L (ref 7–45)
ANION GAP SERPL CALC-SCNC: 12 MMOL/L (ref 10–20)
AST SERPL W P-5'-P-CCNC: 16 U/L (ref 9–39)
BASOPHILS # BLD AUTO: 0.02 X10*3/UL (ref 0–0.1)
BASOPHILS NFR BLD AUTO: 0.4 %
BILIRUB SERPL-MCNC: 0.4 MG/DL (ref 0–1.2)
BUN SERPL-MCNC: 34 MG/DL (ref 6–23)
CALCIUM SERPL-MCNC: 8.9 MG/DL (ref 8.6–10.3)
CHLORIDE SERPL-SCNC: 98 MMOL/L (ref 98–107)
CO2 SERPL-SCNC: 34 MMOL/L (ref 21–32)
CREAT SERPL-MCNC: 1.57 MG/DL (ref 0.5–1.05)
EGFRCR SERPLBLD CKD-EPI 2021: 32 ML/MIN/1.73M*2
EOSINOPHIL # BLD AUTO: 0.14 X10*3/UL (ref 0–0.4)
EOSINOPHIL NFR BLD AUTO: 2.7 %
ERYTHROCYTE [DISTWIDTH] IN BLOOD BY AUTOMATED COUNT: 16.6 % (ref 11.5–14.5)
GLUCOSE SERPL-MCNC: 88 MG/DL (ref 74–99)
HCT VFR BLD AUTO: 35.9 % (ref 36–46)
HGB BLD-MCNC: 10.8 G/DL (ref 12–16)
IMM GRANULOCYTES # BLD AUTO: 0.02 X10*3/UL (ref 0–0.5)
IMM GRANULOCYTES NFR BLD AUTO: 0.4 % (ref 0–0.9)
LYMPHOCYTES # BLD AUTO: 0.85 X10*3/UL (ref 0.8–3)
LYMPHOCYTES NFR BLD AUTO: 16.3 %
MAGNESIUM SERPL-MCNC: 1.9 MG/DL (ref 1.6–2.4)
MCH RBC QN AUTO: 31.5 PG (ref 26–34)
MCHC RBC AUTO-ENTMCNC: 30.1 G/DL (ref 32–36)
MCV RBC AUTO: 105 FL (ref 80–100)
MONOCYTES # BLD AUTO: 0.62 X10*3/UL (ref 0.05–0.8)
MONOCYTES NFR BLD AUTO: 11.9 %
NEUTROPHILS # BLD AUTO: 3.58 X10*3/UL (ref 1.6–5.5)
NEUTROPHILS NFR BLD AUTO: 68.3 %
NRBC BLD-RTO: 0 /100 WBCS (ref 0–0)
PLATELET # BLD AUTO: 157 X10*3/UL (ref 150–450)
POTASSIUM SERPL-SCNC: 4.2 MMOL/L (ref 3.5–5.3)
PROT SERPL-MCNC: 6.3 G/DL (ref 6.4–8.2)
RBC # BLD AUTO: 3.43 X10*6/UL (ref 4–5.2)
SODIUM SERPL-SCNC: 140 MMOL/L (ref 136–145)
WBC # BLD AUTO: 5.2 X10*3/UL (ref 4.4–11.3)

## 2024-02-07 PROCEDURE — 80053 COMPREHEN METABOLIC PANEL: CPT

## 2024-02-07 PROCEDURE — 83735 ASSAY OF MAGNESIUM: CPT

## 2024-02-07 PROCEDURE — 85025 COMPLETE CBC W/AUTO DIFF WBC: CPT

## 2024-02-08 PROBLEM — R35.0 FREQUENCY OF URINATION: Status: ACTIVE | Noted: 2022-11-28

## 2024-02-08 PROBLEM — R22.1 NECK MASS: Status: ACTIVE | Noted: 2022-06-10

## 2024-02-08 PROBLEM — L60.8 NAIL DEFORMITY: Status: ACTIVE | Noted: 2023-10-02

## 2024-02-08 PROBLEM — D18.03: Status: ACTIVE | Noted: 2023-10-02

## 2024-02-08 PROBLEM — H57.12 OCULAR PAIN, LEFT EYE: Status: ACTIVE | Noted: 2023-10-02

## 2024-02-08 PROBLEM — H52.4 HYPEROPIA OF BOTH EYES WITH ASTIGMATISM AND PRESBYOPIA: Status: ACTIVE | Noted: 2023-10-02

## 2024-02-08 PROBLEM — H04.123 DRY EYES, BILATERAL: Status: ACTIVE | Noted: 2023-10-02

## 2024-02-08 PROBLEM — R42 DIZZINESS: Status: ACTIVE | Noted: 2024-02-08

## 2024-02-08 PROBLEM — R11.2 NAUSEA AND VOMITING: Status: ACTIVE | Noted: 2024-02-08

## 2024-02-08 PROBLEM — H55.00 NYSTAGMUS: Status: ACTIVE | Noted: 2020-10-28

## 2024-02-08 PROBLEM — K11.9 LESION OF PAROTID GLAND: Status: ACTIVE | Noted: 2023-10-02

## 2024-02-08 PROBLEM — H52.03 HYPEROPIA OF BOTH EYES WITH ASTIGMATISM AND PRESBYOPIA: Status: ACTIVE | Noted: 2023-10-02

## 2024-02-08 PROBLEM — H52.203 HYPEROPIA OF BOTH EYES WITH ASTIGMATISM AND PRESBYOPIA: Status: ACTIVE | Noted: 2023-10-02

## 2024-02-08 PROBLEM — H50.21 HYPERTROPIA OF RIGHT EYE: Status: ACTIVE | Noted: 2023-10-02

## 2024-02-08 PROBLEM — H91.90 HEARING LOSS: Status: ACTIVE | Noted: 2023-10-02

## 2024-02-08 PROBLEM — R63.0 DECREASED APPETITE: Status: ACTIVE | Noted: 2023-10-02

## 2024-02-08 PROBLEM — R53.83 FATIGUE: Status: ACTIVE | Noted: 2023-10-02

## 2024-02-08 PROBLEM — R68.84 JAW PAIN: Status: ACTIVE | Noted: 2022-09-12

## 2024-02-08 PROBLEM — R51.9 HEADACHE, UNSPECIFIED: Status: ACTIVE | Noted: 2023-10-02

## 2024-02-08 PROBLEM — Z79.01 CHRONIC ANTICOAGULATION: Status: ACTIVE | Noted: 2024-02-08

## 2024-02-08 PROBLEM — I10 ESSENTIAL HYPERTENSION: Status: ACTIVE | Noted: 2023-07-25

## 2024-02-08 PROBLEM — H35.363 DRUSEN OF MACULA OF BOTH EYES: Status: ACTIVE | Noted: 2024-02-08

## 2024-02-08 PROBLEM — I50.32 CHRONIC DIASTOLIC HEART FAILURE (MULTI): Status: ACTIVE | Noted: 2024-02-08

## 2024-02-08 PROBLEM — K59.00 CONSTIPATION: Status: ACTIVE | Noted: 2023-10-02

## 2024-02-08 PROBLEM — H50.10 EXOTROPIA: Status: ACTIVE | Noted: 2023-10-02

## 2024-02-08 PROBLEM — M19.90 ARTHRITIS: Status: ACTIVE | Noted: 2023-10-02

## 2024-02-08 PROBLEM — R52 PAIN: Status: ACTIVE | Noted: 2024-02-08

## 2024-02-08 PROBLEM — H57.10 PAIN IN EYE: Status: ACTIVE | Noted: 2024-02-08

## 2024-02-08 RX ORDER — ONDANSETRON 4 MG/1
4 TABLET, FILM COATED ORAL EVERY 6 HOURS PRN
COMMUNITY
End: 2024-04-30 | Stop reason: ALTCHOICE

## 2024-02-08 RX ORDER — LOSARTAN POTASSIUM 100 MG/1
100 TABLET ORAL DAILY
COMMUNITY
Start: 2023-07-05 | End: 2024-03-12 | Stop reason: ALTCHOICE

## 2024-02-21 ENCOUNTER — APPOINTMENT (OUTPATIENT)
Dept: CARDIOLOGY | Facility: CLINIC | Age: 89
End: 2024-02-21
Payer: MEDICARE

## 2024-02-21 ENCOUNTER — APPOINTMENT (OUTPATIENT)
Dept: CARDIOLOGY | Facility: HOSPITAL | Age: 89
End: 2024-02-21
Payer: MEDICARE

## 2024-02-26 RX ORDER — DESVENLAFAXINE SUCCINATE 25 MG/1
25 TABLET, EXTENDED RELEASE ORAL DAILY
COMMUNITY
Start: 2023-11-22 | End: 2024-02-27 | Stop reason: WASHOUT

## 2024-02-27 ENCOUNTER — OFFICE VISIT (OUTPATIENT)
Dept: CARDIOLOGY | Facility: CLINIC | Age: 89
End: 2024-02-27
Payer: COMMERCIAL

## 2024-02-27 ENCOUNTER — LAB (OUTPATIENT)
Dept: LAB | Facility: LAB | Age: 89
End: 2024-02-27
Payer: MEDICARE

## 2024-02-27 VITALS
HEIGHT: 63 IN | HEART RATE: 67 BPM | SYSTOLIC BLOOD PRESSURE: 106 MMHG | WEIGHT: 255 LBS | OXYGEN SATURATION: 93 % | DIASTOLIC BLOOD PRESSURE: 68 MMHG | BODY MASS INDEX: 45.18 KG/M2

## 2024-02-27 DIAGNOSIS — E66.01 MORBID OBESITY (MULTI): ICD-10-CM

## 2024-02-27 DIAGNOSIS — I25.10 ATHEROSCLEROSIS OF NATIVE CORONARY ARTERY OF NATIVE HEART WITHOUT ANGINA PECTORIS: ICD-10-CM

## 2024-02-27 DIAGNOSIS — I50.30 HEART FAILURE WITH PRESERVED LEFT VENTRICULAR FUNCTION (HFPEF) (MULTI): Primary | ICD-10-CM

## 2024-02-27 DIAGNOSIS — I48.0 PAROXYSMAL ATRIAL FIBRILLATION (MULTI): ICD-10-CM

## 2024-02-27 DIAGNOSIS — I50.30 HEART FAILURE WITH PRESERVED LEFT VENTRICULAR FUNCTION (HFPEF) (MULTI): ICD-10-CM

## 2024-02-27 LAB
ALBUMIN SERPL BCP-MCNC: 4 G/DL (ref 3.4–5)
ALP SERPL-CCNC: 98 U/L (ref 33–136)
ALT SERPL W P-5'-P-CCNC: 13 U/L (ref 7–45)
ANION GAP SERPL CALC-SCNC: 16 MMOL/L (ref 10–20)
AST SERPL W P-5'-P-CCNC: 14 U/L (ref 9–39)
BILIRUB SERPL-MCNC: 0.5 MG/DL (ref 0–1.2)
BUN SERPL-MCNC: 35 MG/DL (ref 6–23)
CALCIUM SERPL-MCNC: 9.3 MG/DL (ref 8.6–10.3)
CHLORIDE SERPL-SCNC: 100 MMOL/L (ref 98–107)
CO2 SERPL-SCNC: 29 MMOL/L (ref 21–32)
CREAT SERPL-MCNC: 1.48 MG/DL (ref 0.5–1.05)
EGFRCR SERPLBLD CKD-EPI 2021: 34 ML/MIN/1.73M*2
GLUCOSE SERPL-MCNC: 103 MG/DL (ref 74–99)
POTASSIUM SERPL-SCNC: 4.8 MMOL/L (ref 3.5–5.3)
PROT SERPL-MCNC: 6.7 G/DL (ref 6.4–8.2)
SODIUM SERPL-SCNC: 140 MMOL/L (ref 136–145)
TSH SERPL-ACNC: 0.94 MIU/L (ref 0.44–3.98)

## 2024-02-27 PROCEDURE — 80053 COMPREHEN METABOLIC PANEL: CPT

## 2024-02-27 PROCEDURE — 99215 OFFICE O/P EST HI 40 MIN: CPT | Performed by: INTERNAL MEDICINE

## 2024-02-27 PROCEDURE — 1125F AMNT PAIN NOTED PAIN PRSNT: CPT | Performed by: INTERNAL MEDICINE

## 2024-02-27 PROCEDURE — 84443 ASSAY THYROID STIM HORMONE: CPT

## 2024-02-27 PROCEDURE — 3074F SYST BP LT 130 MM HG: CPT | Performed by: INTERNAL MEDICINE

## 2024-02-27 PROCEDURE — 36415 COLL VENOUS BLD VENIPUNCTURE: CPT

## 2024-02-27 PROCEDURE — 1036F TOBACCO NON-USER: CPT | Performed by: INTERNAL MEDICINE

## 2024-02-27 PROCEDURE — 1160F RVW MEDS BY RX/DR IN RCRD: CPT | Performed by: INTERNAL MEDICINE

## 2024-02-27 PROCEDURE — 3078F DIAST BP <80 MM HG: CPT | Performed by: INTERNAL MEDICINE

## 2024-02-27 PROCEDURE — 1159F MED LIST DOCD IN RCRD: CPT | Performed by: INTERNAL MEDICINE

## 2024-02-27 ASSESSMENT — ENCOUNTER SYMPTOMS
DEPRESSION: 0
LOSS OF SENSATION IN FEET: 1
OCCASIONAL FEELINGS OF UNSTEADINESS: 1

## 2024-02-27 ASSESSMENT — COLUMBIA-SUICIDE SEVERITY RATING SCALE - C-SSRS
2. HAVE YOU ACTUALLY HAD ANY THOUGHTS OF KILLING YOURSELF?: NO
1. IN THE PAST MONTH, HAVE YOU WISHED YOU WERE DEAD OR WISHED YOU COULD GO TO SLEEP AND NOT WAKE UP?: NO
6. HAVE YOU EVER DONE ANYTHING, STARTED TO DO ANYTHING, OR PREPARED TO DO ANYTHING TO END YOUR LIFE?: NO

## 2024-02-27 ASSESSMENT — PAIN SCALES - GENERAL: PAINLEVEL: 10-WORST PAIN EVER

## 2024-02-27 ASSESSMENT — PATIENT HEALTH QUESTIONNAIRE - PHQ9
2. FEELING DOWN, DEPRESSED OR HOPELESS: NOT AT ALL
SUM OF ALL RESPONSES TO PHQ9 QUESTIONS 1 AND 2: 0
1. LITTLE INTEREST OR PLEASURE IN DOING THINGS: NOT AT ALL

## 2024-02-27 NOTE — PROGRESS NOTES
CHIEF COMPLAINT: routine follow-up visit    HISTORY OF PRESENT ILLNESS:    Ms. Jenkins is an 87 yo F with hx as listed below who returns to Cardiology Clinic for post-hospitalization follow-up visit; last seen by me in 7/2023. Seen in wheelchair today. Admitted 12/2023-1/2024 at [facility] for ADHF and CHERYL. 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 DCCV done on 12/29 complicated by asystolic pauses and junctional bradycardia post-DCCV. 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 Diuresed and started on GDMT for reduced EF. She is currently at [facility] and no paperwork sent. I am unclear of what medications she is currently getting at this [facility] as DC meds and current med list in [facility] are slightly different. Despite calling multiple times to [facility], no medication list could be obtained. They do help with medication there and checking vitals.     She complains of feeling LH and dizzy; she thinks she is on too much medication and BP frequently running 90/60. Is frustrated by bad food there and lack of physical progress in improving strength. Has lost weight due to poor appetite (previously weighed 350 lbs). Also very concerned about urinary incontinence. Has chronic complaints of dizziness, numbness/face pain (trigeminal neuralgia), and headache since cerebellar CVA in 2019. Poor historian.    PAST MEDICAL/SURGICAL HISTORY:  -carpal tunnel surgery  -bilateral knee replacement  -HFrEF (likely tachy-mediated) in 1/2024; had recovered EF in the past. Currently 30-35%.  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)  -IFG  -appendectomy  -cholecystectomy  -L ankle surgery  -cataract surgery  -hysterectomy  -R rotator cuff repair    PRIOR CARDIAC  TESTING:  -TTE (12/2023 done when in rapid afib): EF 30-35%, LV mod dilated, diffuse hypokinesis, mild-mod LAE, RV moderately enlarged with moderate reduced fx, mod ALEXIS, aortic sclerosis, mild-mod MAC  -TTE (2022): EF 65%, LV mildly dilated, RV mildly dilated with reduced fx, severe LAE, moderate-severe ALEXIS, IVC dilated and no compression, RVSP 39  -TTE (2019): EF 55%, mod conc LVH, ascending aorta plaque  -CTA cors (2019): technically difficulty study 2/2 motion artifact, LM and pLAD okay, dLAD/LCx/RCA not well visualized. Non-diagnostic.  -Nuclear stress test (2019): no ischemia, breast artifact, EF 37% at rest to 44% with stress, mild LVE  -TTE (2019): EF 25-30%, LV mildly dilated, impaired relaxation, severe LAE, RV mildly enlarged with normal fx, mild MR, dilated IVC with no collapse  -TTE (2015): EF 55-60%, ecc LVH, impaired relaxation, severe LAE, mod ALEXIS, mild mitral stenosis  -Nuclear stress test (2015): no ischemia, EF 68%    Allergies   Allergen Reactions    Ace Inhibitors Cough    Tramadol Other     Chest pain       Current Outpatient Medications:     acetaminophen (Tylenol) 325 mg tablet, Take 2 tablets (650 mg) by mouth every 4 hours if needed for mild pain (1 - 3)., Disp: , Rfl:     acetaminophen (Tylenol) 500 mg tablet, Take 1 tablet (500 mg) by mouth 2 times a day., Disp: , Rfl:     albuterol 90 mcg/actuation inhaler, Inhale 2 puffs every 4 hours if needed for wheezing or shortness of breath., Disp: , Rfl:     amiodarone (Pacerone) 200 mg tablet, Take 1 tablet (200 mg) by mouth once daily. Do not start before January 10, 2024., Disp: , Rfl:     atorvastatin (Lipitor) 20 mg tablet, Take 1 tablet (20 mg) by mouth once daily at bedtime., Disp: , Rfl:     bumetanide (Bumex) 2 mg tablet, Take 1 tablet (2 mg) by mouth once daily. Do not start before January 10, 2024., Disp: , Rfl:     cyanocobalamin (Vitamin B-12) 250 mcg tablet, Take 1 tablet (250 mcg) by mouth once daily., Disp: , Rfl:     docusate  sodium (Colace) 100 mg capsule, Take 1 capsule (100 mg) by mouth twice a day., Disp: , Rfl:     Eliquis 5 mg tablet, Take 1 tablet (5 mg) by mouth 2 times a day., Disp: , Rfl:     empagliflozin (Jardiance) 10 mg, Take 1 tablet (10 mg) by mouth once daily. Do not start before January 10, 2024., Disp: , Rfl:     famotidine (Pepcid) 20 mg tablet, Take 1 tablet (20 mg) by mouth once daily., Disp: , Rfl:     folic acid (Folvite) 1 mg tablet, Take 1 tablet (1 mg) by mouth once daily., Disp: , Rfl:     losartan (Cozaar) 100 mg tablet, Take 1 tablet (100 mg) by mouth once daily., Disp: , Rfl:     lubricating eye drops ophthalmic solution, Administer 1 drop into both eyes every 4 hours if needed for dry eyes., Disp: , Rfl:     metoprolol succinate XL (Toprol-XL) 50 mg 24 hr tablet, Take 1 tablet (50 mg) by mouth once daily. Do not crush or chew. Do not start before January 10, 2024., Disp: , Rfl:     multivitamin tablet, Take 1 tablet by mouth once daily., Disp: , Rfl:     nystatin (Mycostatin) 100,000 unit/gram powder, Apply 1 Application topically 2 times a day as needed for rash., Disp: , Rfl:     ondansetron (Zofran) 4 mg tablet, Take 4 tablets (16 mg) by mouth every 6 hours if needed for nausea or vomiting., Disp: , Rfl:     polyethylene glycol (Glycolax, Miralax) 17 gram packet, Take 17 g by mouth once daily., Disp: , Rfl:     QUEtiapine (SEROquel) 25 mg tablet, Take 1 tablet (25 mg) by mouth once daily at bedtime., Disp: , Rfl:     sennosides (Senokot) 8.6 mg tablet, Take 1 tablet (8.6 mg) by mouth 2 times a day., Disp: , Rfl:     spironolactone (Aldactone) 25 mg tablet, Take 0.5 tablets (12.5 mg) by mouth once daily. Do not start before January 10, 2024., Disp: , Rfl:     valsartan (Diovan) 40 mg tablet, Take 1 tablet (40 mg) by mouth once daily. Do not start before January 10, 2024., Disp: , Rfl:     compress.stocking,knee,reg,med misc, MEDICAL COMPRESSION STOCKINGS 20-30 MM HG KNEE HIGH COMPRESSION STOCKINGS,  "PLEASE MEASURE FOR APPROPRIATE FIT, DISPENSE TWO PAIRS, REFILL 8, Disp: 2 each, Rfl: 8    /68 (BP Location: Right arm, Patient Position: Sitting, BP Cuff Size: Large adult)   Pulse 67   Ht 1.6 m (5' 3\")   Wt 116 kg (255 lb)   SpO2 93%   BMI 45.17 kg/m²     PHYSICAL EXAM:  GENERAL: NAD  HEENT: no JVD  CV: RRR without m/r/g  PULM: CTAB  EXT: non-edematous bilateral lower extremities     LABS  WBC (x10*3/uL)   Date Value   02/07/2024 5.2     Hemoglobin (g/dL)   Date Value   02/07/2024 10.8 (L)     Platelets (x10*3/uL)   Date Value   02/07/2024 157     Sodium (mmol/L)   Date Value   02/07/2024 140     Potassium (mmol/L)   Date Value   02/07/2024 4.2     Chloride (mmol/L)   Date Value   02/07/2024 98     Bicarbonate (mmol/L)   Date Value   02/07/2024 34 (H)     Urea Nitrogen (mg/dL)   Date Value   02/07/2024 34 (H)     Creatinine (mg/dL)   Date Value   02/07/2024 1.57 (H)     Calcium (mg/dL)   Date Value   02/07/2024 8.9     Total Protein (g/dL)   Date Value   02/07/2024 6.3 (L)     Bilirubin, Total (mg/dL)   Date Value   02/07/2024 0.4     Alkaline Phosphatase (U/L)   Date Value   02/07/2024 89     ALT (U/L)   Date Value   02/07/2024 12     AST (U/L)   Date Value   02/07/2024 16     Glucose (mg/dL)   Date Value   02/07/2024 88     Cholesterol (mg/dL)   Date Value   08/23/2023 109   02/23/2019 161   08/28/2018 169     HDL (mg/dL)   Date Value   08/23/2023 44.1   02/23/2019 43.8   08/28/2018 52.7     Triglycerides (mg/dL)   Date Value   08/23/2023 59   02/23/2019 81     Hemoglobin A1C (%)   Date Value   10/02/2023 6.3 (H)   03/16/2019 5.6   02/23/2019 6.0   08/28/2018 5.7     Lab Results   Component Value Date    LDLF 53 08/23/2023     ASSESSMENT/PLAN: 87 yo F with hx of DLD, HTN, IFG, lymphedema of legs, CVA, UMA not on CPAP, 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 Will be seeing Dr. Mancia (EP) in [facility]. Tried to confirm meds at [place] to no avail. Would consider decreasing valsartan given borderline BP and LH (though dizziness is chronic). Until then, believe she is on GDMT (jardiance 10 mg daily, valsartan 40 mg daily, spironolactone 12.5 mg daily, and metoprolol succinate 50 mg daily). Taking bumex 2 mg daily. On eliquis and newly started on amiodarone 200 mg daily. Get updated CMP and TSH for amiodarone surveillance. Repeat TTE in April to reassess EF after being on GDMT and in sinus rhythm. On atorvastatin 20 mg daily with LDL 44 and TG 60. RTC 6-8 weeks.

## 2024-03-18 ENCOUNTER — TELEPHONE (OUTPATIENT)
Dept: CARDIOLOGY | Facility: CLINIC | Age: 89
End: 2024-03-18
Payer: COMMERCIAL

## 2024-03-18 DIAGNOSIS — N18.30 STAGE 3 CHRONIC KIDNEY DISEASE, UNSPECIFIED WHETHER STAGE 3A OR 3B CKD (MULTI): ICD-10-CM

## 2024-03-18 DIAGNOSIS — I50.30 HEART FAILURE WITH PRESERVED LEFT VENTRICULAR FUNCTION (HFPEF) (MULTI): ICD-10-CM

## 2024-03-18 RX ORDER — BUMETANIDE 2 MG/1
1 TABLET ORAL DAILY
Qty: 1 TABLET | Refills: 0 | Status: SHIPPED | OUTPATIENT
Start: 2024-03-18

## 2024-03-18 NOTE — PROGRESS NOTES
Got updated med list from facility. She is not on valsartan or losartan. Also taking bumex 1 mg daily and not 2 mg daily. Given low BP and dizziness, will stop spironolactone 12.5 mg daily.

## 2024-03-18 NOTE — TELEPHONE ENCOUNTER
Per Dr. Terrazas's request, Rosey Clay was called and a verbal order was given to Pablo to discontinue spironolactone. The dosage was clarified with Pablo of 12.5mg daily. The order was repeated and verbalized understanding. Dr. Terrazas aware of successful communication to nursing staff. ABHILASH Connolly RN

## 2024-04-30 ENCOUNTER — OFFICE VISIT (OUTPATIENT)
Dept: CARDIOLOGY | Facility: CLINIC | Age: 89
End: 2024-04-30
Payer: COMMERCIAL

## 2024-04-30 VITALS
OXYGEN SATURATION: 95 % | HEART RATE: 63 BPM | SYSTOLIC BLOOD PRESSURE: 136 MMHG | WEIGHT: 249 LBS | BODY MASS INDEX: 44.11 KG/M2 | DIASTOLIC BLOOD PRESSURE: 71 MMHG

## 2024-04-30 DIAGNOSIS — I50.41 ACUTE COMBINED SYSTOLIC AND DIASTOLIC HEART FAILURE (MULTI): ICD-10-CM

## 2024-04-30 DIAGNOSIS — I50.30 HEART FAILURE WITH PRESERVED LEFT VENTRICULAR FUNCTION (HFPEF) (MULTI): ICD-10-CM

## 2024-04-30 DIAGNOSIS — I48.0 PAROXYSMAL ATRIAL FIBRILLATION (MULTI): Primary | ICD-10-CM

## 2024-04-30 PROCEDURE — 93005 ELECTROCARDIOGRAM TRACING: CPT | Performed by: INTERNAL MEDICINE

## 2024-04-30 PROCEDURE — 99214 OFFICE O/P EST MOD 30 MIN: CPT | Performed by: INTERNAL MEDICINE

## 2024-04-30 PROCEDURE — 1036F TOBACCO NON-USER: CPT | Performed by: INTERNAL MEDICINE

## 2024-04-30 PROCEDURE — 93010 ELECTROCARDIOGRAM REPORT: CPT | Performed by: INTERNAL MEDICINE

## 2024-04-30 PROCEDURE — 1159F MED LIST DOCD IN RCRD: CPT | Performed by: INTERNAL MEDICINE

## 2024-04-30 PROCEDURE — 3078F DIAST BP <80 MM HG: CPT | Performed by: INTERNAL MEDICINE

## 2024-04-30 PROCEDURE — 1126F AMNT PAIN NOTED NONE PRSNT: CPT | Performed by: INTERNAL MEDICINE

## 2024-04-30 PROCEDURE — 3075F SYST BP GE 130 - 139MM HG: CPT | Performed by: INTERNAL MEDICINE

## 2024-04-30 PROCEDURE — 1160F RVW MEDS BY RX/DR IN RCRD: CPT | Performed by: INTERNAL MEDICINE

## 2024-04-30 ASSESSMENT — ENCOUNTER SYMPTOMS
LOSS OF SENSATION IN FEET: 0
OCCASIONAL FEELINGS OF UNSTEADINESS: 1
DEPRESSION: 0

## 2024-04-30 ASSESSMENT — PAIN SCALES - GENERAL: PAINLEVEL: 0-NO PAIN

## 2024-04-30 NOTE — PROGRESS NOTES
CHIEF COMPLAINT: routine follow-up visit    HISTORY OF PRESENT ILLNESS:    Ms. Jenkins is an 89 yo F with hx as listed below who returns to [facility] for post-hospitalization follow-up visit; last seen by me in 2/2024. Seen in wheelchair today. Meds reconciled from [facility] last month after visit and spironolactone was stopped by me 2/2 low BP and dizziness. Feeling a little better with decreased medication. ECG shows atrial paced rhythm. Denies CP and leg swelling has clinically improved (eating much less salt). Doing PT at [facility].     Of note, admitted 12/2023-1/2024 at [facility] for ADHF and CHERYL. 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 DCCV done on 12/29 complicated by asystolic pauses and junctional bradycardia post-DCCV. 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 Diuresed and started on GDMT for reduced EF. She is currently at [facility]. Has chronic complaints of dizziness, numbness/face pain (trigeminal neuralgia), and headache since cerebellar CVA in 2019. Poor historian.     PAST MEDICAL/SURGICAL HISTORY:  -carpal tunnel surgery  -bilateral knee replacement  -HFrEF (likely tachy-mediated) in 1/2024; had recovered EF in the past. Currently 30-35%.  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)  -IFG  -appendectomy  -cholecystectomy  -L ankle surgery  -cataract surgery  -hysterectomy  -R rotator cuff repair     PRIOR CARDIAC TESTING:  -TTE (12/2023 done when in rapid afib): EF 30-35%, LV mod dilated, diffuse hypokinesis, mild-mod LAE, RV moderately enlarged with moderate reduced fx, mod ALEXIS, aortic sclerosis, mild-mod MAC  -TTE (2022): EF 65%, LV mildly dilated, RV mildly dilated with reduced fx, severe LAE, moderate-severe ALEXIS,  IVC dilated and no compression, RVSP 39  -TTE (2019): EF 55%, mod conc LVH, ascending aorta plaque  -CTA cors (2019): technically difficulty study 2/2 motion artifact, LM and pLAD okay, dLAD/LCx/RCA not well visualized. Non-diagnostic.  -Nuclear stress test (2019): no ischemia, breast artifact, EF 37% at rest to 44% with stress, mild LVE  -TTE (2019): EF 25-30%, LV mildly dilated, impaired relaxation, severe LAE, RV mildly enlarged with normal fx, mild MR, dilated IVC with no collapse  -TTE (2015): EF 55-60%, ecc LVH, impaired relaxation, severe LAE, mod ALEXIS, mild mitral stenosis  -Nuclear stress test (2015): no ischemia, EF 68%    Allergies   Allergen Reactions    Ace Inhibitors Cough    Tramadol Other     Chest pain       Current Outpatient Medications:     acetaminophen (Tylenol) 325 mg tablet, Take 2 tablets (650 mg) by mouth every 4 hours if needed for mild pain (1 - 3)., Disp: , Rfl:     albuterol 90 mcg/actuation inhaler, Inhale 2 puffs every 4 hours if needed for wheezing or shortness of breath., Disp: , Rfl:     amiodarone (Pacerone) 200 mg tablet, Take 1 tablet (200 mg) by mouth once daily. Do not start before January 10, 2024., Disp: , Rfl:     atorvastatin (Lipitor) 20 mg tablet, Take 1 tablet (20 mg) by mouth once daily at bedtime., Disp: , Rfl:     bumetanide (Bumex) 2 mg tablet, Take 0.5 tablets (1 mg) by mouth once daily., Disp: 1 tablet, Rfl: 0    compress.stocking,knee,reg,med misc, MEDICAL COMPRESSION STOCKINGS 20-30 MM HG KNEE HIGH COMPRESSION STOCKINGS, PLEASE MEASURE FOR APPROPRIATE FIT, DISPENSE TWO PAIRS, REFILL 8, Disp: 2 each, Rfl: 8    cyanocobalamin (Vitamin B-12) 250 mcg tablet, Take 1 tablet (250 mcg) by mouth once daily., Disp: , Rfl:     docusate sodium (Colace) 100 mg capsule, Take 1 capsule (100 mg) by mouth twice a day., Disp: , Rfl:     Eliquis 5 mg tablet, Take 1 tablet (5 mg) by mouth 2 times a day., Disp: , Rfl:     empagliflozin (Jardiance) 10 mg, Take 1 tablet (10 mg) by  mouth once daily. Do not start before January 10, 2024., Disp: , Rfl:     famotidine (Pepcid) 20 mg tablet, Take 1 tablet (20 mg) by mouth once daily., Disp: , Rfl:     folic acid (Folvite) 1 mg tablet, Take 1 tablet (1 mg) by mouth once daily., Disp: , Rfl:     lubricating eye drops ophthalmic solution, Administer 1 drop into both eyes every 4 hours if needed for dry eyes., Disp: , Rfl:     metoprolol succinate XL (Toprol-XL) 50 mg 24 hr tablet, Take 1 tablet (50 mg) by mouth once daily. Do not crush or chew. Do not start before January 10, 2024., Disp: , Rfl:     multivitamin tablet, Take 1 tablet by mouth once daily., Disp: , Rfl:     nystatin (Mycostatin) 100,000 unit/gram powder, Apply 1 Application topically 2 times a day as needed for rash., Disp: , Rfl:     polyethylene glycol (Glycolax, Miralax) 17 gram packet, Take 17 g by mouth once daily., Disp: , Rfl:     sennosides (Senokot) 8.6 mg tablet, Take 1 tablet (8.6 mg) by mouth 2 times a day., Disp: , Rfl:     /71   Pulse 63   Wt 113 kg (249 lb)   SpO2 95%   BMI 44.11 kg/m²     PHYSICAL EXAM:  GENERAL: NAD  HEENT: no JVD  CV: RRR without m/r/g  PULM: CTAB  EXT: non-edematous bilateral lower extremities     LABS  WBC (x10*3/uL)   Date Value   02/07/2024 5.2     Hemoglobin (g/dL)   Date Value   02/07/2024 10.8 (L)     Platelets (x10*3/uL)   Date Value   02/07/2024 157     Sodium (mmol/L)   Date Value   02/27/2024 140     Potassium (mmol/L)   Date Value   02/27/2024 4.8     Chloride (mmol/L)   Date Value   02/27/2024 100     Bicarbonate (mmol/L)   Date Value   02/27/2024 29     Urea Nitrogen (mg/dL)   Date Value   02/27/2024 35 (H)     Creatinine (mg/dL)   Date Value   02/27/2024 1.48 (H)     Calcium (mg/dL)   Date Value   02/27/2024 9.3     Total Protein (g/dL)   Date Value   02/27/2024 6.7     Bilirubin, Total (mg/dL)   Date Value   02/27/2024 0.5     Alkaline Phosphatase (U/L)   Date Value   02/27/2024 98     ALT (U/L)   Date Value   02/27/2024 13      AST (U/L)   Date Value   02/27/2024 14     Glucose (mg/dL)   Date Value   02/27/2024 103 (H)     Cholesterol (mg/dL)   Date Value   08/23/2023 109   02/23/2019 161   08/28/2018 169     HDL (mg/dL)   Date Value   08/23/2023 44.1   02/23/2019 43.8   08/28/2018 52.7     Triglycerides (mg/dL)   Date Value   08/23/2023 59   02/23/2019 81     Hemoglobin A1C (%)   Date Value   10/02/2023 6.3 (H)   03/16/2019 5.6   02/23/2019 6.0   08/28/2018 5.7     Lab Results   Component Value Date    LDLF 53 08/23/2023     ASSESSMENT/PLAN:  89 yo F with hx of DLD, HTN, IFG, lymphedema of legs, CVA, UMA not on CPAP, 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 TSH and LFTs fine since starting amiodarone 200 mg daily. Seeing Dr. Mancia for appointment and [facility] on 5/8. Will get updated TTE. Will get updated med list again from [facility], but continue same meds for now (bumex 1 mg daily, eliquis, jardiance 10 mg daily, and metoprolol 50 mg daily). BP did not tolerate ACE-I or spironolactone for GDMT. Continue eliquis. On atorvastatin 20 mg daily with LDL 44 and TG 60. Patient states she gets labs drawn at [facility] monthly; Cr stable 1.4. RTC 3 months.

## 2024-05-08 ENCOUNTER — APPOINTMENT (OUTPATIENT)
Dept: CARDIOLOGY | Facility: HOSPITAL | Age: 89
End: 2024-05-08
Payer: COMMERCIAL

## 2024-05-08 ENCOUNTER — APPOINTMENT (OUTPATIENT)
Dept: CARDIOLOGY | Facility: CLINIC | Age: 89
End: 2024-05-08
Payer: COMMERCIAL

## 2024-05-09 LAB
ATRIAL RATE: 60 BPM
P OFFSET: 116 MS
P ONSET: 68 MS
PR INTERVAL: 262 MS
Q ONSET: 199 MS
QRS COUNT: 10 BEATS
QRS DURATION: 140 MS
QT INTERVAL: 468 MS
QTC CALCULATION(BAZETT): 468 MS
QTC FREDERICIA: 468 MS
R AXIS: 264 DEGREES
T AXIS: -25 DEGREES
T OFFSET: 433 MS
VENTRICULAR RATE: 60 BPM

## 2024-05-17 ENCOUNTER — APPOINTMENT (OUTPATIENT)
Dept: CARDIOLOGY | Facility: HOSPITAL | Age: 89
End: 2024-05-17
Payer: MEDICARE

## 2024-05-17 ENCOUNTER — APPOINTMENT (OUTPATIENT)
Dept: CARDIOLOGY | Facility: CLINIC | Age: 89
End: 2024-05-17
Payer: COMMERCIAL

## 2024-05-21 ENCOUNTER — HOSPITAL ENCOUNTER (OUTPATIENT)
Dept: CARDIOLOGY | Facility: HOSPITAL | Age: 89
Discharge: HOME | End: 2024-05-21
Payer: MEDICARE

## 2024-05-21 DIAGNOSIS — Z95.0 PACEMAKER: ICD-10-CM

## 2024-05-21 DIAGNOSIS — I49.5 SICK SINUS SYNDROME (MULTI): ICD-10-CM

## 2024-05-21 DIAGNOSIS — Z95.0 PACEMAKER: Primary | ICD-10-CM

## 2024-05-21 PROCEDURE — 93294 REM INTERROG EVL PM/LDLS PM: CPT | Performed by: INTERNAL MEDICINE

## 2024-05-21 PROCEDURE — 93296 REM INTERROG EVL PM/IDS: CPT

## 2024-05-31 ENCOUNTER — HOSPITAL ENCOUNTER (OUTPATIENT)
Dept: CARDIOLOGY | Facility: CLINIC | Age: 89
Discharge: HOME | End: 2024-05-31
Payer: COMMERCIAL

## 2024-05-31 DIAGNOSIS — I48.0 PAROXYSMAL ATRIAL FIBRILLATION (MULTI): ICD-10-CM

## 2024-05-31 DIAGNOSIS — I50.41 ACUTE COMBINED SYSTOLIC AND DIASTOLIC HEART FAILURE (MULTI): ICD-10-CM

## 2024-05-31 DIAGNOSIS — I48.91 UNSPECIFIED ATRIAL FIBRILLATION (MULTI): ICD-10-CM

## 2024-05-31 DIAGNOSIS — I50.41 ACUTE COMBINED SYSTOLIC AND DIASTOLIC HEART FAILURE (MULTI): Primary | ICD-10-CM

## 2024-05-31 LAB
AORTIC VALVE MEAN GRADIENT: 7.3 MMHG
AORTIC VALVE PEAK VELOCITY: 1.94 M/S
AV PEAK GRADIENT: 15.1 MMHG
AVA (PEAK VEL): 1.92 CM2
AVA (VTI): 2.03 CM2
EJECTION FRACTION APICAL 4 CHAMBER: 58.1
LEFT ATRIUM VOLUME AREA LENGTH INDEX BSA: 36.1 ML/M2
LEFT VENTRICLE INTERNAL DIMENSION DIASTOLE: 5.31 CM (ref 3.5–6)
LEFT VENTRICULAR OUTFLOW TRACT DIAMETER: 1.96 CM
MITRAL VALVE E/A RATIO: 1.06
MITRAL VALVE E/E' RATIO: 34.51
RIGHT VENTRICLE FREE WALL PEAK S': 10 CM/S

## 2024-05-31 PROCEDURE — 2500000004 HC RX 250 GENERAL PHARMACY W/ HCPCS (ALT 636 FOR OP/ED): Performed by: STUDENT IN AN ORGANIZED HEALTH CARE EDUCATION/TRAINING PROGRAM

## 2024-05-31 PROCEDURE — 93306 TTE W/DOPPLER COMPLETE: CPT

## 2024-05-31 PROCEDURE — 93306 TTE W/DOPPLER COMPLETE: CPT | Performed by: INTERNAL MEDICINE

## 2024-05-31 RX ADMIN — PERFLUTREN 2 ML OF DILUTION: 6.52 INJECTION, SUSPENSION INTRAVENOUS at 11:57

## 2024-06-05 ENCOUNTER — TELEPHONE (OUTPATIENT)
Dept: CARDIOLOGY | Facility: CLINIC | Age: 89
End: 2024-06-05
Payer: MEDICARE

## 2024-06-05 NOTE — TELEPHONE ENCOUNTER
----- Message from Marcie Terrazas MD sent at 6/4/2024  5:30 PM EDT -----  Please let patient know results of TTE (noted she lives at [facility]). EF has improved from 35% to normal. Great news!

## 2024-06-06 ENCOUNTER — TELEPHONE (OUTPATIENT)
Dept: CARDIOLOGY | Facility: CLINIC | Age: 89
End: 2024-06-06
Payer: COMMERCIAL

## 2024-06-06 NOTE — TELEPHONE ENCOUNTER
----- Message from Marcie Terrazas MD sent at 6/4/2024  5:30 PM EDT -----  Please let patient know results of TTE (noted she lives at [facility]). EF has improved from 35% to normal. Great news!   stated

## 2024-06-07 NOTE — TELEPHONE ENCOUNTER
Phoned patient and no answer.  Left voicemail with contact number for cardiology nursing office ([phone]) and requested patient return call.

## 2024-06-20 NOTE — TELEPHONE ENCOUNTER
Patient returned call to cariology nursing office.  Provided patient result per Dr. Terrazas notation and patient verbalized understanding.  Patient confirmed listed contact number ([phone]) is preferred contact number.

## 2024-07-08 ENCOUNTER — OFFICE VISIT (OUTPATIENT)
Dept: CARDIOLOGY | Facility: CLINIC | Age: 89
End: 2024-07-08
Payer: COMMERCIAL

## 2024-07-08 VITALS
HEART RATE: 72 BPM | DIASTOLIC BLOOD PRESSURE: 70 MMHG | OXYGEN SATURATION: 91 % | SYSTOLIC BLOOD PRESSURE: 159 MMHG | WEIGHT: 258 LBS | BODY MASS INDEX: 45.7 KG/M2

## 2024-07-08 DIAGNOSIS — I48.0 PAROXYSMAL ATRIAL FIBRILLATION (MULTI): Primary | ICD-10-CM

## 2024-07-08 DIAGNOSIS — I50.32 CHRONIC DIASTOLIC HEART FAILURE (MULTI): ICD-10-CM

## 2024-07-08 DIAGNOSIS — G47.33 OBSTRUCTIVE SLEEP APNEA SYNDROME: ICD-10-CM

## 2024-07-08 DIAGNOSIS — I10 ESSENTIAL HYPERTENSION: ICD-10-CM

## 2024-07-08 PROCEDURE — 3078F DIAST BP <80 MM HG: CPT | Performed by: INTERNAL MEDICINE

## 2024-07-08 PROCEDURE — 1125F AMNT PAIN NOTED PAIN PRSNT: CPT | Performed by: INTERNAL MEDICINE

## 2024-07-08 PROCEDURE — 1160F RVW MEDS BY RX/DR IN RCRD: CPT | Performed by: INTERNAL MEDICINE

## 2024-07-08 PROCEDURE — 1159F MED LIST DOCD IN RCRD: CPT | Performed by: INTERNAL MEDICINE

## 2024-07-08 PROCEDURE — 99214 OFFICE O/P EST MOD 30 MIN: CPT | Performed by: INTERNAL MEDICINE

## 2024-07-08 PROCEDURE — 3077F SYST BP >= 140 MM HG: CPT | Performed by: INTERNAL MEDICINE

## 2024-07-08 PROCEDURE — 1036F TOBACCO NON-USER: CPT | Performed by: INTERNAL MEDICINE

## 2024-07-08 ASSESSMENT — ENCOUNTER SYMPTOMS
LOSS OF SENSATION IN FEET: 0
DEPRESSION: 0
OCCASIONAL FEELINGS OF UNSTEADINESS: 0

## 2024-07-08 ASSESSMENT — PATIENT HEALTH QUESTIONNAIRE - PHQ9
1. LITTLE INTEREST OR PLEASURE IN DOING THINGS: NOT AT ALL
SUM OF ALL RESPONSES TO PHQ9 QUESTIONS 1 AND 2: 0
2. FEELING DOWN, DEPRESSED OR HOPELESS: NOT AT ALL

## 2024-07-08 ASSESSMENT — COLUMBIA-SUICIDE SEVERITY RATING SCALE - C-SSRS
1. IN THE PAST MONTH, HAVE YOU WISHED YOU WERE DEAD OR WISHED YOU COULD GO TO SLEEP AND NOT WAKE UP?: NO
6. HAVE YOU EVER DONE ANYTHING, STARTED TO DO ANYTHING, OR PREPARED TO DO ANYTHING TO END YOUR LIFE?: NO
2. HAVE YOU ACTUALLY HAD ANY THOUGHTS OF KILLING YOURSELF?: NO

## 2024-07-08 ASSESSMENT — PAIN SCALES - GENERAL: PAINLEVEL: 8

## 2024-07-08 NOTE — PROGRESS NOTES
CHIEF COMPLAINT: routine follow-up visit    HISTORY OF PRESENT ILLNESS:    Ms. Jenkins is an 89 yo F with hx as listed below who returns to [facility]iology Clinic for follow-up visit; last seen by me in 4/2024. Seen in wheelchair today. Lives at [facility]. Denies CP, SOB, palpitations, or dizziness. Leg swelling has clinically improved (eating less salt). Doing PT at [facility]. In the interim, TTE done shows recovery of EF from 30% to 55% (no longer in rapid afib). Remote Device transmission fine. Meds reconciled with list from [facility]. Never got compression stockings.      Of note, admitted 12/2023-1/2024 at [facility] for ADHF and CHERYL. 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 DCCV done on 12/29 complicated by asystolic pauses and junctional bradycardia post-DCCV. 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 Diuresed and started on GDMT for reduced EF. Has chronic complaints of dizziness, numbness/face pain (trigeminal neuralgia), and headache since cerebellar CVA in 2019. Poor historian.     PAST MEDICAL/SURGICAL HISTORY:  -carpal tunnel surgery  -bilateral knee replacement  -HFrEF (likely tachy-mediated) in 1/2024; had recovered EF in the past. Was 30% and now recovered.  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)  -IFG  -appendectomy  -cholecystectomy  -L ankle surgery  -cataract surgery  -hysterectomy  -R rotator cuff repair     PRIOR CARDIAC TESTING:  -TTE (4/2024): EF 55-60%, pseudonormal diastology, elevated mean LAP, mild LAE, device in RA/RV, IVC mildly dilated with less than 50% collapse with inspiration  -TTE (12/2023 done when in rapid afib): EF 30-35%, LV mod dilated, diffuse hypokinesis, mild-mod LAE, RV moderately enlarged with moderate  reduced fx, mod ALEXIS, aortic sclerosis, mild-mod MAC  -TTE (2022): EF 65%, LV mildly dilated, RV mildly dilated with reduced fx, severe LAE, moderate-severe ALEXIS, IVC dilated and no compression, RVSP 39  -TTE (2019): EF 55%, mod conc LVH, ascending aorta plaque  -CTA cors (2019): technically difficulty study 2/2 motion artifact, LM and pLAD okay, dLAD/LCx/RCA not well visualized. Non-diagnostic.  -Nuclear stress test (2019): no ischemia, breast artifact, EF 37% at rest to 44% with stress, mild LVE  -TTE (2019): EF 25-30%, LV mildly dilated, impaired relaxation, severe LAE, RV mildly enlarged with normal fx, mild MR, dilated IVC with no collapse  -TTE (2015): EF 55-60%, ecc LVH, impaired relaxation, severe LAE, mod ALEXIS, mild mitral stenosis  -Nuclear stress test (2015): no ischemia, EF 68%     Allergies   Allergen Reactions    Ace Inhibitors Cough    Tramadol Other     Chest pain       Current Outpatient Medications:     acetaminophen (Tylenol) 325 mg tablet, Take 2 tablets (650 mg) by mouth every 4 hours if needed for mild pain (1 - 3)., Disp: , Rfl:     albuterol 90 mcg/actuation inhaler, Inhale 2 puffs every 4 hours if needed for wheezing or shortness of breath., Disp: , Rfl:     amiodarone (Pacerone) 200 mg tablet, Take 1 tablet (200 mg) by mouth once daily. Do not start before January 10, 2024., Disp: , Rfl:     atorvastatin (Lipitor) 20 mg tablet, Take 1 tablet (20 mg) by mouth once daily at bedtime., Disp: , Rfl:     bumetanide (Bumex) 2 mg tablet, Take 0.5 tablets (1 mg) by mouth once daily., Disp: 1 tablet, Rfl: 0    cyanocobalamin (Vitamin B-12) 250 mcg tablet, Take 1 tablet (250 mcg) by mouth once daily., Disp: , Rfl:     docusate sodium (Colace) 100 mg capsule, Take 1 capsule (100 mg) by mouth twice a day., Disp: , Rfl:     Eliquis 5 mg tablet, Take 1 tablet (5 mg) by mouth 2 times a day., Disp: , Rfl:     empagliflozin (Jardiance) 10 mg, Take 1 tablet (10 mg) by mouth once daily. Do not start before  January 10, 2024., Disp: , Rfl:     famotidine (Pepcid) 20 mg tablet, Take 1 tablet (20 mg) by mouth once daily., Disp: , Rfl:     folic acid (Folvite) 1 mg tablet, Take 1 tablet (1 mg) by mouth once daily., Disp: , Rfl:     lubricating eye drops ophthalmic solution, Administer 1 drop into both eyes every 4 hours if needed for dry eyes., Disp: , Rfl:     metoprolol succinate XL (Toprol-XL) 50 mg 24 hr tablet, Take 1 tablet (50 mg) by mouth once daily. Do not crush or chew. Do not start before January 10, 2024., Disp: , Rfl:     multivitamin tablet, Take 1 tablet by mouth once daily., Disp: , Rfl:     nystatin (Mycostatin) 100,000 unit/gram powder, Apply 1 Application topically 2 times a day as needed for rash., Disp: , Rfl:     polyethylene glycol (Glycolax, Miralax) 17 gram packet, Take 17 g by mouth once daily., Disp: , Rfl:     sennosides (Senokot) 8.6 mg tablet, Take 1 tablet (8.6 mg) by mouth 2 times a day., Disp: , Rfl:     /70 (BP Location: Right arm)   Pulse 72   Wt 117 kg (258 lb)   SpO2 91%   BMI 45.70 kg/m²     PHYSICAL EXAM:  GENERAL: NAD  HEENT: no JVD  CV: RRR without m/r/g  PULM: CTAB  EXT: trace edema bilateral lower extremities     LABS  WBC (x10*3/uL)   Date Value   02/07/2024 5.2     Hemoglobin (g/dL)   Date Value   02/07/2024 10.8 (L)     Platelets (x10*3/uL)   Date Value   02/07/2024 157     Sodium (mmol/L)   Date Value   02/27/2024 140     Potassium (mmol/L)   Date Value   02/27/2024 4.8     Chloride (mmol/L)   Date Value   02/27/2024 100     Bicarbonate (mmol/L)   Date Value   02/27/2024 29     Urea Nitrogen (mg/dL)   Date Value   02/27/2024 35 (H)     Creatinine (mg/dL)   Date Value   02/27/2024 1.48 (H)     Calcium (mg/dL)   Date Value   02/27/2024 9.3     Total Protein (g/dL)   Date Value   02/27/2024 6.7     Bilirubin, Total (mg/dL)   Date Value   02/27/2024 0.5     Alkaline Phosphatase (U/L)   Date Value   02/27/2024 98     ALT (U/L)   Date Value   02/27/2024 13     AST (U/L)    Date Value   02/27/2024 14     Glucose (mg/dL)   Date Value   02/27/2024 103 (H)     Cholesterol (mg/dL)   Date Value   08/23/2023 109   02/23/2019 161   08/28/2018 169     HDL (mg/dL)   Date Value   08/23/2023 44.1   02/23/2019 43.8   08/28/2018 52.7     Triglycerides (mg/dL)   Date Value   08/23/2023 59   02/23/2019 81     Hemoglobin A1C (%)   Date Value   10/02/2023 6.3 (H)   03/16/2019 5.6   02/23/2019 6.0   08/28/2018 5.7     Lab Results   Component Value Date    LDLF 53 08/23/2023     ASSESSMENT/PLAN:  89 yo F with hx of DLD, HTN, IFG, lymphedema of legs, CVA, UMA not on CPAP, 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 Just had labs drawn at [facility] and will get those faxed over (needs TFT and LFT surveillance since on amiodarone). Has [facility] and EP follow-up scheduled with Dr. Savage in November. Continue same meds including amiodarone 200 mg daily, bumex 1 mg daily, eliquis, jardiance 10 mg daily, and metoprolol 50 mg daily. BP did not tolerate ACE-I or spironolactone for GDMT. On atorvastatin 20 mg daily with LDL 44 and TG 60. RTC 6 months.

## 2024-08-15 ENCOUNTER — HOSPITAL ENCOUNTER (OUTPATIENT)
Dept: CARDIOLOGY | Facility: HOSPITAL | Age: 89
Discharge: HOME | End: 2024-08-15
Payer: COMMERCIAL

## 2024-08-15 DIAGNOSIS — I49.5 SICK SINUS SYNDROME (MULTI): ICD-10-CM

## 2024-08-15 DIAGNOSIS — Z95.0 PACEMAKER: ICD-10-CM

## 2024-08-15 PROCEDURE — 93294 REM INTERROG EVL PM/LDLS PM: CPT | Performed by: INTERNAL MEDICINE

## 2024-08-15 PROCEDURE — 93296 REM INTERROG EVL PM/IDS: CPT

## 2024-11-05 ENCOUNTER — TELEPHONE (OUTPATIENT)
Dept: GASTROENTEROLOGY | Facility: CLINIC | Age: 89
End: 2024-11-05
Payer: COMMERCIAL

## 2024-11-05 NOTE — TELEPHONE ENCOUNTER
Left a message for the patient to call the office so that we can schedule an appointment with Dr. Basurto, per request. Documenting. Thank you.

## 2024-11-12 ENCOUNTER — APPOINTMENT (OUTPATIENT)
Dept: CARDIOLOGY | Facility: CLINIC | Age: 89
End: 2024-11-12
Payer: COMMERCIAL

## 2024-11-12 VITALS — SYSTOLIC BLOOD PRESSURE: 159 MMHG | DIASTOLIC BLOOD PRESSURE: 75 MMHG

## 2024-11-12 DIAGNOSIS — I48.0 PAROXYSMAL ATRIAL FIBRILLATION (MULTI): Primary | ICD-10-CM

## 2024-11-12 DIAGNOSIS — Z95.0 PACEMAKER: ICD-10-CM

## 2024-11-12 DIAGNOSIS — I49.5 SICK SINUS SYNDROME (MULTI): ICD-10-CM

## 2024-11-12 PROCEDURE — 93280 PM DEVICE PROGR EVAL DUAL: CPT | Performed by: INTERNAL MEDICINE

## 2024-11-12 PROCEDURE — 3077F SYST BP >= 140 MM HG: CPT | Performed by: INTERNAL MEDICINE

## 2024-11-12 PROCEDURE — 1036F TOBACCO NON-USER: CPT | Performed by: INTERNAL MEDICINE

## 2024-11-12 PROCEDURE — 3078F DIAST BP <80 MM HG: CPT | Performed by: INTERNAL MEDICINE

## 2024-11-12 PROCEDURE — 1159F MED LIST DOCD IN RCRD: CPT | Performed by: INTERNAL MEDICINE

## 2024-11-12 PROCEDURE — 99214 OFFICE O/P EST MOD 30 MIN: CPT | Performed by: INTERNAL MEDICINE

## 2024-11-12 RX ORDER — DEXTROSE 40 %
GEL (GRAM) ORAL AS NEEDED
COMMUNITY

## 2024-11-12 NOTE — PROGRESS NOTES
"  Subjective:  The patient is an 88-year-old -American female who presents today for follow-up of her permanent pacemaker that was implanted in January 2024.  She is followed from a cardiac standpoint by Dr. Marcie Terrazas at [facility] and has a history of hypertension, obesity, degenerative joint disease, stage III chronic kidney disease, gastroesophageal reflux disease, hyperlipidemia, anemia, lower extremity venous insufficiency.  She had persistent atrial fibrillation in late 2023 and was cardioverted on 12/29/2023 but had severe bradycardia thereafter, requiring atropine to bring up her sinus rate.  Dr. Sneed and I both recommended permanent pacing.  The patient initially refused this but ultimately consented to it.  She underwent Medtronic DDDR pacemaker placement on 1/5/2024 by Dr. Alejandro Mancia, covering for me that afternoon.    The patient was felt to have a tachycardia-induced cardiomyopathy from rapid ventricular responses to her atrial fibrillation (LVEF 30-35%).  Following pacemaker placement, she was started on amiodarone, and her LVEF improved to 55-60% by May 2024.  She continues on a \"rhythm control strategy\" with amiodarone 200 mg daily, Toprol-XL 50 mg daily, and Eliquis anticoagulation at 5 mg p.o. twice daily.    The patient is originally from the [facility] but is now living in an assisted living facility in [place], I believe.  She has difficulty with transportation, but came to our office via [employer] bus.  She also has issues with urinary incontinence and has to wear adult diapers.    Current Outpatient Medications   Medication Sig    acetaminophen (Tylenol) 325 mg tablet Take 2 tablets (650 mg) by mouth every 4 hours if needed for mild pain (1 - 3).    albuterol 90 mcg/actuation inhaler Inhale 2 puffs every 4 hours if needed for wheezing or shortness of breath.    amiodarone (Pacerone) 200 mg tablet Take 1 tablet (200 mg) by mouth once daily.     atorvastatin " (Lipitor) 20 mg tablet Take 1 tablet (20 mg) by mouth once daily at bedtime.    bumetanide (Bumex) 2 mg tablet Take 0.5 tablets (1 mg) by mouth once daily.    cyanocobalamin (Vitamin B-12) 250 mcg tablet Take 1 tablet (250 mcg) by mouth once daily.    docusate sodium (Colace) 100 mg capsule Take 1 capsule (100 mg) by mouth twice a day.    Eliquis 5 mg tablet Take 1 tablet (5 mg) by mouth 2 times a day.    empagliflozin (Jardiance) 10 mg Take 1 tablet (10 mg) by mouth once daily. Do not start before January 10, 2024.    famotidine (Pepcid) 20 mg tablet Take 1 tablet (20 mg) by mouth once daily.    folic acid (Folvite) 1 mg tablet Take 1 tablet (1 mg) by mouth once daily.    lubricating eye drops ophthalmic solution Administer 1 drop into both eyes every 4 hours if needed for dry eyes.    metoprolol succinate XL (Toprol-XL) 50 mg 24 hr tablet Take 1 tablet (50 mg) by mouth once daily. Do not crush or chew. Do not start before January 10, 2024.    multivitamin tablet Take 1 tablet by mouth once daily.    nystatin (Mycostatin) 100,000 unit/gram powder Apply 1 Application topically 2 times a day as needed for rash.    polyethylene glycol (Glycolax, Miralax) 17 gram packet Take 17 g by mouth once daily.    sennosides (Senokot) 8.6 mg tablet Take 1 tablet (8.6 mg) by mouth 2 times a day.     Allergies:  Ace inhibitors and Tramadol     Patient Active Problem List   Diagnosis    Urinary retention    Trigeminal neuralgia    Spontaneous ocular nystagmus    Spinal stenosis    Schwannoma    Pseudophakia of both eyes    Paroxysmal atrial fibrillation (Multi)    Parotid mass    Cerebellar stroke (Multi)    Osteoarthritis of knee    Obstructive sleep apnea syndrome    Mass of parapharyngeal space    Lumbar spondylosis    LAE (left atrial enlargement)    Incontinence of urine    Hyperlipidemia    History of CVA (cerebrovascular accident)    Heart failure with preserved left ventricular function (HFpEF)    GERD (gastroesophageal  reflux disease)    Vertigo    Chronic anemia    Bradycardia    Bilateral sensorineural hearing loss    Atherosclerosis of coronary artery    Adnexal mass    Abnormal ECG    Acute respiratory failure with hypoxia (Multi)    Stage 3 chronic kidney disease (Multi)    Morbid obesity (Multi)    Type 2 diabetes mellitus with chronic kidney disease, without long-term current use of insulin (Multi)    Acute heart failure with preserved ejection fraction    Apnea    CHERYL (acute kidney injury) (CMS-HCC)    Acute combined systolic and diastolic heart failure    Arthritis    Constipation    Decreased appetite    Drusen of macula of both eyes    Dry eyes, bilateral    Essential hypertension    Exotropia    Fatigue    Frequency of urination    Haemangioma of hepatobiliary system    Headache, unspecified    Hearing loss    Hyperopia of both eyes with astigmatism and presbyopia    Hypertropia of right eye    Jaw pain    Lesion of parotid gland    Nausea and vomiting    Nystagmus    Neck mass    Ocular pain, left eye    Pain in eye    Pain    Nail deformity    Chronic anticoagulation    Chronic diastolic heart failure    Dizziness     Past Surgical History:   Procedure Laterality Date    ANKLE SURGERY  08/01/2013    Ankle Surgery    APPENDECTOMY  02/21/2013    Appendectomy    BREAST BIOPSY  04/02/2013    Biopsy Breast Percutaneous Needle Core    CARDIAC ELECTROPHYSIOLOGY PROCEDURE Left 1/5/2024    Procedure: PPM IMPLANT DUAL;  Surgeon: Alejandro Mancia MD;  Location: [facility] Cardiac Cath Lab;  Service: Electrophysiology;  Laterality: Left;  Medtronic DDDR pacemaker placement    CARPAL TUNNEL RELEASE  02/21/2013    Neuroplasty Decompression Median Nerve At Carpal Tunnel    CHOLECYSTECTOMY  02/21/2013    Cholecystectomy    COLONOSCOPY  12/22/2015    Complete Colonoscopy    CT ANGIO CORONARY ART WITH HEARTFLOW IF SCORE >30%  2/26/2019    CT HEART CORONARY ANGIOGRAM 2/26/2019 [facility] CLINICAL LEGACY    CT ANGIO NECK  2/23/2019    CT NECK ANGIO W  "AND WO IV CONTRAST 2/23/2019 [facility] CLINICAL LEGACY    CT HEAD ANGIO W AND WO IV CONTRAST  2/23/2019    CT HEAD ANGIO W AND WO IV CONTRAST 2/23/2019 [facility] CLINICAL LEGACY    OTHER SURGICAL HISTORY  02/11/2020    Cataract surgery    OTHER SURGICAL HISTORY  12/22/2018    Upper Gastrointestinal Endoscopy (Therapeutic)    ROTATOR CUFF REPAIR  04/02/2013    Rotator Cuff Repair    TOTAL ABDOMINAL HYSTERECTOMY  02/21/2013    Total Abdominal Hysterectomy    TOTAL KNEE ARTHROPLASTY  08/01/2013    Knee Replacement     Objective:  Vitals:    11/12/24 1650   BP: 159/75     Exam:  Gen: Obese elderly woman in no distress; talkative.  HEENT: No scleral icterus; mild bilateral exotropia.  Neck: No jugular venous distention or thyromegaly.  Left subclavian pacemaker pocket: Normal in appearance.  Lungs: Clear to auscultation, with no wheezes or rales.  Heart: Regular rhythm with distant heart sounds but no murmurs appreciated.  Abdomen: Obese but benign, with no organomegaly or masses.  Extremities: Intact distal pulses; trace bilateral ankle edema.  Neuro: No focal neurologic abnormalities.  Skin: No cutaneous lesions.    Device Check:  A Prezacor device check was performed today.  The unit is programmed AAIR (MVP) between 60 bpm and 120 bpm, and actually provides over 99% atrial pacing and over 99% ventricular pacing.  The lead parameters were acceptable and the battery longevity estimated at 10.8 years.  There was no burden of atrial fibrillation.    Impressions:  1.  Chronic hypertension, with suboptimal control today.  2.  Atrial fibrillation, persistent in December 2023, and likely due to hypertension and age.  The patient has a [id number] score of at least 5 (hypertension, CHF, female gender, 2 points for age over 75), and she is appropriately anticoagulated with Eliquis.  She continues on a \"rhythm control strategy\" with amiodarone, which appears to be highly effective for maintenance of sinus rhythm and just 200 mg " daily.  3.  Tachycardia-bradycardia syndrome, with severe sinus bradycardia following cardioversion in December 2023.  She has largely pacemaker-dependent now, in both the atrium and the ventricle.  4.  Status post Medtronic DDDR pacemaker placement 1/5/2024, with normal device function.  5.  Prior tachycardia-induced cardiomyopathy, with full resolution by May 2024.  6.  Multiple other medical problems, including obesity, degenerative joint disease, stage III chronic kidney disease, gastroesophageal reflux disease, hyperlipidemia, lower extremity venous insufficiency, chronic anemia, and urinary incontinence.    Recommendations:  1.  The patient will remain on her current medical regimen.  2.  She will follow-up in the near future with Dr. Marcie Terrazas, and will benefit from laboratory testing at least twice annually to assure that her liver and thyroid function studies are acceptable on long-term amiodarone.  3.  She will also be scheduled to see Dr. Steve Min at the [facility] [address] office for additional pacemaker checks, since that is closer to her facility in [place].      Lamine Savage MD

## 2024-11-25 ENCOUNTER — LAB (OUTPATIENT)
Dept: LAB | Facility: LAB | Age: 89
End: 2024-11-25
Payer: MEDICARE

## 2024-11-25 ENCOUNTER — OFFICE VISIT (OUTPATIENT)
Dept: CARDIOLOGY | Facility: CLINIC | Age: 89
End: 2024-11-25
Payer: COMMERCIAL

## 2024-11-25 VITALS
WEIGHT: 258 LBS | HEIGHT: 63 IN | HEART RATE: 60 BPM | BODY MASS INDEX: 45.71 KG/M2 | DIASTOLIC BLOOD PRESSURE: 54 MMHG | SYSTOLIC BLOOD PRESSURE: 130 MMHG | OXYGEN SATURATION: 98 %

## 2024-11-25 DIAGNOSIS — I48.0 PAROXYSMAL ATRIAL FIBRILLATION (MULTI): ICD-10-CM

## 2024-11-25 DIAGNOSIS — I50.30 HEART FAILURE WITH PRESERVED LEFT VENTRICULAR FUNCTION (HFPEF): ICD-10-CM

## 2024-11-25 DIAGNOSIS — I48.0 PAROXYSMAL ATRIAL FIBRILLATION (MULTI): Primary | ICD-10-CM

## 2024-11-25 DIAGNOSIS — I49.5 SICK SINUS SYNDROME (MULTI): ICD-10-CM

## 2024-11-25 DIAGNOSIS — E78.49 OTHER HYPERLIPIDEMIA: ICD-10-CM

## 2024-11-25 DIAGNOSIS — I10 ESSENTIAL HYPERTENSION: ICD-10-CM

## 2024-11-25 PROCEDURE — 3075F SYST BP GE 130 - 139MM HG: CPT | Performed by: INTERNAL MEDICINE

## 2024-11-25 PROCEDURE — 99214 OFFICE O/P EST MOD 30 MIN: CPT | Performed by: INTERNAL MEDICINE

## 2024-11-25 PROCEDURE — 1160F RVW MEDS BY RX/DR IN RCRD: CPT | Performed by: INTERNAL MEDICINE

## 2024-11-25 PROCEDURE — 1159F MED LIST DOCD IN RCRD: CPT | Performed by: INTERNAL MEDICINE

## 2024-11-25 PROCEDURE — 1126F AMNT PAIN NOTED NONE PRSNT: CPT | Performed by: INTERNAL MEDICINE

## 2024-11-25 PROCEDURE — 3078F DIAST BP <80 MM HG: CPT | Performed by: INTERNAL MEDICINE

## 2024-11-25 PROCEDURE — 1036F TOBACCO NON-USER: CPT | Performed by: INTERNAL MEDICINE

## 2024-11-25 ASSESSMENT — ENCOUNTER SYMPTOMS
LOSS OF SENSATION IN FEET: 0
DEPRESSION: 0

## 2024-11-25 ASSESSMENT — COLUMBIA-SUICIDE SEVERITY RATING SCALE - C-SSRS
6. HAVE YOU EVER DONE ANYTHING, STARTED TO DO ANYTHING, OR PREPARED TO DO ANYTHING TO END YOUR LIFE?: NO
2. HAVE YOU ACTUALLY HAD ANY THOUGHTS OF KILLING YOURSELF?: NO
1. IN THE PAST MONTH, HAVE YOU WISHED YOU WERE DEAD OR WISHED YOU COULD GO TO SLEEP AND NOT WAKE UP?: NO

## 2024-11-25 ASSESSMENT — PAIN SCALES - GENERAL: PAINLEVEL_OUTOF10: 0-NO PAIN

## 2024-11-25 NOTE — PROGRESS NOTES
CHIEF COMPLAINT: routine follow-up visit    HISTORY OF PRESENT ILLNESS:    Ms. Jenkins is an 87 yo F with hx as listed below who returns to [facility] for follow-up visit; last seen by me in 7/2024. Seen in wheelchair today. Lives at [facility]. Denies CP, SOB, palpitations, or dizziness. Leg swelling has clinically improved (eating less salt). Never got compression stockings.      Of note, admitted 12/2023-1/2024 at [facility] for ADHF and CHERYL. 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 DCCV done on 12/29 complicated by asystolic pauses and junctional bradycardia post-DCCV. 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 Diuresed and started on GDMT for reduced EF. Has chronic complaints of dizziness, numbness/face pain (trigeminal neuralgia), and headache since cerebellar CVA in 2019. Poor historian. Has incontinence and wears adult diapers. Has hemorrhoids.     PAST MEDICAL/SURGICAL HISTORY:  -carpal tunnel surgery  -bilateral knee replacement  -HFrEF (likely tachy-mediated) in 1/2024; had recovered EF in the past. Was 30% and now recovered.  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)  -IFG  -appendectomy  -cholecystectomy  -L ankle surgery  -cataract surgery  -hysterectomy  -R rotator cuff repair     PRIOR CARDIAC TESTING:  -TTE (5/2024): EF 55-60%, pseudonormal diastology, elevated mean LAP, mild LAE, device in RA/RV, IVC mildly dilated with less than 50% collapse with inspiration  -TTE (12/2023 done when in rapid afib): EF 30-35%, LV mod dilated, diffuse hypokinesis, mild-mod LAE, RV moderately enlarged with moderate reduced fx, mod ALEXIS, aortic sclerosis, mild-mod MAC  -TTE (2022): EF 65%, LV mildly dilated, RV mildly dilated with reduced  fx, severe LAE, moderate-severe ALEXIS, IVC dilated and no compression, RVSP 39  -TTE (2019): EF 55%, mod conc LVH, ascending aorta plaque  -CTA cors (2019): technically difficulty study 2/2 motion artifact, LM and pLAD okay, dLAD/LCx/RCA not well visualized. Non-diagnostic.  -Nuclear stress test (2019): no ischemia, breast artifact, EF 37% at rest to 44% with stress, mild LVE  -TTE (2019): EF 25-30%, LV mildly dilated, impaired relaxation, severe LAE, RV mildly enlarged with normal fx, mild MR, dilated IVC with no collapse  -TTE (2015): EF 55-60%, ecc LVH, impaired relaxation, severe LAE, mod ALEXIS, mild mitral stenosis  -Nuclear stress test (2015): no ischemia, EF 68%     Allergies   Allergen Reactions    Ace Inhibitors Cough    Tramadol Other     Chest pain       Current Outpatient Medications:     acetaminophen (Tylenol) 325 mg tablet, Take 2 tablets (650 mg) by mouth every 4 hours if needed for mild pain (1 - 3)., Disp: , Rfl:     albuterol 90 mcg/actuation inhaler, Inhale 2 puffs every 4 hours if needed for wheezing or shortness of breath., Disp: , Rfl:     amiodarone (Pacerone) 200 mg tablet, Take 1 tablet (200 mg) by mouth once daily. Do not start before January 10, 2024., Disp: , Rfl:     ANTACID, CALCIUM CARBONATE, ORAL, Take by mouth if needed., Disp: , Rfl:     atorvastatin (Lipitor) 20 mg tablet, Take 1 tablet (20 mg) by mouth once daily at bedtime., Disp: , Rfl:     bumetanide (Bumex) 2 mg tablet, Take 0.5 tablets (1 mg) by mouth once daily., Disp: 1 tablet, Rfl: 0    cyanocobalamin (Vitamin B-12) 250 mcg tablet, Take 1 tablet (250 mcg) by mouth once daily., Disp: , Rfl:     docusate sodium (Colace) 100 mg capsule, Take 1 capsule (100 mg) by mouth twice a day., Disp: , Rfl:     Eliquis 5 mg tablet, Take 1 tablet (5 mg) by mouth 2 times a day., Disp: , Rfl:     empagliflozin (Jardiance) 10 mg, Take 1 tablet (10 mg) by mouth once daily. Do not start before January 10, 2024., Disp: , Rfl:     famotidine  "(Pepcid) 20 mg tablet, Take 1 tablet (20 mg) by mouth once daily., Disp: , Rfl:     folic acid (Folvite) 1 mg tablet, Take 1 tablet (1 mg) by mouth once daily., Disp: , Rfl:     glucose (Glutose) 40 % gel oral gel, Take by mouth if needed for low blood sugar - see comments., Disp: , Rfl:     lubricating eye drops ophthalmic solution, Administer 1 drop into both eyes every 4 hours if needed for dry eyes., Disp: , Rfl:     metoprolol succinate XL (Toprol-XL) 50 mg 24 hr tablet, Take 1 tablet (50 mg) by mouth once daily. Do not crush or chew. Do not start before January 10, 2024., Disp: , Rfl:     multivitamin tablet, Take 1 tablet by mouth once daily., Disp: , Rfl:     nystatin (Mycostatin) 100,000 unit/gram powder, Apply 1 Application topically 2 times a day as needed for rash., Disp: , Rfl:     polyethylene glycol (Glycolax, Miralax) 17 gram packet, Take 17 g by mouth once daily., Disp: , Rfl:     sennosides (Senokot) 8.6 mg tablet, Take 1 tablet (8.6 mg) by mouth 2 times a day., Disp: , Rfl:     /54 (BP Location: Left arm, Patient Position: Sitting)   Pulse 60   Ht 1.6 m (5' 3\")   Wt 117 kg (258 lb)   SpO2 98%   BMI 45.70 kg/m²     PHYSICAL EXAM:  GENERAL: NAD  HEENT: no JVD  CV: RRR without m/r/g  PULM: CTAB  EXT: lymphedema legs    LABS  WBC (x10*3/uL)   Date Value   02/07/2024 5.2     Hemoglobin (g/dL)   Date Value   02/07/2024 10.8 (L)     Platelets (x10*3/uL)   Date Value   02/07/2024 157     Sodium (mmol/L)   Date Value   02/27/2024 140     Potassium (mmol/L)   Date Value   02/27/2024 4.8     Chloride (mmol/L)   Date Value   02/27/2024 100     Bicarbonate (mmol/L)   Date Value   02/27/2024 29     Urea Nitrogen (mg/dL)   Date Value   02/27/2024 35 (H)     Creatinine (mg/dL)   Date Value   02/27/2024 1.48 (H)     Calcium (mg/dL)   Date Value   02/27/2024 9.3     Total Protein (g/dL)   Date Value   02/27/2024 6.7     Bilirubin, Total (mg/dL)   Date Value   02/27/2024 0.5     Alkaline Phosphatase (U/L) "   Date Value   02/27/2024 98     ALT (U/L)   Date Value   02/27/2024 13     AST (U/L)   Date Value   02/27/2024 14     Glucose (mg/dL)   Date Value   02/27/2024 103 (H)     Cholesterol (mg/dL)   Date Value   08/23/2023 109   02/23/2019 161   08/28/2018 169     HDL (mg/dL)   Date Value   08/23/2023 44.1   02/23/2019 43.8   08/28/2018 52.7     Triglycerides (mg/dL)   Date Value   08/23/2023 59   02/23/2019 81     Hemoglobin A1C (%)   Date Value   10/02/2023 6.3 (H)   03/16/2019 5.6   02/23/2019 6.0   08/28/2018 5.7     Lab Results   Component Value Date    LDLF 53 08/23/2023     ASSESSMENT/PLAN: 87 yo F with hx of DLD, HTN, IFG, lymphedema of legs, CVA, UMA not on CPAP, tachy-mediated CM (EF 30-35%) since recovered, and afib s/p DCCV (12/2023) and 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 Needs updated labs since on amiodarone and will get done today (CMP, CBC, TSH, lipid panel, and HgbA1c). Follows with [facility]. Continue same meds including amiodarone 200 mg daily, bumex 1 mg daily, eliquis, jardiance 10 mg daily, and metoprolol 50 mg daily. BP did not tolerate ACE-I or spironolactone for GDMT. On atorvastatin 20 mg daily with LDL 44 and TG 60. Names given for PCP referral. RTC 6 months.

## 2024-11-26 ENCOUNTER — TELEPHONE (OUTPATIENT)
Dept: CARDIOLOGY | Facility: CLINIC | Age: 89
End: 2024-11-26
Payer: MEDICARE

## 2024-11-26 LAB
ALBUMIN SERPL BCP-MCNC: 4.3 G/DL (ref 3.4–5)
ALP SERPL-CCNC: 73 U/L (ref 33–136)
ALT SERPL W P-5'-P-CCNC: 8 U/L (ref 7–45)
ANION GAP SERPL CALC-SCNC: 15 MMOL/L (ref 10–20)
AST SERPL W P-5'-P-CCNC: 10 U/L (ref 9–39)
BILIRUB SERPL-MCNC: 0.6 MG/DL (ref 0–1.2)
BUN SERPL-MCNC: 19 MG/DL (ref 6–23)
CALCIUM SERPL-MCNC: 8.8 MG/DL (ref 8.6–10.6)
CHLORIDE SERPL-SCNC: 102 MMOL/L (ref 98–107)
CHOLEST SERPL-MCNC: 134 MG/DL (ref 0–199)
CHOLESTEROL/HDL RATIO: 2.5
CO2 SERPL-SCNC: 30 MMOL/L (ref 21–32)
CREAT SERPL-MCNC: 1.2 MG/DL (ref 0.5–1.05)
EGFRCR SERPLBLD CKD-EPI 2021: 44 ML/MIN/1.73M*2
ERYTHROCYTE [DISTWIDTH] IN BLOOD BY AUTOMATED COUNT: 13.1 % (ref 11.5–14.5)
EST. AVERAGE GLUCOSE BLD GHB EST-MCNC: 123 MG/DL
GLUCOSE SERPL-MCNC: 92 MG/DL (ref 74–99)
HBA1C MFR BLD: 5.9 %
HCT VFR BLD AUTO: 36.2 % (ref 36–46)
HDLC SERPL-MCNC: 53 MG/DL
HGB BLD-MCNC: 11.2 G/DL (ref 12–16)
LDLC SERPL CALC-MCNC: 66 MG/DL
MCH RBC QN AUTO: 32.6 PG (ref 26–34)
MCHC RBC AUTO-ENTMCNC: 30.9 G/DL (ref 32–36)
MCV RBC AUTO: 105 FL (ref 80–100)
NON HDL CHOLESTEROL: 81 MG/DL (ref 0–149)
NRBC BLD-RTO: 0 /100 WBCS (ref 0–0)
PLATELET # BLD AUTO: 164 X10*3/UL (ref 150–450)
POTASSIUM SERPL-SCNC: 4.7 MMOL/L (ref 3.5–5.3)
PROT SERPL-MCNC: 6.9 G/DL (ref 6.4–8.2)
RBC # BLD AUTO: 3.44 X10*6/UL (ref 4–5.2)
SODIUM SERPL-SCNC: 142 MMOL/L (ref 136–145)
TRIGL SERPL-MCNC: 77 MG/DL (ref 0–149)
TSH SERPL-ACNC: 0.78 MIU/L (ref 0.44–3.98)
VLDL: 15 MG/DL (ref 0–40)
WBC # BLD AUTO: 6 X10*3/UL (ref 4.4–11.3)

## 2024-11-26 NOTE — TELEPHONE ENCOUNTER
Called [facility] after trying patient's cell phone number listed. Left message with nurse to let patient know per Dr. Terrazas labs done look great.

## 2025-01-07 ENCOUNTER — APPOINTMENT (OUTPATIENT)
Dept: GASTROENTEROLOGY | Facility: HOSPITAL | Age: 89
End: 2025-01-07
Payer: MEDICARE

## 2025-01-20 ENCOUNTER — APPOINTMENT (OUTPATIENT)
Dept: GASTROENTEROLOGY | Facility: CLINIC | Age: 89
End: 2025-01-20
Payer: COMMERCIAL

## 2025-02-04 ENCOUNTER — APPOINTMENT (OUTPATIENT)
Dept: GASTROENTEROLOGY | Facility: CLINIC | Age: 89
End: 2025-02-04
Payer: COMMERCIAL

## 2025-02-19 ENCOUNTER — HOSPITAL ENCOUNTER (OUTPATIENT)
Dept: CARDIOLOGY | Facility: HOSPITAL | Age: 89
Discharge: HOME | End: 2025-02-19
Payer: COMMERCIAL

## 2025-02-19 DIAGNOSIS — I49.5 SICK SINUS SYNDROME (MULTI): ICD-10-CM

## 2025-02-19 DIAGNOSIS — Z95.0 PACEMAKER: ICD-10-CM

## 2025-02-19 PROCEDURE — 93294 REM INTERROG EVL PM/LDLS PM: CPT | Performed by: INTERNAL MEDICINE

## 2025-02-19 PROCEDURE — 93296 REM INTERROG EVL PM/IDS: CPT

## 2025-05-22 ENCOUNTER — HOSPITAL ENCOUNTER (OUTPATIENT)
Dept: CARDIOLOGY | Facility: HOSPITAL | Age: 89
Discharge: HOME | End: 2025-05-22
Payer: COMMERCIAL

## 2025-05-22 DIAGNOSIS — I49.5 SICK SINUS SYNDROME (MULTI): ICD-10-CM

## 2025-05-22 DIAGNOSIS — Z95.0 PACEMAKER: ICD-10-CM

## 2025-05-22 PROCEDURE — 93296 REM INTERROG EVL PM/IDS: CPT

## 2025-05-27 ENCOUNTER — OFFICE VISIT (OUTPATIENT)
Dept: CARDIOLOGY | Facility: CLINIC | Age: 89
End: 2025-05-27
Payer: COMMERCIAL

## 2025-05-27 VITALS
HEART RATE: 61 BPM | DIASTOLIC BLOOD PRESSURE: 73 MMHG | SYSTOLIC BLOOD PRESSURE: 153 MMHG | BODY MASS INDEX: 45.71 KG/M2 | OXYGEN SATURATION: 90 % | HEIGHT: 63 IN | WEIGHT: 258 LBS

## 2025-05-27 DIAGNOSIS — I50.30 HEART FAILURE WITH PRESERVED LEFT VENTRICULAR FUNCTION (HFPEF): ICD-10-CM

## 2025-05-27 DIAGNOSIS — G82.20 PARAPARESIS (MULTI): ICD-10-CM

## 2025-05-27 DIAGNOSIS — E11.22 TYPE 2 DIABETES MELLITUS WITH CHRONIC KIDNEY DISEASE, WITHOUT LONG-TERM CURRENT USE OF INSULIN, UNSPECIFIED CKD STAGE (MULTI): ICD-10-CM

## 2025-05-27 DIAGNOSIS — J81.1 CHRONIC PULMONARY EDEMA: ICD-10-CM

## 2025-05-27 DIAGNOSIS — I63.9 CEREBELLAR STROKE (MULTI): ICD-10-CM

## 2025-05-27 DIAGNOSIS — J96.01 ACUTE RESPIRATORY FAILURE WITH HYPOXIA: ICD-10-CM

## 2025-05-27 DIAGNOSIS — G47.33 OBSTRUCTIVE SLEEP APNEA SYNDROME: ICD-10-CM

## 2025-05-27 DIAGNOSIS — I49.5 SICK SINUS SYNDROME (MULTI): ICD-10-CM

## 2025-05-27 DIAGNOSIS — I69.354 HEMIPLEGIA AND HEMIPARESIS FOLLOWING CEREBRAL INFARCTION AFFECTING LEFT NON-DOMINANT SIDE (MULTI): ICD-10-CM

## 2025-05-27 DIAGNOSIS — I48.0 PAROXYSMAL ATRIAL FIBRILLATION (MULTI): Primary | ICD-10-CM

## 2025-05-27 DIAGNOSIS — Z95.0 PACEMAKER: ICD-10-CM

## 2025-05-27 PROCEDURE — 99214 OFFICE O/P EST MOD 30 MIN: CPT | Performed by: INTERNAL MEDICINE

## 2025-05-27 PROCEDURE — 1126F AMNT PAIN NOTED NONE PRSNT: CPT | Performed by: INTERNAL MEDICINE

## 2025-05-27 PROCEDURE — 3078F DIAST BP <80 MM HG: CPT | Performed by: INTERNAL MEDICINE

## 2025-05-27 PROCEDURE — 3077F SYST BP >= 140 MM HG: CPT | Performed by: INTERNAL MEDICINE

## 2025-05-27 PROCEDURE — 1160F RVW MEDS BY RX/DR IN RCRD: CPT | Performed by: INTERNAL MEDICINE

## 2025-05-27 PROCEDURE — 1159F MED LIST DOCD IN RCRD: CPT | Performed by: INTERNAL MEDICINE

## 2025-05-27 PROCEDURE — 93005 ELECTROCARDIOGRAM TRACING: CPT | Performed by: INTERNAL MEDICINE

## 2025-05-27 ASSESSMENT — ENCOUNTER SYMPTOMS
OCCASIONAL FEELINGS OF UNSTEADINESS: 1
DEPRESSION: 0
LOSS OF SENSATION IN FEET: 0

## 2025-05-27 ASSESSMENT — PAIN SCALES - GENERAL: PAINLEVEL_OUTOF10: 0-NO PAIN

## 2025-05-27 ASSESSMENT — PATIENT HEALTH QUESTIONNAIRE - PHQ9
SUM OF ALL RESPONSES TO PHQ9 QUESTIONS 1 AND 2: 0
1. LITTLE INTEREST OR PLEASURE IN DOING THINGS: NOT AT ALL
2. FEELING DOWN, DEPRESSED OR HOPELESS: NOT AT ALL

## 2025-05-27 NOTE — PROGRESS NOTES
CHIEF COMPLAINT: routine follow-up visit    HISTORY OF PRESENT ILLNESS:    Ms. Jenkins is an 89 yo F with hx as listed below who returns to [facility] for follow-up visit; last seen by me in 11/2024. Seen in wheelchair today. Lives at [facility]. Denies CP, SOB, palpitations, or dizziness. Has chronic bilateral lower extremity lymphedema and is not interested in compression stockings. Has not yet established with a PCP. Stopped jardiance on her own a few weeks ago due to dizziness. ECG shows paced rhythm. Waking herself up at night (sounds like apneic episodes) leading to daytime sleepiness and chronic fatigue.     Of note, admitted 12/2023-1/2024 at [facility] for ADHF and CHERYL. 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 DCCV done on 12/29 complicated by asystolic pauses and junctional bradycardia post-DCCV. 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 Diuresed and started on GDMT for reduced EF. Has chronic complaints of dizziness, numbness/face pain (trigeminal neuralgia), and headache since cerebellar CVA in 2019. Poor historian. Has incontinence and wears adult diapers. Has hemorrhoids with occasional bright red blood with wiping.     PAST MEDICAL/SURGICAL HISTORY:  -carpal tunnel surgery  -hemorrhoids  -bilateral knee replacement  -HFrEF (likely tachy-mediated) in 1/2024; had recovered EF in the past. Was 30% and now recovered.  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)  -IFG  -appendectomy  -cholecystectomy  -L ankle surgery  -cataract surgery  -hysterectomy  -R rotator cuff repair     PRIOR CARDIAC TESTING:  -TTE (2024): EF 55-60%, pseudonormal diastology, elevated mean LAP, mild LAE, device in RA/RV, IVC mildly dilated with less than  50% collapse with inspiration  -TTE (2023 done when in rapid afib): EF 30-35%, LV mod dilated, diffuse hypokinesis, mild-mod LAE, RV moderately enlarged with moderate reduced fx, mod ALEXIS, aortic sclerosis, mild-mod MAC  -TTE (2022): EF 65%, LV mildly dilated, RV mildly dilated with reduced fx, severe LAE, moderate-severe ALEXIS, IVC dilated and no compression, RVSP 39  -TTE (2019): EF 55%, mod conc LVH, ascending aorta plaque  -CTA cors (2019): technically difficulty study 2/2 motion artifact, LM and pLAD okay, dLAD/LCx/RCA not well visualized. Non-diagnostic.  -Nuclear stress test (2019): no ischemia, breast artifact, EF 37% at rest to 44% with stress, mild LVE  -TTE (2019): EF 25-30%, LV mildly dilated, impaired relaxation, severe LAE, RV mildly enlarged with normal fx, mild MR, dilated IVC with no collapse  -TTE (2015): EF 55-60%, ecc LVH, impaired relaxation, severe LAE, mod ALEXIS, mild mitral stenosis  -Nuclear stress test (2015): no ischemia, EF 68%     Allergies   Allergen Reactions    Ace Inhibitors Cough    Jardiance [Empagliflozin] Dizziness    Tramadol Other     Chest pain       Current Outpatient Medications:     acetaminophen (Tylenol) 325 mg tablet, Take 2 tablets (650 mg) by mouth every 4 hours if needed for mild pain (1 - 3)., Disp: , Rfl:     albuterol 90 mcg/actuation inhaler, Inhale 2 puffs every 4 hours if needed for wheezing or shortness of breath., Disp: , Rfl:     amiodarone (Pacerone) 200 mg tablet, Take 1 tablet (200 mg) by mouth once daily. Do not start before January 10, 2024., Disp: , Rfl:     ANTACID, CALCIUM CARBONATE, ORAL, Take by mouth if needed., Disp: , Rfl:     atorvastatin (Lipitor) 20 mg tablet, Take 1 tablet (20 mg) by mouth once daily at bedtime., Disp: , Rfl:     bumetanide (Bumex) 2 mg tablet, Take 0.5 tablets (1 mg) by mouth once daily., Disp: 1 tablet, Rfl: 0    cyanocobalamin (Vitamin B-12) 250 mcg tablet, Take 1 tablet (250 mcg) by mouth once daily., Disp: , Rfl:     docusate  "sodium (Colace) 100 mg capsule, Take 1 capsule (100 mg) by mouth twice a day., Disp: , Rfl:     Eliquis 5 mg tablet, Take 1 tablet (5 mg) by mouth 2 times a day., Disp: , Rfl:     famotidine (Pepcid) 20 mg tablet, Take 1 tablet (20 mg) by mouth once daily., Disp: , Rfl:     folic acid (Folvite) 1 mg tablet, Take 1 tablet (1 mg) by mouth once daily., Disp: , Rfl:     glucose (Glutose) 40 % gel oral gel, Take by mouth if needed for low blood sugar - see comments., Disp: , Rfl:     metoprolol succinate XL (Toprol-XL) 50 mg 24 hr tablet, Take 1 tablet (50 mg) by mouth once daily. Do not crush or chew. Do not start before January 10, 2024., Disp: , Rfl:     multivitamin tablet, Take 1 tablet by mouth once daily., Disp: , Rfl:     nystatin (Mycostatin) 100,000 unit/gram powder, Apply 1 Application topically 2 times a day as needed for rash., Disp: , Rfl:     polyethylene glycol (Glycolax, Miralax) 17 gram packet, Take 17 g by mouth once daily., Disp: , Rfl:     sennosides (Senokot) 8.6 mg tablet, Take 1 tablet (8.6 mg) by mouth 2 times a day., Disp: , Rfl:     lubricating eye drops ophthalmic solution, Administer 1 drop into both eyes every 4 hours if needed for dry eyes., Disp: , Rfl:     /73 (BP Location: Left arm, Patient Position: Sitting, BP Cuff Size: Adult long)   Pulse 61   Ht 1.6 m (5' 3\")   Wt 117 kg (258 lb)   SpO2 90%   BMI 45.70 kg/m²     PHYSICAL EXAM:  GENERAL: NAD, obese, in wheelchair  HEENT: no JVD  CV: RRR without m/r/g  PULM: CTAB  EXT: chronic lymphedema of bilateral lower extremities    LABS  WBC (x10*3/uL)   Date Value   11/25/2024 6.0     Hemoglobin (g/dL)   Date Value   11/25/2024 11.2 (L)     Platelets (x10*3/uL)   Date Value   11/25/2024 164     Sodium (mmol/L)   Date Value   11/25/2024 142     Potassium (mmol/L)   Date Value   11/25/2024 4.7     Chloride (mmol/L)   Date Value   11/25/2024 102     Bicarbonate (mmol/L)   Date Value   11/25/2024 30     Urea Nitrogen (mg/dL)   Date Value "   11/25/2024 19     Creatinine (mg/dL)   Date Value   11/25/2024 1.20 (H)     Calcium (mg/dL)   Date Value   11/25/2024 8.8     Total Protein (g/dL)   Date Value   11/25/2024 6.9     Bilirubin, Total (mg/dL)   Date Value   11/25/2024 0.6     Alkaline Phosphatase (U/L)   Date Value   11/25/2024 73     ALT (U/L)   Date Value   11/25/2024 8     AST (U/L)   Date Value   11/25/2024 10     Glucose (mg/dL)   Date Value   11/25/2024 92     Cholesterol (mg/dL)   Date Value   11/25/2024 134   08/23/2023 109   02/23/2019 161   08/28/2018 169     LDL Calculated (mg/dL)   Date Value   11/25/2024 66     HDL-Cholesterol (mg/dL)   Date Value   11/25/2024 53.0     HDL (mg/dL)   Date Value   08/23/2023 44.1   02/23/2019 43.8   08/28/2018 52.7     Triglycerides (mg/dL)   Date Value   11/25/2024 77   08/23/2023 59   02/23/2019 81     Hemoglobin A1C (%)   Date Value   11/25/2024 5.9 (H)   10/02/2023 6.3 (H)   03/16/2019 5.6   02/23/2019 6.0   08/28/2018 5.7     Lab Results   Component Value Date    LDLF 53 08/23/2023     ASSESSMENT/PLAN: 89 yo F with hx of DLD, HTN, IFG, lymphedema of legs, CVA, UMA not on CPAP, tachy-mediated CM (EF 30-35%) since recovered, and afib s/p DCCV (12/2023) and 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 TSH and LFTs fine. Consults placed for [facility]/EP. Continue same meds including amiodarone 200 mg daily, bumex 1 mg daily, eliquis, and metoprolol 50 mg succinate daily. BP did not tolerate ACE-I or spironolactone for GDMT; she self-discontinued jardiance. On atorvastatin 20 mg daily with LDL 66 and TG 77. Updated TTE ordered to reassess EF. Sleep Medicine consult placed for untreated UMA and more frequent apneic waking episodes. Names given for PCP referral again. RTC 6 months.

## 2025-05-27 NOTE — PATIENT INSTRUCTIONS
1) Get updated echo  2) See Sleep Medicine for sleep apnea  3) Get established with Electrophysiologist Dr. Min [facility]  4) Set up Primary Care appointment (Dr. Maria Esther Nguyen, Dr. Rigo Wang, Dr. Rach Koenig)  5) See me in 6 months

## 2025-05-29 DIAGNOSIS — Z95.0 PACEMAKER: Primary | ICD-10-CM

## 2025-05-29 DIAGNOSIS — I49.5 SICK SINUS SYNDROME DUE TO SA NODE DYSFUNCTION (MULTI): ICD-10-CM

## 2025-05-30 ENCOUNTER — HOSPITAL ENCOUNTER (OUTPATIENT)
Dept: CARDIOLOGY | Facility: CLINIC | Age: 89
Discharge: HOME | End: 2025-05-30
Payer: COMMERCIAL

## 2025-05-30 DIAGNOSIS — I49.5 SICK SINUS SYNDROME (MULTI): ICD-10-CM

## 2025-05-30 DIAGNOSIS — I48.0 PAROXYSMAL ATRIAL FIBRILLATION (MULTI): ICD-10-CM

## 2025-05-30 DIAGNOSIS — Z95.0 PACEMAKER: ICD-10-CM

## 2025-05-30 LAB
ATRIAL RATE: 60 BPM
Q ONSET: 223 MS
QRS COUNT: 10 BEATS
QRS DURATION: 144 MS
QT INTERVAL: 446 MS
QTC CALCULATION(BAZETT): 448 MS
QTC FREDERICIA: 448 MS
R AXIS: -32 DEGREES
T AXIS: 149 DEGREES
T OFFSET: 446 MS
VENTRICULAR RATE: 61 BPM

## 2025-05-30 PROCEDURE — 93280 PM DEVICE PROGR EVAL DUAL: CPT

## 2025-06-23 ENCOUNTER — HOSPITAL ENCOUNTER (OUTPATIENT)
Dept: CARDIOLOGY | Facility: CLINIC | Age: 89
Discharge: HOME | End: 2025-06-23
Payer: COMMERCIAL

## 2025-06-23 DIAGNOSIS — I48.0 PAROXYSMAL ATRIAL FIBRILLATION (MULTI): ICD-10-CM

## 2025-06-23 DIAGNOSIS — I63.9 CEREBELLAR STROKE (MULTI): ICD-10-CM

## 2025-06-23 DIAGNOSIS — G47.33 OBSTRUCTIVE SLEEP APNEA SYNDROME: ICD-10-CM

## 2025-06-23 DIAGNOSIS — I50.30 HEART FAILURE WITH PRESERVED LEFT VENTRICULAR FUNCTION (HFPEF): ICD-10-CM

## 2025-06-23 DIAGNOSIS — I48.0 PAROXYSMAL ATRIAL FIBRILLATION (MULTI): Primary | ICD-10-CM

## 2025-06-23 LAB
AORTIC VALVE MEAN GRADIENT: 13 MMHG
AORTIC VALVE PEAK VELOCITY: 2.47 M/S
AV PEAK GRADIENT: 24 MMHG
AVA (PEAK VEL): 1.82 CM2
AVA (VTI): 1.78 CM2
EJECTION FRACTION APICAL 4 CHAMBER: 66
EJECTION FRACTION: 55 %
LEFT VENTRICLE INTERNAL DIMENSION DIASTOLE: 5.66 CM (ref 3.5–6)
LEFT VENTRICULAR OUTFLOW TRACT DIAMETER: 2.03 CM
MITRAL VALVE E/A RATIO: 2.33
RIGHT VENTRICLE FREE WALL PEAK S': 11 CM/S
TRICUSPID ANNULAR PLANE SYSTOLIC EXCURSION: 2.2 CM

## 2025-06-23 PROCEDURE — C8929 TTE W OR WO FOL WCON,DOPPLER: HCPCS

## 2025-06-23 PROCEDURE — 2500000004 HC RX 250 GENERAL PHARMACY W/ HCPCS (ALT 636 FOR OP/ED): Performed by: INTERNAL MEDICINE

## 2025-06-23 PROCEDURE — 93306 TTE W/DOPPLER COMPLETE: CPT | Performed by: INTERNAL MEDICINE

## 2025-06-23 RX ADMIN — PERFLUTREN 3 ML OF DILUTION: 6.52 INJECTION, SUSPENSION INTRAVENOUS at 11:43

## 2025-06-30 ENCOUNTER — TELEPHONE (OUTPATIENT)
Dept: CARDIOLOGY | Facility: CLINIC | Age: 89
End: 2025-06-30
Payer: COMMERCIAL

## 2025-06-30 NOTE — TELEPHONE ENCOUNTER
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 We will watch via surveillance echo in one year.  ----- Message -----  From: Daniel, [employer] - Cardiology Results In  Sent: 6/23/2025  12:52 PM EDT  To: Marcie Terrazas MD

## 2025-06-30 NOTE — TELEPHONE ENCOUNTER
Phoned patient and no answer.  Left voicemail with contact number for nursing office [phone] and requested patient return call.

## 2025-07-02 NOTE — TELEPHONE ENCOUNTER
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 We will watch via surveillance echo in one year.  ----- Message -----  From: Daniel, [employer] - Cardiology Results In  Sent: 6/23/2025  12:52 PM EDT  To: Marcie Terrazas MD

## 2025-07-03 NOTE — TELEPHONE ENCOUNTER
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 We will watch via surveillance echo in one year.  ----- Message -----  From: Daniel, [employer] - Cardiology Results In  Sent: 6/23/2025  12:52 PM EDT  To: Marcie Terrazas MD

## 2025-07-03 NOTE — TELEPHONE ENCOUNTER
Tried to contact patient got voicemail, left message regarding Echo results and to call [phone] with any questions.   Echo shows preserved EF but mild aortic stenosis. 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 We will watch via surveillance echo in one year.

## 2025-07-09 PROBLEM — I50.31 ACUTE HEART FAILURE WITH PRESERVED EJECTION FRACTION: Status: RESOLVED | Noted: 2023-12-21 | Resolved: 2025-07-09

## 2025-07-09 PROBLEM — I50.30 HEART FAILURE WITH PRESERVED LEFT VENTRICULAR FUNCTION (HFPEF): Status: RESOLVED | Noted: 2023-10-12 | Resolved: 2025-07-09

## 2025-07-16 ENCOUNTER — APPOINTMENT (OUTPATIENT)
Facility: CLINIC | Age: 89
End: 2025-07-16
Payer: COMMERCIAL

## 2025-08-15 ENCOUNTER — APPOINTMENT (OUTPATIENT)
Dept: PRIMARY CARE | Facility: CLINIC | Age: 89
End: 2025-08-15
Payer: COMMERCIAL

## 2025-11-25 ENCOUNTER — APPOINTMENT (OUTPATIENT)
Dept: CARDIOLOGY | Facility: CLINIC | Age: 89
End: 2025-11-25
Payer: COMMERCIAL

## 2025-11-28 ENCOUNTER — APPOINTMENT (OUTPATIENT)
Dept: OPHTHALMOLOGY | Facility: CLINIC | Age: 89
End: 2025-11-28
Payer: COMMERCIAL

## (undated) DEVICE — Device

## (undated) DEVICE — HEMOSTAT, ARISTA, ABSORBABLE, 3 GRAMS

## (undated) DEVICE — KIT, STYLET, 52CM

## (undated) DEVICE — SUTURE, ETHIBOND, XTRA, 30 IN, 0, CT-1, GREEN

## (undated) DEVICE — DRESSING, AQUACEL AG, HYDROFIBER W/SILVER, 3.5 X 6 IN

## (undated) DEVICE — SUTURE, VICRYL, 3-0, 27 IN, SH, VIOLET

## (undated) DEVICE — CAUTERY, PENCIL, PUSH BUTTON, SMOKE EVAC, 70MM

## (undated) DEVICE — ADHESIVE, SKIN, LIQUIBAND EXCEED

## (undated) DEVICE — CABLE, SURGICAL, DISP

## (undated) DEVICE — SUTURE, MONOCRYL, 4-0, 18 IN, PS2, UNDYED

## (undated) DEVICE — COVER, EQUIPMENT, DOME COVER, SNAP CAP, 30 IN, CLEAR, LF

## (undated) DEVICE — INTRODUCER SYSTEM, PRELUDE SNAP, SPLITTABLE, HEMOSTATIC, 7FR